# Patient Record
Sex: MALE | Race: WHITE | NOT HISPANIC OR LATINO | Employment: OTHER | URBAN - METROPOLITAN AREA
[De-identification: names, ages, dates, MRNs, and addresses within clinical notes are randomized per-mention and may not be internally consistent; named-entity substitution may affect disease eponyms.]

---

## 2019-08-22 ENCOUNTER — APPOINTMENT (OUTPATIENT)
Dept: GENERAL RADIOLOGY | Facility: CLINIC | Age: 59
End: 2019-08-22
Attending: EMERGENCY MEDICINE
Payer: COMMERCIAL

## 2019-08-22 ENCOUNTER — HOSPITAL ENCOUNTER (EMERGENCY)
Facility: CLINIC | Age: 59
Discharge: ANOTHER HOSPITAL | End: 2019-08-23
Attending: EMERGENCY MEDICINE | Admitting: EMERGENCY MEDICINE
Payer: COMMERCIAL

## 2019-08-22 ENCOUNTER — APPOINTMENT (OUTPATIENT)
Dept: CT IMAGING | Facility: CLINIC | Age: 59
End: 2019-08-22
Attending: EMERGENCY MEDICINE
Payer: COMMERCIAL

## 2019-08-22 DIAGNOSIS — R06.09 DYSPNEA ON EXERTION: ICD-10-CM

## 2019-08-22 DIAGNOSIS — R93.89 ABNORMAL CT OF THE CHEST: ICD-10-CM

## 2019-08-22 LAB
ALBUMIN SERPL-MCNC: 3.1 G/DL (ref 3.4–5)
ALP SERPL-CCNC: 105 U/L (ref 40–150)
ALT SERPL W P-5'-P-CCNC: 18 U/L (ref 0–70)
ANION GAP SERPL CALCULATED.3IONS-SCNC: 7 MMOL/L (ref 3–14)
AST SERPL W P-5'-P-CCNC: 12 U/L (ref 0–45)
BASOPHILS # BLD AUTO: 0.1 10E9/L (ref 0–0.2)
BASOPHILS NFR BLD AUTO: 1 %
BILIRUB SERPL-MCNC: 0.6 MG/DL (ref 0.2–1.3)
BUN SERPL-MCNC: 22 MG/DL (ref 7–30)
CALCIUM SERPL-MCNC: 9.2 MG/DL (ref 8.5–10.1)
CHLORIDE SERPL-SCNC: 106 MMOL/L (ref 94–109)
CO2 SERPL-SCNC: 27 MMOL/L (ref 20–32)
CREAT SERPL-MCNC: 1.45 MG/DL (ref 0.66–1.25)
D DIMER PPP FEU-MCNC: 1.7 UG/ML FEU (ref 0–0.5)
DIFFERENTIAL METHOD BLD: NORMAL
EOSINOPHIL # BLD AUTO: 0.2 10E9/L (ref 0–0.7)
EOSINOPHIL NFR BLD AUTO: 2.5 %
ERYTHROCYTE [DISTWIDTH] IN BLOOD BY AUTOMATED COUNT: 13.1 % (ref 10–15)
GFR SERPL CREATININE-BSD FRML MDRD: 53 ML/MIN/{1.73_M2}
GLUCOSE SERPL-MCNC: 213 MG/DL (ref 70–99)
HCT VFR BLD AUTO: 44.2 % (ref 40–53)
HGB BLD-MCNC: 14.4 G/DL (ref 13.3–17.7)
IMM GRANULOCYTES # BLD: 0 10E9/L (ref 0–0.4)
IMM GRANULOCYTES NFR BLD: 0.3 %
LYMPHOCYTES # BLD AUTO: 2.3 10E9/L (ref 0.8–5.3)
LYMPHOCYTES NFR BLD AUTO: 26.4 %
MCH RBC QN AUTO: 28.1 PG (ref 26.5–33)
MCHC RBC AUTO-ENTMCNC: 32.6 G/DL (ref 31.5–36.5)
MCV RBC AUTO: 86 FL (ref 78–100)
MONOCYTES # BLD AUTO: 0.6 10E9/L (ref 0–1.3)
MONOCYTES NFR BLD AUTO: 6.5 %
NEUTROPHILS # BLD AUTO: 5.5 10E9/L (ref 1.6–8.3)
NEUTROPHILS NFR BLD AUTO: 63.3 %
NRBC # BLD AUTO: 0 10*3/UL
NRBC BLD AUTO-RTO: 0 /100
NT-PROBNP SERPL-MCNC: 608 PG/ML (ref 0–900)
PLATELET # BLD AUTO: 288 10E9/L (ref 150–450)
POTASSIUM SERPL-SCNC: 3.9 MMOL/L (ref 3.4–5.3)
PROT SERPL-MCNC: 7 G/DL (ref 6.8–8.8)
RBC # BLD AUTO: 5.13 10E12/L (ref 4.4–5.9)
SODIUM SERPL-SCNC: 140 MMOL/L (ref 133–144)
TROPONIN I SERPL-MCNC: <0.015 UG/L (ref 0–0.04)
WBC # BLD AUTO: 8.7 10E9/L (ref 4–11)

## 2019-08-22 PROCEDURE — 25000128 H RX IP 250 OP 636: Performed by: EMERGENCY MEDICINE

## 2019-08-22 PROCEDURE — 83880 ASSAY OF NATRIURETIC PEPTIDE: CPT | Performed by: EMERGENCY MEDICINE

## 2019-08-22 PROCEDURE — 96360 HYDRATION IV INFUSION INIT: CPT | Performed by: EMERGENCY MEDICINE

## 2019-08-22 PROCEDURE — 71046 X-RAY EXAM CHEST 2 VIEWS: CPT

## 2019-08-22 PROCEDURE — 85379 FIBRIN DEGRADATION QUANT: CPT | Performed by: EMERGENCY MEDICINE

## 2019-08-22 PROCEDURE — 96361 HYDRATE IV INFUSION ADD-ON: CPT | Performed by: EMERGENCY MEDICINE

## 2019-08-22 PROCEDURE — 85025 COMPLETE CBC W/AUTO DIFF WBC: CPT | Performed by: EMERGENCY MEDICINE

## 2019-08-22 PROCEDURE — 93010 ELECTROCARDIOGRAM REPORT: CPT | Mod: Z6 | Performed by: EMERGENCY MEDICINE

## 2019-08-22 PROCEDURE — 99285 EMERGENCY DEPT VISIT HI MDM: CPT | Mod: 25 | Performed by: EMERGENCY MEDICINE

## 2019-08-22 PROCEDURE — 84484 ASSAY OF TROPONIN QUANT: CPT | Performed by: EMERGENCY MEDICINE

## 2019-08-22 PROCEDURE — 93005 ELECTROCARDIOGRAM TRACING: CPT | Performed by: EMERGENCY MEDICINE

## 2019-08-22 PROCEDURE — 71275 CT ANGIOGRAPHY CHEST: CPT

## 2019-08-22 PROCEDURE — 25000125 ZZHC RX 250: Performed by: EMERGENCY MEDICINE

## 2019-08-22 PROCEDURE — 80053 COMPREHEN METABOLIC PANEL: CPT | Performed by: EMERGENCY MEDICINE

## 2019-08-22 RX ORDER — IOPAMIDOL 755 MG/ML
100 INJECTION, SOLUTION INTRAVASCULAR ONCE
Status: COMPLETED | OUTPATIENT
Start: 2019-08-22 | End: 2019-08-22

## 2019-08-22 RX ADMIN — SODIUM CHLORIDE 74 ML: 9 INJECTION, SOLUTION INTRAVENOUS at 20:30

## 2019-08-22 RX ADMIN — IOPAMIDOL 100 ML: 755 INJECTION, SOLUTION INTRAVENOUS at 20:29

## 2019-08-22 RX ADMIN — SODIUM CHLORIDE 500 ML: 9 INJECTION, SOLUTION INTRAVENOUS at 23:28

## 2019-08-22 ASSESSMENT — ENCOUNTER SYMPTOMS
EYES NEGATIVE: 1
CARDIOVASCULAR NEGATIVE: 1
GASTROINTESTINAL NEGATIVE: 1
MUSCULOSKELETAL NEGATIVE: 1
CONSTITUTIONAL NEGATIVE: 1
SHORTNESS OF BREATH: 1
ENDOCRINE NEGATIVE: 1
HEMATOLOGIC/LYMPHATIC NEGATIVE: 1
PSYCHIATRIC NEGATIVE: 1
ALLERGIC/IMMUNOLOGIC NEGATIVE: 1

## 2019-08-22 NOTE — ED NOTES
Shortness of breath and dizziness for past 2 weeks.  Patient stated that on   Saturday he had 2 syncopal episodes and was nauseous afterwards.  Patient denies hitting head.  Patient stated that he had these symptoms last winter but then they went away.  Patient said that he was seen in urgent care last weekend for same symptoms.

## 2019-08-22 NOTE — ED PROVIDER NOTES
"  History     Chief Complaint   Patient presents with     Shortness of Breath     episodes of sob with syncope. last one saturday     HPI  Jeremiah Isaac is a 59 year old male with complaints of shortness of breath for the past two weeks. He reports first sign of symptom last winter with worsening symptoms in the last two weeks. Patient notes strenuous activity including going up a hill or stairs causes him to have shortness of breath and dizziness. He also notes \"breathing really hard\" and sweating a lot after activity. He reports losing consciousness a couple of times. His last episode of LOC was five days ago.  His roommate witnessed an incident.  He denies shortness of breath while lying down. 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 Patient states he has narcolepsy and is prescribed adderall. No medication allergies.  Patient recalls father with past heart problems such as CABG before the age of 50. His clinic provider is at [facility]. Prior stress echocardiogram at [facility] in [place]. V\"alve regurgitation was found with no blockages\" by report. He denies smoking in the past.     Social History: The patient lives in [place], [place]. He presents by private car with friend, Sarah.    Allergies:  No Known Allergies    Problem List:    There are no active problems to display for this patient.       Past Medical History:    No past medical history on file.    Past Surgical History:    No past surgical history on file.    Family History:    No family history on file.    Marital Status:    Social History     Tobacco Use     Smoking status: Not on file   Substance Use Topics     Alcohol use: Not on file     Drug use: Not on file        Medications:      No current outpatient medications on file.      Review of Systems   Constitutional: Negative.    HENT: Negative.    Eyes: Negative.    Respiratory: Positive for shortness of breath (with activity over the last 2 week, improved with " rest).    Cardiovascular: Negative.    Gastrointestinal: Negative.    Endocrine: Negative.    Genitourinary: Negative.    Musculoskeletal: Negative.    Skin: Negative.    Allergic/Immunologic: Negative.    Neurological: Positive for syncope.   Hematological: Negative.    Psychiatric/Behavioral: Negative.        Physical Exam   BP: (!) 170/101  Pulse: 78  Heart Rate: 73  Temp: 97.4  F (36.3  C)  Resp: 18  Weight: 145.2 kg (320 lb)  SpO2: 97 %      Physical Exam   Constitutional: He is oriented to person, place, and time. He appears well-developed and well-nourished.  Non-toxic appearance. He does not appear ill. No distress. He is not intubated.   HENT:   Head: Normocephalic and atraumatic.   Eyes: Pupils are equal, round, and reactive to light. EOM are normal.   Neck: Normal range of motion. Neck supple. No hepatojugular reflux and no JVD present. No tracheal deviation present. No thyromegaly present.   Cardiovascular: Normal rate and normal heart sounds.  No extrasystoles are present. Exam reveals no gallop, no friction rub and no decreased pulses.   No murmur heard.  Pulmonary/Chest: Effort normal. No accessory muscle usage or stridor. No apnea, no tachypnea and no bradypnea. He is not intubated. No respiratory distress.   Abdominal: Soft. He exhibits no distension, no ascites and no mass. There is no splenomegaly or hepatomegaly. There is no tenderness. There is no rebound and no guarding.   Genitourinary: Rectal exam shows guaiac negative stool.   Musculoskeletal:        Right lower leg: Normal.        Left lower leg: Normal.   Lymphadenopathy:     He has no cervical adenopathy.   Neurological: He is alert and oriented to person, place, and time. He is not disoriented. No cranial nerve deficit.   Skin: Capillary refill takes less than 2 seconds. No ecchymosis and no rash noted. He is not diaphoretic. No cyanosis or erythema. No pallor. Nails show no clubbing.   Psychiatric: He has a normal mood and affect. His  behavior is normal. His mood appears not anxious. He is not agitated.       ED Course        Procedures               EKG Interpretation:      Interpreted by Tay Kline MD  Time reviewed:17:50  Symptoms at time of EKG: Shortness of breath   Rhythm: normal sinus   Rate: Normal  Axis: Left Axis Deviation  Ectopy: none  Conduction: normal  ST Segments/ T Waves: Non-specific ST-T wave changes  Q Waves: nonspecific  Comparison to prior: No old EKG available    Clinical Impression: Normal sinus rhythm, left anterior fascicular block.          Critical Care time:  none               ED medications: none       ED labs and imaging:  Results for orders placed or performed during the hospital encounter of 08/22/19   Chest XR,  PA & LAT    Narrative    CHEST TWO VIEWS  8/22/2019 6:59 PM     HISTORY: Dyspnea on exertion x 2 weeks with fainting spells.     COMPARISON: None.      Impression    IMPRESSION: Normal.    KIAH EDWARDS MD   CT Chest Pulmonary Embolism w Contrast    Addendum: 8/22/2019    Enlarged pulmonary trunk measuring up to 3.7 cm which can be seen in  pulmonary hypertension.    CHIDI KUNZ MD      Narrative    CT CHEST PULMONARY EMBOLISM WITH CONTRAST  8/22/2019 8:42 PM    HISTORY:  Dyspnea on exertion, syncope, elevated D-dimer. Evaluate for  pulmonary embolism versus other acute cardiopulmonary process.    TECHNIQUE: Scans obtained from the apices through the diaphragm with  IV contrast. 100 mL Isovue 370 IV injected. Radiation dose for this  scan was reduced using automated exposure control, adjustment of the  mA and/or kV according to patient size, or iterative reconstruction  technique.    COMPARISON: None available    FINDINGS:  Vascular: No acute thoracic aortic abnormality. No evidence for  pulmonary embolism. Pulmonary trunk is enlarged measuring 3.7 cm in  diameter.    Lungs and pleura: No pleural effusion or pneumothorax is seen. Mild  dependent subsegmental atelectasis seen in the  bilateral lungs. A 1.1  cm subpleural nodular opacity is noted in the superior segment of the  right lower lobe (series 5, image 47).    Lymph nodes: No enlarged lymph nodes. Subcentimeter mediastinal lymph  nodes are noted measuring up to 7 mm in the right paratracheal space.    Additional findings: 5 mm hypoattenuating nodule is noted in the right  lobe of the thyroid gland. The heart is enlarged. No pericardial  effusion is seen. Multivessel coronary artery calcifications are  present. Scattered atrophy of the pancreas is partially visualized. A  3.4 x 3.0 cm peripherally calcified fat containing lesion is noted in  the right chest wall (series 4, image 38). No suspicious osseous  lesions are seen. Multilevel degenerative changes are present in the  spine.      Impression    IMPRESSION:   1.  No pulmonary embolism seen.  2.  Cardiomegaly with multivessel coronary artery calcifications.  3.  3.4 cm peripherally calcified fat containing lesion in the right  chest wall which may represent fat necrosis.  4.  1.1 cm irregularly-shaped subpleural nodular opacity in the right  lower lobe which may represent focal inflammation or infection.  Short-term interval follow-up with chest CT in 3 months is  recommended.     Recommendations for an incidental lung nodule > 8mm:    Low risk patients: Consider follow-up CT in 3 months, PET/CT, and/or  tissue sampling.    High risk patients: Same as for low risk patients.    *Low Risk: Minimal or absent history of smoking or other known risk  factors.  *Nonsolid (ground-glass) or partly solid nodules may require longer  follow-up to exclude indolent adenocarcinoma.  *Recommendations based on Guidelines for the Management of Incidental  Pulmonary Nodules Detected at CT: From the Fleischner Society 2017,  Radiology 2017.    CHIDI KUNZ MD   CBC with platelets, differential   Result Value Ref Range    WBC 8.7 4.0 - 11.0 10e9/L    RBC Count 5.13 4.4 - 5.9 10e12/L    Hemoglobin 14.4  13.3 - 17.7 g/dL    Hematocrit 44.2 40.0 - 53.0 %    MCV 86 78 - 100 fl    MCH 28.1 26.5 - 33.0 pg    MCHC 32.6 31.5 - 36.5 g/dL    RDW 13.1 10.0 - 15.0 %    Platelet Count 288 150 - 450 10e9/L    Diff Method Automated Method     % Neutrophils 63.3 %    % Lymphocytes 26.4 %    % Monocytes 6.5 %    % Eosinophils 2.5 %    % Basophils 1.0 %    % Immature Granulocytes 0.3 %    Nucleated RBCs 0 0 /100    Absolute Neutrophil 5.5 1.6 - 8.3 10e9/L    Absolute Lymphocytes 2.3 0.8 - 5.3 10e9/L    Absolute Monocytes 0.6 0.0 - 1.3 10e9/L    Absolute Eosinophils 0.2 0.0 - 0.7 10e9/L    Absolute Basophils 0.1 0.0 - 0.2 10e9/L    Abs Immature Granulocytes 0.0 0 - 0.4 10e9/L    Absolute Nucleated RBC 0.0    Comprehensive metabolic panel   Result Value Ref Range    Sodium 140 133 - 144 mmol/L    Potassium 3.9 3.4 - 5.3 mmol/L    Chloride 106 94 - 109 mmol/L    Carbon Dioxide 27 20 - 32 mmol/L    Anion Gap 7 3 - 14 mmol/L    Glucose 213 (H) 70 - 99 mg/dL    Urea Nitrogen 22 7 - 30 mg/dL    Creatinine 1.45 (H) 0.66 - 1.25 mg/dL    GFR Estimate 53 (L) >60 mL/min/[1.73_m2]    GFR Estimate If Black 61 >60 mL/min/[1.73_m2]    Calcium 9.2 8.5 - 10.1 mg/dL    Bilirubin Total 0.6 0.2 - 1.3 mg/dL    Albumin 3.1 (L) 3.4 - 5.0 g/dL    Protein Total 7.0 6.8 - 8.8 g/dL    Alkaline Phosphatase 105 40 - 150 U/L    ALT 18 0 - 70 U/L    AST 12 0 - 45 U/L   NT pro BNP   Result Value Ref Range    N-Terminal Pro BNP Inpatient 608 0 - 900 pg/mL   D dimer quantitative   Result Value Ref Range    D Dimer 1.7 (H) 0.0 - 0.50 ug/ml FEU   Troponin I   Result Value Ref Range    Troponin I ES <0.015 0.000 - 0.045 ug/L         ED Vitals:  Vitals:    08/22/19 2145 08/22/19 2200 08/22/19 2215 08/22/19 2325   BP: (!) 154/81 (!) 174/96 (!) 170/91 (!) 152/91   BP Location:    Left arm   Pulse: 73 75 74 74   Resp:  13 24 18   Temp:       TempSrc:       SpO2: 94% 94% 93% 97%   Weight:         Prior or recent diagnostic imaging and work-up:  Records obtained from review  b Care Everywhere  Stress echocardiogram on 2/27/19  Interpretation Summary    * STRESS ECHO.    * Stress Echo is non-diagnostic secondary to suboptimal maximum predicted  heart rate. The stress test was terminated due hypertensive response at peak  stress where the BP at peak stress was 240/100 mmHg..    * Stress EKG is negative for ischemic changes.    * No chest pain noted.    * 5 METS and  79 % max predicted heart rate (MPHR) achieved.    * Elevated baseline BP with hypertensive response to peak stress.    * Severely reduced aerobic capacity.    * Target heart rate not achieved.    * RESTING ECHO.    * The left ventricular systolic function is normal, estimated LVEF 65-70%.        Assessments & Plan (with Medical Decision Making)   Clinical impression: 59-year-old male who presented from home by private car with his friend- Sarah-for evaluation for 2-week history of progressive dyspnea on exertion and fainting spells that have been witnessed.  Symptoms suspicious for an anginal equivalent, need to exclude acute coronary syndrome.    Last episode of fainting 5 days prior on August 17, 2019.  Patient reports with minimal activity  increasing shortness of breath that improves with rest.  No personal history of coronary disease but had a stress echocardiogram in February 2019 which was nondiagnostic and suboptimal due to severely decreased aerobic capacity- see report.  No fever or chills,  history of narcolepsy, family history of severe coronary disease with father having a heart attack at age 48.,  He currently lives in [facility] with a roommate.  Patient is followed by the [facility] in [place], [place].  Patient reports he is scheduled to see cardiology in [place], [place] for follow-up on symptoms but appointment is not for another 3 weeks. No leg swelling no history of DVT or PE.No report chest pain with activity but does report significant fatigue with minimal exertion including  "climbing up steps.  Currently on Adderall, amlodipine, aspirin, atorvastatin, clonidine, Jardiance, glipizide Humalog, lisinopril, metformin, metoprolol.  On my exam- large BMI patient- (320lb), in no respiratory distress with protuberant abdomen.  murmur on exam but no crackles, no wheezing or rhonchi.  94% on room air blood pressure was 149/80.In sinus rhythm on telemetry.    ED course and Plan:   EKG on arrival in the department shows poor R wave progression, left axis deviation, sinus rhythm, T wave inversion with depression in lead III, no old EKG for comparison.  I expressed concern about his symptoms and possible causes for exertional dyspnea with syncope include valvulopathy such as aortic stenosis, symptoms could be due to stable angina with report of improvement with symptoms with rest and dyspnea can be an anginal equivalent although he does not report palpitations or chest pain.  Patient is associated comorbidities blood work was obtained.    Work-up in the department today revealed a normal BNP.  Elevated d-dimer 1.7 age-adjusted, glucose of 213 creatinine 1.45, and a hemogram that was normal.  With elevated d-dimer, dyspnea on exertion and syncope, normal chest x-ray  CT chest PE protocol was obtained based on patient's large BMI to exclude a PE.  CT PE protocol was negative for PE however cardiomegaly was noted and multivessel coronary artery calcifications.  Additional findings  noted by the interpreting radiologist-see detailed report above.    Patient requested transfer to [facility] with  prior diagnostic evaluation and \"my records are there\".  There was significant delay in transfer as patient initially wanted to be transferred to [facility]- no beds, we ultimately settled on transferring to [facility] after making attempts to transfer him to his desired hospital in the [facility].    I spoke with Dr. Robles at 12.28am- admitting hospitalist at [facility] who agreed to assume care upon transfer after reviewing rationale for transfer, clinical history, presentation, ED course and work-up.      Disclaimer: This note consists of symbols derived from keyboarding, dictation and/or voice recognition software. As a result, there may be errors in the script that have gone undetected. Please consider this when interpreting information found in this chart.  I have reviewed the nursing notes.    I have reviewed the findings, diagnosis, plan and need for follow up with the patient.       New Prescriptions    No medications on file       Final diagnoses:   Dyspnea on exertion   Abnormal CT of the chest - Coronary calcifications, cardiomegaly     This document serves as a record of the services and decisions personally performed and made by Tay Kline. The HPI was created on his behalf by Miguelito Kearney, a trained medical scribe. The creation of this document is based on the provider's statements to the medical scribe.  Miguelito Kearney 6:37 PM August 22, 2019    Provider:  The information in this document created by the medical scribe for me, accurately reflects the services I personally performed and the decisions made by me. I have reviewed and approved this document for accuracy prior to leaving the patient care area.  Tay Kline 6:37 PM August 22, 2019 8/22/2019   [facility] EMERGENCY DEPARTMENT     Tay Kline MD  08/23/19 0050

## 2019-08-23 VITALS
HEART RATE: 74 BPM | SYSTOLIC BLOOD PRESSURE: 152 MMHG | TEMPERATURE: 97.4 F | WEIGHT: 315 LBS | DIASTOLIC BLOOD PRESSURE: 91 MMHG | RESPIRATION RATE: 17 BRPM | OXYGEN SATURATION: 98 %

## 2019-08-23 ASSESSMENT — MIFFLIN-ST. JEOR: SCORE: 2289.08

## 2019-08-23 NOTE — ED PROVIDER NOTES
Patient signed out.  59-year-old male presenting via private vehicle awaiting transfer to outside facility.  Plan was for transfer, with ambulance form which had been arranged.  Ultimately, I was notified and became involved in the patient's care and patient refused EMS transfer.  I discussed risks including and up to death, with worsening pain, shortness of breath.  Patient is aware, and refuses EMS transfer.  I discussed with patient that he should go directly to [facility], do not stop to eat, and do not eat or drink anything in case procedure is performed in the morning.  Patient voiced his understanding.     Ministerio Clifton MD  08/23/19 0147

## 2019-08-25 ASSESSMENT — MIFFLIN-ST. JEOR: SCORE: 2255.51

## 2019-08-26 ENCOUNTER — SURGERY - HEALTHEAST (OUTPATIENT)
Dept: CARDIOLOGY | Facility: CLINIC | Age: 59
End: 2019-08-26

## 2019-08-29 ENCOUNTER — AMBULATORY - HEALTHEAST (OUTPATIENT)
Dept: CARDIAC REHAB | Facility: CLINIC | Age: 59
End: 2019-08-29

## 2019-09-30 ENCOUNTER — AMBULATORY - HEALTHEAST (OUTPATIENT)
Dept: CARDIOLOGY | Facility: CLINIC | Age: 59
End: 2019-09-30

## 2019-10-01 ENCOUNTER — COMMUNICATION - HEALTHEAST (OUTPATIENT)
Dept: CARDIOLOGY | Facility: CLINIC | Age: 59
End: 2019-10-01

## 2019-10-01 DIAGNOSIS — Z95.5 STATUS POST INSERTION OF DRUG ELUTING CORONARY ARTERY STENT: ICD-10-CM

## 2019-10-01 DIAGNOSIS — I25.119 CORONARY ARTERY DISEASE INVOLVING NATIVE CORONARY ARTERY OF NATIVE HEART WITH ANGINA PECTORIS (H): ICD-10-CM

## 2019-10-07 ENCOUNTER — OFFICE VISIT - HEALTHEAST (OUTPATIENT)
Dept: CARDIOLOGY | Facility: CLINIC | Age: 59
End: 2019-10-07

## 2019-10-07 ENCOUNTER — SURGERY - HEALTHEAST (OUTPATIENT)
Dept: CARDIOLOGY | Facility: CLINIC | Age: 59
End: 2019-10-07

## 2019-10-07 ENCOUNTER — AMBULATORY - HEALTHEAST (OUTPATIENT)
Dept: CARDIOLOGY | Facility: CLINIC | Age: 59
End: 2019-10-07

## 2019-10-07 DIAGNOSIS — I25.119 CORONARY ARTERY DISEASE INVOLVING NATIVE CORONARY ARTERY OF NATIVE HEART WITH ANGINA PECTORIS (H): ICD-10-CM

## 2019-10-07 ASSESSMENT — MIFFLIN-ST. JEOR: SCORE: 2294.98

## 2019-10-10 ENCOUNTER — SURGERY - HEALTHEAST (OUTPATIENT)
Dept: CARDIOLOGY | Facility: CLINIC | Age: 59
End: 2019-10-10

## 2019-10-10 ASSESSMENT — MIFFLIN-ST. JEOR: SCORE: 2303.59

## 2019-10-11 ASSESSMENT — MIFFLIN-ST. JEOR: SCORE: 2267.82

## 2019-10-21 ENCOUNTER — TRANSFERRED RECORDS (OUTPATIENT)
Dept: HEALTH INFORMATION MANAGEMENT | Facility: CLINIC | Age: 59
End: 2019-10-21

## 2019-10-30 ENCOUNTER — TRANSFERRED RECORDS (OUTPATIENT)
Dept: HEALTH INFORMATION MANAGEMENT | Facility: CLINIC | Age: 59
End: 2019-10-30

## 2019-11-05 ENCOUNTER — TRANSFERRED RECORDS (OUTPATIENT)
Dept: HEALTH INFORMATION MANAGEMENT | Facility: CLINIC | Age: 59
End: 2019-11-05

## 2019-11-07 ENCOUNTER — HEALTH MAINTENANCE LETTER (OUTPATIENT)
Age: 59
End: 2019-11-07

## 2019-11-08 ENCOUNTER — AMBULATORY - HEALTHEAST (OUTPATIENT)
Dept: CARDIOLOGY | Facility: CLINIC | Age: 59
End: 2019-11-08

## 2019-11-26 ENCOUNTER — OFFICE VISIT - HEALTHEAST (OUTPATIENT)
Dept: CARDIOLOGY | Facility: CLINIC | Age: 59
End: 2019-11-26

## 2019-11-26 DIAGNOSIS — R06.09 DYSPNEA ON EXERTION: ICD-10-CM

## 2019-11-26 DIAGNOSIS — I27.20 PULMONARY HYPERTENSION (H): ICD-10-CM

## 2019-11-26 LAB
ANION GAP SERPL CALCULATED.3IONS-SCNC: 14 MMOL/L (ref 5–18)
BNP SERPL-MCNC: 433 PG/ML (ref 0–49)
BUN SERPL-MCNC: 48 MG/DL (ref 8–22)
CALCIUM SERPL-MCNC: 9.7 MG/DL (ref 8.5–10.5)
CHLORIDE BLD-SCNC: 102 MMOL/L (ref 98–107)
CO2 SERPL-SCNC: 24 MMOL/L (ref 22–31)
CREAT SERPL-MCNC: 2.49 MG/DL (ref 0.7–1.3)
GFR SERPL CREATININE-BSD FRML MDRD: 27 ML/MIN/1.73M2
GLUCOSE BLD-MCNC: 275 MG/DL (ref 70–125)
POTASSIUM BLD-SCNC: 4.8 MMOL/L (ref 3.5–5)
SODIUM SERPL-SCNC: 140 MMOL/L (ref 136–145)

## 2019-11-26 ASSESSMENT — MIFFLIN-ST. JEOR: SCORE: 2254.15

## 2019-11-27 ENCOUNTER — AMBULATORY - HEALTHEAST (OUTPATIENT)
Dept: CARDIOLOGY | Facility: CLINIC | Age: 59
End: 2019-11-27

## 2019-11-27 DIAGNOSIS — I25.119 CORONARY ARTERY DISEASE INVOLVING NATIVE CORONARY ARTERY OF NATIVE HEART WITH ANGINA PECTORIS (H): ICD-10-CM

## 2019-12-04 ENCOUNTER — HOSPITAL ENCOUNTER (OUTPATIENT)
Dept: NUCLEAR MEDICINE | Facility: CLINIC | Age: 59
Discharge: HOME | End: 2019-12-04
Attending: INTERNAL MEDICINE

## 2019-12-04 ENCOUNTER — HOSPITAL ENCOUNTER (OUTPATIENT)
Dept: RADIOLOGY | Facility: CLINIC | Age: 59
Discharge: HOME | End: 2019-12-04
Attending: INTERNAL MEDICINE

## 2019-12-04 DIAGNOSIS — I27.20 PULMONARY HYPERTENSION (H): ICD-10-CM

## 2019-12-04 DIAGNOSIS — R06.09 DYSPNEA ON EXERTION: ICD-10-CM

## 2019-12-04 DIAGNOSIS — R06.09 OTHER FORMS OF DYSPNEA: ICD-10-CM

## 2019-12-13 ENCOUNTER — OFFICE VISIT - HEALTHEAST (OUTPATIENT)
Dept: CARDIOLOGY | Facility: CLINIC | Age: 59
End: 2019-12-13

## 2019-12-13 DIAGNOSIS — E78.2 MIXED HYPERLIPIDEMIA: ICD-10-CM

## 2019-12-13 DIAGNOSIS — I27.20 PULMONARY HYPERTENSION (H): ICD-10-CM

## 2019-12-13 LAB
ALBUMIN SERPL-MCNC: 3.5 G/DL (ref 3.5–5)
ALP SERPL-CCNC: 106 U/L (ref 45–120)
ALT SERPL W P-5'-P-CCNC: 16 U/L (ref 0–45)
AST SERPL W P-5'-P-CCNC: 14 U/L (ref 0–40)
BILIRUB DIRECT SERPL-MCNC: 0.2 MG/DL
BILIRUB SERPL-MCNC: 0.6 MG/DL (ref 0–1)
PROT SERPL-MCNC: 6.8 G/DL (ref 6–8)
TSH SERPL DL<=0.005 MIU/L-ACNC: 3.23 UIU/ML (ref 0.3–5)

## 2019-12-13 ASSESSMENT — MIFFLIN-ST. JEOR: SCORE: 2272.3

## 2019-12-16 LAB — ANA SER QL: 0.4 U

## 2019-12-18 ENCOUNTER — HOSPITAL ENCOUNTER (OUTPATIENT)
Dept: RESPIRATORY THERAPY | Facility: HOSPITAL | Age: 59
Discharge: HOME | End: 2019-12-18
Attending: INTERNAL MEDICINE

## 2019-12-18 DIAGNOSIS — I27.20 PULMONARY HYPERTENSION (H): ICD-10-CM

## 2019-12-18 LAB — HGB BLD-MCNC: 14.4 G/DL (ref 14–18)

## 2019-12-30 ENCOUNTER — COMMUNICATION - HEALTHEAST (OUTPATIENT)
Dept: CARDIOLOGY | Facility: CLINIC | Age: 59
End: 2019-12-30

## 2019-12-30 DIAGNOSIS — I27.20 PULMONARY HYPERTENSION (H): ICD-10-CM

## 2020-01-02 ENCOUNTER — SURGERY - HEALTHEAST (OUTPATIENT)
Dept: CARDIOLOGY | Facility: CLINIC | Age: 60
End: 2020-01-02

## 2020-01-02 ENCOUNTER — COMMUNICATION - HEALTHEAST (OUTPATIENT)
Dept: CARDIOLOGY | Facility: CLINIC | Age: 60
End: 2020-01-02

## 2020-01-08 ENCOUNTER — SURGERY - HEALTHEAST (OUTPATIENT)
Dept: CARDIOLOGY | Facility: CLINIC | Age: 60
End: 2020-01-08

## 2020-01-08 ASSESSMENT — MIFFLIN-ST. JEOR: SCORE: 2276.83

## 2020-01-09 ENCOUNTER — TELEPHONE (OUTPATIENT)
Dept: CARDIOLOGY | Facility: CLINIC | Age: 60
End: 2020-01-09

## 2020-01-09 NOTE — TELEPHONE ENCOUNTER
Contacted pt to schedule appointment. Pt was okay with going to Dr. Riojas's [facility]. Provided the pt the address, date and time of the appointment, which he confirmed worked for him.     Pt asked why he needs to see another provider to go over his condition. Stated to pt that the pt needs to be assessed to determine what his plan of care is. Pt stated that, per the discharging physician, he should be able to go to the hospital. Stated to pt that the discharge plan was never discussed with the office, but would follow-up on the discharge plan. Pt stated that he was groggy at the time and could not remember what exactly happened and requested his sister, Oliva, be contacted.    Pt verbally consented to communication, regarding the pt's health, with his sister Oliva. Pt provided Oliva's phone number ([phone]).     Stated to pt contact will be made with pt's sister. Pt verbalized understanding and had no further questions.    Sharron Katz  Clinic   Pulmonary Hypertension  [facility]  (P) [phone]

## 2020-01-13 NOTE — PROGRESS NOTES
Santiago Riojas M.D.  Cardiovascular Medicine    I personally saw and examined this patient, discussed care with housestaff and other consultants, reviewed current laboratories and imaging studies, and conveyed impression and diagnostic/therapeutic plan to patient.    EL Earl (Sep. 16, 2019September 16, 2019 - Present)  [phone] (Work)  [phone] (Fax)  [address] [place] [address]  Physician Assistant    Shan Malhotra MD    [address] [facility]    [address]    [place], [place] [id number] [phone] (Fax)       Humberto Muse MD (Nephrology)  [phone] (Work)  [phone] (Fax)   Nephrology      Estela Landa MD    [address] [place] [address]    [phone] (Fax)\    [facility]    [address]    [place] [id number] [phone]    Felix Coleman MD    [address] [place], [place] [id number] [phone] (Fax)    Sarath Jimenez (Jonel), MD    [address] [facility]    [address]    [place], [place] [id number] [phone] (Fax)     Jessica Phelps M.D.  [facility]     Problem List  1. Pulmonary hypertension  2. Elevated body mass index  3. Abnormal ECG: bradycardia, incomplete RBBB, RVH  4. Diabetes  5. Neuropathy  6. Hypertension  7. Negative serologic screen collagen vascular disease  8. CKD with proteinuria Estimated GFR 30-50  9. Elevated kappa light chains, low albumin, no evidence of monoclonal spike  10. Narcolepsy  11.Sleep disordered breath   Central sleep apnea   BIP with ASV currently  12. Elevated coronary calcium score with negative stress test   History of LAD stent  13, Questionable history of narcolepsy ? Central sleep apnea  14. History of methamphetamine usage  15. Exertional syncope    Plan:  1. Admit to telemetry  2. CT chest and abdomen: ?PVOD cirrhosis, ascitea  Non contrast  3,. Neurology consult for narcolepsy  4.  "Endocrine/diabetes consult to straighten out medications: Jardiance would be ideal in this situation, however cost is issue and is taking glypizide and insulin  5. Repeat right and left heart catherization as previously recorded PCP approximately 50 with LVEDP 12.  Please obtain multiple wedge positions to look for PVOD.  Also inhaled nitric oxide  6. Hematology consult: kappa chains in urine  7. Arterial blood gases on room air  8. Call the sleep people and see if someone could see him in-patient.  His narcolepsy may be central sleep apnea, genetic or acquired.  9. If possible place respitrace on patient for 24 hours.    Dear Rodolfo:    We appreciate the opportunity to see your patients.  I saw Mr. Isaac, a 59 year old male with very high pulmonary artery pressures but normal LVEDP, normal collagen vascular serologies, normal thyroid functions, a question of kappa light chain elevation without a monoclonal spike, poorly controlled diabetes, multiple negative imaging stress tests without evidence of previous infarction or inducible ischemia, negative VQ lung scan.  His PCP was quite high with a normal LVED which would raise the issue of veno-occlusive disease as your pointed out.  He is a very complex case with weight, central sleep apnea, narcolepsy, light chains in urine,  He has exertional syncope which is trroubling and for which I suggested immediate admission to which he agreed.  He has a history of self-treatment of his \"narcolepsy\" with methamphetamine (as recently as two weeks ago).               Family History: hypertension, ASHD, Diabetes    Smoke:none    ETOH: none    Occupational: disabled secondary to narcolepsy    Social: single  .    Objective  Constitutional: alert, oriented, normal gait and station, normal mentation.  Oral: moist mucous membranes  Lymph: without pathologic adenopathy  Chest: clear to ausculation and percussion with bilateral basilar rales  Cor: No evidence of left or right " ventricular activity.  Rhythm is regular.  S1 normal, S2 split physiologically. Murmurs are not present  Abdomen: without tenderness, rebound, guarding, masses, ascites  Extremities Bilateral lymphedema, right carpal tunnel scar  Neuro: no focal defects, normal mentation  Skin: without open lesions  Psych: oriented, verbal, mental status in tact      Meds  Current Outpatient Medications   Medication     amphetamine-dextroamphetamine (ADDERALL) 20 MG tablet     aspirin 81 MG EC tablet     atorvastatin (LIPITOR) 40 MG tablet     cloNIDine (CATAPRES) 0.3 MG tablet     clopidogrel (PLAVIX) 75 MG tablet     furosemide (LASIX) 40 MG tablet     glipiZIDE (GLUCOTROL XL) 10 MG 24 hr tablet     insulin lispro (HUMALOG VIAL) 100 UNIT/ML vial     lisinopril (PRINIVIL/ZESTRIL) 40 MG tablet     metFORMIN (GLUCOPHAGE-XR) 750 MG 24 hr tablet     metoprolol succinate ER (TOPROL-XL) 100 MG 24 hr tablet     No current facility-administered medications for this visit.        Labs (pending and don't need repeat)          Imaging     EXAM: NM LUNG VQ SCAN  LOCATION: [facility]  DATE/TIME: 12/4/2019 12:29 PM    INDICATION: Pulmonary hypertension. Shortness of breath. Dyspnea on exertion.  COMPARISON: Chest radiograph from today, 12/04/2019.  TECHNIQUE: 46.7 technetium-99m DTPA, aerosol inhalation for ventilation scan. 8.2 mCi technetium-99m MAA IV for perfusion scan.    FINDINGS: Matched ventilation-perfusion defect in the lingula without radiographic correlate. Normal homogeneous radionuclide activity elsewhere throughout both lungs on both ventilation and perfusion scans. No mismatched segmental perfusion defect.   Findings result in low probability of pulmonary embolism    Coronary angiogram with stenting of LAD    Coronary angiogram:  Left Main   There is mild diffuse disease throughout the vessel.   Left Anterior Descending   Prox LAD lesion is 75% stenosed.   Prox LAD to Mid LAD lesion is 75% stenosed. Pressure wire/FFR was  performed on the lesion. pre diagnositic: 0.7. No medication given   Mid LAD to Dist LAD lesion is 50% stenosed.   Left Circumflex   There is mild diffuse disease throughout the vessel.   Ost Cx to Prox Cx lesion is 40% stenosed.   Right Coronary Artery   There is mild diffuse disease throughout the vessel.   Dist RCA lesion is 40% stenosed.   Right Posterior Descending Artery   Ost RPDA lesion is 35% stenosed.   Intervention     Prox LAD lesion   PCI   The guidewire crossed lesion. The pre-interventional distal flow is normal (MAYO 3). Pre-stent angioplasty was performed. A drug eluting stent was successfully placed. The strut is apposed. Post-stent angioplasty was performed. The post-interventional distal flow is normal (MAYO 3). No complications occurred at this lesion. Pressure wire/FFR was performed during intervention on the lesion:.   There is a 0% residual stenosis post intervention.   Prox LAD to Mid LAD lesion   PCI   The guidewire crossed lesion. The pre-interventional distal flow is normal (MAYO 3). Pre-stent angioplasty was performed. A drug eluting stent was successfully placed. The strut is apposed. Post-stent angioplasty was performed. The post-interventional distal flow is normal (MAYO 3). The intervention was successful. No complications occurred at this lesion. Pressure wire/FFR was performed during intervention on the lesion:. pre intervention: 0.7. post intervention: 0.89.   Supplies used: CATH EMERGE NC MR US 2.50MM X 12MM; [employer] 2.51N55YG [id number]; [employer] 3.99I54DW [id number]   There is a 0% residual stenosis post intervention        Echocardiogram:     Left ventricle ejection fraction is increased. The calculated left ventricular ejection fraction is 76%. Mild left ventricular outflow tract gradient.    Normal left ventricular size.    Normal right ventricular size and systolic function.    Abnormal left ventricular diastolic function. E/e' >  15, suggesting elevated LV filling pressures.    No hemodynamically significant valvular heart abnormalities      Nuclear Stress Test  Summary    1. Myocardial perfusion imaging is normal without evidence of ischemia or  infarct.    2. Post stress left ventricular ejection fraction is normal, 71 %.    3. Pharmacologic nuclear stress test is negative for ischemia.    4. Stress EKG is negative for ischemia.    5. No chest pain noted.    6. Normal blood pressure response to stress.      Patient Info  Name:     NADIA MCELROY  Age:     59 years  :     1960  Gender:     Male  MRN:     [id number]  Accession #:     [id number]  Ht:     72 in  Wt:     340 lb  BMI:     46.11  HR:     81 bpm  BP:     154  /     92 mmHg      Catherization        /42 (67).    RA mean 18.    PCW 62/66 (53).    /4, 12.    /58 (73).    /11, 26.    60 yo male with pulmonary HTN here for evaluation and possible vasodilator   study    RHC via right IJ  RA mean 18mmHg  /11  /42 mean 67  PCWP mean 53    LVEDP 12mmHg    Sat   SVC 57  IVC 59  PA 61  Ao 96    CO oswald 3.96 and thermodilution 3.94    With ongoing bradycardia, pulmonary artery pressure greater than systemic   pressure consistently, concern for cardiovascular complications of   intravenous adenosine.      Imp  1. Severe pulmonary HTN with normal LVEDP - discussed with referring   cardiologist and will hold on iv adenosine with follow up regarding   discussion of medication for therapy.

## 2020-01-14 ENCOUNTER — OFFICE VISIT (OUTPATIENT)
Dept: CARDIOLOGY | Facility: CLINIC | Age: 60
End: 2020-01-14
Payer: COMMERCIAL

## 2020-01-14 ENCOUNTER — HOSPITAL ENCOUNTER (OUTPATIENT)
Dept: LAB | Facility: CLINIC | Age: 60
Discharge: HOME | End: 2020-01-14
Attending: INTERNAL MEDICINE | Admitting: INTERNAL MEDICINE
Payer: COMMERCIAL

## 2020-01-14 VITALS
HEART RATE: 71 BPM | OXYGEN SATURATION: 98 % | DIASTOLIC BLOOD PRESSURE: 81 MMHG | WEIGHT: 315 LBS | SYSTOLIC BLOOD PRESSURE: 157 MMHG

## 2020-01-14 DIAGNOSIS — N18.1 CKD (CHRONIC KIDNEY DISEASE) STAGE 1, GFR 90 ML/MIN OR GREATER: ICD-10-CM

## 2020-01-14 DIAGNOSIS — I27.20 PULMONARY HYPERTENSION (H): ICD-10-CM

## 2020-01-14 DIAGNOSIS — I25.10 CORONARY ARTERIOSCLEROSIS: ICD-10-CM

## 2020-01-14 DIAGNOSIS — R79.9 ABNORMAL FINDING OF BLOOD CHEMISTRY, UNSPECIFIED: ICD-10-CM

## 2020-01-14 DIAGNOSIS — R06.09 DYSPNEA ON EXERTION: Primary | ICD-10-CM

## 2020-01-14 DIAGNOSIS — R06.09 DYSPNEA ON EXERTION: ICD-10-CM

## 2020-01-14 LAB
ALBUMIN SERPL-MCNC: 3.4 G/DL (ref 3.4–5)
ALP SERPL-CCNC: 99 U/L (ref 40–150)
ALT SERPL W P-5'-P-CCNC: 25 U/L (ref 0–70)
ANION GAP SERPL CALCULATED.3IONS-SCNC: 4 MMOL/L (ref 3–14)
AST SERPL W P-5'-P-CCNC: 15 U/L (ref 0–45)
BILIRUB SERPL-MCNC: 0.6 MG/DL (ref 0.2–1.3)
BUN SERPL-MCNC: 52 MG/DL (ref 7–30)
CALCIUM SERPL-MCNC: 9.6 MG/DL (ref 8.5–10.1)
CHLORIDE SERPL-SCNC: 109 MMOL/L (ref 94–109)
CO2 SERPL-SCNC: 28 MMOL/L (ref 20–32)
CREAT SERPL-MCNC: 1.91 MG/DL (ref 0.66–1.25)
ERYTHROCYTE [DISTWIDTH] IN BLOOD BY AUTOMATED COUNT: 13 % (ref 10–15)
GFR SERPL CREATININE-BSD FRML MDRD: 38 ML/MIN/{1.73_M2}
GLUCOSE SERPL-MCNC: 223 MG/DL (ref 70–99)
HCT VFR BLD AUTO: 39.4 % (ref 40–53)
HGB BLD-MCNC: 13.6 G/DL (ref 13.3–17.7)
IRON SATN MFR SERPL: 20 % (ref 15–46)
IRON SERPL-MCNC: 66 UG/DL (ref 35–180)
MCH RBC QN AUTO: 29.4 PG (ref 26.5–33)
MCHC RBC AUTO-ENTMCNC: 34.5 G/DL (ref 31.5–36.5)
MCV RBC AUTO: 85 FL (ref 78–100)
NT-PROBNP SERPL-MCNC: 1385 PG/ML (ref 0–125)
PLATELET # BLD AUTO: 271 10E9/L (ref 150–450)
POTASSIUM SERPL-SCNC: 4.2 MMOL/L (ref 3.4–5.3)
PROT SERPL-MCNC: 7 G/DL (ref 6.8–8.8)
RBC # BLD AUTO: 4.62 10E12/L (ref 4.4–5.9)
SODIUM SERPL-SCNC: 141 MMOL/L (ref 133–144)
T4 FREE SERPL-MCNC: 0.9 NG/DL (ref 0.76–1.46)
TIBC SERPL-MCNC: 328 UG/DL (ref 240–430)
TSH SERPL DL<=0.005 MIU/L-ACNC: 4.08 MU/L (ref 0.4–4)
WBC # BLD AUTO: 10.1 10E9/L (ref 4–11)

## 2020-01-14 PROCEDURE — 99215 OFFICE O/P EST HI 40 MIN: CPT | Performed by: INTERNAL MEDICINE

## 2020-01-14 PROCEDURE — 83540 ASSAY OF IRON: CPT | Performed by: INTERNAL MEDICINE

## 2020-01-14 PROCEDURE — 84439 ASSAY OF FREE THYROXINE: CPT | Performed by: INTERNAL MEDICINE

## 2020-01-14 PROCEDURE — 00000402 ZZHCL STATISTIC TOTAL PROTEIN: Performed by: INTERNAL MEDICINE

## 2020-01-14 PROCEDURE — 84165 PROTEIN E-PHORESIS SERUM: CPT | Performed by: INTERNAL MEDICINE

## 2020-01-14 PROCEDURE — 83550 IRON BINDING TEST: CPT | Performed by: INTERNAL MEDICINE

## 2020-01-14 PROCEDURE — 84156 ASSAY OF PROTEIN URINE: CPT | Performed by: INTERNAL MEDICINE

## 2020-01-14 PROCEDURE — 85027 COMPLETE CBC AUTOMATED: CPT | Performed by: INTERNAL MEDICINE

## 2020-01-14 PROCEDURE — 86335 IMMUNFIX E-PHORSIS/URINE/CSF: CPT | Performed by: INTERNAL MEDICINE

## 2020-01-14 PROCEDURE — 84443 ASSAY THYROID STIM HORMONE: CPT | Performed by: INTERNAL MEDICINE

## 2020-01-14 PROCEDURE — 83880 ASSAY OF NATRIURETIC PEPTIDE: CPT | Performed by: INTERNAL MEDICINE

## 2020-01-14 PROCEDURE — 83883 ASSAY NEPHELOMETRY NOT SPEC: CPT | Performed by: INTERNAL MEDICINE

## 2020-01-14 PROCEDURE — 36415 COLL VENOUS BLD VENIPUNCTURE: CPT | Performed by: INTERNAL MEDICINE

## 2020-01-14 PROCEDURE — 80053 COMPREHEN METABOLIC PANEL: CPT | Performed by: INTERNAL MEDICINE

## 2020-01-14 RX ORDER — METOPROLOL SUCCINATE 100 MG/1
100 TABLET, EXTENDED RELEASE ORAL EVERY MORNING
COMMUNITY
Start: 2019-02-15 | End: 2021-03-25

## 2020-01-14 RX ORDER — LISINOPRIL 40 MG/1
40 TABLET ORAL EVERY MORNING
Status: ON HOLD | COMMUNITY
Start: 2019-02-15 | End: 2020-08-21

## 2020-01-14 RX ORDER — SODIUM CHLORIDE 9 MG/ML
1000 INJECTION, SOLUTION INTRAVENOUS CONTINUOUS
Status: CANCELLED | OUTPATIENT
Start: 2020-01-14

## 2020-01-14 RX ORDER — CLOPIDOGREL BISULFATE 75 MG/1
75 TABLET ORAL EVERY MORNING
Status: ON HOLD | COMMUNITY
Start: 2019-10-01 | End: 2020-08-21

## 2020-01-14 RX ORDER — FUROSEMIDE 40 MG
40 TABLET ORAL DAILY
COMMUNITY
Start: 2019-10-12 | End: 2020-07-09 | Stop reason: ALTCHOICE

## 2020-01-14 RX ORDER — METFORMIN HYDROCHLORIDE 750 MG/1
2250 TABLET, EXTENDED RELEASE ORAL
Status: ON HOLD | COMMUNITY
End: 2020-08-21

## 2020-01-14 RX ORDER — CLONIDINE HYDROCHLORIDE 0.3 MG/1
0.3 TABLET ORAL 2 TIMES DAILY
Status: ON HOLD | COMMUNITY
Start: 2019-07-24 | End: 2021-01-05

## 2020-01-14 RX ORDER — GLIPIZIDE 10 MG/1
10 TABLET, FILM COATED, EXTENDED RELEASE ORAL EVERY MORNING
Status: ON HOLD | COMMUNITY
Start: 2019-02-15 | End: 2020-08-21

## 2020-01-14 RX ORDER — ASPIRIN 81 MG/1
81 TABLET ORAL DAILY
COMMUNITY
Start: 2010-09-07 | End: 2020-06-25

## 2020-01-14 RX ORDER — POTASSIUM CHLORIDE 1500 MG/1
20 TABLET, EXTENDED RELEASE ORAL
Status: CANCELLED | OUTPATIENT
Start: 2020-01-14

## 2020-01-14 RX ORDER — ATORVASTATIN CALCIUM 40 MG/1
40 TABLET, FILM COATED ORAL EVERY MORNING
COMMUNITY
Start: 2019-07-24

## 2020-01-14 RX ORDER — LIDOCAINE 40 MG/G
CREAM TOPICAL
Status: CANCELLED | OUTPATIENT
Start: 2020-01-14

## 2020-01-14 RX ORDER — DEXTROAMPHETAMINE SACCHARATE, AMPHETAMINE ASPARTATE, DEXTROAMPHETAMINE SULFATE AND AMPHETAMINE SULFATE 5; 5; 5; 5 MG/1; MG/1; MG/1; MG/1
20-40 TABLET ORAL 3 TIMES DAILY
Status: ON HOLD | COMMUNITY
End: 2020-01-19

## 2020-01-14 SDOH — HEALTH STABILITY: MENTAL HEALTH: HOW OFTEN DO YOU HAVE A DRINK CONTAINING ALCOHOL?: MONTHLY OR LESS

## 2020-01-14 NOTE — LETTER
1/14/2020    [facility]  [address] [address]    RE: Jeremiah Grossnt       Dear Colleague,    I had the pleasure of seeing Jeremiah Isaac in the [facility].    Santiago Riojas M.D.  Cardiovascular Medicine    I personally saw and examined this patient, discussed care with housestaff and other consultants, reviewed current laboratories and imaging studies, and conveyed impression and diagnostic/therapeutic plan to patient.    EL Earl (Sep. 16, 2019September 16, 2019 - Present)  [phone] (Work)  [phone] (Fax)  [address] [place] [address]  Physician Assistant    Shan Malhotra MD    [address]    [place] [address]    [phone] (Fax)       Humberto Muse MD (Nephrology)  [phone] (Work)  [phone] (Fax)   Nephrology      Estela Landa MD    [address] [place] [address]    [phone] (Fax)\    [facility]    [address]    [place] [address] [phone]    Felix Coleman MD    [address] [place] [address]    [phone] (Fax)    Sarath Jimenez (Jonel), MD    [address]    [address]    [place] [address]    [phone] (Fax)     Jessica Phelps M.D.  [facility]     Problem List  1. Pulmonary hypertension  2. Elevated body mass index  3. Abnormal ECG: bradycardia, incomplete RBBB, RVH  4. Diabetes  5. Neuropathy  6. Hypertension  7. Negative serologic screen collagen vascular disease  8. CKD with proteinuria Estimated GFR 30-50  9. Elevated kappa light chains, low albumin, no evidence of monoclonal spike  10. Narcolepsy  11.Sleep disordered breath   Central sleep apnea   BIP with ASV currently  12. Elevated coronary calcium score with negative stress test   History of LAD stent  13, Questionable history of narcolepsy ? Central sleep  "apnea  14. History of methamphetamine usage  15. Exertional syncope    Plan:  1. Admit to telemetry  2. CT chest and abdomen: ?PVOD cirrhosis, ascitea  Non contrast  3,. Neurology consult for narcolepsy  4. Endocrine/diabetes consult to straighten out medications: Jardiance would be ideal in this situation, however cost is issue and is taking glypizide and insulin  5. Repeat right and left heart catherization as previously recorded PCP approximately 50 with LVEDP 12.  Please obtain multiple wedge positions to look for PVOD.  Also inhaled nitric oxide  6. Hematology consult: kappa chains in urine  7. Arterial blood gases on room air  8. Call the sleep people and see if someone could see him in-patient.  His narcolepsy may be central sleep apnea, genetic or acquired.  9. If possible place respitrace on patient for 24 hours.    Dear Rodolfo:    We appreciate the opportunity to see your patients.  I saw Mr. Isaac, a 59 year old male with very high pulmonary artery pressures but normal LVEDP, normal collagen vascular serologies, normal thyroid functions, a question of kappa light chain elevation without a monoclonal spike, poorly controlled diabetes, multiple negative imaging stress tests without evidence of previous infarction or inducible ischemia, negative VQ lung scan.  His PCP was quite high with a normal LVED which would raise the issue of veno-occlusive disease as your pointed out.  He is a very complex case with weight, central sleep apnea, narcolepsy, light chains in urine,  He has exertional syncope which is trroubling and for which I suggested immediate admission to which he agreed.  He has a history of self-treatment of his \"narcolepsy\" with methamphetamine (as recently as two weeks ago).               Family History: hypertension, ASHD, Diabetes    Smoke:none    ETOH: none    Occupational: disabled secondary to narcolepsy    Social: single  .    Objective  Constitutional: alert, oriented, normal gait and " station, normal mentation.  Oral: moist mucous membranes  Lymph: without pathologic adenopathy  Chest: clear to ausculation and percussion with bilateral basilar rales  Cor: No evidence of left or right ventricular activity.  Rhythm is regular.  S1 normal, S2 split physiologically. Murmurs are not present  Abdomen: without tenderness, rebound, guarding, masses, ascites  Extremities Bilateral lymphedema, right carpal tunnel scar  Neuro: no focal defects, normal mentation  Skin: without open lesions  Psych: oriented, verbal, mental status in tact      Meds  Current Outpatient Medications   Medication     amphetamine-dextroamphetamine (ADDERALL) 20 MG tablet     aspirin 81 MG EC tablet     atorvastatin (LIPITOR) 40 MG tablet     cloNIDine (CATAPRES) 0.3 MG tablet     clopidogrel (PLAVIX) 75 MG tablet     furosemide (LASIX) 40 MG tablet     glipiZIDE (GLUCOTROL XL) 10 MG 24 hr tablet     insulin lispro (HUMALOG VIAL) 100 UNIT/ML vial     lisinopril (PRINIVIL/ZESTRIL) 40 MG tablet     metFORMIN (GLUCOPHAGE-XR) 750 MG 24 hr tablet     metoprolol succinate ER (TOPROL-XL) 100 MG 24 hr tablet     No current facility-administered medications for this visit.        Labs (pending and don't need repeat)          Imaging     EXAM: NM LUNG VQ SCAN  LOCATION: [facility]  DATE/TIME: 12/4/2019 12:29 PM    INDICATION: Pulmonary hypertension. Shortness of breath. Dyspnea on exertion.  COMPARISON: Chest radiograph from today, 12/04/2019.  TECHNIQUE: 46.7 technetium-99m DTPA, aerosol inhalation for ventilation scan. 8.2 mCi technetium-99m MAA IV for perfusion scan.    FINDINGS: Matched ventilation-perfusion defect in the lingula without radiographic correlate. Normal homogeneous radionuclide activity elsewhere throughout both lungs on both ventilation and perfusion scans. No mismatched segmental perfusion defect.   Findings result in low probability of pulmonary embolism    Coronary angiogram with stenting of LAD    Coronary  angiogram:  Left Main   There is mild diffuse disease throughout the vessel.   Left Anterior Descending   Prox LAD lesion is 75% stenosed.   Prox LAD to Mid LAD lesion is 75% stenosed. Pressure wire/FFR was performed on the lesion. pre diagnositic: 0.7. No medication given   Mid LAD to Dist LAD lesion is 50% stenosed.   Left Circumflex   There is mild diffuse disease throughout the vessel.   Ost Cx to Prox Cx lesion is 40% stenosed.   Right Coronary Artery   There is mild diffuse disease throughout the vessel.   Dist RCA lesion is 40% stenosed.   Right Posterior Descending Artery   Ost RPDA lesion is 35% stenosed.   Intervention     Prox LAD lesion   PCI   The guidewire crossed lesion. The pre-interventional distal flow is normal (MAYO 3). Pre-stent angioplasty was performed. A drug eluting stent was successfully placed. The strut is apposed. Post-stent angioplasty was performed. The post-interventional distal flow is normal (MAYO 3). No complications occurred at this lesion. Pressure wire/FFR was performed during intervention on the lesion:.   There is a 0% residual stenosis post intervention.   Prox LAD to Mid LAD lesion   PCI   The guidewire crossed lesion. The pre-interventional distal flow is normal (MAYO 3). Pre-stent angioplasty was performed. A drug eluting stent was successfully placed. The strut is apposed. Post-stent angioplasty was performed. The post-interventional distal flow is normal (MAYO 3). The intervention was successful. No complications occurred at this lesion. Pressure wire/FFR was performed during intervention on the lesion:. pre intervention: 0.7. post intervention: 0.89.   Supplies used: CATH EMERGE NC MR US 2.50MM X 12MM; [employer] 2.21R75XR [id number]; [employer] ELUT[employer] 3.06X24NT [id number]   There is a 0% residual stenosis post intervention        Echocardiogram:     Left ventricle ejection fraction is increased. The calculated left ventricular ejection  fraction is 76%. Mild left ventricular outflow tract gradient.    Normal left ventricular size.    Normal right ventricular size and systolic function.    Abnormal left ventricular diastolic function. E/e' > 15, suggesting elevated LV filling pressures.    No hemodynamically significant valvular heart abnormalities      Nuclear Stress Test  Summary    1. Myocardial perfusion imaging is normal without evidence of ischemia or  infarct.    2. Post stress left ventricular ejection fraction is normal, 71 %.    3. Pharmacologic nuclear stress test is negative for ischemia.    4. Stress EKG is negative for ischemia.    5. No chest pain noted.    6. Normal blood pressure response to stress.      Patient Info  Name:     NADIA MCELROY  Age:     59 years  :     1960  Gender:     Male  MRN:     [id number]  Accession #:     [id number]  Ht:     72 in  Wt:     340 lb  BMI:     46.11  HR:     81 bpm  BP:     154  /     92 mmHg      Catherization        /42 (67).    RA mean 18.    PCW 62/66 (53).    /4, 12.    /58 (73).    /11, 26.    58 yo male with pulmonary HTN here for evaluation and possible vasodilator   study    RHC via right IJ  RA mean 18mmHg  /11  /42 mean 67  PCWP mean 53    LVEDP 12mmHg    Sat   SVC 57  IVC 59  PA 61  Ao 96    CO oswald 3.96 and thermodilution 3.94    With ongoing bradycardia, pulmonary artery pressure greater than systemic   pressure consistently, concern for cardiovascular complications of   intravenous adenosine.      Imp  1. Severe pulmonary HTN with normal LVEDP - discussed with referring   cardiologist and will hold on iv adenosine with follow up regarding   discussion of medication for therapy.              Thank you for allowing me to participate in the care of your patient.    Sincerely,     Santiago Riojas MD     [facility]

## 2020-01-14 NOTE — NURSING NOTE
Procedures and/or Testing: Patient given instructions regarding  Chest CT, Cardiac MRI with Flow. Discussed purpose, preparation, procedure and when to expect results reported back to the patient. Patient demonstrated understanding of this information and agreed to call with further questions or concerns.  Right Heart Catheterization: Patient was instructed regarding right heart catheterization, including discussion of the procedure, preparation, intra-procedural steps, and recovery at home. Patient demonstrated understanding of this information and agreed to call with further questions or concerns.  Med Reconcile: Reviewed and verified all current medications with the patient. The updated medication list was printed and given to the patient.  Diet: Patient instructed regarding a heart healthy diet, including discussion of reduced fat and sodium intake. Patient demonstrated understanding of this information and agreed to call with further questions or concerns.  Return Appointment: Patient given instructions regarding scheduling next clinic visit. Patient demonstrated understanding of this information and agreed to call with further questions or concerns.  Patient stated he understood all health information given and agreed to call with further questions or concerns.     Medication Changes:  No medication changes at this time. Please continue current medication regiment.    Patient Instructions:  1. Continue staying active and eat a heart healthy diet.    2. Please keep current list of medications with you at all times.    3. Remember to weigh yourself daily after voiding and before you consume any food or beverages and log the numbers.  If you have gained 2 pounds overnight or 5 pounds in a week contact us immediately for medication adjustments or further instructions.    4. **Please call us immediately if you have any syncope (fainting or passing out), chest pain, edema (swelling or weight gain), or decline in  your functional status (general decline in how you are feeling).    Check-In  Time Check-In Location Estimated Length Procedure   Name         20 minutes CT Scan**   Procedure Preparations & Instructions     This is a non-invasive procedure and does NOT require any preparation         Check-In  Time Check-In Location Estimated Length Procedure   Name        [facility]  waiting room 60 minutes Cardiac MRI**   Procedure Preparations & Instructions   This is a non-invasive procedure that DOES require preparation:    - DO NOT use alcohol, tobacco or food/beverages containing caffeine for 12 hours prior to your procedure (ie. Coffee, tea, soda, chocolate, de-caffeinated beverages, certain medications including Excedrin, Anacin, NoDoz)     Check-In  Time Check-In Location Estimated Length Procedure   Name        [facility]  waiting room 60-90 minutes Right Heart Catheterization with Vasodilator Study and simultaneous measuring of PCW and LVEDP**     Procedure Preparations & Instructions     This is an invasive procedure that DOES require preparation:    - Nothing to eat for 6 hours   - You may have clear liquids up until the time of your procedure  - A ride should be arranged for you in the instance you are unable to drive home, however you should be able to function as you normally would after the procedure     *For Patients with Diabetes: ? DO NOT take any oral diabetic medication, short-acting diabetes medications/insulin, humalog or regular insulin the morning of your test  ? Take   dose of long-acting insulin (Lantus, Levemir) the day of your test  ? Remember to  bring your glucometer and insulin with you to take after your test if needed     Follow up Appointment Information:  Labs today: Iron Studies, TSH, N-Terminal Pro N-Terminal Pro BNP, serum free light chains, serum free light chains  Follow up after testing is completed with Dr. Riojas    We would like you to be admitted under the cardiovascular service by the  Pulmonary Hypertension Clinic at the [facility], for Pulmonary Hypertension.  We would like to admit you on January 16, 2020 at  1:00 PM.   PLEASE CALL [phone] before you leave and make sure your bed will be ready.  IF it is not please call Me (Senait Berg RN) and I will call bed placement.    The Hospitals address is:   [address] [place] [address]    Please check in to the admissions window in the main lobby of the hospital entrance to the [facility].  The admissions counselor will have your information and should inform you what floor/unit you will be admitted to and instruct you how to proceed.      Please plan to be admitted for approximately 4-5 days under in-patient care.  You are welcome to bring personal items from home, however please do not bring items of value.      Your clinic follow up will be scheduled upon pre-discharge planning for approximately 1 week after you leave the hospital; you should be informed of this appointment date and time prior to leaving the [facility].  In the event you are not given a date and time for clinic follow up, please contact me at [phone] upon discharging from the [facility].      **I spoke with the pt and we discussed the potential dangers on not coming in to the hospital today.  We asked that he does not exert himself has he is having exertional syncope.  Senait Berg RN on 1/14/2020 at 11:03 AM

## 2020-01-14 NOTE — PATIENT INSTRUCTIONS
Medication Changes:  No medication changes at this time. Please continue current medication regiment.    Patient Instructions:  1. Continue staying active and eat a heart healthy diet.    2. Please keep current list of medications with you at all times.    3. Remember to weigh yourself daily after voiding and before you consume any food or beverages and log the numbers.  If you have gained 2 pounds overnight or 5 pounds in a week contact us immediately for medication adjustments or further instructions.    4. **Please call us immediately if you have any syncope (fainting or passing out), chest pain, edema (swelling or weight gain), or decline in your functional status (general decline in how you are feeling).    Check-In  Time Check-In Location Estimated Length Procedure   Name         20 minutes CT Scan**   Procedure Preparations & Instructions     This is a non-invasive procedure and does NOT require any preparation         Check-In  Time Check-In Location Estimated Length Procedure   Name        [facility]  waiting room 60 minutes Cardiac MRI**   Procedure Preparations & Instructions   This is a non-invasive procedure that DOES require preparation:    - DO NOT use alcohol, tobacco or food/beverages containing caffeine for 12 hours prior to your procedure (ie. Coffee, tea, soda, chocolate, de-caffeinated beverages, certain medications including Excedrin, Anacin, NoDoz)     Check-In  Time Check-In Location Estimated Length Procedure   Name        [facility]  waiting room 60-90 minutes Right Heart Catheterization with Vasodilator Study and simultaneous measuring of PCW and LVEDP**     Procedure Preparations & Instructions     This is an invasive procedure that DOES require preparation:    - Nothing to eat for 6 hours   - You may have clear liquids up until the time of your procedure  - A ride should be arranged for you in the instance you are unable to drive home, however you should be able to function as you normally would  after the procedure     *For Patients with Diabetes: ? DO NOT take any oral diabetic medication, short-acting diabetes medications/insulin, humalog or regular insulin the morning of your test  ? Take   dose of long-acting insulin (Lantus, Levemir) the day of your test  ? Remember to  bring your glucometer and insulin with you to take after your test if needed     Follow up Appointment Information:  Labs today: Iron Studies, TSH, N-Terminal Pro N-Terminal Pro BNP, serum free light chains, serum free light chains  Follow up after testing is completed with Dr. Riojas    We would like you to be admitted under the cardiovascular service by the Pulmonary Hypertension Clinic at the [facility], for Pulmonary Hypertension.  We would like to admit you on January 16, 2020 at  1:00 PM.   PLEASE CALL [phone] before you leave and make sure your bed will be ready.  IF it is not please call Me (Senait Berg RN) and I will call bed placement.    The Hospitals address is:   [address] [place] [address]    Please check in to the admissions window in the main lobby of the hospital entrance to the [facility].  The admissions counselor will have your information and should inform you what floor/unit you will be admitted to and instruct you how to proceed.      Please plan to be admitted for approximately 4-5 days under in-patient care.  You are welcome to bring personal items from home, however please do not bring items of value.      Your clinic follow up will be scheduled upon pre-discharge planning for approximately 1 week after you leave the hospital; you should be informed of this appointment date and time prior to leaving the [facility].  In the event you are not given a date and time for clinic follow up, please contact me at [phone] upon discharging from the [facility].         For scheduling at [facility] please  call [phone]  For scheduling at the [place] please call [phone]  We are located on the second floor [address] at the [facility].  Our address is     [address],   [address]  [place]  [address]    Thank you for allowing us to be a part of your care here at the [facility]    If you have questions or concerns please contact us at:    Senait Berg, RN, BSN      Nurse Coordinator       Pulmonary Hypertension     [facility]   (Phone)[phone]  (Fax)[phone]    ** Please note that you will NOT receive a reminder call regarding your scheduled testing, reminder calls are for provider appointments only.  If you are scheduled for testing within the [facility] system you may receive a call regarding pre-registration for billing purposes only.**     Remember to weigh yourself daily after voiding and before you consume any food or beverages and log the numbers.  If you have gained/lost 2 pounds overnight or 5 pounds in a week contact us immediately for medication adjustments or further instructions.    Support Group:  Pulmonary Hypertension Association  Https://www.phassociation.org/  **Look at the Events Tab** They even have Support Groups that you can call into    [facility] PH Support Group  Second Saturday of the Month from 1-3 PM   [facility] [address]  Leader: Bonnie Yee and Afshan Pena  Phone: [phone] or [phone]  Email: [email or web address]

## 2020-01-16 ENCOUNTER — HOSPITAL ENCOUNTER (INPATIENT)
Facility: CLINIC | Age: 60
LOS: 3 days | Discharge: HOME | DRG: 287 | End: 2020-01-19
Attending: INTERNAL MEDICINE | Admitting: INTERNAL MEDICINE
Payer: COMMERCIAL

## 2020-01-16 ENCOUNTER — APPOINTMENT (OUTPATIENT)
Dept: CT IMAGING | Facility: CLINIC | Age: 60
DRG: 287 | End: 2020-01-16
Attending: STUDENT IN AN ORGANIZED HEALTH CARE EDUCATION/TRAINING PROGRAM
Payer: COMMERCIAL

## 2020-01-16 DIAGNOSIS — I50.813 ACUTE ON CHRONIC RIGHT-SIDED HEART FAILURE (H): Primary | ICD-10-CM

## 2020-01-16 DIAGNOSIS — I27.20 PULMONARY HYPERTENSION (H): ICD-10-CM

## 2020-01-16 PROBLEM — I50.9 HEART FAILURE (H): Status: ACTIVE | Noted: 2020-01-16

## 2020-01-16 LAB
ALBUMIN SERPL ELPH-MCNC: 3.7 G/DL (ref 3.7–5.1)
ALBUMIN UR QL: DETECTED
ALPHA1 GLOB 24H UR QL ELPH: DETECTED
ALPHA1 GLOB SERPL ELPH-MCNC: 0.3 G/DL (ref 0.2–0.4)
ALPHA2 GLOB SERPL ELPH-MCNC: 0.8 G/DL (ref 0.5–0.9)
ALPHA2 GLOB UR QL ELPH: DETECTED
ANION GAP SERPL CALCULATED.3IONS-SCNC: 6 MMOL/L (ref 3–14)
APTT PPP: 29 SEC (ref 22–37)
B-GLOBULIN SERPL ELPH-MCNC: 0.9 G/DL (ref 0.6–1)
B-GLOBULIN UR QL ELPH: DETECTED
BUN SERPL-MCNC: 56 MG/DL (ref 7–30)
CALCIUM SERPL-MCNC: 9.1 MG/DL (ref 8.5–10.1)
CHLORIDE SERPL-SCNC: 105 MMOL/L (ref 94–109)
CHOLEST SERPL-MCNC: 145 MG/DL
CO2 SERPL-SCNC: 28 MMOL/L (ref 20–32)
COLLECT DURATION TIME SPEC: NORMAL H
CREAT SERPL-MCNC: 2.07 MG/DL (ref 0.66–1.25)
ERYTHROCYTE [DISTWIDTH] IN BLOOD BY AUTOMATED COUNT: 12.9 % (ref 10–15)
GAMMA GLOB SERPL ELPH-MCNC: 0.7 G/DL (ref 0.7–1.6)
GAMMA GLOB UR QL ELPH: DETECTED
GFR SERPL CREATININE-BSD FRML MDRD: 34 ML/MIN/{1.73_M2}
GLUCOSE BLDC GLUCOMTR-MCNC: 269 MG/DL (ref 70–99)
GLUCOSE BLDC GLUCOMTR-MCNC: 291 MG/DL (ref 70–99)
GLUCOSE SERPL-MCNC: 307 MG/DL (ref 70–99)
HBA1C MFR BLD: 10.9 % (ref 0–5.6)
HCT VFR BLD AUTO: 38.5 % (ref 40–53)
HDLC SERPL-MCNC: 45 MG/DL
HGB BLD-MCNC: 12.9 G/DL (ref 13.3–17.7)
INR PPP: 0.97 (ref 0.86–1.14)
INTERPRETATION UR IFE-IMP: NORMAL
KAPPA LC FREE 24H UR-MRATE: NORMAL MG/D
KAPPA LC FREE UR-MCNC: 2.08 MG/DL (ref 0.14–2.42)
KAPPA LC FREE/LAMBDA FREE UR: 4.16 RATIO (ref 2.04–10.37)
LAMBDA LC FREE 24H UR-MRATE: NORMAL MG/D
LAMBDA LC FREE UR-MCNC: 0.5 MG/DL (ref 0.02–0.67)
LDLC SERPL CALC-MCNC: 53 MG/DL
M PROTEIN SERPL ELPH-MCNC: 0 G/DL
MCH RBC QN AUTO: 28.7 PG (ref 26.5–33)
MCHC RBC AUTO-ENTMCNC: 33.5 G/DL (ref 31.5–36.5)
MCV RBC AUTO: 86 FL (ref 78–100)
NONHDLC SERPL-MCNC: 100 MG/DL
PLATELET # BLD AUTO: 238 10E9/L (ref 150–450)
POTASSIUM SERPL-SCNC: 4.3 MMOL/L (ref 3.4–5.3)
PROT 24H UR-MRATE: NORMAL MG/D (ref 10–140)
PROT PATTERN SERPL ELPH-IMP: NORMAL
RBC # BLD AUTO: 4.49 10E12/L (ref 4.4–5.9)
SODIUM SERPL-SCNC: 138 MMOL/L (ref 133–144)
SPECIMEN VOL ?TM UR: 70 ML
TRIGL SERPL-MCNC: 234 MG/DL
WBC # BLD AUTO: 6.7 10E9/L (ref 4–11)

## 2020-01-16 PROCEDURE — 00000146 ZZHCL STATISTIC GLUCOSE BY METER IP

## 2020-01-16 PROCEDURE — 25000132 ZZH RX MED GY IP 250 OP 250 PS 637: Performed by: STUDENT IN AN ORGANIZED HEALTH CARE EDUCATION/TRAINING PROGRAM

## 2020-01-16 PROCEDURE — 80061 LIPID PANEL: CPT | Performed by: STUDENT IN AN ORGANIZED HEALTH CARE EDUCATION/TRAINING PROGRAM

## 2020-01-16 PROCEDURE — 85027 COMPLETE CBC AUTOMATED: CPT | Performed by: STUDENT IN AN ORGANIZED HEALTH CARE EDUCATION/TRAINING PROGRAM

## 2020-01-16 PROCEDURE — 85610 PROTHROMBIN TIME: CPT | Performed by: STUDENT IN AN ORGANIZED HEALTH CARE EDUCATION/TRAINING PROGRAM

## 2020-01-16 PROCEDURE — 25000131 ZZH RX MED GY IP 250 OP 636 PS 637: Performed by: STUDENT IN AN ORGANIZED HEALTH CARE EDUCATION/TRAINING PROGRAM

## 2020-01-16 PROCEDURE — 36415 COLL VENOUS BLD VENIPUNCTURE: CPT | Performed by: STUDENT IN AN ORGANIZED HEALTH CARE EDUCATION/TRAINING PROGRAM

## 2020-01-16 PROCEDURE — 80048 BASIC METABOLIC PNL TOTAL CA: CPT | Performed by: STUDENT IN AN ORGANIZED HEALTH CARE EDUCATION/TRAINING PROGRAM

## 2020-01-16 PROCEDURE — 40000141 ZZH STATISTIC PERIPHERAL IV START W/O US GUIDANCE

## 2020-01-16 PROCEDURE — 99223 1ST HOSP IP/OBS HIGH 75: CPT | Mod: GC | Performed by: INTERNAL MEDICINE

## 2020-01-16 PROCEDURE — 25000128 H RX IP 250 OP 636: Performed by: STUDENT IN AN ORGANIZED HEALTH CARE EDUCATION/TRAINING PROGRAM

## 2020-01-16 PROCEDURE — 83036 HEMOGLOBIN GLYCOSYLATED A1C: CPT | Performed by: STUDENT IN AN ORGANIZED HEALTH CARE EDUCATION/TRAINING PROGRAM

## 2020-01-16 PROCEDURE — 85730 THROMBOPLASTIN TIME PARTIAL: CPT | Performed by: STUDENT IN AN ORGANIZED HEALTH CARE EDUCATION/TRAINING PROGRAM

## 2020-01-16 PROCEDURE — 21400000 ZZH R&B CCU UMMC

## 2020-01-16 PROCEDURE — 74176 CT ABD & PELVIS W/O CONTRAST: CPT

## 2020-01-16 RX ORDER — ACETAMINOPHEN 325 MG/1
650 TABLET ORAL EVERY 4 HOURS PRN
Status: DISCONTINUED | OUTPATIENT
Start: 2020-01-16 | End: 2020-01-19 | Stop reason: HOSPADM

## 2020-01-16 RX ORDER — HEPARIN SODIUM 5000 [USP'U]/.5ML
5000 INJECTION, SOLUTION INTRAVENOUS; SUBCUTANEOUS EVERY 12 HOURS
Status: DISCONTINUED | OUTPATIENT
Start: 2020-01-16 | End: 2020-01-19 | Stop reason: HOSPADM

## 2020-01-16 RX ORDER — FUROSEMIDE 40 MG
40 TABLET ORAL DAILY
Status: DISCONTINUED | OUTPATIENT
Start: 2020-01-17 | End: 2020-01-19 | Stop reason: HOSPADM

## 2020-01-16 RX ORDER — ACETAMINOPHEN 650 MG/1
650 SUPPOSITORY RECTAL EVERY 4 HOURS PRN
Status: DISCONTINUED | OUTPATIENT
Start: 2020-01-16 | End: 2020-01-19 | Stop reason: HOSPADM

## 2020-01-16 RX ORDER — METOPROLOL SUCCINATE 100 MG/1
100 TABLET, EXTENDED RELEASE ORAL DAILY
Status: DISCONTINUED | OUTPATIENT
Start: 2020-01-17 | End: 2020-01-19 | Stop reason: HOSPADM

## 2020-01-16 RX ORDER — FUROSEMIDE 10 MG/ML
40 INJECTION INTRAMUSCULAR; INTRAVENOUS ONCE
Status: DISCONTINUED | OUTPATIENT
Start: 2020-01-16 | End: 2020-01-16

## 2020-01-16 RX ORDER — LISINOPRIL 20 MG/1
40 TABLET ORAL DAILY
Status: DISCONTINUED | OUTPATIENT
Start: 2020-01-16 | End: 2020-01-16

## 2020-01-16 RX ORDER — NICOTINE POLACRILEX 4 MG
15-30 LOZENGE BUCCAL
Status: DISCONTINUED | OUTPATIENT
Start: 2020-01-16 | End: 2020-01-19 | Stop reason: HOSPADM

## 2020-01-16 RX ORDER — LIDOCAINE 40 MG/G
CREAM TOPICAL
Status: DISCONTINUED | OUTPATIENT
Start: 2020-01-16 | End: 2020-01-19 | Stop reason: HOSPADM

## 2020-01-16 RX ORDER — CLONIDINE HYDROCHLORIDE 0.1 MG/1
0.3 TABLET ORAL 2 TIMES DAILY
Status: DISCONTINUED | OUTPATIENT
Start: 2020-01-16 | End: 2020-01-19 | Stop reason: HOSPADM

## 2020-01-16 RX ORDER — CLOPIDOGREL BISULFATE 75 MG/1
75 TABLET ORAL DAILY
Status: DISCONTINUED | OUTPATIENT
Start: 2020-01-17 | End: 2020-01-19 | Stop reason: HOSPADM

## 2020-01-16 RX ORDER — ASPIRIN 81 MG/1
81 TABLET ORAL DAILY
Status: DISCONTINUED | OUTPATIENT
Start: 2020-01-17 | End: 2020-01-19 | Stop reason: HOSPADM

## 2020-01-16 RX ORDER — ATORVASTATIN CALCIUM 40 MG/1
40 TABLET, FILM COATED ORAL DAILY
Status: DISCONTINUED | OUTPATIENT
Start: 2020-01-17 | End: 2020-01-19 | Stop reason: HOSPADM

## 2020-01-16 RX ORDER — FUROSEMIDE 40 MG
40 TABLET ORAL DAILY
Status: DISCONTINUED | OUTPATIENT
Start: 2020-01-16 | End: 2020-01-16

## 2020-01-16 RX ORDER — DEXTROSE MONOHYDRATE 25 G/50ML
25-50 INJECTION, SOLUTION INTRAVENOUS
Status: DISCONTINUED | OUTPATIENT
Start: 2020-01-16 | End: 2020-01-19 | Stop reason: HOSPADM

## 2020-01-16 RX ORDER — DEXTROAMPHETAMINE SACCHARATE, AMPHETAMINE ASPARTATE, DEXTROAMPHETAMINE SULFATE AND AMPHETAMINE SULFATE 2.5; 2.5; 2.5; 2.5 MG/1; MG/1; MG/1; MG/1
20-40 TABLET ORAL 3 TIMES DAILY
Status: DISCONTINUED | OUTPATIENT
Start: 2020-01-16 | End: 2020-01-16

## 2020-01-16 RX ADMIN — CLONIDINE HYDROCHLORIDE 0.3 MG: 0.1 TABLET ORAL at 20:31

## 2020-01-16 RX ADMIN — HEPARIN SODIUM 5000 UNITS: 5000 INJECTION, SOLUTION INTRAVENOUS; SUBCUTANEOUS at 18:47

## 2020-01-16 RX ADMIN — INSULIN GLARGINE 14 UNITS: 100 INJECTION, SOLUTION SUBCUTANEOUS at 22:21

## 2020-01-16 RX ADMIN — INSULIN ASPART 6 UNITS: 100 INJECTION, SOLUTION INTRAVENOUS; SUBCUTANEOUS at 18:47

## 2020-01-16 ASSESSMENT — ACTIVITIES OF DAILY LIVING (ADL)
DRESS: 0-->INDEPENDENT
WHICH_OF_THE_ABOVE_FUNCTIONAL_RISKS_HAD_A_RECENT_ONSET_OR_CHANGE?: FALL HISTORY
FALL_HISTORY_WITHIN_LAST_SIX_MONTHS: YES
RETIRED_EATING: 0-->INDEPENDENT
ADLS_ACUITY_SCORE: 13
BATHING: 0-->INDEPENDENT
ADLS_ACUITY_SCORE: 13
TOILETING: 0-->INDEPENDENT
RETIRED_COMMUNICATION: 0-->UNDERSTANDS/COMMUNICATES WITHOUT DIFFICULTY
COGNITION: 0 - NO COGNITION ISSUES REPORTED
NUMBER_OF_TIMES_PATIENT_HAS_FALLEN_WITHIN_LAST_SIX_MONTHS: 2
TRANSFERRING: 0-->INDEPENDENT
AMBULATION: 1-->ASSISTIVE EQUIPMENT
SWALLOWING: 0-->SWALLOWS FOODS/LIQUIDS WITHOUT DIFFICULTY

## 2020-01-16 ASSESSMENT — MIFFLIN-ST. JEOR: SCORE: 2286.83

## 2020-01-16 NOTE — H&P
Cardiology    History and Physical         Date of Admission:  1/16/2020  Date of Service (when I saw the patient): 01/16/20    Assessment & Plan     Jeremiah Isaac is a 59 year old male who presents with with a PMHx of pulmonary HTN, obesity, T2DM c/b diabetes, HTN, CKD, nacolepsy, exertional syncope, possible MGUS who presents today for scheduled admission for exertional syncope.    #Exertional Syncope  #Concern for PVOD  #Pulmonary HTN(WHO group 3 vs possible group 1 PVOD)  History of exertional syncope. Underwent LHC on 10/2019 that revealed 95% stenosis of prox LAD that was FFR positive necessitating ARINA to prox-LAD. Symptoms persisted despite ARINA. RHC revealed severe post capillary pulmonary HTN with a normal LVEDP lowering clinical suspicion for class 2 pulmonary HTN. V/Q scan revealed no CTEPH. Patient has history of sleep apnea, however class 3 unlikely to be only  of pulmonary HTN. At bedside, patient continues to endorse SAINI and syncope. To work up PVOD, will perform LHC/RHC w/nitrix oxide study. Although it is difficult to determine patient's volume, creatinine is higher than baseline, increasing concern for cardiorenal syndrome  - Volume: IV lasix 40mg  - RHC/LHC tomorrow  - ACEI: Will hold due to increasing Creatinine  - Will CT chest/abdomen/pelvis w/o contrast  - BB: Metop succinate 100mg qday       #Narcolepsy  Patient with history of narcolepsy. Could be 2/2 to GIRISH. Currently perscribed amphetamine by sleep medicine. In the setting of significant PHTN, will hold amphetamine presently  - Hold amphetamine   - Will consult Neurology    CAD s/p ARINA to prox LAD  ARINA to prox LAD on 8/2019  - ASA 81mg  - Plavix 75mg qday    GIRISH  Was recently transition to BIPAP last month. There could be a component of CSA  - BIPAP: 26/7  - Will consult sleep medicine to discuss if there is a CSA component to symptoms    #T2DM:  - sliding scale insulin  - Lantus 14 units this evening  - Can consider Endocrinology  consult      Staffed with Dr. Allison Yuan MD  Cardiology Fellow  PGY4    History of Present Illness   Jeremiah Isaac is a 59 year old male who presents with with a PMHx of pulmonary HTN, obesity, T2DM c/b diabetes, HTN, CKD, nacolepsy, exertional syncope, possible MGUS who presents today for scheduled admission for exertional syncope.    Mr. Isaac endorsed SAINI that started about one year ago. States that the episodes occurred when moving his lawn or performing outdoor chores. He then began having episodes of exertional syncope starting in the summer of 2019. He presented to [facility] on 8/2019 for exertional syncope. He underwent a LHC that revealed 75% stenosis of the prox LAD that was FFR  positive then underwent stenting. His breathing improved for a short period of time, however, he then developed exertional syncope one month after his stent. It was thought that his GIRISH was worsening, he followed up with sleep medicine and was transition from CPAP to BIPAP. He then underwent a repeat LHC with a concurrent RHC. LHC showed a patent ARINA to the prox LAD and RHC revealed an RAP: 27/25/23, RV: 124/15(28)  PA: 114/42(65), PCW: 42/79/49 LVEDP:12. Due to concern for severe pulmonary HTN, patient was then referred to Dr. Malhotra. Dr. Malhotra felt that it was unlikely that GIRISH would cause such severe pulmonary HTN, he obtained the cardiac catherization data and noted that the patient's LVEDP was normal increasing of pulmonary veno occlusive disease. Patient was then referred to Dr. Riojas on 1/14/2020 who was similarly concerned for pulmonary venoocclusive disease. It was then decided that Mr. Isaac would be admitted to the heart failure service for a repeat right and left heart cath with nitrix oxide study.,    On admission, patient denied chest pain, SOB, lightheadedness, dizziness, nausea, vomitting, diarrhea. He did endorse the exertional syncope.       Past Medical History    I have reviewed this  patient's medical history and updated it with pertinent information if needed.   No past medical history on file.    Past Surgical History   I have reviewed this patient's surgical history and updated it with pertinent information if needed.  No past surgical history on file.    Prior to Admission Medications   Prior to Admission Medications   Prescriptions Last Dose Informant Patient Reported? Taking?   amphetamine-dextroamphetamine (ADDERALL) 20 MG tablet   Yes No   Sig: Take 20-40 mg by mouth 3 times daily   aspirin 81 MG EC tablet   Yes No   Sig: Take 81 mg by mouth daily   atorvastatin (LIPITOR) 40 MG tablet   Yes No   Sig: Take 40 mg by mouth daily   cloNIDine (CATAPRES) 0.3 MG tablet   Yes No   Sig: Take 0.3 mg by mouth 2 times daily   clopidogrel (PLAVIX) 75 MG tablet   Yes No   Si mg daily   furosemide (LASIX) 40 MG tablet   Yes No   Sig: Take 40 mg by mouth daily   glipiZIDE (GLUCOTROL XL) 10 MG 24 hr tablet   Yes No   Sig: Take 10 mg by mouth daily   insulin lispro (HUMALOG VIAL) 100 UNIT/ML vial   Yes No   Sig: Inject 100 Units Subcutaneous daily   lisinopril (PRINIVIL/ZESTRIL) 40 MG tablet   Yes No   Sig: Take 40 mg by mouth daily   metFORMIN (GLUCOPHAGE-XR) 750 MG 24 hr tablet   Yes No   Sig: Take 2,250 mg by mouth daily (with dinner)   metoprolol succinate ER (TOPROL-XL) 100 MG 24 hr tablet   Yes No   Sig: Take 100 mg by mouth daily      Facility-Administered Medications: None     Allergies   No Known Allergies    Social History   I have reviewed this patient's social history and updated it with pertinent information if needed. Jeremiah Grossnt  reports that he has never smoked. He has never used smokeless tobacco. He reports current alcohol use.    Family History   I have reviewed this patient's family history and updated it with pertinent information if needed.   No family history on file.    Review of Systems   The 10 point Review of Systems is negative other than noted in the HPI or here.  SAINI    Physical Exam   Temp: 97.4  F (36.3  C) Temp src: Oral BP: (!) 155/109(RN is aware of BP)   Heart Rate: 64 Resp: 20 SpO2: 97 % O2 Device: None (Room air)    Vital Signs with Ranges  Temp:  [97.4  F (36.3  C)] 97.4  F (36.3  C)  Heart Rate:  [64] 64  Resp:  [20] 20  BP: (155)/(109) 155/109  SpO2:  [97 %] 97 %  322 lbs 0 oz    GEN:  Alert, oriented x 3, appears comfortable, NAD.  HEENT:  Normocephalic/atraumatic, no scleral icterus, no nasal discharge, mouth moist.  CV:  Regular rate and rhythm, no murmur or JVD.  S1 + S2 noted, no S3 or S4.  LUNGS:  Clear to auscultation bilaterally   ABD:  Active bowel sounds, soft, non-tender/non-distended.  No rebound/guarding/rigidity.  EXT:  No edema or cyanosis.  Hands/feet warm to touch with good signs of peripheral perfusion.   SKIN:  Dry to touch, no exanthems noted in the visualized areas.  NEURO:  No new focal deficits appreciated.    Data   Data reviewed today:  I personally reviewed the EKG tracing showing NSR.  Recent Labs   Lab 01/16/20  1501 01/14/20  1127   WBC 6.7 10.1   HGB 12.9* 13.6   MCV 86 85    271   NA  --  141   POTASSIUM  --  4.2   CHLORIDE  --  109   CO2  --  28   BUN  --  52*   CR  --  1.91*   ANIONGAP  --  4   ANNITA  --  9.6   GLC  --  223*   ALBUMIN  --  3.4   PROTTOTAL  --  7.0   BILITOTAL  --  0.6   ALKPHOS  --  99   ALT  --  25   AST  --  15       No results found for this or any previous visit (from the past 24 hour(s)).       Data:     Result Date: 12/4/2019  EXAM: NM LUNG VQ SCAN LOCATION: [facility] DATE/TIME: 12/4/2019 12:29 PM INDICATION: Pulmonary hypertension. Shortness of breath. Dyspnea on exertion. COMPARISON: Chest radiograph from today, 12/04/2019. TECHNIQUE: 46.7 technetium-99m DTPA, aerosol inhalation for ventilation scan. 8.2 mCi technetium-99m MAA IV for perfusion scan. FINDINGS: Matched ventilation-perfusion defect in the lingula without radiographic correlate. Normal homogeneous radionuclide activity elsewhere  throughout both lungs on both ventilation and perfusion scans. No mismatched segmental perfusion defect. Findings result in low probability of pulmonary embolism.      RH 10/2019     RAP: 27/25/23, RV: 124/15(28)  PA: 114/42(65), PCW: 42/79/49 LVEDP:12.    [facility] 10/2019  Narrative Coronary anatomy unchanged from recent study.  LAD remains widely patent.  Mild diffuse coronary atherosclerosis.

## 2020-01-17 ENCOUNTER — APPOINTMENT (OUTPATIENT)
Dept: MRI IMAGING | Facility: CLINIC | Age: 60
DRG: 287 | End: 2020-01-17
Attending: STUDENT IN AN ORGANIZED HEALTH CARE EDUCATION/TRAINING PROGRAM
Payer: COMMERCIAL

## 2020-01-17 LAB
ANION GAP SERPL CALCULATED.3IONS-SCNC: 2 MMOL/L (ref 3–14)
BUN SERPL-MCNC: 57 MG/DL (ref 7–30)
CALCIUM SERPL-MCNC: 9.2 MG/DL (ref 8.5–10.1)
CHLORIDE SERPL-SCNC: 107 MMOL/L (ref 94–109)
CO2 SERPL-SCNC: 30 MMOL/L (ref 20–32)
CREAT SERPL-MCNC: 1.87 MG/DL (ref 0.66–1.25)
ERYTHROCYTE [DISTWIDTH] IN BLOOD BY AUTOMATED COUNT: 13.1 % (ref 10–15)
GFR SERPL CREATININE-BSD FRML MDRD: 39 ML/MIN/{1.73_M2}
GLUCOSE BLDC GLUCOMTR-MCNC: 190 MG/DL (ref 70–99)
GLUCOSE BLDC GLUCOMTR-MCNC: 199 MG/DL (ref 70–99)
GLUCOSE BLDC GLUCOMTR-MCNC: 202 MG/DL (ref 70–99)
GLUCOSE BLDC GLUCOMTR-MCNC: 203 MG/DL (ref 70–99)
GLUCOSE BLDC GLUCOMTR-MCNC: 219 MG/DL (ref 70–99)
GLUCOSE SERPL-MCNC: 213 MG/DL (ref 70–99)
HCT VFR BLD AUTO: 40.8 % (ref 40–53)
HGB BLD-MCNC: 13.1 G/DL (ref 13.3–17.7)
INR PPP: 1.05 (ref 0.86–1.14)
INTERPRETATION ECG - MUSE: NORMAL
MCH RBC QN AUTO: 28.6 PG (ref 26.5–33)
MCHC RBC AUTO-ENTMCNC: 32.1 G/DL (ref 31.5–36.5)
MCV RBC AUTO: 89 FL (ref 78–100)
PLATELET # BLD AUTO: 209 10E9/L (ref 150–450)
POTASSIUM SERPL-SCNC: 4.2 MMOL/L (ref 3.4–5.3)
POTASSIUM SERPL-SCNC: 5.7 MMOL/L (ref 3.4–5.3)
RBC # BLD AUTO: 4.58 10E12/L (ref 4.4–5.9)
SODIUM SERPL-SCNC: 139 MMOL/L (ref 133–144)
WBC # BLD AUTO: 8.2 10E9/L (ref 4–11)

## 2020-01-17 PROCEDURE — 25000128 H RX IP 250 OP 636: Performed by: INTERNAL MEDICINE

## 2020-01-17 PROCEDURE — C1894 INTRO/SHEATH, NON-LASER: HCPCS | Performed by: INTERNAL MEDICINE

## 2020-01-17 PROCEDURE — 80048 BASIC METABOLIC PNL TOTAL CA: CPT | Performed by: STUDENT IN AN ORGANIZED HEALTH CARE EDUCATION/TRAINING PROGRAM

## 2020-01-17 PROCEDURE — 27210794 ZZH OR GENERAL SUPPLY STERILE: Performed by: INTERNAL MEDICINE

## 2020-01-17 PROCEDURE — 00000146 ZZHCL STATISTIC GLUCOSE BY METER IP

## 2020-01-17 PROCEDURE — 84132 ASSAY OF SERUM POTASSIUM: CPT | Performed by: STUDENT IN AN ORGANIZED HEALTH CARE EDUCATION/TRAINING PROGRAM

## 2020-01-17 PROCEDURE — 36415 COLL VENOUS BLD VENIPUNCTURE: CPT | Performed by: STUDENT IN AN ORGANIZED HEALTH CARE EDUCATION/TRAINING PROGRAM

## 2020-01-17 PROCEDURE — 99233 SBSQ HOSP IP/OBS HIGH 50: CPT | Mod: 25 | Performed by: INTERNAL MEDICINE

## 2020-01-17 PROCEDURE — 40000065 ZZH STATISTIC EKG NON-CHARGEABLE

## 2020-01-17 PROCEDURE — 25000132 ZZH RX MED GY IP 250 OP 250 PS 637: Performed by: STUDENT IN AN ORGANIZED HEALTH CARE EDUCATION/TRAINING PROGRAM

## 2020-01-17 PROCEDURE — 85027 COMPLETE CBC AUTOMATED: CPT | Performed by: STUDENT IN AN ORGANIZED HEALTH CARE EDUCATION/TRAINING PROGRAM

## 2020-01-17 PROCEDURE — 25500064 ZZH RX 255 OP 636: Performed by: INTERNAL MEDICINE

## 2020-01-17 PROCEDURE — 93010 ELECTROCARDIOGRAM REPORT: CPT | Performed by: INTERNAL MEDICINE

## 2020-01-17 PROCEDURE — 75565 CARD MRI VELOC FLOW MAPPING: CPT | Mod: 26 | Performed by: INTERNAL MEDICINE

## 2020-01-17 PROCEDURE — 75565 CARD MRI VELOC FLOW MAPPING: CPT

## 2020-01-17 PROCEDURE — 25000128 H RX IP 250 OP 636: Performed by: STUDENT IN AN ORGANIZED HEALTH CARE EDUCATION/TRAINING PROGRAM

## 2020-01-17 PROCEDURE — 4A023N8 MEASUREMENT OF CARDIAC SAMPLING AND PRESSURE, BILATERAL, PERCUTANEOUS APPROACH: ICD-10-PCS | Performed by: INTERNAL MEDICINE

## 2020-01-17 PROCEDURE — A9585 GADOBUTROL INJECTION: HCPCS | Performed by: INTERNAL MEDICINE

## 2020-01-17 PROCEDURE — 25000125 ZZHC RX 250: Performed by: STUDENT IN AN ORGANIZED HEALTH CARE EDUCATION/TRAINING PROGRAM

## 2020-01-17 PROCEDURE — 21400000 ZZH R&B CCU UMMC

## 2020-01-17 PROCEDURE — 93453 R&L HRT CATH W/VENTRICLGRPHY: CPT | Mod: 26 | Performed by: INTERNAL MEDICINE

## 2020-01-17 PROCEDURE — 93463 DRUG ADMIN & HEMODYNMIC MEAS: CPT | Performed by: INTERNAL MEDICINE

## 2020-01-17 PROCEDURE — 25000125 ZZHC RX 250: Performed by: INTERNAL MEDICINE

## 2020-01-17 PROCEDURE — 27210762 ZZH DEVICE SUTURELESS SECUREMENT EA CR2: Performed by: INTERNAL MEDICINE

## 2020-01-17 PROCEDURE — 75561 CARDIAC MRI FOR MORPH W/DYE: CPT | Mod: 26 | Performed by: INTERNAL MEDICINE

## 2020-01-17 PROCEDURE — 93453 R&L HRT CATH W/VENTRICLGRPHY: CPT | Performed by: INTERNAL MEDICINE

## 2020-01-17 PROCEDURE — 85610 PROTHROMBIN TIME: CPT | Performed by: STUDENT IN AN ORGANIZED HEALTH CARE EDUCATION/TRAINING PROGRAM

## 2020-01-17 RX ORDER — LIDOCAINE 40 MG/G
CREAM TOPICAL
Status: DISCONTINUED | OUTPATIENT
Start: 2020-01-17 | End: 2020-01-17 | Stop reason: HOSPADM

## 2020-01-17 RX ORDER — NIFEDIPINE 30 MG/1
30 TABLET, EXTENDED RELEASE ORAL DAILY
Status: DISCONTINUED | OUTPATIENT
Start: 2020-01-17 | End: 2020-01-19 | Stop reason: HOSPADM

## 2020-01-17 RX ORDER — HEPARIN SODIUM 1000 [USP'U]/ML
INJECTION, SOLUTION INTRAVENOUS; SUBCUTANEOUS
Status: DISCONTINUED | OUTPATIENT
Start: 2020-01-17 | End: 2020-01-17 | Stop reason: HOSPADM

## 2020-01-17 RX ORDER — GADOBUTROL 604.72 MG/ML
7.5 INJECTION INTRAVENOUS ONCE
Status: COMPLETED | OUTPATIENT
Start: 2020-01-17 | End: 2020-01-17

## 2020-01-17 RX ORDER — SODIUM CHLORIDE 9 MG/ML
INJECTION, SOLUTION INTRAVENOUS CONTINUOUS
Status: DISCONTINUED | OUTPATIENT
Start: 2020-01-17 | End: 2020-01-17 | Stop reason: HOSPADM

## 2020-01-17 RX ORDER — VERAPAMIL HYDROCHLORIDE 2.5 MG/ML
INJECTION, SOLUTION INTRAVENOUS
Status: DISCONTINUED | OUTPATIENT
Start: 2020-01-17 | End: 2020-01-17 | Stop reason: HOSPADM

## 2020-01-17 RX ORDER — NITROGLYCERIN 5 MG/ML
VIAL (ML) INTRAVENOUS
Status: DISCONTINUED | OUTPATIENT
Start: 2020-01-17 | End: 2020-01-17 | Stop reason: HOSPADM

## 2020-01-17 RX ORDER — POTASSIUM CHLORIDE 750 MG/1
20 TABLET, EXTENDED RELEASE ORAL
Status: DISCONTINUED | OUTPATIENT
Start: 2020-01-17 | End: 2020-01-17 | Stop reason: HOSPADM

## 2020-01-17 RX ORDER — POTASSIUM CHLORIDE 750 MG/1
40 TABLET, EXTENDED RELEASE ORAL
Status: DISCONTINUED | OUTPATIENT
Start: 2020-01-17 | End: 2020-01-17 | Stop reason: HOSPADM

## 2020-01-17 RX ADMIN — INSULIN ASPART 3 UNITS: 100 INJECTION, SOLUTION INTRAVENOUS; SUBCUTANEOUS at 09:00

## 2020-01-17 RX ADMIN — GADOBUTROL 7.5 ML: 604.72 INJECTION INTRAVENOUS at 11:46

## 2020-01-17 RX ADMIN — CLOPIDOGREL BISULFATE 75 MG: 75 TABLET, FILM COATED ORAL at 08:57

## 2020-01-17 RX ADMIN — ASPIRIN 325 MG: 325 TABLET, DELAYED RELEASE ORAL at 09:12

## 2020-01-17 RX ADMIN — NIFEDIPINE 30 MG: 30 TABLET, FILM COATED, EXTENDED RELEASE ORAL at 17:19

## 2020-01-17 RX ADMIN — CLONIDINE HYDROCHLORIDE 0.3 MG: 0.1 TABLET ORAL at 19:10

## 2020-01-17 RX ADMIN — INSULIN ASPART 3 UNITS: 100 INJECTION, SOLUTION INTRAVENOUS; SUBCUTANEOUS at 13:11

## 2020-01-17 RX ADMIN — FUROSEMIDE 40 MG: 40 TABLET ORAL at 08:57

## 2020-01-17 RX ADMIN — INSULIN ASPART 3 UNITS: 100 INJECTION, SOLUTION INTRAVENOUS; SUBCUTANEOUS at 17:19

## 2020-01-17 RX ADMIN — HEPARIN SODIUM 5000 UNITS: 5000 INJECTION, SOLUTION INTRAVENOUS; SUBCUTANEOUS at 09:12

## 2020-01-17 RX ADMIN — HEPARIN SODIUM 5000 UNITS: 5000 INJECTION, SOLUTION INTRAVENOUS; SUBCUTANEOUS at 19:10

## 2020-01-17 RX ADMIN — INSULIN GLARGINE 14 UNITS: 100 INJECTION, SOLUTION SUBCUTANEOUS at 21:47

## 2020-01-17 RX ADMIN — CLONIDINE HYDROCHLORIDE 0.3 MG: 0.1 TABLET ORAL at 08:57

## 2020-01-17 RX ADMIN — ATORVASTATIN CALCIUM 40 MG: 40 TABLET, FILM COATED ORAL at 08:57

## 2020-01-17 ASSESSMENT — ACTIVITIES OF DAILY LIVING (ADL)
ADLS_ACUITY_SCORE: 13

## 2020-01-17 ASSESSMENT — MIFFLIN-ST. JEOR: SCORE: 2289.1

## 2020-01-17 NOTE — PLAN OF CARE
D: Acute on chronic right-sided heart failure (H)  (primary encounter diagnosis)    I: Monitored vitals and assessed pt status.     A: A0x4. VSS, on room air. Up with SBA Sinus valentin/Sinus rhythm 45-60 bpm. No dizziness this shift.  2 RN skin assessment done with Neelima WU. No open sores or pressure wounds. Dried healing cuts on right arm and left 2nd toe. Dry skin BLE. Francois legs. Took off lymphodema wraps for the night. Please have day team place lymphodema consult.    Temp:  [97  F (36.1  C)-98  F (36.7  C)] 97  F (36.1  C)  Heart Rate:  [47-64] 47  Resp:  [20] 20  BP: (111-155)/() 130/80  SpO2:  [96 %-100 %] 100 %      P: Continue to monitor Pt status and report changes to treatment team. NPO RHC/LHC

## 2020-01-17 NOTE — PROGRESS NOTES
[facility] Cardiology Progress Note           Assessment and Plan:     Jeremiah Isaac is a 59 year old male who presents with with a PMHx of pulmonary HTN, obesity, T2DM c/b diabetes, HTN, CKD, nacolepsy, exertional syncope, possible MGUS who presents today for scheduled admission for exertional syncope.     #Exertional Syncope  #Concern for PVOD  #Pulmonary HTN(WHO group 3 vs possible group 1 PVOD)  History of exertional syncope. Underwent LHC on 10/2019 that revealed 95% stenosis of prox LAD that was FFR positive necessitating ARINA to prox-LAD. Symptoms persisted despite ARINA. RHC revealed severe post capillary pulmonary HTN with a normal LVEDP lowering clinical suspicion for class 2 pulmonary HTN. V/Q scan revealed no CTEPH. Patient has history of sleep apnea, however class 3 unlikely to be only  of pulmonary HTN. At bedside, patient continues to endorse SAINI and syncope.  RHC today did show elevated precapillar PH with moderately reversible NO study. Will begin po Nifedipine 30mg qday.  Findings on RHC today NOT consistent with PVOD.   CRMI also revealed LGE pattern that is typical of pulmonary HTN.   - Volume: PO Lasix 40mg  - Will consider HRCT  - ACEI: Will hold due to increasing Creatinine  - BB: Metop succinate 100mg qday         #Narcolepsy  Patient with history of narcolepsy. Could be 2/2 to GIRISH. Currently perscribed amphetamine by sleep medicine. In the setting of significant PHTN, will hold amphetamine presently  - Hold amphetamine   - Will consult Neurology     CAD s/p ARINA to prox LAD  ARINA to prox LAD on 8/2019  - ASA 81mg  - Plavix 75mg qday     GIRISH  Was recently transition to BIPAP last month. There could be a component of CSA  - BIPAP: 26/7  - Will consult sleep medicine to discuss if there is a CSA component to symptoms     #T2DM:  - sliding scale insulin  - Lantus 14 units this evening  - consulted Endocrinology         Staffed with Dr. Mateo Yuan MD  Cardiology  Fellow  PGY4      Late entry - pt seen and examined on 1/17/2020  I have reviewed today's vital signs, notes, medications, labs and imaging. I have also seen and examined the patient and agree with the findings and plan as outlined above.    Keesha Galindo MD  Section Head - Advanced Heart Failure, Transplantation and Mechanical Circulatory Support  Director - Adult [facility]ovascular [facility]  Associate Professor of Medicine, [facility]           Subjective:     Jeremiah Isaac is a 59 year old male who presents with with a PMHx of pulmonary HTN, obesity, T2DM c/b diabetes, HTN, CKD, nacolepsy, exertional syncope, possible MGUS who presents today for scheduled admission for exertional syncope.       S/IE:  - Underwent LHC and RHC today  - Underwent cMRI  - Denies worsening SOB, lightheadedness and dizziness.                 Review of Systems:   A comprehensive review of systems was performed and found to be negative except as described in this note          Medications:     Current Facility-Administered Medications   Medication     acetaminophen (TYLENOL) Suppository 650 mg     acetaminophen (TYLENOL) tablet 650 mg     aspirin EC tablet 81 mg     atorvastatin (LIPITOR) tablet 40 mg     cloNIDine (CATAPRES) tablet 0.3 mg     clopidogrel (PLAVIX) tablet 75 mg     glucose gel 15-30 g    Or     dextrose 50 % injection 25-50 mL    Or     glucagon injection 1 mg     furosemide (LASIX) tablet 40 mg     heparin ANTICOAGULANT injection 5,000 Units     insulin aspart (NovoLOG) inj (RAPID ACTING)     insulin aspart (NovoLOG) inj (RAPID ACTING)     insulin glargine (LANTUS PEN) injection 14 Units     lidocaine (LMX4) cream     lidocaine 1 % 0.1-1 mL     medication instruction     metoprolol succinate ER (TOPROL-XL) 24 hr tablet 100 mg     NIFEdipine ER OSMOTIC (PROCARDIA XL) 24 hr tablet 30 mg     sodium chloride (PF) 0.9% PF flush 3 mL     sodium chloride (PF) 0.9% PF flush 3 mL                Objective:   Temp: 97.7  F (36.5  C) Temp src: Oral BP: 121/80 Pulse: 52 Heart Rate: 51 Resp: 18 SpO2: 96 % O2 Device: None (Room air)      GEN:  Alert, oriented x 3, appears comfortable, NAD.  HEENT:  Normocephalic/atraumatic, no scleral icterus, no nasal discharge, mouth moist.  CV:  Regular rate and rhythm, no murmur or JVD.  S1 + S2 noted, no S3 or S4.  LUNGS:  Clear to auscultation bilaterally   ABD:  Active bowel sounds, soft, non-tender/non-distended.  No rebound/guarding/rigidity.  EXT: 2+ pitting edema   SKIN:  Dry to touch, no exanthems noted in the visualized areas.  NEURO:  No new focal deficits appreciated.          Data:     Lab Results   Component Value Date    WBC 8.2 01/17/2020    HGB 13.1 (L) 01/17/2020    HCT 40.8 01/17/2020     01/17/2020     01/17/2020    POTASSIUM 4.2 01/17/2020    CHLORIDE 107 01/17/2020    CO2 30 01/17/2020    BUN 57 (H) 01/17/2020    CR 1.87 (H) 01/17/2020     (H) 01/17/2020    DD 1.7 (H) 08/22/2019    NTBNPI 608 08/22/2019    NTBNP 1,385 (H) 01/14/2020    TROPI <0.015 08/22/2019    AST 15 01/14/2020    ALT 25 01/14/2020    ALKPHOS 99 01/14/2020    BILITOTAL 0.6 01/14/2020    INR 1.05 01/17/2020     Lab Results   Component Value Date    WBC 8.2 01/17/2020    HGB 13.1 (L) 01/17/2020    HCT 40.8 01/17/2020     01/17/2020     01/17/2020    POTASSIUM 4.2 01/17/2020    CHLORIDE 107 01/17/2020    CO2 30 01/17/2020    BUN 57 (H) 01/17/2020    CR 1.87 (H) 01/17/2020     (H) 01/17/2020    DD 1.7 (H) 08/22/2019    NTBNPI 608 08/22/2019    NTBNP 1,385 (H) 01/14/2020    TROPI <0.015 08/22/2019    AST 15 01/14/2020    ALT 25 01/14/2020    ALKPHOS 99 01/14/2020    BILITOTAL 0.6 01/14/2020    INR 1.05 01/17/2020        EKG results:  EKG: None today     ECHO:     CXR:     RHC: 1/17/2020    HR 60  /74/106  O2 Sat 100%    RA 10/8/5  /10  Right /28/51  Right PCWP mean 13  LVEDP ranged 10-15 mmHg  PA Sat 60.2%  Brittanie CO/CI 4.8/1.9  TD  CO/CI 5/2  SVR 1616  PVR 7.6    200mcg Nitroglycerine and 5mg verapamil given for radial cocktail medications    /64/85    Right PA 60/24/34  Right PCWP 10/8/7    Left PA 79/25/42  Left PCWP 8/7/4    No aortic valve gradient on pullback    Normal right sided filling pressures  Severely elevated precapillary pulmonary artery pressures with modest response to vasodilators  Normal to mildly elevated PCWP and LVEDP  Hemodynamic data has been modified in Epic per physician review

## 2020-01-17 NOTE — PLAN OF CARE
D: Patient admitted 1/16/19 following syncopal episode at home and pulmonary HTN management.    I/A: Monitored vitals and assessed patient status. A0x4. VSS. Rhythm sinus bradycardia as low as mid 40's while sleeping and up to mid 60's. Asymptomatic. Cards 2 aware. Afebrile. Urinating adequately. Blood sugars in the low 200's, on sliding scale insulin. Transferred to cardiac cath lab around 1400 for right and left heart cath.    Completed: Cardiac MRI    P: Continue to monitor patient status and report changes to treatment team.

## 2020-01-17 NOTE — PLAN OF CARE
Admission          1/16/2020  2:02 PM  -----------------------------------------------------------  Diagnosis: Exertional Dyspnea, Pulmonary HTN    Admitted from: home  Report given from: N/A  Via: ambulatory  Accompanied by: friend  Family Aware of Admission: Yes  Belongings: Placed in closet; valuables and medication, including insulin pump, sent home with pt's friend.  Admission Profile: complete, skin assessment passed on to oncoming RN  Teaching: orientation to unit, call don't fall, use of console, meal times, visiting hours, when to call for the RN (angina/sob/dizzyness, etc.), and enforced importance of safety   Access: PIV  Telemetry:Placed on pt  Ht./Wt.: complete    Temp:  [97.4  F (36.3  C)-98  F (36.7  C)] 98  F (36.7  C)  Heart Rate:  [61-64] 61  Resp:  [20] 20  BP: (127-155)/() 128/75  SpO2:  [96 %-97 %] 96 %    Plan: NPO at midnight for right and left heart cath tomorrow. CT of abdomen, chest, and pelvis done this evening. Oncoming RN to perform 2 RN skin assessment.     Jayde Fox, RN  Cardiology

## 2020-01-18 LAB
ALBUMIN SERPL-MCNC: 3 G/DL (ref 3.4–5)
ALP SERPL-CCNC: 82 U/L (ref 40–150)
ALT SERPL W P-5'-P-CCNC: 22 U/L (ref 0–70)
ANION GAP SERPL CALCULATED.3IONS-SCNC: 7 MMOL/L (ref 3–14)
AST SERPL W P-5'-P-CCNC: 8 U/L (ref 0–45)
BASOPHILS # BLD AUTO: 0 10E9/L (ref 0–0.2)
BASOPHILS NFR BLD AUTO: 0.6 %
BILIRUB SERPL-MCNC: 0.7 MG/DL (ref 0.2–1.3)
BUN SERPL-MCNC: 58 MG/DL (ref 7–30)
CALCIUM SERPL-MCNC: 9.4 MG/DL (ref 8.5–10.1)
CHLORIDE SERPL-SCNC: 105 MMOL/L (ref 94–109)
CO2 SERPL-SCNC: 28 MMOL/L (ref 20–32)
CREAT SERPL-MCNC: 1.98 MG/DL (ref 0.66–1.25)
DIFFERENTIAL METHOD BLD: ABNORMAL
EOSINOPHIL # BLD AUTO: 0.1 10E9/L (ref 0–0.7)
EOSINOPHIL NFR BLD AUTO: 1.9 %
ERYTHROCYTE [DISTWIDTH] IN BLOOD BY AUTOMATED COUNT: 12.8 % (ref 10–15)
GFR SERPL CREATININE-BSD FRML MDRD: 36 ML/MIN/{1.73_M2}
GLUCOSE BLDC GLUCOMTR-MCNC: 174 MG/DL (ref 70–99)
GLUCOSE BLDC GLUCOMTR-MCNC: 180 MG/DL (ref 70–99)
GLUCOSE BLDC GLUCOMTR-MCNC: 190 MG/DL (ref 70–99)
GLUCOSE BLDC GLUCOMTR-MCNC: 210 MG/DL (ref 70–99)
GLUCOSE BLDC GLUCOMTR-MCNC: 258 MG/DL (ref 70–99)
GLUCOSE SERPL-MCNC: 193 MG/DL (ref 70–99)
HCT VFR BLD AUTO: 39.2 % (ref 40–53)
HGB BLD-MCNC: 13.2 G/DL (ref 13.3–17.7)
IMM GRANULOCYTES # BLD: 0 10E9/L (ref 0–0.4)
IMM GRANULOCYTES NFR BLD: 0.4 %
INR PPP: 1.02 (ref 0.86–1.14)
LYMPHOCYTES # BLD AUTO: 1.9 10E9/L (ref 0.8–5.3)
LYMPHOCYTES NFR BLD AUTO: 26.7 %
MCH RBC QN AUTO: 28.8 PG (ref 26.5–33)
MCHC RBC AUTO-ENTMCNC: 33.7 G/DL (ref 31.5–36.5)
MCV RBC AUTO: 86 FL (ref 78–100)
MONOCYTES # BLD AUTO: 0.5 10E9/L (ref 0–1.3)
MONOCYTES NFR BLD AUTO: 6.8 %
NEUTROPHILS # BLD AUTO: 4.6 10E9/L (ref 1.6–8.3)
NEUTROPHILS NFR BLD AUTO: 63.6 %
NRBC # BLD AUTO: 0 10*3/UL
NRBC BLD AUTO-RTO: 0 /100
PLATELET # BLD AUTO: 221 10E9/L (ref 150–450)
POTASSIUM SERPL-SCNC: 4 MMOL/L (ref 3.4–5.3)
PROT SERPL-MCNC: 6.2 G/DL (ref 6.8–8.8)
RBC # BLD AUTO: 4.58 10E12/L (ref 4.4–5.9)
SODIUM SERPL-SCNC: 140 MMOL/L (ref 133–144)
WBC # BLD AUTO: 7.2 10E9/L (ref 4–11)

## 2020-01-18 PROCEDURE — 85610 PROTHROMBIN TIME: CPT | Performed by: STUDENT IN AN ORGANIZED HEALTH CARE EDUCATION/TRAINING PROGRAM

## 2020-01-18 PROCEDURE — 25000128 H RX IP 250 OP 636: Performed by: STUDENT IN AN ORGANIZED HEALTH CARE EDUCATION/TRAINING PROGRAM

## 2020-01-18 PROCEDURE — 36415 COLL VENOUS BLD VENIPUNCTURE: CPT | Performed by: STUDENT IN AN ORGANIZED HEALTH CARE EDUCATION/TRAINING PROGRAM

## 2020-01-18 PROCEDURE — 25000132 ZZH RX MED GY IP 250 OP 250 PS 637: Performed by: STUDENT IN AN ORGANIZED HEALTH CARE EDUCATION/TRAINING PROGRAM

## 2020-01-18 PROCEDURE — 80053 COMPREHEN METABOLIC PANEL: CPT | Performed by: STUDENT IN AN ORGANIZED HEALTH CARE EDUCATION/TRAINING PROGRAM

## 2020-01-18 PROCEDURE — 00000146 ZZHCL STATISTIC GLUCOSE BY METER IP

## 2020-01-18 PROCEDURE — 99232 SBSQ HOSP IP/OBS MODERATE 35: CPT | Mod: GC | Performed by: INTERNAL MEDICINE

## 2020-01-18 PROCEDURE — 25000131 ZZH RX MED GY IP 250 OP 636 PS 637: Performed by: STUDENT IN AN ORGANIZED HEALTH CARE EDUCATION/TRAINING PROGRAM

## 2020-01-18 PROCEDURE — 85025 COMPLETE CBC W/AUTO DIFF WBC: CPT | Performed by: STUDENT IN AN ORGANIZED HEALTH CARE EDUCATION/TRAINING PROGRAM

## 2020-01-18 PROCEDURE — 21400000 ZZH R&B CCU UMMC

## 2020-01-18 RX ADMIN — INSULIN ASPART 9 UNITS: 100 INJECTION, SOLUTION INTRAVENOUS; SUBCUTANEOUS at 13:23

## 2020-01-18 RX ADMIN — HEPARIN SODIUM 5000 UNITS: 5000 INJECTION, SOLUTION INTRAVENOUS; SUBCUTANEOUS at 08:00

## 2020-01-18 RX ADMIN — INSULIN ASPART 3 UNITS: 100 INJECTION, SOLUTION INTRAVENOUS; SUBCUTANEOUS at 08:10

## 2020-01-18 RX ADMIN — HEPARIN SODIUM 5000 UNITS: 5000 INJECTION, SOLUTION INTRAVENOUS; SUBCUTANEOUS at 20:19

## 2020-01-18 RX ADMIN — NIFEDIPINE 30 MG: 30 TABLET, FILM COATED, EXTENDED RELEASE ORAL at 08:01

## 2020-01-18 RX ADMIN — CLONIDINE HYDROCHLORIDE 0.3 MG: 0.1 TABLET ORAL at 20:19

## 2020-01-18 RX ADMIN — CLOPIDOGREL BISULFATE 75 MG: 75 TABLET, FILM COATED ORAL at 08:01

## 2020-01-18 RX ADMIN — INSULIN ASPART 5 UNITS: 100 INJECTION, SOLUTION INTRAVENOUS; SUBCUTANEOUS at 12:04

## 2020-01-18 RX ADMIN — INSULIN ASPART 3 UNITS: 100 INJECTION, SOLUTION INTRAVENOUS; SUBCUTANEOUS at 17:54

## 2020-01-18 RX ADMIN — ATORVASTATIN CALCIUM 40 MG: 40 TABLET, FILM COATED ORAL at 08:01

## 2020-01-18 RX ADMIN — METOPROLOL SUCCINATE 100 MG: 100 TABLET, EXTENDED RELEASE ORAL at 08:01

## 2020-01-18 RX ADMIN — FUROSEMIDE 40 MG: 40 TABLET ORAL at 08:01

## 2020-01-18 RX ADMIN — ASPIRIN 81 MG: 81 TABLET, COATED ORAL at 08:13

## 2020-01-18 RX ADMIN — INSULIN ASPART 9 UNITS: 100 INJECTION, SOLUTION INTRAVENOUS; SUBCUTANEOUS at 17:54

## 2020-01-18 RX ADMIN — CLONIDINE HYDROCHLORIDE 0.3 MG: 0.1 TABLET ORAL at 08:00

## 2020-01-18 ASSESSMENT — ACTIVITIES OF DAILY LIVING (ADL)
ADLS_ACUITY_SCORE: 13
ADLS_ACUITY_SCORE: 14
ADLS_ACUITY_SCORE: 13
ADLS_ACUITY_SCORE: 14

## 2020-01-18 ASSESSMENT — MIFFLIN-ST. JEOR: SCORE: 2262.79

## 2020-01-18 NOTE — CONSULTS
Diabetes/Hyperglycemia Inpatient Consult    Reason for consultation:   Consulting physician: Donte Cooper MD      Assessment/Plan:  Jeremiah Isaac is a 59 year old male with PMHx of pulmonary HTN, obesity, T2DM, HTN, CKD IIIb, narcolepsy admitted 1/16/19 for work up of  exertional syncope. He longstanding DM2 on Vgo 40, metformin and glipizide. He follows with his PCP at [facility], last A1C 10.4 % 1/2020    # longstanding DM2 with nephropathy, CKD3b    Patient was switched to lantus 14 units daily and high sliding scale insulin on admission. He was NPO yesterday and his BG has been in good range. His current regimen is was less than his PTA requirements maybe due to NPO status yesterday. Would expect his requirements to go high now that he is eating.    - increase Lantus to 20 units at bedtime, may need higher dose   - aspart carb coverage 1:10 TID qac and with snacks  - continue current high aspart correction 1:25  - agreed with holding metformin and glipizide  - patient would like to go back on V-go on discharge, he will bring it from home    We will continue to follow     Emmanuel Roman MD  Endocrinology Fellow.    Plan discussed with Dr. Mendoza    =====================================================================      History of Present Illness  Jeremiah Isaac is a 59 year old male with PMHx of with PMHx of pulmonary HTN, obesity, T2DM, HTN, CKD IIIb, narcolepsy admitted 1/16/19 for work up of  exertional syncope.     He has exertional dyspnea of still unclear etiology. He had an extensive cardiac work up and will be here until early next week to continue the work up.    He had longstanding DM2 complicated by CKD. He follows with his PCP at [facility], last A1C 10.4 % 1/2020. Based on the last follow up note 3/2019 he is currently on Vgo 40, jardiance 25mg daily, glipizide XL 20 mg once daily, metformin at our 750 mg three tablets once daily. Patient has a new med list and reported he is on  Glipizide 10 mg daily and metformin 750 mg xr 2 tabs daily. He has been on the V-go for the last year, he was on MDI prior to V-go. He likes and would like to stay on it. He is on Vgo 40 and uses clicks when BG is elevated > 300. He was not able to tell me how many clicks a day. He has a personal jayde for the past year; does not have supply with him now.   .       Recent Labs   Lab 01/18/20  1200 01/18/20  0732 01/18/20  0643 01/18/20  0359 01/17/20  2122 01/17/20  1652 01/17/20  1306 01/17/20  0901  01/16/20  1501 01/14/20  1127   GLC  --   --  193*  --   --   --   --  213*  --  307* 223*   * 210*  --  180* 190* 199* 203*  --    < >  --   --     < > = values in this interval not displayed.         Diabetes Type:  Type 2 Diabetes  Diabetes Duration: >10 years  Usual Diabetes Regimen:   BG monitoring frequency: has a personal jayde  Diet: regular  Medications:   V go 40  Glipizide 10 mg daily and metformin 750 mg xr 2 tabs daily    Ability to [place] Prescribed Regimen: yes  Diabetes Control:   Lab Results   Component Value Date    A1C 10.9 01/16/2020     Diabetes Complications: yes nephropathy, denied neuropathy or retinopathy  History of DKA: no  Able to Detect Hypoglycemia: yes  Usual Diabetes Care Provider: elliott      Current Inpatient DM regimen:  lantus 14 units  High sliding scale insulin     Review of Systems  11 point ROS completed with pertinent positives and negatives noted in the HPI    Past medical, family and social histories are reviewed and updated.    Past Medical History  CAD, PMHx of pulmonary HTN, obesity, T2DM, HTN, CKD IIIb, narcolepsy    Family History  CAD    Social History  Social History     Socioeconomic History     Marital status: Single     Spouse name: Not on file     Number of children: Not on file     Years of education: Not on file     Highest education level: Not on file   Occupational History     Not on file   Social Needs     Financial resource strain: Not on  "file     Food insecurity:     Worry: Not on file     Inability: Not on file     Transportation needs:     Medical: Not on file     Non-medical: Not on file   Tobacco Use     Smoking status: Never Smoker     Smokeless tobacco: Never Used   Substance and Sexual Activity     Alcohol use: Yes     Frequency: Monthly or less     Comment: once or twice a year      Drug use: Not on file     Sexual activity: Not on file   Lifestyle     Physical activity:     Days per week: Not on file     Minutes per session: Not on file     Stress: Not on file   Relationships     Social connections:     Talks on phone: Not on file     Gets together: Not on file     Attends [employer] service: Not on file     Active member of club or organization: Not on file     Attends meetings of clubs or organizations: Not on file     Relationship status: Not on file     Intimate partner violence:     Fear of current or ex partner: Not on file     Emotionally abused: Not on file     Physically abused: Not on file     Forced sexual activity: Not on file   Other Topics Concern     Not on file   Social History Narrative     Not on file         Physical Exam  /57 (BP Location: Left arm)   Pulse 59   Temp 98  F (36.7  C) (Oral)   Resp 16   Ht 1.778 m (5' 10\")   Wt 143.7 kg (316 lb 11.2 oz)   SpO2 98%   BMI 45.44 kg/m      General:  Pleasant, resting in bed, in no distress.   HEENT: NAD, MMM, anicteric slcera  Lungs: breathing comfortably  Cardiac: regular rate and rythm  ABD: soft, non-tender/non-distended  Skin: WWP, no obvious lesions  MSK:  fluid movement of all extremities  Mental status: alert, oriented x3, communicating clearly  Psych: calm, even mood    Laboratory  Recent Labs   Lab Test 01/18/20  0643 01/17/20  1047 01/17/20  0901     --  139   POTASSIUM 4.0 4.2 5.7*   CHLORIDE 105  --  107   CO2 28  --  30   ANIONGAP 7  --  2*   *  --  213*   BUN 58*  --  57*   CR 1.98*  --  1.87*   ANNITA 9.4  --  9.2     CBC RESULTS:   Recent " Labs   Lab Test 01/18/20  0643   WBC 7.2   RBC 4.58   HGB 13.2*   HCT 39.2*   MCV 86   MCH 28.8   MCHC 33.7   RDW 12.8          Liver Function Studies -   Recent Labs   Lab Test 01/18/20  0643   PROTTOTAL 6.2*   ALBUMIN 3.0*   BILITOTAL 0.7   ALKPHOS 82   AST 8   ALT 22       Active Medications  Current Facility-Administered Medications   Medication     acetaminophen (TYLENOL) Suppository 650 mg     acetaminophen (TYLENOL) tablet 650 mg     aspirin EC tablet 81 mg     atorvastatin (LIPITOR) tablet 40 mg     cloNIDine (CATAPRES) tablet 0.3 mg     clopidogrel (PLAVIX) tablet 75 mg     glucose gel 15-30 g    Or     dextrose 50 % injection 25-50 mL    Or     glucagon injection 1 mg     furosemide (LASIX) tablet 40 mg     heparin ANTICOAGULANT injection 5,000 Units     insulin aspart (NovoLOG) inj (RAPID ACTING)     insulin aspart (NovoLOG) inj (RAPID ACTING)     insulin aspart (NovoLOG) inj (RAPID ACTING)     insulin aspart (NovoLOG) inj (RAPID ACTING)     insulin glargine (LANTUS PEN) injection 20 Units     lidocaine (LMX4) cream     lidocaine 1 % 0.1-1 mL     medication instruction     metoprolol succinate ER (TOPROL-XL) 24 hr tablet 100 mg     NIFEdipine ER OSMOTIC (PROCARDIA XL) 24 hr tablet 30 mg     sodium chloride (PF) 0.9% PF flush 3 mL     sodium chloride (PF) 0.9% PF flush 3 mL     No current outpatient medications on file.       Current Diet  Orders Placed This Encounter      Low Saturated Fat Na <2400 mg        ATTENDING NOTE    I have seen and examined the patient, reviewed and edited the fellow's note, and agree with the plan of care.    Rosalie Mendoza MD PhD    Division of Endocrinology and Diabetes

## 2020-01-18 NOTE — CONSULTS
[facility]  Neurology Consultation    Patient Name:  Jeremiah Isaac  MRN:  [id number]    :  1960  Date of Service:  2020  Primary care provider:  [facility]      Neurology consultation service was asked to see Jeremiah Isaac by Dr. Flor Yuan to evaluate for Narcolepsy.    History of Present Illness:   59 year old male h/o CAD S/P ARINA in 2019, hypertension, obesity, diabetes, CKD, obstructive sleep apnea and narcolepsy who presents with exertional syncope.  Neurology has been consulted for narcolepsy. The patient is on Adderall that was hold in the setting of pulmonary hypertension.  patient has narcolepsy without cataplexy and sleep apnea diagnosed in , he is on Adderall 20 to 40 mg 3 times daily for the Narcolepsy.  He has excessive daytime sleepiness. The patient states the Adderall is not really doing much to treat. The excessive day time sleepiness.  He had about 6 sleep studies so far.  We do not have the reports in our system.  The patient showed me there report of the last sleep study that he got in 2019.  This study showed no apneas but he has hypopneas with AHI of 12.  About 30% of the hypopneas with a central.  He was on a CPAP for many years and after the last study he is using BiPAP. The obstructive and central hypopneas are succefully treated with the BiPAP, per the sleep study done on 2019     Patient denies headache, weakness, sensory changes, balance issues or any focal neurological deficit.    He is using walker for the dyspnea and dizziness with exertion    ROS  A 10-point ROS was performed as per HPI.     [facility]  Narcolepsy 2013   GIRISH (obstructive sleep apnea)       Medications   Medications Prior to Admission   Medication Sig Dispense Refill Last Dose     amphetamine-dextroamphetamine (ADDERALL) 20 MG tablet Take 20-40 mg by mouth 3 times daily   1/15/2020 at Unknown time     aspirin 81 MG EC tablet  "Take 81 mg by mouth daily   1/16/2020 at 0800     atorvastatin (LIPITOR) 40 MG tablet Take 40 mg by mouth daily   1/16/2020 at 0800     cloNIDine (CATAPRES) 0.3 MG tablet Take 0.3 mg by mouth 2 times daily   1/16/2020 at 080     clopidogrel (PLAVIX) 75 MG tablet 75 mg daily   1/16/2020 at 0800     furosemide (LASIX) 40 MG tablet Take 40 mg by mouth daily   1/16/2020 at 08811     glipiZIDE (GLUCOTROL XL) 10 MG 24 hr tablet Take 10 mg by mouth daily   1/16/2020 at 0800     insulin lispro (HUMALOG VIAL) 100 UNIT/ML vial Inject 100 Units Subcutaneous daily   1/16/2020     lisinopril (PRINIVIL/ZESTRIL) 40 MG tablet Take 40 mg by mouth daily   1/16/2020 at 0800     metFORMIN (GLUCOPHAGE-XR) 750 MG 24 hr tablet Take 2,250 mg by mouth daily (with dinner)   1/16/2020 at 0800     metoprolol succinate ER (TOPROL-XL) 100 MG 24 hr tablet Take 100 mg by mouth daily   1/16/2020 at 0800       Allergies  No Known Allergies    Social History  Social History     Tobacco Use     Smoking status: Never Smoker     Smokeless tobacco: Never Used   Substance Use Topics     Alcohol use: Yes     Frequency: Monthly or less     Comment: once or twice a year        Family History    Family History   Problem Relation Age of Onset     Coronary Artery Disease Birth Father   multiple heart attacks     Diabetes, Type II Birth Father     Physical Examination   Vitals: /57 (BP Location: Left arm)   Pulse 59   Temp 98  F (36.7  C) (Oral)   Resp 16   Ht 1.778 m (5' 10\")   Wt 143.7 kg (316 lb 11.2 oz)   SpO2 98%   BMI 45.44 kg/m    General: Adult male patient, lying in bed, NAD  HEENT: Normocephalic  Cardiac: RRR  Chest: No respiratory distress  Psych: Mood pleasant, affect congruent  Neuro:  Mental status: Awake, alert, attentive, oriented to self, time, place, and circumstance. Language is fluent and coherent with intact comprehension of complex commands, naming and repetition.  Cranial nerves: VFF, PERRL, conjugate gaze, EOMI, facial " sensation intact, face symmetric, shoulder shrug strong, tongue/uvula midline, no dysarthria.   Motor: Normal bulk and tone. No abnormal movements. 5/5 strength in 4/4 extremities.   Reflexes: 2+ reflexes symmetric biceps, brachioradialis, triceps, patellae, and achilles. Negative Calhoun, no clonus, toes down-going.  Sensory: Intact to light touch, pin, vibration, and proprioception  Coordination: FNF without ataxia or dysmetria.   Gait: Normal width, and stride length  Investigations   Sleep study 11/2019 : AHI 12, 30% of the hypopneas are central    Impression  59 year old male h/o CAD S/P ARINA in 2019, hypertension, obesity, diabetes, CKD, obstructive sleep apnea and narcolepsy who presents with exertional syncope.  Neurology has been consulted for narcolepsy.  He has narcolepsy without cataplexy for years and on Adderall for this.  He does not think the Adderall has great effect on the excessive daytime sleepiness.  He also has obstructive sleep apnea and some central hypopneas for which he is using BiPAP machine during sleep.  Narcolepsy can be contributing to his excessive daytime sleepiness.      - If the Adderall is not helping much.  I agree on holding it if it has adverse effect on the Pulmonary hypertension.   - The central and obstructive hypopneas are successfully treated with the BiPAP, no further intervention for the GIRISH and the central hypopneas at this point  - Follow up with the Out patient sleep medicine doctor to discuss alternative treatment for the excessive daytime sleepiness    Neurology will sign off    Thank you for involving Neurology in the care of Jeremiah Isaac.  Please do not hesitate to call with questions/concerns (consult pager [phone]).      Patient was seen and discussed with Dr. Keating.    Alba Barone MD  Neurology PGY-2  [phone]

## 2020-01-18 NOTE — PLAN OF CARE
D/I/A: Pt here w/ exertional syncope. Had RHC/LHC done. TR band deflated, site intact. SB, RA/bipap, VSS.  P: Continue to monitor. Neuro consult for narcolepsy, sleep medicine consult for sleep apnea.

## 2020-01-18 NOTE — PLAN OF CARE
"Patient is a 59 year old male admitted on 1/16 as a scheduled admit to receive treatment for exertional syncope and pulmonary hypertension.     PMHx relevant for CAD s/p ARINA to LAD (2019), pHTN, obesity, T2DM, HTN, CKD, GIRISH, narcolepsy, exertional syndrome, and possible MGUD.    /58 (BP Location: Left arm)   Pulse 52   Temp 97.9  F (36.6  C) (Axillary)   Resp 18   Ht 1.778 m (5' 10\")   Wt 146.3 kg (322 lb 8 oz)   SpO2 99%   BMI 46.27 kg/m      RHC and angiogram completed yesterday with TR band R wrist removed at 1930.    Neuro: A&O x4, calls appropriately.  Cardiac/Tele: VSS. Sinus bradycardia on telemetry with rates in the 50s. No chest pain, lightheaded/dizziness, or palpitations.  Respiratory: Sats maintained on RA, BiPAP worn at night. Denies SOB.  GI/: LBM 1/16, voids without complications. No n/v/d.  Musculoskeletal: Generalized weakness  Diet/Appetite: <2400 mg Na, low saturated fat diet. Carb counting.  Skin: Francois coloration to bilateral LE with +2 dependent edema. No new deficits noted.  Endocrine: BG ACHS, sliding scale insulin and lantus at HS.   LDAs: L PIV saline locked.  Activity: Up as tolerated.  Pain: Denies  Plan: Neuro consulting for narcolepsy, sleep med consulting for GIRISH. Continue to monitor and report changes to Cards 2.    Cece Lozano RN  Cardiology  7:00 AM    "

## 2020-01-18 NOTE — PROGRESS NOTES
[facility] Cardiology Progress Note           Assessment and Plan:     Jeremiah Isaac is a 59 year old male who presents with with a PMHx of pulmonary HTN, obesity, T2DM c/b diabetes, HTN, CKD, narcolepsy, exertional syncope, possible MGUS who presented 1/16 for scheduled admission for exertional syncope.     Changes today:  - Tolerated nifedipine without side effects  - Neuro consult, recommend outpatient sleep clinic follow up  - Likely discharge tomorrow    # Exertional Syncope  #  PVOD excluded by RHC 1/17  # Pulmonary HTN(WHO group 3 vs possible group 1)  History of exertional syncope. Underwent LHC on 10/2019 that revealed 95% stenosis of prox LAD that was FFR positive necessitating ARINA to prox-LAD. Symptoms persisted despite ARINA.     RHC revealed severe post capillary pulmonary HTN with a normal LVEDP lowering clinical suspicion for class 2 pulmonary HTN. V/Q scan revealed no CTEPH. Patient has history of sleep apnea, however class 3 unlikely to be only  of pulmonary HTN due to higher PA pressures than would be expected.     At bedside, patient continues to endorse SAINI and syncope. RHC 1/17 did show elevated precapillar PH with moderately reversible CCB study. CRMI also revealed LGE pattern that is typical of pulmonary HTN. Because of CCB responsiveness during RHC, Mr Isaac was started on nifedipine on 1/17. He tolerated this medication without side effects.  - Volume: PO Lasix 40mg  - ACEI: Will hold due to increasing Creatinine  - BB: Metop succinate 100mg qday   - Nifedipine 30 mg qday    # Narcolepsy  Patient with history of narcolepsy. Could be 2/2 to GIRISH. Currently prescribed amphetamine by sleep medicine. In the setting of significant PHTN, amphetamine held. Per neurology consult 1/18, this may be discontinued as benefit is outweighed by cost of exacerbating PH.  - Discontinue amphetamine   - Neurology consulted, appreciate recs   - Recommend follow up with outpatient sleep medicine     #  CAD s/p ARINA to prox LAD  ARINA to prox LAD on 8/2019.  - ASA 81mg  - Plavix 75mg qday     # GIRISH  Was recently transitioned to BIPAP last month. There could be a component of CSA.  - BIPAP: 26/7     # T2DM:  Controlled on metformin, glipizide and V-go (wearable insulin pump).  - Consulted Endocrinology   - Lantus 20 unit(s) at bedtime   - Aspart coverage 1:10 TID qac and with snacks   - Continue high sliding scale insulin   - Restart V-go on discharge     Staffed with Dr. Galindo.    Mickey Allison MD  PGY1  [phone]      I have reviewed today's vital signs, notes, medications, labs and imaging. I have also seen and examined the patient and agree with the findings and plan as outlined above.    Keesha Galindo MD  Section Head - Advanced Heart Failure, Transplantation and Mechanical Circulatory Support  Director - Adult Congenital and Cardiovascular Genetics Center  Associate Professor of Medicine, [facility]       Subjective:     Jeremiah Isaac is a 59 year old male who presents with with a PMHx of pulmonary HTN, obesity, T2DM c/b diabetes, HTN, CKD, narcolepsy, exertional syncope, possible MGUS who presents today for scheduled admission for exertional syncope.    S/IE:  No acute events overnight.  This morning, Mr. Isaac reports no acute concerns. He reports no complications from RHC and LHC from yesterday.            Review of Systems:   A comprehensive review of systems was performed and found to be negative except as described in this note          Medications:     Current Facility-Administered Medications   Medication     acetaminophen (TYLENOL) Suppository 650 mg     acetaminophen (TYLENOL) tablet 650 mg     aspirin EC tablet 81 mg     atorvastatin (LIPITOR) tablet 40 mg     cloNIDine (CATAPRES) tablet 0.3 mg     clopidogrel (PLAVIX) tablet 75 mg     glucose gel 15-30 g    Or     dextrose 50 % injection 25-50 mL    Or     glucagon injection 1 mg     furosemide (LASIX) tablet 40 mg      heparin ANTICOAGULANT injection 5,000 Units     insulin aspart (NovoLOG) inj (RAPID ACTING)     insulin aspart (NovoLOG) inj (RAPID ACTING)     insulin glargine (LANTUS PEN) injection 14 Units     lidocaine (LMX4) cream     lidocaine 1 % 0.1-1 mL     medication instruction     metoprolol succinate ER (TOPROL-XL) 24 hr tablet 100 mg     NIFEdipine ER OSMOTIC (PROCARDIA XL) 24 hr tablet 30 mg     sodium chloride (PF) 0.9% PF flush 3 mL     sodium chloride (PF) 0.9% PF flush 3 mL               Objective:   Temp: 96.4  F (35.8  C) Temp src: Axillary BP: 129/71 Pulse: 51 Heart Rate: 54 Resp: 18 SpO2: 99 % O2 Device: BiPAP/CPAP      GEN:  Alert, oriented x 3, appears comfortable, NAD.  HEENT:  Normocephalic/atraumatic, no scleral icterus, no nasal discharge, mouth moist.  CV:  Regular rate and rhythm, no murmur or JVD.  S1 + S2 noted, no S3 or S4.  LUNGS:  Clear to auscultation bilaterally   ABD:  Active bowel sounds, soft, non-tender/non-distended.  No rebound/guarding/rigidity.  EXT: 2+ pitting edema   SKIN:  Dry to touch, no exanthems noted in the visualized areas.  NEURO:  No new focal deficits appreciated.          Data:     Lab Results   Component Value Date    WBC 8.2 01/17/2020    HGB 13.1 (L) 01/17/2020    HCT 40.8 01/17/2020     01/17/2020     01/17/2020    POTASSIUM 4.2 01/17/2020    CHLORIDE 107 01/17/2020    CO2 30 01/17/2020    BUN 57 (H) 01/17/2020    CR 1.87 (H) 01/17/2020     (H) 01/17/2020    DD 1.7 (H) 08/22/2019    NTBNPI 608 08/22/2019    NTBNP 1,385 (H) 01/14/2020    TROPI <0.015 08/22/2019    AST 15 01/14/2020    ALT 25 01/14/2020    ALKPHOS 99 01/14/2020    BILITOTAL 0.6 01/14/2020    INR 1.05 01/17/2020     Lab Results   Component Value Date    WBC 8.2 01/17/2020    HGB 13.1 (L) 01/17/2020    HCT 40.8 01/17/2020     01/17/2020     01/17/2020    POTASSIUM 4.2 01/17/2020    CHLORIDE 107 01/17/2020    CO2 30 01/17/2020    BUN 57 (H) 01/17/2020    CR 1.87 (H)  01/17/2020     (H) 01/17/2020    DD 1.7 (H) 08/22/2019    NTBNPI 608 08/22/2019    NTBNP 1,385 (H) 01/14/2020    TROPI <0.015 08/22/2019    AST 15 01/14/2020    ALT 25 01/14/2020    ALKPHOS 99 01/14/2020    BILITOTAL 0.6 01/14/2020    INR 1.05 01/17/2020        EKG results:  EKG: None today     ECHO:     CXR:     RHC: 1/17/2020    HR 60  /74/106  O2 Sat 100%    RA 10/8/5  /10  Right /28/51  Right PCWP mean 13  LVEDP ranged 10-15 mmHg  PA Sat 60.2%  Brittanie CO/CI 4.8/1.9  TD CO/CI 5/2  SVR 1616  PVR 7.6    200mcg Nitroglycerine and 5mg verapamil given for radial cocktail medications    /64/85    Right PA 60/24/34  Right PCWP 10/8/7    Left PA 79/25/42  Left PCWP 8/7/4    No aortic valve gradient on pullback    Normal right sided filling pressures  Severely elevated precapillary pulmonary artery pressures with modest response to vasodilators  Normal to mildly elevated PCWP and LVEDP  Hemodynamic data has been modified in Epic per physician review

## 2020-01-19 ENCOUNTER — PATIENT OUTREACH (OUTPATIENT)
Dept: CARE COORDINATION | Facility: CLINIC | Age: 60
End: 2020-01-19

## 2020-01-19 VITALS
HEIGHT: 70 IN | DIASTOLIC BLOOD PRESSURE: 63 MMHG | TEMPERATURE: 97.5 F | SYSTOLIC BLOOD PRESSURE: 98 MMHG | BODY MASS INDEX: 45.1 KG/M2 | HEART RATE: 51 BPM | RESPIRATION RATE: 16 BRPM | OXYGEN SATURATION: 94 % | WEIGHT: 315 LBS

## 2020-01-19 LAB
ALBUMIN SERPL-MCNC: 2.9 G/DL (ref 3.4–5)
ALP SERPL-CCNC: 82 U/L (ref 40–150)
ALT SERPL W P-5'-P-CCNC: 19 U/L (ref 0–70)
ANION GAP SERPL CALCULATED.3IONS-SCNC: 4 MMOL/L (ref 3–14)
ANION GAP SERPL CALCULATED.3IONS-SCNC: 7 MMOL/L (ref 3–14)
AST SERPL W P-5'-P-CCNC: 10 U/L (ref 0–45)
BASOPHILS # BLD AUTO: 0.1 10E9/L (ref 0–0.2)
BASOPHILS NFR BLD AUTO: 0.8 %
BILIRUB SERPL-MCNC: 0.8 MG/DL (ref 0.2–1.3)
BUN SERPL-MCNC: 64 MG/DL (ref 7–30)
BUN SERPL-MCNC: 65 MG/DL (ref 7–30)
CALCIUM SERPL-MCNC: 9 MG/DL (ref 8.5–10.1)
CALCIUM SERPL-MCNC: 9 MG/DL (ref 8.5–10.1)
CHLORIDE SERPL-SCNC: 104 MMOL/L (ref 94–109)
CHLORIDE SERPL-SCNC: 104 MMOL/L (ref 94–109)
CO2 SERPL-SCNC: 27 MMOL/L (ref 20–32)
CO2 SERPL-SCNC: 29 MMOL/L (ref 20–32)
CREAT SERPL-MCNC: 2.09 MG/DL (ref 0.66–1.25)
CREAT SERPL-MCNC: 2.37 MG/DL (ref 0.66–1.25)
DIFFERENTIAL METHOD BLD: ABNORMAL
EOSINOPHIL # BLD AUTO: 0.2 10E9/L (ref 0–0.7)
EOSINOPHIL NFR BLD AUTO: 2.4 %
ERYTHROCYTE [DISTWIDTH] IN BLOOD BY AUTOMATED COUNT: 12.9 % (ref 10–15)
GFR SERPL CREATININE-BSD FRML MDRD: 29 ML/MIN/{1.73_M2}
GFR SERPL CREATININE-BSD FRML MDRD: 34 ML/MIN/{1.73_M2}
GLUCOSE BLDC GLUCOMTR-MCNC: 146 MG/DL (ref 70–99)
GLUCOSE BLDC GLUCOMTR-MCNC: 160 MG/DL (ref 70–99)
GLUCOSE BLDC GLUCOMTR-MCNC: 195 MG/DL (ref 70–99)
GLUCOSE SERPL-MCNC: 159 MG/DL (ref 70–99)
GLUCOSE SERPL-MCNC: 216 MG/DL (ref 70–99)
HCT VFR BLD AUTO: 36.7 % (ref 40–53)
HGB BLD-MCNC: 12.3 G/DL (ref 13.3–17.7)
IMM GRANULOCYTES # BLD: 0.1 10E9/L (ref 0–0.4)
IMM GRANULOCYTES NFR BLD: 0.7 %
INR PPP: 0.99 (ref 0.86–1.14)
LYMPHOCYTES # BLD AUTO: 2.1 10E9/L (ref 0.8–5.3)
LYMPHOCYTES NFR BLD AUTO: 29.5 %
MCH RBC QN AUTO: 29.4 PG (ref 26.5–33)
MCHC RBC AUTO-ENTMCNC: 33.5 G/DL (ref 31.5–36.5)
MCV RBC AUTO: 88 FL (ref 78–100)
MONOCYTES # BLD AUTO: 0.7 10E9/L (ref 0–1.3)
MONOCYTES NFR BLD AUTO: 9.8 %
NEUTROPHILS # BLD AUTO: 4.1 10E9/L (ref 1.6–8.3)
NEUTROPHILS NFR BLD AUTO: 56.8 %
NRBC # BLD AUTO: 0 10*3/UL
NRBC BLD AUTO-RTO: 0 /100
PLATELET # BLD AUTO: 217 10E9/L (ref 150–450)
POTASSIUM SERPL-SCNC: 4.2 MMOL/L (ref 3.4–5.3)
POTASSIUM SERPL-SCNC: 4.3 MMOL/L (ref 3.4–5.3)
PROT SERPL-MCNC: 6.3 G/DL (ref 6.8–8.8)
RBC # BLD AUTO: 4.18 10E12/L (ref 4.4–5.9)
SODIUM SERPL-SCNC: 137 MMOL/L (ref 133–144)
SODIUM SERPL-SCNC: 139 MMOL/L (ref 133–144)
WBC # BLD AUTO: 7.2 10E9/L (ref 4–11)

## 2020-01-19 PROCEDURE — 25000128 H RX IP 250 OP 636: Performed by: STUDENT IN AN ORGANIZED HEALTH CARE EDUCATION/TRAINING PROGRAM

## 2020-01-19 PROCEDURE — 00000146 ZZHCL STATISTIC GLUCOSE BY METER IP

## 2020-01-19 PROCEDURE — 80048 BASIC METABOLIC PNL TOTAL CA: CPT | Performed by: STUDENT IN AN ORGANIZED HEALTH CARE EDUCATION/TRAINING PROGRAM

## 2020-01-19 PROCEDURE — 99239 HOSP IP/OBS DSCHRG MGMT >30: CPT | Mod: GC | Performed by: INTERNAL MEDICINE

## 2020-01-19 PROCEDURE — 36415 COLL VENOUS BLD VENIPUNCTURE: CPT | Performed by: STUDENT IN AN ORGANIZED HEALTH CARE EDUCATION/TRAINING PROGRAM

## 2020-01-19 PROCEDURE — 85610 PROTHROMBIN TIME: CPT | Performed by: STUDENT IN AN ORGANIZED HEALTH CARE EDUCATION/TRAINING PROGRAM

## 2020-01-19 PROCEDURE — 80053 COMPREHEN METABOLIC PANEL: CPT | Performed by: STUDENT IN AN ORGANIZED HEALTH CARE EDUCATION/TRAINING PROGRAM

## 2020-01-19 PROCEDURE — 25000132 ZZH RX MED GY IP 250 OP 250 PS 637: Performed by: STUDENT IN AN ORGANIZED HEALTH CARE EDUCATION/TRAINING PROGRAM

## 2020-01-19 PROCEDURE — 85025 COMPLETE CBC W/AUTO DIFF WBC: CPT | Performed by: STUDENT IN AN ORGANIZED HEALTH CARE EDUCATION/TRAINING PROGRAM

## 2020-01-19 RX ORDER — NIFEDIPINE 30 MG
30 TABLET, EXTENDED RELEASE ORAL DAILY
Qty: 90 TABLET | Refills: 0 | Status: SHIPPED | OUTPATIENT
Start: 2020-01-19 | End: 2020-04-17

## 2020-01-19 RX ADMIN — ATORVASTATIN CALCIUM 40 MG: 40 TABLET, FILM COATED ORAL at 08:19

## 2020-01-19 RX ADMIN — METOPROLOL SUCCINATE 100 MG: 100 TABLET, EXTENDED RELEASE ORAL at 08:19

## 2020-01-19 RX ADMIN — INSULIN ASPART 6 UNITS: 100 INJECTION, SOLUTION INTRAVENOUS; SUBCUTANEOUS at 08:25

## 2020-01-19 RX ADMIN — CLONIDINE HYDROCHLORIDE 0.3 MG: 0.1 TABLET ORAL at 08:19

## 2020-01-19 RX ADMIN — ASPIRIN 81 MG: 81 TABLET, COATED ORAL at 08:19

## 2020-01-19 RX ADMIN — NIFEDIPINE 30 MG: 30 TABLET, FILM COATED, EXTENDED RELEASE ORAL at 08:19

## 2020-01-19 RX ADMIN — INSULIN ASPART 1 UNITS: 100 INJECTION, SOLUTION INTRAVENOUS; SUBCUTANEOUS at 08:24

## 2020-01-19 RX ADMIN — HEPARIN SODIUM 5000 UNITS: 5000 INJECTION, SOLUTION INTRAVENOUS; SUBCUTANEOUS at 08:20

## 2020-01-19 RX ADMIN — FUROSEMIDE 40 MG: 40 TABLET ORAL at 08:19

## 2020-01-19 RX ADMIN — CLOPIDOGREL BISULFATE 75 MG: 75 TABLET, FILM COATED ORAL at 08:24

## 2020-01-19 RX ADMIN — INSULIN ASPART 3 UNITS: 100 INJECTION, SOLUTION INTRAVENOUS; SUBCUTANEOUS at 12:48

## 2020-01-19 RX ADMIN — INSULIN ASPART 5 UNITS: 100 INJECTION, SOLUTION INTRAVENOUS; SUBCUTANEOUS at 13:08

## 2020-01-19 ASSESSMENT — ACTIVITIES OF DAILY LIVING (ADL)
ADLS_ACUITY_SCORE: 12
ADLS_ACUITY_SCORE: 11

## 2020-01-19 ASSESSMENT — MIFFLIN-ST. JEOR: SCORE: 2278.67

## 2020-01-19 NOTE — PLAN OF CARE
D: Pt with h/y of syncopal episode , DM 22, obesity,HTN, CAD,GIRISH,  Narcolepsy, PHT managment .      I: Monitored vitals and assessed pt status.     PRN:    A: A0x4. VSS, on BIPAP overnight. SB/SR ,asymptomatic. Afebrile. Denies any pain,no nausea,reported.  at bedtime.       Temp:  [96.4  F (35.8  C)-98.1  F (36.7  C)] 98  F (36.7  C)  Pulse:  [51-59] 51  Heart Rate:  [51-64] 51  Resp:  [16-18] 16  BP: ()/(54-71) 90/54  SpO2:  [97 %-100 %] 97 %      P: Continue to monitor Pt status and report changes to treatment team. Likely will be discharge today.

## 2020-01-19 NOTE — DISCHARGE SUMMARY
Cardiology Discharge Summary  Jeremiah Isaac MRN: [id number]  1960  Primary care provider: [facility]  ___________________________________          Date of Admission:  1/16/2020  Date of Discharge:  1/19/2020  Admitting Physician:  Donte Cooper MD  Discharge Physician:  Keesha Galindo MD  Discharging Service:  Cardiology 2     Primary Provider: [facility]         Reason for Admission:   Planned admission for pulmonary hypertension diagnostic evaluation          Discharge Diagnosis:   Calcium channel blocker sensitive pulmonary hypertension         Procedures & Significant Findings:     RHC & LHC, 1/17/20:  Normal right sided filling pressures  Severely elevated precapillary pulmonary artery pressures with modest response to vasodilators  Normal to mildly elevated PCWP and LVEDP  Low borderline cardiac output         Consultations:     Neurology  Endocrine - diabetes         Relevant Results:     RHC as above    Cr, 1/19/20 @ 1150: 2.09    MR Cardiac with contrast, 1/17/20:  CONCLUSIONS: Pulmonary hypertension with LVH and RVH. Normal RV size with RVEF of 55%          Hospital Course by Problem:      Jeremiah Isaac is a 59 year old male who presents with with a PMHx of pulmonary HTN, obesity, T2DM c/b diabetes, HTN, CKD, narcolepsy, exertional syncope, possible MGUS who presented 1/16 for scheduled admission for exertional syncope.    # Exertional Syncope  # Pulmonary HTN  History of exertional syncope. Underwent LHC on 10/2019 that revealed 95% stenosis of prox LAD that was FFR positive necessitating ARINA to prox-LAD. Symptoms persisted despite ARINA.      RHC on 1/8/20 revealed severe post capillary pulmonary HTN with a normal LVEDP lowering clinical suspicion for class 2 pulmonary HTN. V/Q scan revealed no CTEPH. Patient has history of sleep apnea, however class 3 unlikely to be only  of pulmonary HTN due to higher  PA pressures than would be expected. Reversibility study was not performed at this time.     Planned admission to [facility] on 1/16 for further evaluation of PH. RHC 1/17 showed elevated precapillar PH with moderately reversible CCB study. CRMI also revealed LGE pattern that is typical of pulmonary HTN. Because of CCB responsiveness during RHC, Mr Isaac was started on nifedipine on 1/17. He tolerated this medication without side effects. Discharged on nifedipine 1/19.  - Volume: PO Lasix 40mg  - ACEI: Held due to increasing Creatinine inpatient, restarted on discharge  - BB: Metop succinate 100mg qday   - Nifedipine 30 mg qday    # Narcolepsy  Patient with history of narcolepsy. Could be 2/2 to GIRISH. Currently prescribed amphetamine by sleep medicine. In the setting of significant PHTN, amphetamine held. Per neurology consult 1/18, this may be discontinued as benefit is outweighed by cost of exacerbating PH.  - Discontinue amphetamine  - Follow-up outpatient with sleep medicine - Mr. Isaac is already seen by sleep specialist and does not want further referral at this time, PCP may re-address PRN    # CAD s/p ARINA to prox LAD  ARINA to prox LAD on 8/2019.  - ASA 81mg  - Plavix 75mg qday     # GIRISH  Was recently transitioned to BIPAP last month.  - BIPAP: 26/7     # T2DM:  Controlled on metformin, glipizide and V-go (wearable insulin pump).  - Consulted Endocrinology regarding inpatient insulin regimen              - Lantus 20 unit(s) at bedtime              - Aspart coverage 1:10 TID qac and with snacks              - High sliding scale insulin  - Restart outpatient anti-hyperglycemic regimen including V-go on discharge    Follow up plan summary  1. Follow-up with PCP at [facility] in 7-14 days, BMP ordered for 1/23 - can readdress sleep medicine referral PRN  2. Follow-up with Dr. Riojas in PH clinic within 1-2 weeks, inbox message sent to clinic coordinator for assistance scheduling  3. Follow-up with  "[facility] cardiologist Dr. Shan Malhotra 2/13/2020, appointment already scheduled    Physical Exam on day of Discharge:  Blood pressure 98/63, pulse 51, temperature 97.5  F (36.4  C), temperature source Axillary, resp. rate 16, height 1.778 m (5' 10\"), weight 145.2 kg (320 lb 3.2 oz), SpO2 94 %.  General: AAOx3, no clubbing/cyanosis  HEENT:  Supple, JVD not appreciated due to neck girth   Cardiac: RRR. No m/r/g. Normal S1, S2.  Pulm: CTAB, no wheezes, no crackles.  Abd: +BS, soft, non distended, non tender  Skin: No rash/petechiae  MSK: No deformities, no joint swelling, 1+ edema lower extremities  Neuro:  No new focal deficits         Discharge Medications:     Discharge Medication List as of 1/19/2020  1:13 PM      START taking these medications    Details   NIFEdipine ER OSMOTIC (ADALAT CC) 30 MG 24 hr tablet Take 1 tablet (30 mg) by mouth daily, Disp-90 tablet, R-0, Local Print         CONTINUE these medications which have NOT CHANGED    Details   aspirin 81 MG EC tablet Take 81 mg by mouth daily, Historical      atorvastatin (LIPITOR) 40 MG tablet Take 40 mg by mouth daily, Historical      cloNIDine (CATAPRES) 0.3 MG tablet Take 0.3 mg by mouth 2 times daily, Historical      clopidogrel (PLAVIX) 75 MG tablet 75 mg daily, Historical      furosemide (LASIX) 40 MG tablet Take 40 mg by mouth daily, Historical      glipiZIDE (GLUCOTROL XL) 10 MG 24 hr tablet Take 10 mg by mouth daily, Historical      insulin lispro (HUMALOG VIAL) 100 UNIT/ML vial Inject 100 Units Subcutaneous daily, Historical      lisinopril (PRINIVIL/ZESTRIL) 40 MG tablet Take 40 mg by mouth daily, Historical      metFORMIN (GLUCOPHAGE-XR) 750 MG 24 hr tablet Take 2,250 mg by mouth daily (with dinner), Historical      metoprolol succinate ER (TOPROL-XL) 100 MG 24 hr tablet Take 100 mg by mouth daily, Historical         STOP taking these medications       amphetamine-dextroamphetamine (ADDERALL) 20 MG tablet Comments:   Reason for Stopping:      "               Discharge Instructions and Follow-Up:     Discharge Procedure Orders   Basic metabolic panel   Standing Status: Future Standing Exp. Date: 03/19/20     Reason for your hospital stay   Order Comments: You were admitted for evaluation of your pulmonary hypertension. You underwent a procedure called right heart catheterization which measured the pressure in multiple areas of your heart.     During this procedure, it was observed that your pulmonary hypertension responded to a medication called a calcium channel blocker. Therefore after your procedure you were started on a calcium channel blocker called nifedipine.     Adult [facility] Follow-up and recommended labs and tests   Order Comments: Follow up with primary care provider, [facility], within 7 days for hospital follow- up.  No follow up labs or test are needed.    You also have a follow-up appointment scheduled with Dr. Shan Malhotra on 2/13/2020.    Appointments on [place] and/or [facility] (with [facility] or [facility] provider or service). Call [phone] if you haven't heard regarding these appointments within 7 days of discharge.     Activity   Order Comments: Your activity upon discharge: activity as tolerated     Order Specific Question Answer Comments   Is discharge order? Yes      Full Code     Order Specific Question Answer Comments   Code status determined by: Discussion with patient/legal decision maker      Diet   Order Comments: Follow this diet upon discharge: Orders Placed This Encounter      Low Saturated Fat Na <2400 mg     Order Specific Question Answer Comments   Is discharge order? Yes      Case Request Cath Lab: Heart Cath Right Heart Cath w/ nitric oxide   Standing Status: Future Standing Exp. Date: 01/16/21     Order Specific Question Answer Comments   Patient Class: Outpatient [102]    Scheduling Questions for Order Above RHC only    Reason for exam: PVOD              Discharge Disposition:   Home          Condition on Discharge:     Discharge condition: Stable   Code status on discharge: Full Code        Date of service: 1/19/2020    Patient discussed with staff attending, Dr. Galindo and the note reflects our joint plan.     Mickey Allison MD  PGY-1, Internal Medicine  Cardiology Service  Pager: [phone]      I,Keesha Galindo MD, have seen and examined this patient. I have discussed with the team and agree with the findings and discharge plan. I have reviewed the hospital course with the patient and have spent 35 minutes in the process of discharge, including discharge planning with patient education, medication teaching, and the coordination of care to outpatient providers.  It has been a pleasure to participate in the care of your patient - please do not hesitate to contact me with any questions.    Keesha Galindo MD  Section Head - Advanced Heart Failure, Transplantation and Mechanical Circulatory Support  Director - Adult Congenital and Cardiovascular Genetics Center  Associate Professor of Medicine, [facility]

## 2020-01-19 NOTE — PROGRESS NOTES
DISCHARGE:  Discharged to: Home  Via: Automobile  Accompanied by: Family  Discharge Instructions: diet, activity, medications, follow up appointments, when to call the MD, and what to watchout for (i.e. s/s of infection, increasing SOB, palpitations, chest pain, etc.)  Prescriptions: To be filled by [facility] per patient's request; medication list reviewed & sent with patient  Follow Up Appointments: arranged; information given  Belongings: All sent with patient  IV: Removed  Telemetry: off    Patient exhibits understanding of above discharge instructions; all questions answered.  Discharge Paperwork: faxed

## 2020-01-19 NOTE — PROGRESS NOTES
Diabetes Consult Daily Progress Note          Assessment/Plan:   Assessment/Plan:  Jeremiah Isaac is a 59 year old male with PMHx of pulmonary HTN, obesity, T2DM, HTN, CKD IIIb, narcolepsy admitted 1/16/19 for work up of  exertional syncope. He longstanding DM2 on Vgo 40, metformin and glipizide. He follows with his PCP at [facility], last A1C 10.4 % 1/2020     # longstanding DM2 with nephropathy, CKD3b     Current inpatient regimen:  - Lantus to 20 units at bedtime  - aspart carb coverage 1:10 TID qac and with snacks  - high aspart correction 1:25  - agreed with holding metformin and glipizide  - patient would like to go back on V-go on discharge, he will bring it from home     Recommendations on discharge:  Patient will be discharged home today    - no need to give lantus prior to discharge if goes home this afternoon  - he was instructed to place his V-go back at home not before 8 pm ( around supper time)  - hold metformin on discharge given GFR is < 30 and discuss with his providers ( PCP and nephrology) as outpatient if/when to resume depending on his kidney function  - ok to resume Glipizide on discharge, we discussed risk of hypoglycemia with sulfonylurea and CKD. He needs also to address it again with his providers as outpatient.     Emmanuel Roman MD  Endocrinology Fellow.     Plan discussed with Dr. Mendoza           Interval History:   BG within good range so far. He eating well but not like home.  BG in good range    Current Diabetes Regimen:  Lantus to 20 units at bedtime, may need higher dose   aspart carb coverage 1:10 TID qac and with snacks  high aspart correction 1:25    Previous day insulin needs:  1/18: 29 aspart, 20 lantus: total 49 units    Other Pertinent Medications/Considerations:   Odd metformin and glipizide      Orders Placed This Encounter      Low Saturated Fat Na <2400 mg              Exam:    Blood pressure 122/72, pulse 51, temperature 98.1  F (36.7  " C), temperature source Oral, resp. rate 16, height 1.778 m (5' 10\"), weight 145.2 kg (320 lb 3.2 oz), SpO2 94 %.    General: Alert, resting in bed, NAD.   HEENT: NC/AT, mucous membranes are moist.  Resp: Unlabored breathing.   Neuro: Alert and oriented, communicating clearly.          Data:     Lab Results   Component Value Date    A1C 10.9 01/16/2020       Recent Labs   Lab 01/19/20  0815 01/19/20  0457 01/19/20  0415 01/18/20  2228 01/18/20  1749 01/18/20  1200 01/18/20  0732 01/18/20  0643  01/17/20  0901  01/16/20  1501 01/14/20  1127   GLC  --  159*  --   --   --   --   --  193*  --  213*  --  307* 223*   *  --  146* 174* 190* 258* 210*  --    < >  --    < >  --   --     < > = values in this interval not displayed.     ATTENDING NOTE  I have seen and examined the patient, reviewed and edited the fellow's note, and agree with the plan of care.    Rosalie Mendoza MD PhD    Division of Endocrinology and Diabetes      "

## 2020-01-19 NOTE — PLAN OF CARE
D: Patient admitted 1/16/19 following syncopal episode at home and pulmonary HTN management.     I/A: Monitored vitals and assessed patient status. A0x4. VSS. Rhythm sinus bradycardia as low as mid 40's-50's while sleeping and up to mid 60's when awake. Asymptomatic. Cards 2 aware. Afebrile. Urinating adequately. Blood sugars in the upper 190's-low 200's, on sliding scale insulin. Carb coverage added to regimen. Up with SB assist.     P: Continue to monitor patient status and report changes to treatment team. Discharge to home Sunday.

## 2020-01-20 ENCOUNTER — PATIENT OUTREACH (OUTPATIENT)
Dept: CARDIOLOGY | Facility: CLINIC | Age: 60
End: 2020-01-20

## 2020-01-20 DIAGNOSIS — I27.20 PULMONARY HYPERTENSION (H): ICD-10-CM

## 2020-01-20 DIAGNOSIS — R06.09 DYSPNEA ON EXERTION: ICD-10-CM

## 2020-01-20 NOTE — TELEPHONE ENCOUNTER
Reason for Admission  Exertional Syncope     How are you feeling since you got home?  Pt noticed that he is not having exertional shortness of breath and is feeling pretty good.    And questions about medication? (I.E. Taking, received, new medication)  YES - Medications looks different from the Pharmacy,  Confirmed with the pt that it is the same medication that he received from the hospital.    Do you have any questions about the discharge instructions?  NO    Do you have any other questions?  NO    Has a follow up appointment been made?   YES - Santiago Riojas MD - 1/22/20, Labs at 2:30 PM, Appointment with him at 3:00 PM

## 2020-01-22 ENCOUNTER — OFFICE VISIT (OUTPATIENT)
Dept: CARDIOLOGY | Facility: CLINIC | Age: 60
End: 2020-01-22
Attending: INTERNAL MEDICINE
Payer: COMMERCIAL

## 2020-01-22 VITALS
HEIGHT: 70 IN | SYSTOLIC BLOOD PRESSURE: 127 MMHG | DIASTOLIC BLOOD PRESSURE: 70 MMHG | OXYGEN SATURATION: 97 % | HEART RATE: 70 BPM | BODY MASS INDEX: 45.1 KG/M2 | WEIGHT: 315 LBS

## 2020-01-22 DIAGNOSIS — I27.20 PULMONARY HYPERTENSION (H): ICD-10-CM

## 2020-01-22 DIAGNOSIS — R06.09 DYSPNEA ON EXERTION: Primary | ICD-10-CM

## 2020-01-22 DIAGNOSIS — I50.813 ACUTE ON CHRONIC RIGHT-SIDED HEART FAILURE (H): ICD-10-CM

## 2020-01-22 DIAGNOSIS — R06.09 DYSPNEA ON EXERTION: ICD-10-CM

## 2020-01-22 LAB
ALBUMIN SERPL-MCNC: 3.4 G/DL (ref 3.4–5)
ALP SERPL-CCNC: 107 U/L (ref 40–150)
ALT SERPL W P-5'-P-CCNC: 29 U/L (ref 0–70)
ANION GAP SERPL CALCULATED.3IONS-SCNC: 7 MMOL/L (ref 3–14)
AST SERPL W P-5'-P-CCNC: 18 U/L (ref 0–45)
BILIRUB SERPL-MCNC: 0.6 MG/DL (ref 0.2–1.3)
BUN SERPL-MCNC: 60 MG/DL (ref 7–30)
CALCIUM SERPL-MCNC: 9.3 MG/DL (ref 8.5–10.1)
CHLORIDE SERPL-SCNC: 107 MMOL/L (ref 94–109)
CO2 SERPL-SCNC: 26 MMOL/L (ref 20–32)
CREAT SERPL-MCNC: 2.4 MG/DL (ref 0.66–1.25)
ERYTHROCYTE [DISTWIDTH] IN BLOOD BY AUTOMATED COUNT: 13 % (ref 10–15)
GFR SERPL CREATININE-BSD FRML MDRD: 28 ML/MIN/{1.73_M2}
GLUCOSE SERPL-MCNC: 150 MG/DL (ref 70–99)
HCT VFR BLD AUTO: 41.5 % (ref 40–53)
HGB BLD-MCNC: 13.9 G/DL (ref 13.3–17.7)
MCH RBC QN AUTO: 29 PG (ref 26.5–33)
MCHC RBC AUTO-ENTMCNC: 33.5 G/DL (ref 31.5–36.5)
MCV RBC AUTO: 87 FL (ref 78–100)
NT-PROBNP SERPL-MCNC: 648 PG/ML (ref 0–125)
PLATELET # BLD AUTO: 265 10E9/L (ref 150–450)
POTASSIUM SERPL-SCNC: 4.2 MMOL/L (ref 3.4–5.3)
PROT SERPL-MCNC: 7.2 G/DL (ref 6.8–8.8)
RBC # BLD AUTO: 4.8 10E12/L (ref 4.4–5.9)
SODIUM SERPL-SCNC: 140 MMOL/L (ref 133–144)
WBC # BLD AUTO: 8.5 10E9/L (ref 4–11)

## 2020-01-22 PROCEDURE — 83880 ASSAY OF NATRIURETIC PEPTIDE: CPT | Performed by: INTERNAL MEDICINE

## 2020-01-22 PROCEDURE — 80053 COMPREHEN METABOLIC PANEL: CPT | Performed by: INTERNAL MEDICINE

## 2020-01-22 PROCEDURE — 85027 COMPLETE CBC AUTOMATED: CPT | Performed by: INTERNAL MEDICINE

## 2020-01-22 PROCEDURE — 36415 COLL VENOUS BLD VENIPUNCTURE: CPT | Performed by: INTERNAL MEDICINE

## 2020-01-22 PROCEDURE — 99213 OFFICE O/P EST LOW 20 MIN: CPT | Mod: ZP | Performed by: INTERNAL MEDICINE

## 2020-01-22 PROCEDURE — G0463 HOSPITAL OUTPT CLINIC VISIT: HCPCS | Mod: ZF

## 2020-01-22 ASSESSMENT — PAIN SCALES - GENERAL: PAINLEVEL: NO PAIN (0)

## 2020-01-22 ASSESSMENT — MIFFLIN-ST. JEOR: SCORE: 2290.46

## 2020-01-22 NOTE — PROGRESS NOTES
1. Pulmonary hypertension  2. Elevated body mass index  3. Abnormal ECG: bradycardia, incomplete RBBB, RVH  4. Diabetes  5. Neuropathy  6. Hypertension  7. Negative serologic screen collagen vascular disease  8. CKD with proteinuria Estimated GFR 30-50  9. Elevated kappa light chains, low albumin, no evidence of monoclonal spike  10. Narcolepsy  11.Sleep disordered breath              Central sleep apnea              BIP with ASV currently  12. Elevated coronary calcium score with negative stress test              History of LAD stent  13, Questionable history of narcolepsy ? Central sleep apnea  14. History of methamphetamine usage  15. Exertional syncope        EL Earl (Sep. 16, 2018September 16, 2019 - Present)  [phone] (Work)  [phone] (Fax)  [address] [place] [address]  Physician Assistant     Shan Malhotra MD    [address] [facility] [address]    [place] [address]    [phone] (Fax)         Humberto Muse MD (Nephrology)  [phone] (Work)  [phone] (Fax)   Nephrology        Estela Landa MD    [address] [place] [address]    [phone] (Fax)\     [facility] [place]    [address] [facility] [address]    [place] [id number] [phone]     Felix Coleman MD    [address] [place], [place] [id number] [phone] (Fax)     Sarath Jimenez (Jonel), MD    [address] [facility]    [address]    [place], [place] [id number] [phone] (Fax)      Jessica Phelps M.D.  [facility]    The patient returns for follow-up of heart failure,highly responsive pulmonary hypertension.  There is no interim history of chest pain, tightness, paroxysmal nocturnal dyspnea, orthopnea, peripheral edema, palpitation, pre-syncope, syncope, device discharge.  Exercise tolerance is stable.  The patient is attempting to exercise regularly and following a sodium restricted, calorically  appropriate diet.  Medications are reviewed and the patient is taking medications as prescribed.  The patient is generally sleeping well. He has more energy and less somnolence.    Constitutional: alert, oriented, normal gait and station, normal mentation.  Oral: moist mucous membranes  Lymph: without pathologic adenopathy  Chest: clear to ausculation and percussion save basilar rales  Cor: No evidence of left or right ventricular activity.  Rhythm is regular.  S1 normal, S2 split physiologically. Murmurs are not present  Abdomen: without tenderness, rebound, guarding, masses, ascites  Extremities: Edema mildly  present  Neuro: no focal defects, normal mentation  Skin: without open lesions  Psych: oriented, verbal, mental status in tact        Results for TERRI MCELROY (MRN [id number]) as of 1/22/2020 15:26   Ref. Range 1/14/2020 11:27 1/14/2020 11:30 1/16/2020 15:01 1/16/2020 17:36 1/16/2020 17:53 1/16/2020 18:20 1/16/2020 21:32 1/17/2020 02:14 1/17/2020 07:31 1/17/2020 09:00 1/17/2020 09:01 1/17/2020 10:05 1/17/2020 10:47 1/17/2020 13:03 1/17/2020 13:06 1/17/2020 15:53 1/17/2020 16:52 1/17/2020 21:22 1/18/2020 03:59 1/18/2020 06:43 1/18/2020 07:32 1/18/2020 12:00 1/18/2020 17:49 1/18/2020 22:28 1/19/2020 04:15 1/19/2020 04:57 1/19/2020 08:15 1/19/2020 11:50 1/19/2020 12:15 1/22/2020 14:36   Sodium Latest Ref Range: 133 - 144 mmol/L 141  138        139         140      139  137  140   Potassium Latest Ref Range: 3.4 - 5.3 mmol/L 4.2  4.3        5.7 (H)  4.2       4.0      4.3  4.2  4.2   Chloride Latest Ref Range: 94 - 109 mmol/L 109  105        107         105      104  104  107   Carbon Dioxide Latest Ref Range: 20 - 32 mmol/L 28  28        30         28      27  29  26   Urea Nitrogen Latest Ref Range: 7 - 30 mg/dL 52 (H)  56 (H)        57 (H)         58 (H)      65 (H)  64 (H)  60 (H)   Creatinine Latest Ref Range: 0.66 - 1.25 mg/dL 1.91 (H)  2.07 (H)        1.87 (H)         1.98 (H)      2.37 (H)  2.09  (H)  2.40 (H)   GFR Estimate Latest Ref Range: >60 mL/min/1.73_m2 38 (L)  34 (L)        39 (L)         36 (L)      29 (L)  34 (L)  28 (L)   GFR Estimate If Black Latest Ref Range: >60 mL/min/1.73_m2 44 (L)  39 (L)        45 (L)         42 (L)      34 (L)  39 (L)  33 (L)   Calcium Latest Ref Range: 8.5 - 10.1 mg/dL 9.6  9.1        9.2         9.4      9.0  9.0  9.3   Anion Gap Latest Ref Range: 3 - 14 mmol/L 4  6        2 (L)         7      7  4  7   Albumin Latest Ref Range: 3.4 - 5.0 g/dL 3.4                   3.0 (L)      2.9 (L)    3.4   Protein Total Latest Ref Range: 6.8 - 8.8 g/dL 7.0                   6.2 (L)      6.3 (L)    7.2   Bilirubin Total Latest Ref Range: 0.2 - 1.3 mg/dL 0.6                   0.7      0.8    0.6   Alkaline Phosphatase Latest Ref Range: 40 - 150 U/L 99                   82      82    107   ALT Latest Ref Range: 0 - 70 U/L 25                   22      19    29   AST Latest Ref Range: 0 - 45 U/L 15                   8      10    18   Hemoglobin A1C Latest Ref Range: 0 - 5.6 %     10.9 (H)                            Cholesterol Latest Ref Range: <200 mg/dL   145                              HDL Cholesterol Latest Ref Range: >39 mg/dL   45                              Hours Collected Latest Units:     RANDOM URINE                               Iron Latest Ref Range: 35 - 180 ug/dL 66                                Iron Binding Cap Latest Ref Range: 240 - 430 ug/dL 328                                Iron Saturation Index Latest Ref Range: 15 - 46 % 20                                LDL Cholesterol Calculated Latest Ref Range: <100 mg/dL   53                              Non HDL Cholesterol Latest Ref Range: <130 mg/dL   100                              N-Terminal Pro Bnp Latest Ref Range: 0 - 125 pg/mL 1,385 (H)                             648 (H)   T4 Free Latest Ref Range: 0.76 - 1.46 ng/dL 0.90                                Triglycerides Latest Ref Range: <150 mg/dL   234 (H)                               TSH Latest Ref Range: 0.40 - 4.00 mU/L 4.08 (H)                                Glucose Latest Ref Range: 70 - 99 mg/dL 223 (H)  307 (H) 269 (H)   291 (H) 219 (H)  202 (H) 213 (H)    203 (H)  199 (H) 190 (H) 180 (H) 193 (H) 210 (H) 258 (H) 190 (H) 174 (H) 146 (H) 159 (H) 160 (H) 216 (H) 195 (H) 150 (H)   WBC Latest Ref Range: 4.0 - 11.0 10e9/L 10.1  6.7        8.2         7.2      7.2    8.5   Hemoglobin Latest Ref Range: 13.3 - 17.7 g/dL 13.6  12.9 (L)        13.1 (L)         13.2 (L)      12.3 (L)    13.9   Hematocrit Latest Ref Range: 40.0 - 53.0 % 39.4 (L)  38.5 (L)        40.8         39.2 (L)      36.7 (L)    41.5   Platelet Count Latest Ref Range: 150 - 450 10e9/L 271  238        209         221      217    265   RBC Count Latest Ref Range: 4.4 - 5.9 10e12/L 4.62  4.49        4.58         4.58      4.18 (L)    4.80   MCV Latest Ref Range: 78 - 100 fl 85  86        89         86      88    87   MCH Latest Ref Range: 26.5 - 33.0 pg 29.4  28.7        28.6         28.8      29.4    29.0   MCHC Latest Ref Range: 31.5 - 36.5 g/dL 34.5  33.5        32.1         33.7      33.5    33.5   RDW Latest Ref Range: 10.0 - 15.0 % 13.0  12.9        13.1         12.8      12.9    13.0   Diff Method Unknown                    Automated Method      Automated Method       % Neutrophils Latest Units: %                    63.6      56.8       % Lymphocytes Latest Units: %                    26.7      29.5       % Monocytes Latest Units: %                    6.8      9.8       % Eosinophils Latest Units: %                    1.9      2.4       % Basophils Latest Units: %                    0.6      0.8       % Immature Granulocytes Latest Units: %                    0.4      0.7       Nucleated RBCs Latest Ref Range: 0 /100                    0      0       Absolute Neutrophil Latest Ref Range: 1.6 - 8.3 10e9/L                    4.6      4.1       Absolute Lymphocytes Latest Ref Range: 0.8 - 5.3 10e9/L                     1.9      2.1       Absolute Monocytes Latest Ref Range: 0.0 - 1.3 10e9/L                    0.5      0.7       Absolute Eosinophils Latest Ref Range: 0.0 - 0.7 10e9/L                    0.1      0.2       Absolute Basophils Latest Ref Range: 0.0 - 0.2 10e9/L                    0.0      0.1       Abs Immature Granulocytes Latest Ref Range: 0 - 0.4 10e9/L                    0.0      0.1       Absolute Nucleated RBC Unknown                    0.0      0.0       INR Latest Ref Range: 0.86 - 1.14    0.97         1.05        1.02      0.99       PTT Latest Ref Range: 22 - 37 sec   29                              Albumin Fraction Latest Ref Range: 3.7 - 5.1 g/dL 3.7                                Alpha 1 Fraction Latest Ref Range: 0.2 - 0.4 g/dL 0.3                                Alpha 2 Fraction Latest Ref Range: 0.5 - 0.9 g/dL 0.8                                Beta Fraction Latest Ref Range: 0.6 - 1.0 g/dL 0.9                                ELP Interpretation: Unknown Essentially santiago...                                FREE KAPPA AND LAMBDA LIGHT CHAINS URINE Unknown  Rpt                               Gamma Fraction Latest Ref Range: 0.7 - 1.6 g/dL 0.7                                Monoclonal Peak Latest Ref Range: 0.0 g/dL 0.0                                CT CHEST ABDOMEN PELVIS W/O CONTRAST Unknown      Rpt                           MR CARDIAC W CONTRAST W FLOW QUANT Unknown              Rpt                   EKG 12-LEAD, TRACING ONLY Unknown         Rpt                        CV CARDIAC CATHERIZATION Unknown                Attch                     Current Outpatient Medications   Medication     aspirin 81 MG EC tablet     atorvastatin (LIPITOR) 40 MG tablet     cloNIDine (CATAPRES) 0.3 MG tablet     clopidogrel (PLAVIX) 75 MG tablet     furosemide (LASIX) 40 MG tablet     glipiZIDE (GLUCOTROL XL) 10 MG 24 hr tablet     insulin lispro (HUMALOG VIAL) 100 UNIT/ML vial     lisinopril (PRINIVIL/ZESTRIL) 40 MG  tablet     metFORMIN (GLUCOPHAGE-XR) 750 MG 24 hr tablet     metoprolol succinate ER (TOPROL-XL) 100 MG 24 hr tablet     NIFEdipine ER OSMOTIC (ADALAT CC) 30 MG 24 hr tablet     No current facility-administered medications for this visit.        Wt Readings from Last 24 Encounters:   01/22/20 146.4 kg (322 lb 12.8 oz)   01/19/20 145.2 kg (320 lb 3.2 oz)   01/14/20 149.1 kg (328 lb 11.2 oz)   08/22/19 145.2 kg (320 lb)                   1. See Dr. Ruiz for sleep medicine  2. See [facility] next week and in three weeks for weight in, renal function testing, blood pressure assessment  3. See me in 406 weeks    CC: all above

## 2020-01-22 NOTE — LETTER
1/22/2020      RE: Jeremiah Isaac  [address]  [place] [address]       Dear Colleague,    Thank you for the opportunity to participate in the care of your patient, Jeremiah Isaac, at the [facility]. Please see a copy of my visit note below.    1. Pulmonary hypertension  2. Elevated body mass index  3. Abnormal ECG: bradycardia, incomplete RBBB, RVH  4. Diabetes  5. Neuropathy  6. Hypertension  7. Negative serologic screen collagen vascular disease  8. CKD with proteinuria Estimated GFR 30-50  9. Elevated kappa light chains, low albumin, no evidence of monoclonal spike  10. Narcolepsy  11.Sleep disordered breath              Central sleep apnea              BIP with ASV currently  12. Elevated coronary calcium score with negative stress test              History of LAD stent  13, Questionable history of narcolepsy ? Central sleep apnea  14. History of methamphetamine usage  15. Exertional syncope        EL Earl (Sep. 16, 2019September 16, 2019 - Present)  [phone] (Work)  [phone] (Fax)  [address] [place] [address]  Physician Assistant     Shan Malhotra MD    [address] [facility]    [address]    [place] [address]    [phone] (Fax)         Humberto Muse MD (Nephrology)  [phone] (Work)  [phone] (Fax)   Nephrology        Estela Landa MD    [address] [place] [address]    [phone] (Fax)\     [facility] [place]    [address]    [place] [address] [phone]     Felix Coleman MD    [address] [place], [place] [id number] [phone] (Fax)     Sarath Jimenez (Jonel), MD    [address] [facility]    [address]    [place], [place] [id number] [phone] (Fax)      Jessica Phelps M.D.  [facility]    The patient returns for follow-up of heart failure,highly responsive  pulmonary hypertension.  There is no interim history of chest pain, tightness, paroxysmal nocturnal dyspnea, orthopnea, peripheral edema, palpitation, pre-syncope, syncope, device discharge.  Exercise tolerance is stable.  The patient is attempting to exercise regularly and following a sodium restricted, calorically appropriate diet.  Medications are reviewed and the patient is taking medications as prescribed.  The patient is generally sleeping well. He has more energy and less somnolence.    Constitutional: alert, oriented, normal gait and station, normal mentation.  Oral: moist mucous membranes  Lymph: without pathologic adenopathy  Chest: clear to ausculation and percussion save basilar rales  Cor: No evidence of left or right ventricular activity.  Rhythm is regular.  S1 normal, S2 split physiologically. Murmurs are not present  Abdomen: without tenderness, rebound, guarding, masses, ascites  Extremities: Edema mildly  present  Neuro: no focal defects, normal mentation  Skin: without open lesions  Psych: oriented, verbal, mental status in tact        Results for TERRI MCELROY (MRN [id number]) as of 1/22/2020 15:26   Ref. Range 1/14/2020 11:27 1/14/2020 11:30 1/16/2020 15:01 1/16/2020 17:36 1/16/2020 17:53 1/16/2020 18:20 1/16/2020 21:32 1/17/2020 02:14 1/17/2020 07:31 1/17/2020 09:00 1/17/2020 09:01 1/17/2020 10:05 1/17/2020 10:47 1/17/2020 13:03 1/17/2020 13:06 1/17/2020 15:53 1/17/2020 16:52 1/17/2020 21:22 1/18/2020 03:59 1/18/2020 06:43 1/18/2020 07:32 1/18/2020 12:00 1/18/2020 17:49 1/18/2020 22:28 1/19/2020 04:15 1/19/2020 04:57 1/19/2020 08:15 1/19/2020 11:50 1/19/2020 12:15 1/22/2020 14:36   Sodium Latest Ref Range: 133 - 144 mmol/L 141  138        139         140      139  137  140   Potassium Latest Ref Range: 3.4 - 5.3 mmol/L 4.2  4.3        5.7 (H)  4.2       4.0      4.3  4.2  4.2   Chloride Latest Ref Range: 94 - 109 mmol/L 109  105        107         105      104  104  107   Carbon Dioxide  Latest Ref Range: 20 - 32 mmol/L 28  28        30         28      27  29  26   Urea Nitrogen Latest Ref Range: 7 - 30 mg/dL 52 (H)  56 (H)        57 (H)         58 (H)      65 (H)  64 (H)  60 (H)   Creatinine Latest Ref Range: 0.66 - 1.25 mg/dL 1.91 (H)  2.07 (H)        1.87 (H)         1.98 (H)      2.37 (H)  2.09 (H)  2.40 (H)   GFR Estimate Latest Ref Range: >60 mL/min/1.73_m2 38 (L)  34 (L)        39 (L)         36 (L)      29 (L)  34 (L)  28 (L)   GFR Estimate If Black Latest Ref Range: >60 mL/min/1.73_m2 44 (L)  39 (L)        45 (L)         42 (L)      34 (L)  39 (L)  33 (L)   Calcium Latest Ref Range: 8.5 - 10.1 mg/dL 9.6  9.1        9.2         9.4      9.0  9.0  9.3   Anion Gap Latest Ref Range: 3 - 14 mmol/L 4  6        2 (L)         7      7  4  7   Albumin Latest Ref Range: 3.4 - 5.0 g/dL 3.4                   3.0 (L)      2.9 (L)    3.4   Protein Total Latest Ref Range: 6.8 - 8.8 g/dL 7.0                   6.2 (L)      6.3 (L)    7.2   Bilirubin Total Latest Ref Range: 0.2 - 1.3 mg/dL 0.6                   0.7      0.8    0.6   Alkaline Phosphatase Latest Ref Range: 40 - 150 U/L 99                   82      82    107   ALT Latest Ref Range: 0 - 70 U/L 25                   22      19    29   AST Latest Ref Range: 0 - 45 U/L 15                   8      10    18   Hemoglobin A1C Latest Ref Range: 0 - 5.6 %     10.9 (H)                            Cholesterol Latest Ref Range: <200 mg/dL   145                              HDL Cholesterol Latest Ref Range: >39 mg/dL   45                              Hours Collected Latest Units:     RANDOM URINE                               Iron Latest Ref Range: 35 - 180 ug/dL 66                                Iron Binding Cap Latest Ref Range: 240 - 430 ug/dL 328                                Iron Saturation Index Latest Ref Range: 15 - 46 % 20                                LDL Cholesterol Calculated Latest Ref Range: <100 mg/dL   53                              Non HDL  Cholesterol Latest Ref Range: <130 mg/dL   100                              N-Terminal Pro Bnp Latest Ref Range: 0 - 125 pg/mL 1,385 (H)                             648 (H)   T4 Free Latest Ref Range: 0.76 - 1.46 ng/dL 0.90                                Triglycerides Latest Ref Range: <150 mg/dL   234 (H)                              TSH Latest Ref Range: 0.40 - 4.00 mU/L 4.08 (H)                                Glucose Latest Ref Range: 70 - 99 mg/dL 223 (H)  307 (H) 269 (H)   291 (H) 219 (H)  202 (H) 213 (H)    203 (H)  199 (H) 190 (H) 180 (H) 193 (H) 210 (H) 258 (H) 190 (H) 174 (H) 146 (H) 159 (H) 160 (H) 216 (H) 195 (H) 150 (H)   WBC Latest Ref Range: 4.0 - 11.0 10e9/L 10.1  6.7        8.2         7.2      7.2    8.5   Hemoglobin Latest Ref Range: 13.3 - 17.7 g/dL 13.6  12.9 (L)        13.1 (L)         13.2 (L)      12.3 (L)    13.9   Hematocrit Latest Ref Range: 40.0 - 53.0 % 39.4 (L)  38.5 (L)        40.8         39.2 (L)      36.7 (L)    41.5   Platelet Count Latest Ref Range: 150 - 450 10e9/L 271  238        209         221      217    265   RBC Count Latest Ref Range: 4.4 - 5.9 10e12/L 4.62  4.49        4.58         4.58      4.18 (L)    4.80   MCV Latest Ref Range: 78 - 100 fl 85  86        89         86      88    87   MCH Latest Ref Range: 26.5 - 33.0 pg 29.4  28.7        28.6         28.8      29.4    29.0   MCHC Latest Ref Range: 31.5 - 36.5 g/dL 34.5  33.5        32.1         33.7      33.5    33.5   RDW Latest Ref Range: 10.0 - 15.0 % 13.0  12.9        13.1         12.8      12.9    13.0   Diff Method Unknown                    Automated Method      Automated Method       % Neutrophils Latest Units: %                    63.6      56.8       % Lymphocytes Latest Units: %                    26.7      29.5       % Monocytes Latest Units: %                    6.8      9.8       % Eosinophils Latest Units: %                    1.9      2.4       % Basophils Latest Units: %                    0.6      0.8        % Immature Granulocytes Latest Units: %                    0.4      0.7       Nucleated RBCs Latest Ref Range: 0 /100                    0      0       Absolute Neutrophil Latest Ref Range: 1.6 - 8.3 10e9/L                    4.6      4.1       Absolute Lymphocytes Latest Ref Range: 0.8 - 5.3 10e9/L                    1.9      2.1       Absolute Monocytes Latest Ref Range: 0.0 - 1.3 10e9/L                    0.5      0.7       Absolute Eosinophils Latest Ref Range: 0.0 - 0.7 10e9/L                    0.1      0.2       Absolute Basophils Latest Ref Range: 0.0 - 0.2 10e9/L                    0.0      0.1       Abs Immature Granulocytes Latest Ref Range: 0 - 0.4 10e9/L                    0.0      0.1       Absolute Nucleated RBC Unknown                    0.0      0.0       INR Latest Ref Range: 0.86 - 1.14    0.97         1.05        1.02      0.99       PTT Latest Ref Range: 22 - 37 sec   29                              Albumin Fraction Latest Ref Range: 3.7 - 5.1 g/dL 3.7                                Alpha 1 Fraction Latest Ref Range: 0.2 - 0.4 g/dL 0.3                                Alpha 2 Fraction Latest Ref Range: 0.5 - 0.9 g/dL 0.8                                Beta Fraction Latest Ref Range: 0.6 - 1.0 g/dL 0.9                                ELP Interpretation: Unknown Essentially santiago...                                FREE KAPPA AND LAMBDA LIGHT CHAINS URINE Unknown  Rpt                               Gamma Fraction Latest Ref Range: 0.7 - 1.6 g/dL 0.7                                Monoclonal Peak Latest Ref Range: 0.0 g/dL 0.0                                CT CHEST ABDOMEN PELVIS W/O CONTRAST Unknown      Rpt                           MR CARDIAC W CONTRAST W FLOW QUANT Unknown              Rpt                   EKG 12-LEAD, TRACING ONLY Unknown         Rpt                        CV CARDIAC CATHERIZATION Unknown                Attch                     Current Outpatient Medications   Medication      aspirin 81 MG EC tablet     atorvastatin (LIPITOR) 40 MG tablet     cloNIDine (CATAPRES) 0.3 MG tablet     clopidogrel (PLAVIX) 75 MG tablet     furosemide (LASIX) 40 MG tablet     glipiZIDE (GLUCOTROL XL) 10 MG 24 hr tablet     insulin lispro (HUMALOG VIAL) 100 UNIT/ML vial     lisinopril (PRINIVIL/ZESTRIL) 40 MG tablet     metFORMIN (GLUCOPHAGE-XR) 750 MG 24 hr tablet     metoprolol succinate ER (TOPROL-XL) 100 MG 24 hr tablet     NIFEdipine ER OSMOTIC (ADALAT CC) 30 MG 24 hr tablet     No current facility-administered medications for this visit.        Wt Readings from Last 24 Encounters:   01/22/20 146.4 kg (322 lb 12.8 oz)   01/19/20 145.2 kg (320 lb 3.2 oz)   01/14/20 149.1 kg (328 lb 11.2 oz)   08/22/19 145.2 kg (320 lb)                   1. See Dr. Ruiz for sleep medicine  2. See [facility] next week and in three weeks for weight in, renal function testing, blood pressure assessment  3. See me in 406 weeks    CC: all above    Please do not hesitate to contact me if you have any questions/concerns.     Sincerely,     Santiago Riojas MD

## 2020-01-22 NOTE — PATIENT INSTRUCTIONS
Medication Changes:       Patient Instructions:  1. Continue staying active and eat a heart healthy diet.    2. Please keep current list of medications with you at all times.    3. Remember to weigh yourself daily after voiding and before you consume any food or beverages and log the numbers.  If you have gained 2 pounds overnight or 5 pounds in a week contact us immediately for medication adjustments or further instructions.    4. **Please call us immediately if you have any syncope (fainting or passing out), chest pain, edema (swelling or weight gain), or decline in your functional status (general decline in how you are feeling).    Follow up Appointment Information:  - Referral sent in to see Dr. Ruiz for a sleep study  - See [facility] next week and in three weeks for a weigh in, renal function testing, and to assess your blood pressure  - Follow up with Dr. Riojas in 4-6 weeks with labs prior    Results:  Component      Latest Ref Rng & Units 1/22/2020   Sodium      133 - 144 mmol/L 140   Potassium      3.4 - 5.3 mmol/L 4.2   Chloride      94 - 109 mmol/L 107   Carbon Dioxide      20 - 32 mmol/L 26   Anion Gap      3 - 14 mmol/L 7   Glucose      70 - 99 mg/dL 150 (H)   Urea Nitrogen      7 - 30 mg/dL 60 (H)   Creatinine      0.66 - 1.25 mg/dL 2.40 (H)   GFR Estimate      >60 mL/min/1.73:m2 28 (L)   GFR Estimate If Black      >60 mL/min/1.73:m2 33 (L)   Calcium      8.5 - 10.1 mg/dL 9.3   Bilirubin Total      0.2 - 1.3 mg/dL 0.6   Albumin      3.4 - 5.0 g/dL 3.4   Protein Total      6.8 - 8.8 g/dL 7.2   Alkaline Phosphatase      40 - 150 U/L 107   ALT      0 - 70 U/L 29   AST      0 - 45 U/L 18   WBC      4.0 - 11.0 10e9/L 8.5   RBC Count      4.4 - 5.9 10e12/L 4.80   Hemoglobin      13.3 - 17.7 g/dL 13.9   Hematocrit      40.0 - 53.0 % 41.5   MCV      78 - 100 fl 87   MCH      26.5 - 33.0 pg 29.0   MCHC      31.5 - 36.5 g/dL 33.5   RDW      10.0 - 15.0 % 13.0   Platelet Count      150 - 450  10e9/L 265   N-Terminal Pro Bnp      0 - 125 pg/mL 648 (H)     We are located on the third floor of [facility] the [facility].  Our address is     [address] [place] [address] [place] [address] [place] [address]      Thank you for allowing us to be a part of your care here at the [facility]    If you have questions or concerns please contact us at:    Senait Berg, RN, BSN    Sharron Katz (Schedule,Prior Auth)  Nurse Coordinator     Clinic   Pulmonary Hypertension   Pulmonary Hypertension  [facility]  (Phone)[phone]   (Phone) [phone]        (Fax)[phone]                ** Please note that you will NOT receive a reminder call regarding your scheduled testing, reminder calls are for provider appointments only.  If you are scheduled for testing within the [facility] system you may receive a call regarding pre-registration for billing purposes only.**     Support Group:  Pulmonary Hypertension Association  Https://www.phassociation.org/  **Look at the Events Tab** They even have Support Groups that you can call into    [facility] PH Support Group  Second Saturday of the Month from 1-3 PM   Location: [address] [place] [address]  Leader: Bonnie Yee and Afshan Pena  Phone: [phone] or [phone]  Email: [email or web address]

## 2020-01-22 NOTE — NURSING NOTE
Chief Complaint   Patient presents with     Follow Up      Return for PH F/U with labs prior (recent discharge from hospital and new med start)     Vitals were taken and medications reconciled.     Lamont Vale CMA  2:48 PM

## 2020-02-11 ENCOUNTER — AMBULATORY - HEALTHEAST (OUTPATIENT)
Dept: CARDIOLOGY | Facility: CLINIC | Age: 60
End: 2020-02-11

## 2020-02-17 ENCOUNTER — HEALTH MAINTENANCE LETTER (OUTPATIENT)
Age: 60
End: 2020-02-17

## 2020-02-19 ENCOUNTER — PATIENT OUTREACH (OUTPATIENT)
Dept: CARDIOLOGY | Facility: CLINIC | Age: 60
End: 2020-02-19

## 2020-02-19 DIAGNOSIS — R89.9 ABNORMAL LABORATORY TEST RESULT: Primary | ICD-10-CM

## 2020-02-19 NOTE — TELEPHONE ENCOUNTER
Date: 2/19/2020    Time of Call: 2:35 PM     Diagnosis:  Abnormal Labs     [ TORB ] Ordering provider: Santiago Riojas MD  Order: Pt needs to be referred to Hematology for abnormal labs (Free [place] and Lambda Light Chains Urine)     Order received by: Senait Berg RN     Follow-up/additional notes: I called pt and left message requesting a call back regarding his labs and referral placed for hematology. Senait Berg RN on 2/19/2020 at 2:36 PM

## 2020-02-24 NOTE — PROGRESS NOTES
Called to follow up with patient regarding his Hematology referral. Patient stated he had not received a call to schedule an appointment, provided the patient with the scheduling number (P) [phone]. Patient verbalized he will call today. Melody Staley RN on 2/24/2020 at 10:42 AM

## 2020-02-24 NOTE — TELEPHONE ENCOUNTER
ONCOLOGY INTAKE: Records Information      APPT INFORMATION: 3/12/20 - [place]  Referring provider:  [facility]  Referring provider s clinic:  [facility]  Reason for visit/diagnosis:  Abnormal Lab - Free Kappa and Lambda Light Chains  Has patient been notified of appointment date and time?: Yes    RECORDS INFORMATION:  Were the records received with the referral (via [employer])? Internal referral    Has patient been seen for any external appt for this diagnosis? No    If yes, where? NA    Has patient had any imaging or procedures outside of [facility] for this condition? No      If Yes, where? NA    ADDITIONAL INFORMATION:  Scheduled via call from patient

## 2020-02-25 ENCOUNTER — TELEPHONE (OUTPATIENT)
Dept: CARDIOLOGY | Facility: CLINIC | Age: 60
End: 2020-02-25

## 2020-02-25 NOTE — TELEPHONE ENCOUNTER
I spoke with Dr. Riojas regarding the patient and he stated that the pt will not need a referral to Hematology.  My Chart Message sent to the patient.  Senait Berg RN on 2/25/2020 at 9:06 AM    ----- Message from Chip Deshpande MD sent at 2/24/2020 10:16 AM CST -----  Regarding: ? need for referral to Hematology  I was asked to determine whether this patient should be seen by our benign or malignant hematology group.  However, after reviewing his chart, I don't see any clear reason for referral to Hematology.    He had a minimally abnormal kappa / lambda light chain ratio in the urine on labs done elsewhere in July 2019.  This is almost certainly due to his chronic renal insufficiency.  Repeat labs done here in Jan 2020 show no evidence of monoclonal light chains in the urine, normal kappa and lambda light chain levels in the urine, and no evidence of monoclonal protein in the serum.      Based on these recent labs, there doesn't appear to be any need for further evaluation or follow up from a hematology perspective.      Jane       S Plasty Text: Given the location and shape of the defect, and the orientation of relaxed skin tension lines, an S-plasty was deemed most appropriate for repair.  Using a sterile surgical marker, the appropriate outline of the S-plasty was drawn, incorporating the defect and placing the expected incisions within the relaxed skin tension lines where possible.  The area thus outlined was incised deep to adipose tissue with a #15 scalpel blade.  The skin margins were undermined to an appropriate distance in all directions utilizing iris scissors. The skin flaps were advanced over the defect.  The opposing margins were then approximated with interrupted buried subcutaneous sutures.

## 2020-02-25 NOTE — TELEPHONE ENCOUNTER
RECORDS STATUS - ALL OTHER DIAGNOSIS      RECORDS RECEIVED FROM: [facility]   DATE RECEIVED: 3/12/2020    NOTES STATUS DETAILS   OFFICE NOTE from referring provider Complete   [facility]   OFFICE NOTE from medical oncologist N/A    DISCHARGE SUMMARY from hospital N/A    DISCHARGE REPORT from the ER     OPERATIVE REPORT N/A    MEDICATION LIST Complete [facility]   CLINICAL TRIAL TREATMENTS TO DATE N/A    LABS     PATHOLOGY REPORTS     ANYTHING RELATED TO DIAGNOSIS Complete Labs last updated on 1/22/2020 in [facility]     Labs are also up to date in [facility]    GENONOMIC TESTING     TYPE:     IMAGING (NEED IMAGES & REPORT)     CT SCANS     MRI     MAMMO     ULTRASOUND     PET       Action    Action Taken 2/25/2020 3:54pm     I called pt Hasmukh- per pt no outside records.

## 2020-02-26 ENCOUNTER — PRE VISIT (OUTPATIENT)
Dept: SLEEP MEDICINE | Facility: CLINIC | Age: 60
End: 2020-02-26

## 2020-02-26 NOTE — TELEPHONE ENCOUNTER
"  1.  Reason for the visit:  Consult establish care for Bipap  2.  Referring provider and clinic name:  Dr. Riojas [facility]  3.  Previous Sleep Doctor or Pulmonlogist (clinic name)?  Previous studies done at [place] [address]  4.  Records, Procedures, Imaging, and Labs (see below)  STEFANIE needed        All NOTES from previous office visits that pertain to why they are being seen in the Sleep Center    Previous Sleep Studies, Chest CT, Echos and reports that pertain to why they are seeing Sleep Center    All Sleep records that have been done in the last 2 years that pertain to why they are seeing Sleep Center            Are they being seen for continuation of care for Cpap/Bipap/Avap/Trilogy/Dental Device? Bipap    If yes to above Who and Where was Device issued/currently getting supplies from?     Are you currently on \"Supplemental Oxygen\" during the day or night?                                                                                                                                                         Please remind pt to bring Cpap machine and ask to arrive 15 minutes early to appointment due traffic and congestion                                                 5. Pt Sleep Center Packet received Message left asking pt to arrive 30 minutes early to appointment if no packet received.        Yes: \"please make sure that you bring this to your appointment completed, either the doctor will not see you until this completed or you may be asked to reschedule your appointment.\"     No: mail or email to the pt and explain, \"please make sure that you bring this to your appointment completed, either the doctor will not see you until this completed or you may be asked to reschedule your appointment.\"     ~If pt coming early to fill packet out, ask that they come 30 minutes prior to their appointment~     6. Has the pt's medication list been updated and preferred pharmacy added?     7. Has the allergy list " "been reviewed?    \"Thank you for choosing [facility] and we look forward to seeing you at your upcoming appointment\"     "

## 2020-03-03 ENCOUNTER — OFFICE VISIT (OUTPATIENT)
Dept: SLEEP MEDICINE | Facility: CLINIC | Age: 60
End: 2020-03-03
Attending: INTERNAL MEDICINE
Payer: COMMERCIAL

## 2020-03-03 VITALS
DIASTOLIC BLOOD PRESSURE: 72 MMHG | HEART RATE: 68 BPM | BODY MASS INDEX: 44.67 KG/M2 | HEIGHT: 70 IN | OXYGEN SATURATION: 95 % | RESPIRATION RATE: 16 BRPM | SYSTOLIC BLOOD PRESSURE: 123 MMHG | WEIGHT: 312 LBS

## 2020-03-03 DIAGNOSIS — I27.20 PULMONARY HYPERTENSION (H): ICD-10-CM

## 2020-03-03 DIAGNOSIS — R06.09 DYSPNEA ON EXERTION: ICD-10-CM

## 2020-03-03 DIAGNOSIS — G47.33 OSA TREATED WITH BIPAP: Primary | ICD-10-CM

## 2020-03-03 PROCEDURE — 99205 OFFICE O/P NEW HI 60 MIN: CPT | Performed by: INTERNAL MEDICINE

## 2020-03-03 ASSESSMENT — MIFFLIN-ST. JEOR: SCORE: 2236.47

## 2020-03-03 NOTE — PROGRESS NOTES
"Sleep Consultation Note:    Date on this visit: 3/3/2020    Jeremiah Isaac is sent by Santiago Riojas for a sleep consultation regarding evaluation of sleep disordered breathing and excessive daytime sleepiness.    Primary Physician: Clinic, [facility]     Chief complaint: \"To address my sleepiness during the day\"    Jeremiah Isaac 60 year old with PMH of congestive heart failure, pulmonary hypertension, abnormal ECG(bradycardia, incomplete RBBB, RVH), Diabetes, Neuropathy, Hypertension, questionable  Narcolepsy, Sleep disordered breathing(GIRISH,CSA),  History of methamphetamine usage.    He was diagnosed with obstructive sleep apnea several years ago and has had several polysomnography evaluations.    PSG report (October 21, 2019):  The most recent PSG evaluations have been obtained through [facility][1] on October 21, 2019. It was an all-night CPAP titration study.  During the  study CPAP was titrated up to 20 cm water. Optimum treatment was not achieved during the study due to persistent obstructive respiratory events despite higher CPAP settings.      PSG report (Nov,5, 2019): titration was obtained using Bilevel PAP and it was noted that with at  Bilevel PAP at pressure settings 25/17 cmH2O the disordered breathing events  were well controlled, using a chinstrap and [place] and [place]kel  large Simplus full facemask. There were no significant central apneas at the final pressure settings with residual AHI 0.5/h.    PSG from February 2, 2003: Patient was noted to have severe obstructive sleep apnea with a respiratory disturbance index of 124 that resolved with CPAP at pressure setting of 18 cmH2O. patient was started on treatment with CPAP at 15 cmH2O.    PSG obtained on March 19, 2003: CPAP titration study since that pressure setting 15 cmH2O there were still respiratory events with index of 40 events per hour CPAP was titrated to a final pressure of 18 cmH2O with resolution of obstructive " events.  The following morning multiple sleep latency test was obtained that showed evidence of mean sleep latency of 4.1 minutes without sleep onset REM periods and a diagnosis of possible idiopathic hypersomnolence was made.    He was previously treated with CPAP, however following the PSG from November 5, 2019, he was switched to treatment using Bi-level PAP device .  He obtained his device through [facility].  He was planning on following up with his previous sleep provider after obtaining the Bilevel PAP device.    Sleep Disordered Breathing  He reports using the Bilevel PAP device regularly during sleep. He uses a full facemask.  He does not like his current mask.  He liked his previous full facemask that he was using with CPAP.     He sleeps alone and hence there is not anyone to report snoring or apneas during sleep while he is using the device.  He denies awakenings due to gasping for air with the device use. He reports that the pressure feels higher with the BiPAP compared to his previous device- CPAP,  but he does report good tolerance to the pressure settings and denies any symptoms suggestive of aerophagia.     Compliance download for the past 1 month was reviewed he has used the device for 29 out of 30 days with an average use of 5.18 hours with a residual AHI of 7.6/h.  Significant air leak was noted.  Pressure setting on the Bilevel PAP device:IPAP equal to 25, EPAP equal to 17 cm water.    Sleep Schedule/Sleep Complaints/Daytime functioning  He is a night person. Jeremiah goes to bed between 10 PM to 1 AM or later and wakes up at 7 -9 AM.   Thrice a week he  wakes up with an alarm at 8 AM, to go  for volunteering and has to be on the road by 9 AM.  He spends most of his time in his bedroom but not on his bed.  He watches television, listens to the news and plays card games at night. Once he goes to bed, he falls asleep in 5 minutes. He wakes up 0-1 times throughout the night, to use the bathroom.  After awakening, he denies difficulty resuming sleep.  He reports feeling comparatively little more refreshed since he has been using the BiPAP device.  He also reports that he has not been sleeping as much since he started the treatment with the Bilevel PAP.  After a few hours of awakening he again feels tired and sleepy.  He endorses an [place] sleepiness score  of  15 /24.  He denies drowsiness while driving.   He naps 6 out of 7 days for variable duration of time sometimes up to 2 to 3 hours and he does use his BiPAP during the naps as well.  He has always been sleepy for several years.  He was being treated with Adderall which was discontinued in January 2020 in the setting of significant PHTN.  He reports Adderall did not do a whole lot because he considered himself as an awake zombie, even when he was using it.    Restless legs symptoms  He denies symptoms suggestive of restless legs syndrome.    Sleep Behaviors  He denies nightmares, sleepwalking or sleep eating, dream enactment behavior, cataplexy, sleep paralysis or hallucinations.  He initially answered yes to the question that was listed in the sleep history questionnaire for cataplexy.  He reported that when he is upset with the dog, he feels wiped out and drained.      Social History  Jeremiah lives with his son.  He does volunteering 3 times a week for couple hours. He drinks a soda pop very rarely and does not drink caffeine within 6 hours before bed. He  drinks alcohol 1-2 times/year. Patient is a never smoker. Patient does  not use drugs.    Allergies:    No Known Allergies    Medications:    Current Outpatient Medications   Medication Sig Dispense Refill     aspirin 81 MG EC tablet Take 81 mg by mouth daily       atorvastatin (LIPITOR) 40 MG tablet Take 40 mg by mouth daily       cloNIDine (CATAPRES) 0.3 MG tablet Take 0.3 mg by mouth 2 times daily       clopidogrel (PLAVIX) 75 MG tablet 75 mg daily       furosemide (LASIX) 40 MG tablet Take 40 mg  by mouth daily       glipiZIDE (GLUCOTROL XL) 10 MG 24 hr tablet Take 10 mg by mouth daily       insulin lispro (HUMALOG VIAL) 100 UNIT/ML vial Inject 100 Units Subcutaneous daily       lisinopril (PRINIVIL/ZESTRIL) 40 MG tablet Take 40 mg by mouth daily       metFORMIN (GLUCOPHAGE-XR) 750 MG 24 hr tablet Take 2,250 mg by mouth daily (with dinner)       metoprolol succinate ER (TOPROL-XL) 100 MG 24 hr tablet Take 100 mg by mouth daily       NIFEdipine ER OSMOTIC (ADALAT CC) 30 MG 24 hr tablet Take 1 tablet (30 mg) by mouth daily 90 tablet 0       Problem List:  Patient Active Problem List    Diagnosis Date Noted     Heart failure (H) 01/16/2020     Priority: Medium     Acute on chronic right-sided heart failure (H) 01/16/2020     Priority: Medium     Added automatically from request for surgery [id number]       Dyspnea on exertion 01/14/2020     Priority: Medium     Added automatically from request for surgery [id number]       Pulmonary hypertension (H) 01/14/2020     Priority: Medium     Added automatically from request for surgery [id number]          Past Medical/Surgical History:  No past medical history on file.  Past Surgical History:   Procedure Laterality Date     CV LEFT HEART CATH N/A 1/17/2020    Procedure: Left Heart Cath;  Surgeon: Elia Salas MD;  Location:  HEART CARDIAC CATH LAB     CV RIGHT HEART CATH N/A 1/17/2020    Procedure: Heart Cath Right Heart Cath w/ nitric oxide reversal study;  Surgeon: Elia Salas MD;  [facility]       Social History:  Social History     Socioeconomic History     Marital status: Single     Spouse name: Not on file     Number of children: Not on file     Years of education: Not on file     Highest education level: Not on file   Occupational History     Not on file   Social Needs     Financial resource strain: Not on file     Food insecurity:     Worry: Not on file     Inability: Not on file     Transportation needs:     Medical: Not on  file     Non-medical: Not on file   Tobacco Use     Smoking status: Never Smoker     Smokeless tobacco: Never Used   Substance and Sexual Activity     Alcohol use: Yes     Frequency: Monthly or less     Comment: once or twice a year      Drug use: Not on file     Sexual activity: Not on file   Lifestyle     Physical activity:     Days per week: Not on file     Minutes per session: Not on file     Stress: Not on file   Relationships     Social connections:     Talks on phone: Not on file     Gets together: Not on file     Attends Worship service: Not on file     Active member of club or organization: Not on file     Attends meetings of clubs or organizations: Not on file     Relationship status: Not on file     Intimate partner violence:     Fear of current or ex partner: Not on file     Emotionally abused: Not on file     Physically abused: Not on file     Forced sexual activity: Not on file   Other Topics Concern     Not on file   Social History Narrative     Not on file       Family History:  No family history on file.    Review of Systems:  A complete review of systems reviewed by me is negative with the exeption of what has been mentioned in the history of present illness and symptoms listed below, highlighted in red:  CONSTITUTIONAL: NEGATIVE for weight gain/loss, fever, chills, sweats or night sweats, drug allergies.  EYES: changes in vision, NEGATIVE for blind spots, double vision.  ENT: NEGATIVE for ear pain, sore throat, sinus pain, post-nasal drip, runny nose, bloody nose  CARDIAC:swollen legs or swollen feet;  high blood pressure NEGATIVE for fast heartbeats or fluttering in chest, chest pain or pressure, breathlessness when lying flat,   NEUROLOGIC: NEGATIVE headaches, weakness or numbness in the arms or legs.  DERMATOLOGIC: NEGATIVE for rashes, new moles or change in mole(s)  PULMONARY: SOB with activity, NEGATIVE SOB at rest,dry cough, productive cough, coughing up blood, wheezing or whistling when  "breathing.    GASTROINTESTINAL: NEGATIVE for nausea or vomitting, loose or watery stools, fat or grease in stools, constipation, abdominal pain, bowel movements black in color or blood noted.  GENITOURINARY: NEGATIVE for pain during urination, blood in urine, urinating more frequently than usual  MUSCULOSKELETAL: NEGATIVE for muscle pain, bone or joint pain, swollen joints.  ENDOCRINE: diabetes NEGATIVE for increased thirst or urination  LYMPHATIC: NEGATIVE for swollen lymph nodes, lumps or bumps in the breasts or nipple discharge.  PSYCHE: NEGATIVE for depression, anxiety    Physical Examination:  Vitals: /72   Pulse 68   Resp 16   Ht 1.778 m (5' 10\")   Wt 141.5 kg (312 lb)   SpO2 95%   BMI 44.77 kg/m    BMI= Body mass index is 44.77 kg/m .    Neck Cir (cm): 51 cm    [place] Total Score 3/3/2020   Total score - [place] 15       General: No apparent distress, appropriately groomed  Head: Normocephalic, atraumatic  Eyes: no icterus, PERRL  Nose: Nares patent. No exudate/erythema.  Mouth: op pink and moist, teeth: poor dentition  Orophraynx: Opening is narrowed, uvula: Thick   Mallampati Class:  IV    No tonsillar hypertrophy   Neck: Supple, Circumference: 20 inches  Cardiac: RegularS1, S2; no gallops/murmurs  Chest: Symmetric air movement, lungs clear to auscultation bilaterally, no rales/rhonchi/wheezing  Musculoskeletal: [place] LE edema noted  Skin: Warm, dry, intact  Psych: Mood pleasant, affect congruent  Neuro:  Mental status: Awake, alert, attentive, oriented.  No focal neurological deficit    MRI CARDIAC(1/17/2020)  SUMMARY   ==========================================================================================================  Clinical history: 59-year-old male with CAD (prior LAD stent), MGUS, and PH. CMR to evaluate RV function in  the setting of PH.   Comparison CMR: None.     1. The LV exhibits a small cavity size. There is mild concentric hypertrophy. The global systolic function  is normal. " The LVEF is 72%. There is systolic flattening of the interventricular septum consistent with RV  pressure overload.     2. The RV is normal in cavity size. There is mild hypertrophy. The global systolic function is normal. The  RVEF is 55%.      3. Both atria are mildly enlarged.     4. There is no significant valvular disease.      5. Late gadolinium enhancement imaging shows midmyocardial enhancement in the anterior and inferior right  ventricular insertions in the basal anteroseptal, basal anterior, and basal inferoseptal segments that  transitions to involve only the inferior right ventricular insertion points in the mid inferoseptal  segment. This is a non-specific pattern that can be seen in pulmonary hypertension.     6. There is no pericardial effusion or thickening.     7. There is no intracardiac thrombus.     8. There is no evidence of intracardiac shunting. Qp/Qs 1.0.     9. The aortic root and ascending aorta are normal in size without an aneurysm or dissection.     10. The aortic arch is left sided. There is normal branching of the arch vessels. There is no coarctation.     11. The main and proximal branch pulmonary arteries are all mildly enlarged.      12. The systemic venous connections are normal.      13. There are four pulmonary veins and they all drain into the left atrium.     CONCLUSIONS: Pulmonary hypertension with LVH and RVH. Normal RV size with RVEF of 55%.     HbA1C(1/31/2020):10.5  TSH, Reflex  0.30 - 4.50 uIU/mL  3.45     Basic Metabolic panel 1/31/2020:  Ref Range & Units  Value  Resulting Agency    Sodium  136 - 145 mmol/L  141    Potassium  3.5 - 5.1 mmol/L  4.6    Chloride  98 - 109 mmol/L  108    CO2  20 - 29 mmol/L  23    Anion Gap  7 - 16 mmol/L  10    Calcium  8.4 - 10.4 mg/dL  10.1    BUN  7 - 26 mg/dL  41 High      Creatinine  0.73 - 1.18 mg/dL  1.73 High      GFR, Estimated  >60 mL/min/1.73m2  43 Low      GFR, Est If African American  >60 mL/min/1.73m2  49 Low      Glucose   70 - 100 mg/dL  119 High           Impression/Report/Plan:  Patient is a 60 year old male with PMH of congestive heart failure, pulmonary hypertension, abnormal ECG(bradycardia, incomplete RBBB, RVH), Diabetes, Neuropathy, Hypertension, questionable idiopathic hypersomnia, obstructive sleep apnea and history of methamphetamine usage, with reports of excessive daytime sleepiness.  The accuracy of previous diagnosis of idiopathic hypersomnia (pathological hypersomnolence) is questionable. Neither sleep logs or actigraphy were obtained preceding the polysomnography in March 2003.  The PSG that preceded the MSLT was a titration study.  The most likely cause of the excessive daytime sleepiness is insufficient sleep/inadequate use of BiPAP.    Based on the downloadable compliance measures in the past month, there were several nights with the device usage of less than 4 hours.  The sleep schedule and BiPAP use that he reports did not quite correlate with the data from compliance download.    GIRISH: Recommended continuing the treatment with the BiPAP  device at the current pressure settings and to follow-up with the DME provider to obtain mask fit optimization to minimize the air leaks. Recommended using the BiPAP device for the entire sleep duration including during naps to derive maximum benefit. Consequences of inadequately/untreated sleep disordered breathing were discussed.     Obtaining actigraphy with concomitant sleep logs, for at least 2 weeks followed by PSG with the BiPAP  at the current pressure setting followed by MSLT to evaluate for pathological hypersomnolence would be ideal.    We discussed regularizing sleep schedule, increasing sleep duration aiming at obtaining at least 7 to 8 hours of sleep time.   If he is unable to regularize sleep schedule following the   recommendations he was instructed to let me know so that I can provide him referral to our sleep psychologist, Dr. Nolan Dumont.  Encouraged to  "follow good sleep hygiene/behavioral techniques.    Congestive heart failure, pulmonary hypertension:We also discussed the association of sleep disordered breathing and cardiovascular disease. He  will continue follow-up with Cardiology.    Obesity: We discussed weight management with healthy diet, and exercise.    Patient was strongly advised to avoid driving, operating any heavy machinery or other hazardous situations while drowsy or sleepy.  Patient was counseled on the importance of driving while alert, to pull over if drowsy, or nap before getting into the vehicle if sleepy.      Follow up in 6 weeks at sleep clinic (he was instructed to bring his  BiPAP machine and sleep logs).  Patient is not sure whether he wants to continue follow-up at our sleep clinic or return back to his previous sleep provider.    CC: Santiago Riojas      The above note was dictated using voice recognition software. Although reviewed after completion, some word and grammatical error may remain . Please contact the author for any clarifications.    \"I spent a total of 60 minutes face to face with Jeremiah Isaac during today's office visit. Over 50% of this time was spent counseling the patient and  coordinating care regarding sleep-related breathing disorder, BiPAP treatment, mask fit optimization, regularizing sleep schedule , increasing sleep duration, SRBD and cardiovascular disease, reviewing previous sleep study reports and chart review.\"     Alfred Parks MD  [facility]   [address], [address]   [address] [place] [address]   Appointments: [phone]                      "

## 2020-03-03 NOTE — PATIENT INSTRUCTIONS
Your BMI is Body mass index is 44.77 kg/m .  Weight management is a personal decision.  If you are interested in exploring weight loss strategies, the following discussion covers the approaches that may be successful. Body mass index (BMI) is one way to tell whether you are at a healthy weight, overweight, or obese. It measures your weight in relation to your height.  A BMI of 18.5 to 24.9 is in the healthy range. A person with a BMI of 25 to 29.9 is considered overweight, and someone with a BMI of 30 or greater is considered obese. More than two-thirds of American adults are considered overweight or obese.  Being overweight or obese increases the risk for further weight gain. Excess weight may lead to heart disease and diabetes.  Creating and following plans for healthy eating and physical activity may help you improve your health.  Weight control is part of healthy lifestyle and includes exercise, emotional health, and healthy eating habits. Careful eating habits lifelong are the mainstay of weight control. Though there are significant health benefits from weight loss, long-term weight loss with diet alone may be very difficult to achieve- studies show long-term success with dietary management in less than 10% of people. Attaining a healthy weight may be especially difficult to achieve in those with severe obesity. In some cases, medications, devices and surgical management might be considered.  What can you do?  If you are overweight or obese and are interested in methods for weight loss, you should discuss this with your provider.     Consider reducing daily calorie intake by 500 calories.     Keep a food journal.     Avoiding skipping meals, consider cutting portions instead.    Diet combined with exercise helps maintain muscle while optimizing fat loss. Strength training is particularly important for building and maintaining muscle mass. Exercise helps reduce stress, increase energy, and improves fitness.  "Increasing exercise without diet control, however, may not burn enough calories to loose weight.       Start walking three days a week 10-20 minutes at a time    Work towards walking thirty minutes five days a week     Eventually, increase the speed of your walking for 1-2 minutes at time    In addition, we recommend that you review healthy lifestyles and methods for weight loss available through the National Institutes of Health patient information sites:  http://win.niddk.nih.gov/publications/index.htm    And look into health and wellness programs that may be available through your health insurance provider, employer, local community center, or [employer] club.    Weight management plan: Patient was referred to their PCP to discuss a diet and exercise plan.    Please do everything they can to develop and maintain good sleep habits and a steady sleep schedule. Habits should include going to bed and waking up at the same times; avoiding caffeinated products (eg, coffees, teas, ray, some non-cola pops, energy drinks, chocolates) avoiding other stimulants and products that can disrupt sleep (eg, alcohol, sleeping pills, nicotine); maintaining a cool, quiet and comfortable bedroom; and avoiding activities before bedtime that are stimulating (eg, computer games and television).   GOAL  BED TIME:12 MN  GOAL WAKE TIME: 8 AM  Staying motivated to stick with the schedule. It is especially important not to lose sight of the goals during holidays and weekends. Adhering to strict bed and wake times keeps the body s clock under control but does not \"cure\" the tendency for delayed sleep phase. Once the desired bedtime is reached, you must stay motivated and stick with going to bed at the desired bedtime on a nightly basis in order to reset the internal clock. Only after several months of sticking to the schedule can there be some flexibility allowed on special occasions.   Use special bright light box of 71573 lux intensity(can be " purchased through amazon.com), exposing  to bright light for approximately half an hour to one hour  in the morning soon afetr awakening  Take  1/2 to 1 tab of melatonin 1mg strength by mouth about 2 hours before goal bedtime(this means taking melatonin at 10 PM since the goal bed time is 12 MN)  Move to bed when sleepy, after 15 of wakefulness move to chair to read or listen to audio    Copy and paste into web browser address window        https://www.[employer].com/watch?v=Quqb9koVoUb  If you are unable to follow regular sleep schedule following the aforementioned recommendations, please let me know so that I can provide you with referral to our sleep psychologist.  PLEASE avoid naps during day. If nap is inevitable please use an alarm to wake yourself up in  30 minutes and use the BiPAP machine even during nap.  PLEASE follow up with [facility] to get mask fit optimized to minimize the air leaks.  Follow up in 6 weeks at sleep clinic (bring with you your BiPAP machine and sleep logs).  Please do not drive/operte heavy machinery,  if drowsy or sleepy;  pull over if drowsy.

## 2020-03-05 NOTE — NURSING NOTE
Orders sent to [facility]    [facility] ~[facility]

## 2020-03-06 ENCOUNTER — TELEPHONE (OUTPATIENT)
Dept: SLEEP MEDICINE | Facility: CLINIC | Age: 60
End: 2020-03-06

## 2020-03-12 ENCOUNTER — PRE VISIT (OUTPATIENT)
Dept: ONCOLOGY | Facility: CLINIC | Age: 60
End: 2020-03-12

## 2020-03-12 ENCOUNTER — HOSPITAL ENCOUNTER (OUTPATIENT)
Dept: LAB | Facility: CLINIC | Age: 60
Discharge: HOME | End: 2020-03-12
Attending: INTERNAL MEDICINE | Admitting: INTERNAL MEDICINE
Payer: COMMERCIAL

## 2020-03-12 ENCOUNTER — ONCOLOGY VISIT (OUTPATIENT)
Dept: ONCOLOGY | Facility: CLINIC | Age: 60
End: 2020-03-12
Attending: INTERNAL MEDICINE
Payer: COMMERCIAL

## 2020-03-12 VITALS
DIASTOLIC BLOOD PRESSURE: 51 MMHG | BODY MASS INDEX: 45.07 KG/M2 | WEIGHT: 314.8 LBS | HEART RATE: 63 BPM | TEMPERATURE: 97.3 F | RESPIRATION RATE: 20 BRPM | SYSTOLIC BLOOD PRESSURE: 101 MMHG | HEIGHT: 70 IN | OXYGEN SATURATION: 95 %

## 2020-03-12 DIAGNOSIS — D47.2 MONOCLONAL (M) PROTEIN DISEASE, MULTIPLE 'M' PROTEIN: ICD-10-CM

## 2020-03-12 DIAGNOSIS — D47.2 MONOCLONAL (M) PROTEIN DISEASE, MULTIPLE 'M' PROTEIN: Primary | ICD-10-CM

## 2020-03-12 PROCEDURE — 82784 ASSAY IGA/IGD/IGG/IGM EACH: CPT | Performed by: INTERNAL MEDICINE

## 2020-03-12 PROCEDURE — 86334 IMMUNOFIX E-PHORESIS SERUM: CPT | Performed by: INTERNAL MEDICINE

## 2020-03-12 PROCEDURE — 36415 COLL VENOUS BLD VENIPUNCTURE: CPT | Performed by: INTERNAL MEDICINE

## 2020-03-12 PROCEDURE — 99204 OFFICE O/P NEW MOD 45 MIN: CPT | Performed by: INTERNAL MEDICINE

## 2020-03-12 PROCEDURE — G0463 HOSPITAL OUTPT CLINIC VISIT: HCPCS

## 2020-03-12 ASSESSMENT — MIFFLIN-ST. JEOR: SCORE: 2249.17

## 2020-03-12 ASSESSMENT — PAIN SCALES - GENERAL: PAINLEVEL: NO PAIN (0)

## 2020-03-12 NOTE — PROGRESS NOTES
"NEW PATIENT HEMATOLOGY CONSULT    REQUESTING PROVIDER/SERVICE: cardiology Santiago Corey    PATIENT NAME: Jeremiah Isaac   MRN# [id number]     Date of Visit: Mar 12, 2020     YOB: 1960       REASON FOR CONSULTATION/CC:          HISTORY OF ONCOLOGY/HEMATOLLOGY ILLNESS:    He has been doctoring extensively with cardiology service for his severe pulmonary hypertension.  He has chronic progressively dyspnea on exertion over years, even with minimal activity symptoms.  He denies any palpitation, he does have chronic fluid retention in his legs.  He also has diabetic nephropathy, neuropathy.  Chronic renal insufficiency creatinine around 2.    1/2020, SPEP 0 M protein, Urine LCA negative, no cytopenia on CBC.    He is seen hematology service for \"monoclonal protein disease \"first time today.      INTERVAL HISTORY  n/a      PAST MEDICAL HISTORY  Abnormal ECG: bradycardia, incomplete RBBB, RVH, CAD stents.  Hypertension.   Pulmonary hypertension. Central sleep apnea on Bipap  Elevated body mass index. Diabetes  Neuropathy  CKD with proteinuria Estimated GFR 30-50  Exertional syncope    MEDICATIONS/ALLERY:  Reviewed in Epic system.        SOCIAL HISTORY:    Jacquelin smoking or ETOH abuse    FAMILY HISTORY:    denies      REVIEW OF SYSTEMS:   A 10-point review of systems was performed. Pertinent findings are noted in the HPI.    GENERAL: pt is in usual state of health.  No B symptoms  NEURO:   No headache, double vision, or focal weakness.  No neuropathy. Hx of Exertional syncope  SKIN:  No chronic skin rash or skin infection.   CARDIOVASCULAR: Abnormal ECG: bradycardia, incomplete RBBB, RVH, CAD stents.  Hypertension.   PULMONARY:  Pulmonary hypertension. Central sleep apnea on Bipap  GI:  No abdominal pain, nausea, vomiting, constipation.  No diarrhea.  No bright red blood per rectum.   :   Chronic renal insufficiency diabetic nephropathy.  RHEUMATOLOGY/MUSCULOSKELETAL SYSTEM: Deferred diabetic " "neuropathy  ENDOCRINE: Diabetes mellitus.   HEMATOLOGY:  No history of bleeding or thrombosis episode.   Oncology: no hx of cancers  IMMUNOLOGY:  No recurrent fever or chills episode.  No recurrent infectious episode.   PSYCHIATRY:  No anxiety or depression.          PHYSICAL EXAMINATION:   VITAL SIGNS: Blood pressure 101/51, pulse 63, temperature 97.3  F (36.3  C), temperature source Tympanic, resp. rate 20, height 1.778 m (5' 10\"), weight 142.8 kg (314 lb 12.8 oz), SpO2 95 %.      GENERAL APPEARANCE:  looks like stated age, very pleasant, not in acute distress.     ECOG 0-1    ENT, MOUTH: Pupils are equally reactive to light.  Sclerae are anicteric.  Moist oral mucosa without lesion or ulcer.   Negative pharynx.  No oral thrush.   NECK:  Supple.  No jugular venous distention.  Thyroid is not palpable.   LYMPH NODES:  Superficial lymphadenopathy is not appreciable in the bilateral cervical, supraclavicular, axillary or inguinal areas.   CARDIOVASCULAR:  S1, S2 regular with no murmurs or gallops.  No carotid or abdominal bruits.   PULMONARY:  Lungs are clear to auscultation and percussion bilaterally.  There is no wheezing or rhonchi.   GASTROINTESTINAL:  Abdomen is soft, nontender.  No hepatosplenomegaly.  No signs of ascites.  No mass appreciable.   MUSCULOSKELETAL/EXTREMITIES: Bilateral legs has Ace wrapping. No cyanotic changes.  No signs of joint deformity.  No lymphedema.   NEUROLOGIC:  Cranial nerves II-XII are grossly intact.  Sensation intact.  Muscle strength and muscle tone symmetrical, 5/5 throughout.   BACK  No spinal or paraspinal tenderness.  No CVA tenderness.   SKIN:  No petechiae.  No rash.  No signs of cellulitis.   PSYCHIATRIC: Oriented to time, person, and places. Normal mood and affect. Good memory. Proper insight and judgement.       CURRENT LAB DATA REVIEWED TODAY  1/2020, SPEP 0 M protein, Urine LCA negative, no cytopenia on CT CBC.        CURRENT IMAGING REVIEWED TODAY  1/2020 CT body  1. " "Dilation of the main pulmonary artery, suggestive of pulmonary arterial hypertension.  2. Stable 11 x 7 mm nodular opacity in the right lower lobe. Recommend follow-up CT in 18 months for further evaluation.  3. Layering hyperdense material in the gallbladder, biliary sludge  versus vicarious excretion of contrast.  4. Diverticulosis without CT evidence of acute diverticulitis.       OLD DATA REVIEWED TODAY WITH SUMMARY  Chronic renal insufficiency gradually worsening from 1.5 to around 2 over years  No cytopenia since August 2019      ASSESSMENT AND PLAN:    60 years old gentleman has progressive pulmonary hypertension, progressive dyspnea on exertion, diabetes diabetic nephropathy, sent from cardiology service for hematology consult for \"monoclonal protein.\"    He has chronic renal insufficiency creatinine around 2 right now, no cytopenia, SPEP light chain assay were normal in January 2020.    Recommend hematology work-up to check for M protein with blood urine and bone survey.     Called him on negative work up results with blood tests and bone survey.       "

## 2020-03-12 NOTE — LETTER
"    3/12/2020         RE: Jeremiah Isaac  [address]  [facility] [address]        Dear Colleague,    Thank you for referring your patient, Jeremiah Isaac, to the [facility]. Please see a copy of my visit note below.    Oncology Rooming Note    March 12, 2020 1:01 PM   Jeremiah Isaac is a 60 year old male who presents for:    Chief Complaint   Patient presents with     Oncology Clinic Visit     New Patient - Abnormal laboratory test result      Initial Vitals: /51 (BP Location: Right arm, Patient Position: Sitting, Cuff Size: Adult Large)   Pulse 63   Temp 97.3  F (36.3  C) (Tympanic)   Resp 20   Ht 1.778 m (5' 10\")   Wt 142.8 kg (314 lb 12.8 oz)   SpO2 95%   BMI 45.17 kg/m   Estimated body mass index is 45.17 kg/m  as calculated from the following:    Height as of this encounter: 1.778 m (5' 10\").    Weight as of this encounter: 142.8 kg (314 lb 12.8 oz). Body surface area is 2.66 meters squared.  No Pain (0) Comment: Data Unavailable   No LMP for male patient.  Allergies reviewed: Yes  Medications reviewed: Yes    Medications: Medication refills not needed today.  Pharmacy name entered into [employer]: [id number] - [place], [place] - [address] [address]    Clinical concerns: New Patient - Abnormal laboratory test result.       Negrita Perez CMA                NEW PATIENT HEMATOLOGY CONSULT    REQUESTING PROVIDER/SERVICE: cardiology Santiago Corey    PATIENT NAME: Jeremiah Isaac   MRN# [id number]     Date of Visit: Mar 12, 2020     YOB: 1960       REASON FOR CONSULTATION/CC:          HISTORY OF ONCOLOGY/HEMATOLLOGY ILLNESS:    He has been doctoring extensively with cardiology service for his severe pulmonary hypertension.  He has chronic progressively dyspnea on exertion over years, even with minimal activity symptoms.  He denies any palpitation, he does have chronic fluid retention in his legs.  He also has diabetic nephropathy, neuropathy.  Chronic renal " "insufficiency creatinine around 2.    1/2020, SPEP 0 M protein, Urine LCA negative, no cytopenia on CT CBC.    He is seen hematology service for \"monoclonal protein disease \"first time today.      INTERVAL HISTORY  n/a      PAST MEDICAL HISTORY  Abnormal ECG: bradycardia, incomplete RBBB, RVH, CAD stents.  Hypertension.   Pulmonary hypertension. Central sleep apnea on Bipap  Elevated body mass index. Diabetes  Neuropathy  CKD with proteinuria Estimated GFR 30-50  Exertional syncope    MEDICATIONS/ALLERY:  Reviewed in Epic system.        SOCIAL HISTORY:    Jacquelin smoking or ETOH abuse    FAMILY HISTORY:    denies      REVIEW OF SYSTEMS:   A 10-point review of systems was performed. Pertinent findings are noted in the HPI.    GENERAL: pt is in usual state of health.  No B symptoms  NEURO:   No headache, double vision, or focal weakness.  No neuropathy. Hx of Exertional syncope  SKIN:  No chronic skin rash or skin infection.   CARDIOVASCULAR: Abnormal ECG: bradycardia, incomplete RBBB, RVH, CAD stents.  Hypertension.   PULMONARY:  Pulmonary hypertension. Central sleep apnea on Bipap  GI:  No abdominal pain, nausea, vomiting, constipation.  No diarrhea.  No bright red blood per rectum.   :   Chronic renal insufficiency diabetic nephropathy.  RHEUMATOLOGY/MUSCULOSKELETAL SYSTEM: Deferred diabetic neuropathy  ENDOCRINE: Diabetes mellitus.   HEMATOLOGY:  No history of bleeding or thrombosis episode.   Oncology: no hx of cancers  IMMUNOLOGY:  No recurrent fever or chills episode.  No recurrent infectious episode.   PSYCHIATRY:  No anxiety or depression.          PHYSICAL EXAMINATION:   VITAL SIGNS: Blood pressure 101/51, pulse 63, temperature 97.3  F (36.3  C), temperature source Tympanic, resp. rate 20, height 1.778 m (5' 10\"), weight 142.8 kg (314 lb 12.8 oz), SpO2 95 %.      GENERAL APPEARANCE:  looks like stated age, very pleasant, not in acute distress.     ECOG 0-1    ENT, MOUTH: Pupils are equally reactive to " light.  Sclerae are anicteric.  Moist oral mucosa without lesion or ulcer.   Negative pharynx.  No oral thrush.   NECK:  Supple.  No jugular venous distention.  Thyroid is not palpable.   LYMPH NODES:  Superficial lymphadenopathy is not appreciable in the bilateral cervical, supraclavicular, axillary or inguinal areas.   CARDIOVASCULAR:  S1, S2 regular with no murmurs or gallops.  No carotid or abdominal bruits.   PULMONARY:  Lungs are clear to auscultation and percussion bilaterally.  There is no wheezing or rhonchi.   GASTROINTESTINAL:  Abdomen is soft, nontender.  No hepatosplenomegaly.  No signs of ascites.  No mass appreciable.   MUSCULOSKELETAL/EXTREMITIES: Bilateral legs has Ace wrapping. No cyanotic changes.  No signs of joint deformity.  No lymphedema.   NEUROLOGIC:  Cranial nerves II-XII are grossly intact.  Sensation intact.  Muscle strength and muscle tone symmetrical, 5/5 throughout.   BACK  No spinal or paraspinal tenderness.  No CVA tenderness.   SKIN:  No petechiae.  No rash.  No signs of cellulitis.   PSYCHIATRIC: Oriented to time, person, and places. Normal mood and affect. Good memory. Proper insight and judgement.       CURRENT LAB DATA REVIEWED TODAY  1/2020, SPEP 0 M protein, Urine LCA negative, no cytopenia on CT CBC.        CURRENT IMAGING REVIEWED TODAY  1/2020 CT body  1. Dilation of the main pulmonary artery, suggestive of pulmonary arterial hypertension.  2. Stable 11 x 7 mm nodular opacity in the right lower lobe. Recommend follow-up CT in 18 months for further evaluation.  3. Layering hyperdense material in the gallbladder, biliary sludge  versus vicarious excretion of contrast.  4. Diverticulosis without CT evidence of acute diverticulitis.       OLD DATA REVIEWED TODAY WITH SUMMARY  Chronic renal insufficiency gradually worsening from 1.5 to around 2 over years  No cytopenia since August 2019      ASSESSMENT AND PLAN:    60 years old gentleman has progressive pulmonary hypertension,  "progressive dyspnea on exertion, diabetes diabetic nephropathy, sent from cardiology service for hematology consult for \"monoclonal protein.\"    He has chronic renal insufficiency creatinine around 2 right now, no cytopenia, SPEP light chain assay were normal in January 2020.    Recommend hematology work-up to check for M protein with blood urine and bone survey.     We will call him on the results.        Again, thank you for allowing me to participate in the care of your patient.        Sincerely,        Naz Corrales MD, MD    "

## 2020-03-12 NOTE — PATIENT INSTRUCTIONS
Dr. Corrales would like you to complete a work up with labs, urine and an Xray. We will call you with results.         When you are in need of a refill, please call your pharmacy and they will send us a request.      Copy of appointments, and after visit summary (AVS) given to patient.      If you have any questions please call Domi Bautista RN, BSN Oncology Hematology  [facility] [phone]. For questions after business hours, or on holidays/weekends, please call our after hours Nurse Triage line [phone]. Thank you.       Work up with lab, urine, x ray. Will call pt on result

## 2020-03-12 NOTE — PROGRESS NOTES
"Oncology Rooming Note    March 12, 2020 1:01 PM   Jeremiah Isaac is a 60 year old male who presents for:    Chief Complaint   Patient presents with     Oncology Clinic Visit     New Patient - Abnormal laboratory test result      Initial Vitals: /51 (BP Location: Right arm, Patient Position: Sitting, Cuff Size: Adult Large)   Pulse 63   Temp 97.3  F (36.3  C) (Tympanic)   Resp 20   Ht 1.778 m (5' 10\")   Wt 142.8 kg (314 lb 12.8 oz)   SpO2 95%   BMI 45.17 kg/m   Estimated body mass index is 45.17 kg/m  as calculated from the following:    Height as of this encounter: 1.778 m (5' 10\").    Weight as of this encounter: 142.8 kg (314 lb 12.8 oz). Body surface area is 2.66 meters squared.  No Pain (0) Comment: Data Unavailable   No LMP for male patient.  Allergies reviewed: Yes  Medications reviewed: Yes    Medications: Medication refills not needed today.  Pharmacy name entered into [employer]: [id number] - [place], [place] - [address] [address]    Clinical concerns: New Patient - Abnormal laboratory test result.       Negrita Perez CMA              "

## 2020-03-13 LAB
IGA SERPL-MCNC: 340 MG/DL (ref 84–499)
IGG SERPL-MCNC: 782 MG/DL (ref 610–1616)
IGM SERPL-MCNC: 54 MG/DL (ref 35–242)
PROT PATTERN SERPL IFE-IMP: NORMAL

## 2020-03-16 PROCEDURE — 84166 PROTEIN E-PHORESIS/URINE/CSF: CPT | Performed by: INTERNAL MEDICINE

## 2020-03-17 ENCOUNTER — HOSPITAL ENCOUNTER (OUTPATIENT)
Dept: GENERAL RADIOLOGY | Facility: CLINIC | Age: 60
Discharge: HOME | End: 2020-03-17
Attending: INTERNAL MEDICINE | Admitting: INTERNAL MEDICINE
Payer: COMMERCIAL

## 2020-03-17 DIAGNOSIS — D47.2 MONOCLONAL (M) PROTEIN DISEASE, MULTIPLE 'M' PROTEIN: ICD-10-CM

## 2020-03-17 PROCEDURE — 77075 RADEX OSSEOUS SURVEY COMPL: CPT

## 2020-03-18 LAB
ALBUMIN MFR UR ELPH: 81.3 %
ALPHA1 GLOB MFR UR ELPH: 1.2 %
ALPHA2 GLOB MFR UR ELPH: 1.7 %
B-GLOBULIN MFR UR ELPH: 5.1 %
GAMMA GLOB MFR UR ELPH: 10.7 %
M PROTEIN MFR UR ELPH: 0 %
PROT PATTERN UR ELPH-IMP: ABNORMAL

## 2020-03-19 ENCOUNTER — TELEPHONE (OUTPATIENT)
Dept: ONCOLOGY | Facility: CLINIC | Age: 60
End: 2020-03-19

## 2020-03-19 NOTE — TELEPHONE ENCOUNTER
----- Message from Naz Corrales MD sent at 3/19/2020  3:40 PM CDT -----  Regarding: RE: Results  Labs and x ray are all negative. Follow-up with PMD, and his other providers.   ----- Message -----  From: Domi Bautista RN  Sent: 3/19/2020   3:17 PM CDT  To: Naz Corrales MD  Subject: Results                                          Dr. Corrales    Have you had time to review pt results?    Thank you  Domi

## 2020-04-01 ENCOUNTER — VIRTUAL VISIT (OUTPATIENT)
Dept: CARDIOLOGY | Facility: CLINIC | Age: 60
End: 2020-04-01
Payer: COMMERCIAL

## 2020-04-01 DIAGNOSIS — I27.20 PULMONARY HYPERTENSION (H): Primary | ICD-10-CM

## 2020-04-01 PROCEDURE — 99207 ZZC NO CHARGE LOS: CPT | Mod: ZP | Performed by: INTERNAL MEDICINE

## 2020-04-01 NOTE — PROGRESS NOTES
1. Pulmonary hypertension  2. Elevated body mass index  3. Abnormal ECG: bradycardia, incomplete RBBB, RVH  4. Diabetes  5. Neuropathy  6. Hypertension  7. Negative serologic screen collagen vascular disease  8. CKD with proteinuria Estimated GFR 30-50  9. Elevated kappa light chains, low albumin, no evidence of monoclonal spike  10. Narcolepsy  11.Sleep disordered breath              Central sleep apnea              BIP with ASV currently  12. Elevated coronary calcium score with negative stress test              History of LAD stent  13, Questionable history of narcolepsy ? Central sleep apnea  14. History of methamphetamine usage  15. Exertional syncope    I have called Mr Isaac on several occasions over the last week and left word on an identified phone number, asking him to call me.  So far no word back.          EL Earl (Sep. 16, 2019September 16, 2019 - Present)  [phone] (Work)  [phone] (Fax)  [address] [place] [address]  Physician Assistant     Shan Malhotra MD    [address]    [place] [address]    [phone] (Fax)         Humberto Muse MD (Nephrology)  [phone] (Work)  [phone] (Fax)   Nephrology        Estela Landa MD    [address] [place] [address]    [phone] (Fax)\     [facility] [place]    [address]    [place] [address] [phone]     Felix Coleman MD    [address] [place], [place] [id number] [phone] (Fax)     Sarath Jimenez (Jonel), MD    [address]    [place], [place] [id number] [phone] (Fax)      Jessica Phelps M.D.  [facility]    Plan:  1. Labs this week  2. Follow-up RHC and echocardiogram in June  3. Weight loss continuation  4. Regular f/u with sleep medicine    Patient is a 60 year old male with PMH of congestive heart failure, pulmonary  hypertension, abnormal ECG(bradycardia, incomplete RBBB, RVH), Diabetes, Neuropathy, Hypertension, questionable idiopathic hypersomnia, obstructive sleep apnea and history of methamphetamine usage, with reports of excessive daytime sleepiness.  The accuracy of previous diagnosis of idiopathic hypersomnia (pathological hypersomnolence    .T        Current Outpatient Medications   Medication     aspirin 81 MG EC tablet     atorvastatin (LIPITOR) 40 MG tablet     cloNIDine (CATAPRES) 0.3 MG tablet     clopidogrel (PLAVIX) 75 MG tablet     furosemide (LASIX) 40 MG tablet     glipiZIDE (GLUCOTROL XL) 10 MG 24 hr tablet     insulin lispro (HUMALOG VIAL) 100 UNIT/ML vial     lisinopril (PRINIVIL/ZESTRIL) 40 MG tablet     metFORMIN (GLUCOPHAGE-XR) 750 MG 24 hr tablet     metoprolol succinate ER (TOPROL-XL) 100 MG 24 hr tablet     NIFEdipine ER OSMOTIC (ADALAT CC) 30 MG 24 hr tablet     No current facility-administered medications for this visit.        Wt Readings from Last 24 Encounters:   03/12/20 142.8 kg (314 lb 12.8 oz)   03/03/20 141.5 kg (312 lb)   01/22/20 146.4 kg (322 lb 12.8 oz)   01/19/20 145.2 kg (320 lb 3.2 oz)   01/14/20 149.1 kg (328 lb 11.2 oz)   08/22/19 145.2 kg (320 lb)                     CC: all above

## 2020-04-04 NOTE — PROGRESS NOTES
Incomplete                  I conducted a virtual visit with this patient. We discussed the patient's current condition, reviewed interim history since last visit, current functional status, diet, and possible cardiac symptoms including: chest pain, tightness, heaviness, pressure, PND, orthopnea, ankle edema, increasing abdominal girth, weight gain, palpitation, pre-syncope or syncope. This visit was carried out by phone with his permission.  We reviewed medications and allergies.      We also discussed the difficulty we are having in getting a hold of him when we initiate call.  He will check his phone.    We discussed the nature of PAH as reflected in his catherization demonstrating normal RA and PCP but PA pressure of 100.      We discussed the nature of PAH as well as its two drug therapy.        I spent 30 minutes reviewing chart, discussing the patient's condition and preparing report    We reviewed and suggested:  1. Viral epidemic safety suggestions  2. Continued adherence to medical regimen, diet, and exercise program as previously described  3. Current scheduled visit deferred in light of current situation  Call immediately should your health status change, and we will make arrangements to see you immediately    We suggested:  1. Two drug therapy with tadalafil and Opsumit.  Nurses will call on Monday to obtain verbal permission.  2. Patient to have perfusion lung scan to help exclude CTEPH  3. Continue weight loss/ has lost 15 pounds!  4. Start medications sequentially with Tadalafil and then Opsumit  5. See in clinic 6 weeks post starting medications                              1. Pulmonary hypertension  2. Elevated body mass index  3. Abnormal ECG: bradycardia, incomplete RBBB, RVH  4. Diabetes  5. Neuropathy  6. Hypertension  7. Negative serologic screen collagen vascular disease  8. CKD with proteinuria Estimated GFR 30-50  9. Elevated kappa light chains, low albumin, no evidence of monoclonal  spike  10. Narcolepsy  11.Sleep disordered breath              Central sleep apnea              BIP with ASV currently  12. Elevated coronary calcium score with negative stress test              History of LAD stent  13, Questionable history of narcolepsy ? Central sleep apnea  14. History of methamphetamine usage  15. Exertional syncope     I have called Mr Isaac on several occasions over the last week and left word on an identified phone number, asking him to call me.  So far no word back.             EL Earl (Sep. 16, 2019September 16, 2019 - Present)  [phone] (Work)  [phone] (Fax)  [address] [place] [address]  Physician Assistant     Shan Malhotra MD    [address]    [place], [place] [id number] [phone] (Fax)         Humberto Muse MD (Nephrology)  [phone] (Work)  [phone] (Fax)   Nephrology        Estela Landa MD    [address] [place] [address]    [phone] (Fax)\     [facility] [place]    [address]    [place] [address] [phone]     Felix Coleman MD    [address] [place], [place] [id number] [phone] (Fax)     Sarath Jimenez (Jonel), MD    [address]    [place], [place] [id number] [phone] (Fax)      Jessica Phelps M.D.  [facility]     Plan:  1. Labs this week  2. Follow-up RHC and echocardiogram in June  3. Weight loss continuation  4. Regular f/u with sleep medicine     Patient is a 60 year old male with PMH of congestive heart failure, pulmonary hypertension, abnormal ECG(bradycardia, incomplete RBBB, RVH), Diabetes, Neuropathy, Hypertension, questionable idiopathic hypersomnia, obstructive sleep apnea and history of methamphetamine usage, with reports of excessive daytime sleepiness.  The accuracy of previous diagnosis of idiopathic hypersomnia (pathological  hypersomnolence     .T                   Wt Readings from Last 24 Encounters:   20 142.8 kg (314 lb 12.8 oz)   20 141.5 kg (312 lb)   20 146.4 kg (322 lb 12.8 oz)   20 145.2 kg (320 lb 3.2 oz)   20 149.1 kg (328 lb 11.2 oz)   19 145.2 kg (320 lb)                             CC: all above              MRI     MRN:                  [id number]                                  Name:            NADIA MCELROY                                   :                  1960                                  Scan Date:   2020 11:18:50                                   Electronically signed by Rajinder Alexandre  15:15:47     SUMMARY   ==========================================================================================================     Clinical history: 59-year-old male with CAD (prior LAD stent), MGUS, and PH. CMR to evaluate RV function in  the setting of PH.   Comparison CMR: None.     1. The LV exhibits a small cavity size. There is mild concentric hypertrophy. The global systolic function  is normal. The LVEF is 72%. There is systolic flattening of the interventricular septum consistent with RV  pressure overload.     2. The RV is normal in cavity size. There is mild hypertrophy. The global systolic function is normal. The RVEF is 55%.      3. Both atria are mildly enlarged.     4. There is no significant valvular disease.      5. Late gadolinium enhancement imaging shows midmyocardial enhancement in the anterior and inferior right  ventricular insertions in the basal anteroseptal, basal anterior, and basal inferoseptal segments that  transitions to involve only the inferior right ventricular insertion points in the mid inferoseptal  segment. This is a non-specific pattern that can be seen in pulmonary hypertension.     6. There is no pericardial effusion or thickening.     7. There is no intracardiac thrombus.     8. There is no evidence of intracardiac  shunting. Qp/Qs 1.0.     9. The aortic root and ascending aorta are normal in size without an aneurysm or dissection.     10. The aortic arch is left sided. There is normal branching of the arch vessels. There is no coarctation.     11. The main and proximal branch pulmonary arteries are all mildly enlarged.      12. The systemic venous connections are normal.      13. There are four pulmonary veins and they all drain into the left atrium.     CONCLUSIONS: Pulmonary hypertension with LVH and RVH. Normal RV size with RVEF of 55            Catherization    Right sided filling pressures are normal.    Severely elevated pulmonary artery hypertension.    Left sided filling pressures are normal.    Left ventricular filling pressures are normal .    Normal cardiac output level.    No gradient between PCW and LVEDP to suggest pulmonary veno-occlusive disease.    Higher LF PASP (measured later) than RT PASP may reflect the recovery from IA Verapamil and NTG                    Current Outpatient Medications   Medication     aspirin 81 MG EC tablet     atorvastatin (LIPITOR) 40 MG tablet     cloNIDine (CATAPRES) 0.3 MG tablet     clopidogrel (PLAVIX) 75 MG tablet     furosemide (LASIX) 40 MG tablet     glipiZIDE (GLUCOTROL XL) 10 MG 24 hr tablet     insulin lispro (HUMALOG VIAL) 100 UNIT/ML vial     lisinopril (PRINIVIL/ZESTRIL) 40 MG tablet     metFORMIN (GLUCOPHAGE-XR) 750 MG 24 hr tablet     metoprolol succinate ER (TOPROL-XL) 100 MG 24 hr tablet     NIFEdipine ER OSMOTIC (ADALAT CC) 30 MG 24 hr tablet     No current facility-administered medications for this visit.    EXAMINATION: CT CHEST ABDOMEN PELVIS W/O CONTRAST, 1/16/2020 6:20 PM     TECHNIQUE:  Helical CT images from the thoracic inlet through the  symphysis pubis were obtained without contrast.      COMPARISON: 8/22/2019 CT chest     HISTORY: Suspcion for PVOD, want to rule out causes     FINDINGS:     Chest: No consolidative airspace opacity. No pleural effusion  or  pneumothorax. Dependent atelectasis in the lung bases. The central  tracheobronchial tree is patent. Irregular nodular opacity in the  superior aspect of the right lower lobe measuring 11 x 7 mm, stable  since 8/22/2019. Dependent atelectasis in the lung bases.     The visualized thyroid gland is within normal limits. Heart size is  normal. No pericardial effusion. Dilation of the main pulmonary artery  measuring 4.3 cm. Normal caliber of the aorta. Common origin of the  left common carotid and brachiocephalic arteries. No thoracic  lymphadenopathy. Diffuse esophageal wall thickening, stable from  prior. Moderate three-vessel coronary artery disease.     Abdomen and pelvis: Unremarkable noncontrast appearance of the liver,  spleen, adrenal glands, and kidneys. Left upper quadrant splenules.  Layering hyperdense material in the gallbladder, biliary sludge versus  vicarious excretion of contrast from patient's cardiac catheterization  on 1/8/2020. Fatty atrophy of the pancreas. No obstructing urinary  tract calculi. The bladder is partially distended and unremarkable.  Normal caliber of the small and large bowel. Normal appendix.  Diverticulosis without CT evidence of acute diverticulitis. No  pathologically enlarged abdominal or pelvic lymph nodes.     Bones and soft tissues: Degenerative changes of the spine with  multilevel thoracic wedge deformities. Partially calcified fat density  nodule in the right chest wall measuring 3.7 x 2.8 cm, likely sequelae  of fat necrosis, stable 8/22/2019. Fat-containing umbilical hernia.                                                                      IMPRESSION:   1. Dilation of the main pulmonary artery, suggestive of pulmonary  arterial hypertension.  2. Stable 11 x 7 mm nodular opacity in the right lower lobe. Recommend  follow-up CT in 18 months for further evaluation.  3. Layering hyperdense material in the gallbladder, biliary sludge  versus vicarious excretion of  contrast.  4. Diverticulosis without CT evidence of acute diverticulitis.

## 2020-04-07 ENCOUNTER — VIRTUAL VISIT (OUTPATIENT)
Dept: CARDIOLOGY | Facility: CLINIC | Age: 60
End: 2020-04-07
Attending: INTERNAL MEDICINE
Payer: COMMERCIAL

## 2020-04-07 DIAGNOSIS — I27.20 PULMONARY HYPERTENSION (H): Primary | ICD-10-CM

## 2020-04-07 PROCEDURE — 99212 OFFICE O/P EST SF 10 MIN: CPT | Mod: TEL | Performed by: INTERNAL MEDICINE

## 2020-04-14 ENCOUNTER — TELEPHONE (OUTPATIENT)
Dept: CARDIOLOGY | Facility: CLINIC | Age: 60
End: 2020-04-14

## 2020-04-14 RX ORDER — TADALAFIL 20 MG/1
20 TABLET ORAL DAILY
Qty: 60 TABLET | COMMUNITY
Start: 2020-04-14 | End: 2020-04-17 | Stop reason: ALTCHOICE

## 2020-04-14 NOTE — TELEPHONE ENCOUNTER
PA Initiation    Medication: Tadalafil 20mg  Insurance Company: [employer] - Phone [phone] Fax [phone]  Start Date: 4/14/2020    *Prior authorization form completed and faxed to [facility] for expedited review along with clinic note and right heart catheterization report.

## 2020-04-14 NOTE — TELEPHONE ENCOUNTER
PA Initiation    Medication: Opsumit 10mg  Insurance Company: [email or web address] - Phone [phone] Fax [phone]  Pharmacy Filling the Rx: [facility] [address]  Start Date: 4/14/2020    *Prior authorization form completed and faxed to [facility] for expedited review along with clinic note and right heart catheterization report.

## 2020-04-14 NOTE — NURSING NOTE
Orders have been placed for the Adcirca (tadalafil) and Opsumit (macitentan).  I have also ordered the NM Perfusion scan as well and we will work to get this set up.  Senait Berg RN on 4/14/2020 at 12:32 PM    We suggested:  1. Two drug therapy with tadalafil and Opsumit.  Nurses will call on Monday to obtain verbal permission.  2. Patient to have perfusion lung scan to help exclude CTEPH  3. Continue weight loss/ has lost 15 pounds!  4. Start medications sequentially with Tadalafil and then Opsumit  5. See in clinic 6 weeks post starting medications

## 2020-04-16 ENCOUNTER — TELEPHONE (OUTPATIENT)
Dept: CARDIOLOGY | Facility: CLINIC | Age: 60
End: 2020-04-16

## 2020-04-16 DIAGNOSIS — R06.09 DYSPNEA ON EXERTION: ICD-10-CM

## 2020-04-16 DIAGNOSIS — I27.20 PULMONARY HYPERTENSION (H): ICD-10-CM

## 2020-04-16 DIAGNOSIS — I27.20 PULMONARY HYPERTENSION (H): Primary | ICD-10-CM

## 2020-04-16 NOTE — TELEPHONE ENCOUNTER
PA Initiation    Medication: Sildenafil 20mg  Insurance Company: [employer] - Phone [phone] Fax [phone]  Start Date: 4/16/2020    *Prior authorization form completed and faxed to [facility] for expedited review along with clinic note and right heart catheterization report.

## 2020-04-16 NOTE — TELEPHONE ENCOUNTER
Called and verified which pharmacy patient wanted his refill to go to. Advised that when he starts his sildenafil, to monitor s/sx of low BP, lightheadedness, dizziness, and feelings of passing out. Advised that the patient check his BP daily and to call us immediately if he is experiencing any adverse symptoms. Patient verbalized understanding and did not have any further questions. Melody Staley RN on 4/17/2020 at 9:49 AM  __________________________________________    Contacted pt to review information about pt's new medication start. Informed pt that he will be starting Sildenafil instead of Tadalafil. Discussed Opsumit with the pt also.    During the call, pt stated that he has about 6 days left of Nifedipine. Pt asked if he would be getting another refill while he is waiting for the two other medication. Stated to pt that the message will be routed to RN to follow-up on pt's question.    Sharron Katz  Clinic   Pulmonary Hypertension  [facility]  (P) [phone]

## 2020-04-16 NOTE — TELEPHONE ENCOUNTER
Prior Authorization Approval    Authorization Effective Date: 3/15/2020  Authorization Expiration Date: 4/15/2021  Medication: Opsumit 10mg - Approved  Approved Dose/Quantity: 30 tablets/30 days  Reference #: [id number]   Insurance Company: [employer] - Phone [phone] Fax [phone]    Which Pharmacy is filling the prescription (Not needed for infusion/clinic administered): [address]  Patient Notified:  Yes  ----------------------------  Insurance: [employer]  BIN: [id number]  PCN: [id number]  ID#: [id number]  GRP#: [id number]

## 2020-04-16 NOTE — TELEPHONE ENCOUNTER
PRIOR AUTHORIZATION DENIED    Medication: Tadalafil 20mg - DENIED  Denial Date: 4/15/2020  Denial Rational: *See Below*  Appeal Information: Awaiting MD's direction on whether request should be appealed or if pt should start on formulary alternative, Sildenafil.

## 2020-04-17 ENCOUNTER — MEDICAL CORRESPONDENCE (OUTPATIENT)
Dept: HEALTH INFORMATION MANAGEMENT | Facility: CLINIC | Age: 60
End: 2020-04-17

## 2020-04-17 RX ORDER — SILDENAFIL CITRATE 20 MG/1
20 TABLET ORAL 3 TIMES DAILY
Qty: 270 TABLET | Refills: 3 | Status: ON HOLD | OUTPATIENT
Start: 2020-04-17 | End: 2021-01-05

## 2020-04-17 RX ORDER — NIFEDIPINE 30 MG
30 TABLET, EXTENDED RELEASE ORAL DAILY
Qty: 90 TABLET | Refills: 3 | Status: SHIPPED | OUTPATIENT
Start: 2020-04-17 | End: 2021-05-17

## 2020-04-17 NOTE — TELEPHONE ENCOUNTER
Per MD, not to pursue appeal and complete prior authorization for Sildenafil.    No further follow-up completed and encounter closed.    Sharron Katz  Clinic   Pulmonary Hypertension  [facility]  (P) [phone]

## 2020-04-17 NOTE — TELEPHONE ENCOUNTER
Prior Authorization Approval    Authorization Effective Date: 3/17/2020  Authorization Expiration Date: 4/17/2021  Medication: Sildenafil 20mg - Approved  Approved Dose/Quantity: 90 tablets/30 days  Reference #: [id number]   Insurance Company: [employer] - Phone [phone] Fax [phone]  Which Pharmacy is filling the prescription (Not needed for infusion/clinic administered): [id number] [place], [place] - [address]  ----------------------------  Insurance: [employer]  BIN: [id number]  PCN: [id number]  ID#: [id number]  GRP#: [id number]

## 2020-04-17 NOTE — TELEPHONE ENCOUNTER
*Opsumit enrollment form submitted through [employer] portal for review. Prior authorization sent separately by fax to [employer].    Opsumit Voucher Program was not requested.        Sharron Katz  Clinic   Pulmonary Hypertension  [facility]  (p) [phone]

## 2020-04-17 NOTE — TELEPHONE ENCOUNTER
**Discussed the enrollment form with the pt over the phone yesterday, April 16 @4:53pm. Stated pt should hear from [facility] to discuss shipment of the medication. Stated to pt to ask what the co-pay is when the pt is contacted by the specialty pharmacy. Requested that if co-pay is high, ask the specialty pharmacy for assistance, and if none is available, to contact the office. Pt verbalized understanding of the information provided.    Sharron Katz  Clinic   Pulmonary Hypertension  [facility]  (P) [phone]

## 2020-04-21 ENCOUNTER — PATIENT OUTREACH (OUTPATIENT)
Dept: CARDIOLOGY | Facility: CLINIC | Age: 60
End: 2020-04-21

## 2020-04-21 NOTE — TELEPHONE ENCOUNTER
I called the pt and he stated that he is going to start his Revatio (sildenafil) 20 mg three times a day today.  We will follow up with him in one week to see how he is tolerating this medication.  Senait Berg RN on 4/21/2020 at 1:26 PM

## 2020-04-28 ENCOUNTER — PATIENT OUTREACH (OUTPATIENT)
Dept: CARDIOLOGY | Facility: CLINIC | Age: 60
End: 2020-04-28

## 2020-04-28 NOTE — TELEPHONE ENCOUNTER
I called the pt to follow up and see how he was tolerating the Revatio (sildenafil).  He stated that he feels fine and has not had any side effects.  He also stated that he started the Opsumit (macitentan) 2 days ago.  I have requested that he continue to weight himself daily to assess for fluid retention.  Senait Berg RN on 4/28/2020 at 3:06 PM

## 2020-05-05 ENCOUNTER — PATIENT OUTREACH (OUTPATIENT)
Dept: CARDIOLOGY | Facility: CLINIC | Age: 60
End: 2020-05-05

## 2020-05-05 NOTE — TELEPHONE ENCOUNTER
I called the pt and he stated that yesterday he felt weak and fuzzy yesterday, a bloody nose one day, and shortness of breath one day.  Other than that he still feels fine. He is going to follow up in May 12 at 1:00.  Senait Berg RN on 5/5/2020 at 1:26 PM      I called pt to follow up on how he is tolerating the medication Opsumit (macitentan).  He should have been on this medication for about one week.  I was unable to reach him and left a message requesting a call back.  Senait Berg RN on 5/5/2020 at 10:41 AM

## 2020-05-06 ENCOUNTER — HOSPITAL ENCOUNTER (OUTPATIENT)
Dept: NUCLEAR MEDICINE | Facility: CLINIC | Age: 60
Setting detail: NUCLEAR MEDICINE
Discharge: HOME | End: 2020-05-06
Attending: INTERNAL MEDICINE | Admitting: INTERNAL MEDICINE
Payer: COMMERCIAL

## 2020-05-06 DIAGNOSIS — I27.20 PULMONARY HYPERTENSION (H): ICD-10-CM

## 2020-05-06 PROCEDURE — 78580 LUNG PERFUSION IMAGING: CPT

## 2020-05-06 PROCEDURE — A9540 TC99M MAA: HCPCS | Performed by: INTERNAL MEDICINE

## 2020-05-06 PROCEDURE — 34300033 ZZH RX 343: Performed by: INTERNAL MEDICINE

## 2020-05-06 RX ADMIN — KIT FOR THE PREPARATION OF TECHNETIUM TC 99M ALBUMIN AGGREGATED 6 MCI.: 2.5 INJECTION, POWDER, FOR SOLUTION INTRAVENOUS at 14:12

## 2020-05-11 NOTE — PROGRESS NOTES
I conducted a virtual telephone visit with this patient's permission. The patient did not wish to have video visit, We discussed the patient's current condition, reviewed interim history since last visit, current functional status, diet, and possible cardiac symptoms including: chest pain, tightness, heaviness, pressure, PND, orthopnea, ankle edema, increasing abdominal girth, weight gain, palpitation, pre-syncope or syncope.  We also reviewed the patient's current medications and cross checked for allergies.    Duration 25 minutes      1. Pulmonary hypertension  2. Elevated body mass index  3. Abnormal ECG: bradycardia, incomplete RBBB, RVH  4. Diabetes  5. Neuropathy  6. Hypertension  7. Negative serologic screen collagen vascular disease  8. CKD with proteinuria Estimated GFR 30-50  9. Elevated kappa light chains, low albumin, no evidence of monoclonal spike  10. Narcolepsy  11.Sleep disordered breath              Central sleep apnea              BIP with ASV currently  12. Elevated coronary calcium score with negative stress test              History of LAD stent  13, Questionable history of narcolepsy ? Central sleep apnea  14. History of methamphetamine usage  15. Exertional syncope  16. Epistaxis (ASA/Plavix)          The patient is doing well. His weight is stable.  He has decreased shortness of breath and increased exercise tolerance.  He has no evidence of fluid retention.  He is utilizing BIPAP and has no excessive daytime somnolence.      .There is no interim jhistory of chest pain, tightness, pressure, orthopnea, edema, palpitation pre-syncope or syncope.    He does have spontaneous epistaxis while on A & P post stent 8 months ago.      Assessment:  1. Tolerating PAH medications  2. No evidence right heart failure  3. Stable sleep apnea  4. Increasing exercise tolerance  5. Epistaxis secondary to humidity - ASA to be stopped and Plavix continued  6. Right heart catherization and echocardiogram and  complete laboratories in second or third week of June to assess response to two drug initiation    Current Outpatient Medications   Medication     aspirin 81 MG EC tablet     atorvastatin (LIPITOR) 40 MG tablet     cloNIDine (CATAPRES) 0.3 MG tablet     clopidogrel (PLAVIX) 75 MG tablet     furosemide (LASIX) 40 MG tablet     glipiZIDE (GLUCOTROL XL) 10 MG 24 hr tablet     insulin lispro (HUMALOG VIAL) 100 UNIT/ML vial     lisinopril (PRINIVIL/ZESTRIL) 40 MG tablet     macitentan (OPSUMIT) 10 MG tablet     metFORMIN (GLUCOPHAGE-XR) 750 MG 24 hr tablet     metoprolol succinate ER (TOPROL-XL) 100 MG 24 hr tablet     NIFEdipine ER (ADALAT CC) 30 MG 24 hr tablet     sildenafil (REVATIO) 20 MG tablet     No current facility-administered medications for this visit.        Constitutional: weight loss, fever, chills, night sweats  HEENT: without visual changes, swallow difficulties  Pulmonary: without shortness of breath, cough, wheeze, hemoptysis  Cardiac: without chest pain, SAINI, PND, orthopnea, edema, palpitation, pre-syncope, syncope,  GI: without diarrhea, constipation, jaundice, melena, GERD, hematemesis  : without frequency, urgency, dysuria, hematuria  Skin: rash, bruise, open lesions  Neuro: without TIA, focal neurologic complaints, seizure, trauma  Ortho: without pain, swelling, mobility impairment  Endocrine: diabetes, thyroid, heat/cold intolerance, polyuria, polyphagia, change bowel habits.  Sleep:no GIRISH, periodic breathing, fatigue        We reviewed and suggested:  1. Viral epidemic safety suggestions  2. Continued adherence to medical regimen, diet, and exercise program as previously described  3. Current scheduled visit deferred in light of current situation  4. Call immediately should your health status change, and we will make arrangements to see you immediately    .

## 2020-05-12 ENCOUNTER — VIRTUAL VISIT (OUTPATIENT)
Dept: CARDIOLOGY | Facility: CLINIC | Age: 60
End: 2020-05-12
Payer: COMMERCIAL

## 2020-05-12 DIAGNOSIS — R06.09 DYSPNEA ON EXERTION: ICD-10-CM

## 2020-05-12 DIAGNOSIS — I27.20 PULMONARY HYPERTENSION (H): ICD-10-CM

## 2020-05-12 PROCEDURE — 99214 OFFICE O/P EST MOD 30 MIN: CPT | Mod: TEL | Performed by: INTERNAL MEDICINE

## 2020-05-12 NOTE — LETTER
5/12/2020      RE: Jeremiah Isaac  [address]  [facility] [address]       Dear Colleague,    Thank you for the opportunity to participate in the care of your patient, Jeremiah Isaac, at the [facility] at [facility]. Please see a copy of my visit note below.    I conducted a virtual telephone visit with this patient's permission. The patient did not wish to have video visit, We discussed the patient's current condition, reviewed interim history since last visit, current functional status, diet, and possible cardiac symptoms including: chest pain, tightness, heaviness, pressure, PND, orthopnea, ankle edema, increasing abdominal girth, weight gain, palpitation, pre-syncope or syncope.  We also reviewed the patient's current medications and cross checked for allergies.    Duration 25 minutes      1. Pulmonary hypertension  2. Elevated body mass index  3. Abnormal ECG: bradycardia, incomplete RBBB, RVH  4. Diabetes  5. Neuropathy  6. Hypertension  7. Negative serologic screen collagen vascular disease  8. CKD with proteinuria Estimated GFR 30-50  9. Elevated kappa light chains, low albumin, no evidence of monoclonal spike  10. Narcolepsy  11.Sleep disordered breath              Central sleep apnea              BIP with ASV currently  12. Elevated coronary calcium score with negative stress test              History of LAD stent  13, Questionable history of narcolepsy ? Central sleep apnea  14. History of methamphetamine usage  15. Exertional syncope  16. Epistaxis (ASA/Plavix)    The patient is doing well. His weight is stable.  He has decreased shortness of breath and increased exercise tolerance.  He has no evidence of fluid retention.  He is utilizing BIPAP and has no excessive daytime somnolence.      .There is no interim jhistory of chest pain, tightness, pressure, orthopnea, edema, palpitation pre-syncope or syncope.    He does have  spontaneous epistaxis while on A & P post stent 8 months ago.      Assessment:  1. Tolerating PAH medications  2. No evidence right heart failure  3. Stable sleep apnea  4. Increasing exercise tolerance  5. Epistaxis secondary to humidity - ASA to be stopped and Plavix continued  6. Right heart catherization and echocardiogram and complete laboratories in second or third week of June to assess response to two drug initiation    Current Outpatient Medications   Medication     aspirin 81 MG EC tablet     atorvastatin (LIPITOR) 40 MG tablet     cloNIDine (CATAPRES) 0.3 MG tablet     clopidogrel (PLAVIX) 75 MG tablet     furosemide (LASIX) 40 MG tablet     glipiZIDE (GLUCOTROL XL) 10 MG 24 hr tablet     insulin lispro (HUMALOG VIAL) 100 UNIT/ML vial     lisinopril (PRINIVIL/ZESTRIL) 40 MG tablet     macitentan (OPSUMIT) 10 MG tablet     metFORMIN (GLUCOPHAGE-XR) 750 MG 24 hr tablet     metoprolol succinate ER (TOPROL-XL) 100 MG 24 hr tablet     NIFEdipine ER (ADALAT CC) 30 MG 24 hr tablet     sildenafil (REVATIO) 20 MG tablet     No current facility-administered medications for this visit.        Constitutional: weight loss, fever, chills, night sweats  HEENT: without visual changes, swallow difficulties  Pulmonary: without shortness of breath, cough, wheeze, hemoptysis  Cardiac: without chest pain, SAINI, PND, orthopnea, edema, palpitation, pre-syncope, syncope,  GI: without diarrhea, constipation, jaundice, melena, GERD, hematemesis  : without frequency, urgency, dysuria, hematuria  Skin: rash, bruise, open lesions  Neuro: without TIA, focal neurologic complaints, seizure, trauma  Ortho: without pain, swelling, mobility impairment  Endocrine: diabetes, thyroid, heat/cold intolerance, polyuria, polyphagia, change bowel habits.  Sleep:no GIRISH, periodic breathing, fatigue        We reviewed and suggested:  1. Viral epidemic safety suggestions  2. Continued adherence to medical regimen, diet, and exercise program as  previously described  3. Current scheduled visit deferred in light of current situation  4. Call immediately should your health status change, and we will make arrangements to see you immediately    Please do not hesitate to contact me if you have any questions/concerns.     Sincerely,     Santiago Riojas MD

## 2020-05-13 RX ORDER — LIDOCAINE 40 MG/G
CREAM TOPICAL
Status: CANCELLED | OUTPATIENT
Start: 2020-05-13

## 2020-05-13 RX ORDER — SODIUM CHLORIDE 9 MG/ML
1000 INJECTION, SOLUTION INTRAVENOUS CONTINUOUS
Status: CANCELLED | OUTPATIENT
Start: 2020-05-13

## 2020-05-13 RX ORDER — POTASSIUM CHLORIDE 1500 MG/1
20 TABLET, EXTENDED RELEASE ORAL
Status: CANCELLED | OUTPATIENT
Start: 2020-05-13

## 2020-05-13 NOTE — NURSING NOTE
Pt plan of care, per Santiago Riojas MD     Assessment:  1. Tolerating PAH medications  2. No evidence right heart failure  3. Stable sleep apnea  4. Increasing exercise tolerance  5. Epistaxis secondary to humidity - ASA to be stopped and Plavix continued  6. Right heart catherization and echocardiogram and complete laboratories in second or third week of June to assess response to two drug initiation  Orders placed for testing and message sent to Sharron for scheduling.  Senait Berg RN on 5/13/2020 at 8:24 AM

## 2020-06-19 ENCOUNTER — TELEPHONE (OUTPATIENT)
Dept: CARDIOLOGY | Facility: CLINIC | Age: 60
End: 2020-06-19

## 2020-06-19 NOTE — TELEPHONE ENCOUNTER
----- Message from EL Sidhu sent at 6/19/2020 12:29 PM CDT -----  Regarding: call patient today please?  Hey team:  So today at 12:30 I had this pt scheduled for an H&P for a RHC. When my MA called to room him he decided to reschedule? Not sure why...but his RHC is scheduled for Monday at [facility] so I'm guessing they wouldn't do it.  Will someone call him this afternoon and make sure he knows we will have to cancel/reschedule the RHC then?    Thx  ----------------------------------  LM we were calling because he cancelled his H&P today and they may cancel his RHC on Monday because of it.  I asked him to call and re-schedule his appt.  Left the scheduling line number. Lissette Carias RN on 6/19/2020 at 2:40 PM

## 2020-06-22 DIAGNOSIS — Z11.59 ENCOUNTER FOR SCREENING FOR OTHER VIRAL DISEASES: Primary | ICD-10-CM

## 2020-06-24 NOTE — PROGRESS NOTES
CARDIOLOGY PULMONARY HYPERTENSION CLINIC VIDEO VISIT    Jeremiah Isaac is a 60 year old male who is being evaluated via a billable video visit.      The patient has been notified of following:     This video visit will be conducted via a call between you and your physician/provider. We have found that certain health care needs can be provided without the need for an in-person physical exam.  This service lets us provide the care you need with a video conversation.  If a prescription is necessary we can send it directly to your pharmacy.  If lab work is needed we can place an order for that and you can then stop by our lab to have the test done at a later time.    Virtual visits are billed at different rates depending on your insurance coverage. During this emergency period, for some insurers they may be billed the same as an in-person visit.  Please reach out to your insurance provider with any questions.    If during the course of the call the physician/provider feels a video visit is not appropriate, you will not be charged for this service.      Physician has received verbal consent for a Video Visit from the patient? Yes    Patient would like the video invitation sent by: Text to cell phone: [phone]    Vitals - Patient Reported  Weight (Patient Reported): 144.2 kg (318 lb)      I have reviewed and updated the patient's Past Medical History, Social History, Family History and Medication List.    MEDICATIONS:  Current Outpatient Medications   Medication Sig Dispense Refill     atorvastatin (LIPITOR) 40 MG tablet Take 40 mg by mouth daily       cloNIDine (CATAPRES) 0.3 MG tablet Take 0.3 mg by mouth 2 times daily       clopidogrel (PLAVIX) 75 MG tablet 75 mg daily       furosemide (LASIX) 40 MG tablet Take 40 mg by mouth daily       glipiZIDE (GLUCOTROL XL) 10 MG 24 hr tablet Take 10 mg by mouth daily       insulin lispro (HUMALOG VIAL) 100 UNIT/ML vial Inject 100 Units Subcutaneous daily       lisinopril  (PRINIVIL/ZESTRIL) 40 MG tablet Take 40 mg by mouth daily       macitentan (OPSUMIT) 10 MG tablet Take 1 tablet (10 mg) by mouth daily       metFORMIN (GLUCOPHAGE-XR) 750 MG 24 hr tablet Take 2,250 mg by mouth daily (with dinner)       metoprolol succinate ER (TOPROL-XL) 100 MG 24 hr tablet Take 100 mg by mouth daily       NIFEdipine ER (ADALAT CC) 30 MG 24 hr tablet Take 1 tablet (30 mg) by mouth daily 90 tablet 3     sildenafil (REVATIO) 20 MG tablet Take 1 tablet (20 mg) by mouth 3 times daily 270 tablet 3       ALLERGIES  Patient has no known allergies.    Review Of Systems:   Respiratory: Still has some SAINI, but improved. Has occasional runny nose, he thinks from the medications.  Cardiovascular: Denies chest pain or pressure. No palpitations. No further presyncope/syncope.  Gastrointestinal: No new N/V/D.    Brief physical exam:  General: In no acute distress, upright and calm.  Eyes: No apparent redness or discharge.   Chest: No labored breathing, no cough during exam or audible wheezing.   Neuro: No obvious focal defects or tremors.   Psych: Alert and oriented. Does not appear anxious.     The rest of a comprehensive physical examination is deferred due to public health emergency video visit restrictions.     Most recent labs: None in the last 60 days     Primary PH cardiologist: Dr. Riojas      HPI:  Mr. Isaac is a pleasant 60 ear old male with a PMHx including DM2, hypertension, neuropathy, obesity, CKD, central sleep apnea with possible narcolepsy on Bipap, prior methamphetamine use, and coronary disease s/p LAD stenting. He was initially evaluated by Dr. Riojas in Jan 2020 due to exertional syncope. He underwent a RHC also in January showing normal right and left sided filling pressures, but severe PAH, with normal cardiac output. Notably, at that time he had a significant reduction in PA pressures after receiving NTG and verapamil and was placed on nifedipine.     In April, when he returned, dual  "oral therapy was added with sildenafil and macitentan. Since then, he tells me he has no longer had syncope, but still struggles with shortness of breath at times, especially while trying to carry heavy things. He remains on Bipap and is compliant with nightly use. It is now planned for him to have a repeat RHC to assess his response to his PAH medications.    Today we are doing a virtual visit in efforts to limit possible exposures to COVID-19 which he is agreeable to. We discussed the risks and benefits of RHC including but not limited to: bleeding, vascular complications, infection, arrhythmias, heart attack, and stroke. He wishes to proceed with testing. He tells me that overall he has noted some subjective improvement since starting therapies but is still \"hoping there is more you can do\" as he does still feel that he is limited by his breathing.     Assessment/Plan:    1. Pulmonary arterial hypertension.   --PAH out of proportion to his sleep disordered breathing. He remains compliant with his nightly Bipap as directed.   --He has been on nifedipine for about 6 months, and now sildenafil 20mg TID with Opsumit 10mg daily for a little over 2 months. A repeat RHC is planned next week to reassess his response to therapy and make decisions whether the addition of a third medication is needed.    --He self reports no worsening edema and tells me his weight is stable. Continue Lasix 40mg daily. It has been a few months since labs have been performed, so a BMP may be warranted the morning of his procedure.     2. Coronary artery disease.   --Angiogram at OSH in Aug 2019 showed 75% stenosis of LAD, s/p stenting.   --He is currently on Plavix, but I believe his ASA has been stopped due to severe epistaxis.    --Continue atorvastatin 40mg daily. Last LDL Jan 2020 within goal at 52.       Follow up plan:  Proceed with RHC and echo in the next few weeks, then repeat video visit with myself shortly after to discuss results. "       Video-Visit Details    Type of service:  Video Visit    Video Start Time: 1440  Video End Time: 1451    Originating Location (pt. Location): Home    Distant Location (provider location):  [facility]    Platform used for Video Visit: Jose Angel Peters PA-C  [facility]  Pager [phone]

## 2020-06-25 ENCOUNTER — VIRTUAL VISIT (OUTPATIENT)
Dept: CARDIOLOGY | Facility: CLINIC | Age: 60
End: 2020-06-25
Attending: INTERNAL MEDICINE
Payer: COMMERCIAL

## 2020-06-25 DIAGNOSIS — R06.09 DYSPNEA ON EXERTION: ICD-10-CM

## 2020-06-25 DIAGNOSIS — I27.20 PULMONARY HYPERTENSION (H): Primary | ICD-10-CM

## 2020-06-25 PROCEDURE — 99214 OFFICE O/P EST MOD 30 MIN: CPT | Mod: GT | Performed by: PHYSICIAN ASSISTANT

## 2020-06-25 ASSESSMENT — PAIN SCALES - GENERAL: PAINLEVEL: NO PAIN (0)

## 2020-06-25 NOTE — PATIENT INSTRUCTIONS
Medication Changes:   None today    Patient Instructions:  1. Continue staying active and eat a heart healthy diet.    2. Please keep current list of medications with you at all times.    3. Remember to weigh yourself daily after voiding and before you consume any food or beverages and log the numbers.  If you have gained 2 pounds overnight or 5 pounds in a week contact us immediately for medication adjustments or further instructions.    4. **Please call us immediately if you have any syncope (fainting or passing out), chest pain, edema (swelling or weight gain), or decline in your functional status (general decline in how you are feeling).    Follow up Appointment Information:  You have appointments in place for your right heart catheterization and echocardiogram.  Then return with another virtual visit with Laurie shortly after to discuss results.     We are located on the third floor of the Clinic [facility] ([facility] on the [facility].  Our address is     [address] [place] [address] [place] [address] [place] [address]      Thank you for allowing us to be a part of your care here at the [facility]    If you have questions or concerns please contact us at:    Senait Berg, RN, BSN    Sharron Katz (Schedule,Prior Auth)  Nurse Coordinator     Clinic   Pulmonary Hypertension   Pulmonary Hypertension  [facility]  (Phone)[phone]   (Phone) [phone]        (Fax)[phone]

## 2020-06-25 NOTE — LETTER
6/25/2020    RE: Jeremiah Isaac  [address]  [facility] [address]       Dear Colleague,    Thank you for the opportunity to participate in the care of your patient, Jeremiah Isaac, at the [facility]. Please see a copy of my visit note below.    CARDIOLOGY PULMONARY HYPERTENSION CLINIC VIDEO VISIT    Jeremiah Isaac is a 60 year old male who is being evaluated via a billable video visit.      Vitals - Patient Reported  Weight (Patient Reported): 144.2 kg (318 lb)    I have reviewed and updated the patient's Past Medical History, Social History, Family History and Medication List.    MEDICATIONS:  Current Outpatient Medications   Medication Sig Dispense Refill     atorvastatin (LIPITOR) 40 MG tablet Take 40 mg by mouth daily       cloNIDine (CATAPRES) 0.3 MG tablet Take 0.3 mg by mouth 2 times daily       clopidogrel (PLAVIX) 75 MG tablet 75 mg daily       furosemide (LASIX) 40 MG tablet Take 40 mg by mouth daily       glipiZIDE (GLUCOTROL XL) 10 MG 24 hr tablet Take 10 mg by mouth daily       insulin lispro (HUMALOG VIAL) 100 UNIT/ML vial Inject 100 Units Subcutaneous daily       lisinopril (PRINIVIL/ZESTRIL) 40 MG tablet Take 40 mg by mouth daily       macitentan (OPSUMIT) 10 MG tablet Take 1 tablet (10 mg) by mouth daily       metFORMIN (GLUCOPHAGE-XR) 750 MG 24 hr tablet Take 2,250 mg by mouth daily (with dinner)       metoprolol succinate ER (TOPROL-XL) 100 MG 24 hr tablet Take 100 mg by mouth daily       NIFEdipine ER (ADALAT CC) 30 MG 24 hr tablet Take 1 tablet (30 mg) by mouth daily 90 tablet 3     sildenafil (REVATIO) 20 MG tablet Take 1 tablet (20 mg) by mouth 3 times daily 270 tablet 3       ALLERGIES  Patient has no known allergies.    Review Of Systems:   Respiratory: Still has some SAINI, but improved. Has occasional runny nose, he thinks from the medications.  Cardiovascular: Denies chest pain or pressure. No palpitations. No further  presyncope/syncope.  Gastrointestinal: No new N/V/D.    Brief physical exam:  General: In no acute distress, upright and calm.  Eyes: No apparent redness or discharge.   Chest: No labored breathing, no cough during exam or audible wheezing.   Neuro: No obvious focal defects or tremors.   Psych: Alert and oriented. Does not appear anxious.     The rest of a comprehensive physical examination is deferred due to public health emergency video visit restrictions.     Most recent labs: None in the last 60 days     Primary PH cardiologist: Dr. Riojas      HPI:  Mr. Isaac is a pleasant 60 ear old male with a PMHx including DM2, hypertension, neuropathy, obesity, CKD, central sleep apnea with possible narcolepsy on Bipap, prior methamphetamine use, and coronary disease s/p LAD stenting. He was initially evaluated by Dr. Riojas in Jan 2020 due to exertional syncope. He underwent a RHC also in January showing normal right and left sided filling pressures, but severe PAH, with normal cardiac output. Notably, at that time he had a significant reduction in PA pressures after receiving NTG and verapamil and was placed on nifedipine.     In April, when he returned, dual oral therapy was added with sildenafil and macitentan. Since then, he tells me he has no longer had syncope, but still struggles with shortness of breath at times, especially while trying to carry heavy things. He remains on Bipap and is compliant with nightly use. It is now planned for him to have a repeat RHC to assess his response to his PAH medications.    Today we are doing a virtual visit in efforts to limit possible exposures to COVID-19 which he is agreeable to. We discussed the risks and benefits of RHC including but not limited to: bleeding, vascular complications, infection, arrhythmias, heart attack, and stroke. He wishes to proceed with testing. He tells me that overall he has noted some subjective improvement since starting therapies but is still  "\"hoping there is more you can do\" as he does still feel that he is limited by his breathing.     Assessment/Plan:    1. Pulmonary arterial hypertension.   --PAH out of proportion to his sleep disordered breathing. He remains compliant with his nightly Bipap as directed.   --He has been on nifedipine for about 6 months, and now sildenafil 20mg TID with Opsumit 10mg daily for a little over 2 months. A repeat RHC is planned next week to reassess his response to therapy and make decisions whether the addition of a third medication is needed.    --He self reports no worsening edema and tells me his weight is stable. Continue Lasix 40mg daily. It has been a few months since labs have been performed, so a BMP may be warranted the morning of his procedure.     2. Coronary artery disease.   --Angiogram at OSH in Aug 2019 showed 75% stenosis of LAD, s/p stenting.   --He is currently on Plavix, but I believe his ASA has been stopped due to severe epistaxis.    --Continue atorvastatin 40mg daily. Last LDL Jan 2020 within goal at 52.       Follow up plan:  Proceed with RHC and echo in the next few weeks, then repeat video visit with myself shortly after to discuss results.       Video-Visit Details    Type of service:  Video Visit    Video Start Time: 1440  Video End Time: 1451    Originating Location (pt. Location): Home    Distant Location (provider location):  [facility]    Platform used for Video Visit: Jose Angel Peters PA-C  [facility]  Pager [phone]    "

## 2020-06-27 DIAGNOSIS — Z11.59 ENCOUNTER FOR SCREENING FOR OTHER VIRAL DISEASES: ICD-10-CM

## 2020-06-29 ENCOUNTER — COMMUNICATION - HEALTHEAST (OUTPATIENT)
Dept: SCHEDULING | Facility: CLINIC | Age: 60
End: 2020-06-29

## 2020-06-29 ENCOUNTER — TELEPHONE (OUTPATIENT)
Dept: CARDIOLOGY | Facility: CLINIC | Age: 60
End: 2020-06-29

## 2020-06-29 ENCOUNTER — PATIENT OUTREACH (OUTPATIENT)
Dept: CARE COORDINATION | Facility: CLINIC | Age: 60
End: 2020-06-29

## 2020-06-29 DIAGNOSIS — Z20.822 ENCOUNTER FOR LABORATORY TESTING FOR COVID-19 VIRUS: Primary | ICD-10-CM

## 2020-06-29 PROCEDURE — U0003 INFECTIOUS AGENT DETECTION BY NUCLEIC ACID (DNA OR RNA); SEVERE ACUTE RESPIRATORY SYNDROME CORONAVIRUS 2 (SARS-COV-2) (CORONAVIRUS DISEASE [COVID-19]), AMPLIFIED PROBE TECHNIQUE, MAKING USE OF HIGH THROUGHPUT TECHNOLOGIES AS DESCRIBED BY CMS-2020-01-R: HCPCS | Performed by: FAMILY MEDICINE

## 2020-06-29 PROCEDURE — 99207 ZZC NO CHARGE NURSE ONLY: CPT

## 2020-06-29 NOTE — LETTER
July 1, 2020        Jeremiah Isaac  [address] [address] [address]  [facility] [facility] [address]    This letter provides a written record that you were tested for COVID-19 on 6/29/20.       Your result was negative. This means that we didn t find the virus that causes COVID-19 in your sample. A test may show negative when you do actually have the virus. This can happen when the virus is in the early stages of infection, before you feel illness symptoms.    If you have symptoms   Stay home and away from others (self-isolate) until you meet ALL of the guidelines below:    You ve had no fever--and no medicine that reduces fever--for 3 full days (72 hours). And      Your other symptoms have gotten better. For example, your cough or breathing has improved. And     At least 10 days have passed since your symptoms started.    During this time:    Stay home. Don t go to work, school or anywhere else.     Stay in your own room, including for meals. Use your own bathroom if you can.    Stay away from others in your home. No hugging, kissing or shaking hands. No visitors.    Clean  high touch  surfaces often (doorknobs, counters, handles, etc.). Use a household cleaning spray or wipes. You can find a full list on the EPA website at www.epa.gov/pesticide-registration/list-n-disinfectants-use-against-sars-cov-2.    Cover your mouth and nose with a mask, tissue or washcloth to avoid spreading germs.    Wash your hands and face often with soap and water.    Going back to work  Check with your employer for any guidelines to follow for going back to work.    Employers: This document serves as formal notice that your employee tested negative for COVID-19, as of the testing date shown above.

## 2020-06-29 NOTE — TELEPHONE ENCOUNTER
Wellness Screening Tool    Symptom Screening:  Do you have one of the following NEW symptoms:    Fever (subjective or >100.0)?  No    New cough? No    Shortness of breath? No  - pt does have SOB, but this is not new, why patient is having RHC    Chills? No    New loss of taste or smell? No    Generalized body aches? No    New persistent headache? No    New sore throat? No    Nausea, vomiting or diarrhea? No  Within the past 3 weeks, have you been exposed to someone with a known positive illness below?      COVID - 19 (known or suspected) No    Chicken pox?  No    Measles? No    Pertussis? No    Patient notified of visitor restriction: Yes  Patient informed to wear a mask: Yes   COVID results: PENDING -completed 6/29/20 at 2:15pm    Patient's appointment status: Patient will proceed with RHC as scheduled 6/30/20.    Lynda Louie RN  [facility]

## 2020-06-30 ENCOUNTER — HOSPITAL ENCOUNTER (OUTPATIENT)
Facility: CLINIC | Age: 60
Discharge: HOME | End: 2020-06-30
Admitting: INTERNAL MEDICINE
Payer: COMMERCIAL

## 2020-06-30 ENCOUNTER — TELEPHONE (OUTPATIENT)
Dept: CARDIOLOGY | Facility: CLINIC | Age: 60
End: 2020-06-30

## 2020-06-30 ENCOUNTER — SURGERY (OUTPATIENT)
Age: 60
End: 2020-06-30
Payer: COMMERCIAL

## 2020-06-30 VITALS
BODY MASS INDEX: 45.1 KG/M2 | SYSTOLIC BLOOD PRESSURE: 146 MMHG | HEART RATE: 70 BPM | OXYGEN SATURATION: 94 % | WEIGHT: 315 LBS | TEMPERATURE: 98.4 F | DIASTOLIC BLOOD PRESSURE: 83 MMHG | HEIGHT: 70 IN | RESPIRATION RATE: 16 BRPM

## 2020-06-30 DIAGNOSIS — I27.20 PULMONARY HYPERTENSION (H): Primary | ICD-10-CM

## 2020-06-30 DIAGNOSIS — R06.09 DYSPNEA ON EXERTION: ICD-10-CM

## 2020-06-30 DIAGNOSIS — I27.20 PULMONARY HYPERTENSION (H): ICD-10-CM

## 2020-06-30 PROBLEM — Z98.890 STATUS POST CORONARY ANGIOGRAM: Status: ACTIVE | Noted: 2020-06-30

## 2020-06-30 LAB
ANION GAP SERPL CALCULATED.3IONS-SCNC: 8 MMOL/L (ref 3–14)
APTT PPP: 30 SEC (ref 22–37)
BUN SERPL-MCNC: 41 MG/DL (ref 7–30)
CALCIUM SERPL-MCNC: 9.1 MG/DL (ref 8.5–10.1)
CHLORIDE SERPL-SCNC: 108 MMOL/L (ref 94–109)
CO2 SERPL-SCNC: 23 MMOL/L (ref 20–32)
CREAT SERPL-MCNC: 1.74 MG/DL (ref 0.66–1.25)
ERYTHROCYTE [DISTWIDTH] IN BLOOD BY AUTOMATED COUNT: 13.2 % (ref 10–15)
GFR SERPL CREATININE-BSD FRML MDRD: 42 ML/MIN/{1.73_M2}
GLUCOSE SERPL-MCNC: 223 MG/DL (ref 70–99)
HCT VFR BLD AUTO: 32.8 % (ref 40–53)
HGB BLD-MCNC: 10.9 G/DL (ref 13.3–17.7)
INR PPP: 0.92 (ref 0.86–1.14)
MCH RBC QN AUTO: 29.1 PG (ref 26.5–33)
MCHC RBC AUTO-ENTMCNC: 33.2 G/DL (ref 31.5–36.5)
MCV RBC AUTO: 88 FL (ref 78–100)
PLATELET # BLD AUTO: 249 10E9/L (ref 150–450)
POTASSIUM SERPL-SCNC: 4 MMOL/L (ref 3.4–5.3)
RBC # BLD AUTO: 3.75 10E12/L (ref 4.4–5.9)
SARS-COV-2 RNA SPEC QL NAA+PROBE: NOT DETECTED
SODIUM SERPL-SCNC: 139 MMOL/L (ref 133–144)
SPECIMEN SOURCE: NORMAL
WBC # BLD AUTO: 6.4 10E9/L (ref 4–11)

## 2020-06-30 PROCEDURE — 80048 BASIC METABOLIC PNL TOTAL CA: CPT

## 2020-06-30 PROCEDURE — 40000852 ZZH STATISTIC HEART CATH LAB OR EP LAB

## 2020-06-30 PROCEDURE — 25000128 H RX IP 250 OP 636: Performed by: INTERNAL MEDICINE

## 2020-06-30 PROCEDURE — 93005 ELECTROCARDIOGRAM TRACING: CPT

## 2020-06-30 PROCEDURE — 85027 COMPLETE CBC AUTOMATED: CPT

## 2020-06-30 PROCEDURE — 99153 MOD SED SAME PHYS/QHP EA: CPT | Performed by: INTERNAL MEDICINE

## 2020-06-30 PROCEDURE — 25800030 ZZH RX IP 258 OP 636: Performed by: INTERNAL MEDICINE

## 2020-06-30 PROCEDURE — 40000235 ZZH STATISTIC TELEMETRY

## 2020-06-30 PROCEDURE — 25000125 ZZHC RX 250: Performed by: INTERNAL MEDICINE

## 2020-06-30 PROCEDURE — 93010 ELECTROCARDIOGRAM REPORT: CPT | Performed by: INTERNAL MEDICINE

## 2020-06-30 PROCEDURE — 99152 MOD SED SAME PHYS/QHP 5/>YRS: CPT | Mod: 59 | Performed by: INTERNAL MEDICINE

## 2020-06-30 PROCEDURE — 82803 BLOOD GASES ANY COMBINATION: CPT

## 2020-06-30 PROCEDURE — 85730 THROMBOPLASTIN TIME PARTIAL: CPT | Mod: GZ

## 2020-06-30 PROCEDURE — 27210794 ZZH OR GENERAL SUPPLY STERILE: Performed by: INTERNAL MEDICINE

## 2020-06-30 PROCEDURE — 99152 MOD SED SAME PHYS/QHP 5/>YRS: CPT | Performed by: INTERNAL MEDICINE

## 2020-06-30 PROCEDURE — 93451 RIGHT HEART CATH: CPT | Performed by: INTERNAL MEDICINE

## 2020-06-30 PROCEDURE — C1894 INTRO/SHEATH, NON-LASER: HCPCS | Performed by: INTERNAL MEDICINE

## 2020-06-30 PROCEDURE — 85610 PROTHROMBIN TIME: CPT | Mod: GZ

## 2020-06-30 PROCEDURE — 25000132 ZZH RX MED GY IP 250 OP 250 PS 637: Performed by: INTERNAL MEDICINE

## 2020-06-30 PROCEDURE — 93451 RIGHT HEART CATH: CPT | Mod: 26 | Performed by: INTERNAL MEDICINE

## 2020-06-30 PROCEDURE — 40000065 ZZH STATISTIC EKG NON-CHARGEABLE

## 2020-06-30 RX ORDER — POTASSIUM CHLORIDE 1500 MG/1
20 TABLET, EXTENDED RELEASE ORAL
Status: DISCONTINUED | OUTPATIENT
Start: 2020-06-30 | End: 2020-06-30 | Stop reason: HOSPADM

## 2020-06-30 RX ORDER — LIDOCAINE 40 MG/G
CREAM TOPICAL
Status: DISCONTINUED | OUTPATIENT
Start: 2020-06-30 | End: 2020-06-30 | Stop reason: HOSPADM

## 2020-06-30 RX ORDER — SODIUM CHLORIDE 9 MG/ML
INJECTION, SOLUTION INTRAVENOUS CONTINUOUS
Status: CANCELLED | OUTPATIENT
Start: 2020-06-30

## 2020-06-30 RX ORDER — DOPAMINE HYDROCHLORIDE 160 MG/100ML
2-20 INJECTION, SOLUTION INTRAVENOUS CONTINUOUS PRN
Status: DISCONTINUED | OUTPATIENT
Start: 2020-06-30 | End: 2020-06-30 | Stop reason: HOSPADM

## 2020-06-30 RX ORDER — FENTANYL CITRATE 50 UG/ML
INJECTION, SOLUTION INTRAMUSCULAR; INTRAVENOUS
Status: DISCONTINUED | OUTPATIENT
Start: 2020-06-30 | End: 2020-06-30 | Stop reason: HOSPADM

## 2020-06-30 RX ORDER — EPTIFIBATIDE 2 MG/ML
180 INJECTION, SOLUTION INTRAVENOUS EVERY 10 MIN PRN
Status: DISCONTINUED | OUTPATIENT
Start: 2020-06-30 | End: 2020-06-30 | Stop reason: HOSPADM

## 2020-06-30 RX ORDER — SODIUM CHLORIDE 9 MG/ML
1000 INJECTION, SOLUTION INTRAVENOUS CONTINUOUS
Status: CANCELLED | OUTPATIENT
Start: 2020-06-30

## 2020-06-30 RX ORDER — SODIUM CHLORIDE 9 MG/ML
INJECTION, SOLUTION INTRAVENOUS CONTINUOUS
Status: DISCONTINUED | OUTPATIENT
Start: 2020-06-30 | End: 2020-06-30 | Stop reason: HOSPADM

## 2020-06-30 RX ORDER — FLUMAZENIL 0.1 MG/ML
0.2 INJECTION, SOLUTION INTRAVENOUS
Status: DISCONTINUED | OUTPATIENT
Start: 2020-06-30 | End: 2020-06-30 | Stop reason: HOSPADM

## 2020-06-30 RX ORDER — LIDOCAINE 40 MG/G
CREAM TOPICAL
Status: CANCELLED | OUTPATIENT
Start: 2020-06-30

## 2020-06-30 RX ORDER — POTASSIUM CHLORIDE 1500 MG/1
20 TABLET, EXTENDED RELEASE ORAL
Status: CANCELLED | OUTPATIENT
Start: 2020-06-30

## 2020-06-30 RX ORDER — FENTANYL CITRATE 50 UG/ML
25-50 INJECTION, SOLUTION INTRAMUSCULAR; INTRAVENOUS
Status: DISCONTINUED | OUTPATIENT
Start: 2020-06-30 | End: 2020-06-30 | Stop reason: HOSPADM

## 2020-06-30 RX ORDER — NALOXONE HYDROCHLORIDE 0.4 MG/ML
.2-.4 INJECTION, SOLUTION INTRAMUSCULAR; INTRAVENOUS; SUBCUTANEOUS
Status: DISCONTINUED | OUTPATIENT
Start: 2020-06-30 | End: 2020-06-30 | Stop reason: HOSPADM

## 2020-06-30 RX ORDER — ATROPINE SULFATE 0.1 MG/ML
0.5 INJECTION INTRAVENOUS EVERY 5 MIN PRN
Status: DISCONTINUED | OUTPATIENT
Start: 2020-06-30 | End: 2020-06-30 | Stop reason: HOSPADM

## 2020-06-30 RX ORDER — NALOXONE HYDROCHLORIDE 0.4 MG/ML
.1-.4 INJECTION, SOLUTION INTRAMUSCULAR; INTRAVENOUS; SUBCUTANEOUS
Status: DISCONTINUED | OUTPATIENT
Start: 2020-06-30 | End: 2020-06-30 | Stop reason: HOSPADM

## 2020-06-30 RX ORDER — HEPARIN SODIUM 10000 [USP'U]/100ML
100-1000 INJECTION, SOLUTION INTRAVENOUS CONTINUOUS PRN
Status: DISCONTINUED | OUTPATIENT
Start: 2020-06-30 | End: 2020-06-30 | Stop reason: HOSPADM

## 2020-06-30 RX ORDER — DOBUTAMINE HYDROCHLORIDE 200 MG/100ML
2-20 INJECTION INTRAVENOUS CONTINUOUS PRN
Status: DISCONTINUED | OUTPATIENT
Start: 2020-06-30 | End: 2020-06-30 | Stop reason: HOSPADM

## 2020-06-30 RX ORDER — ACETAMINOPHEN 325 MG/1
650 TABLET ORAL EVERY 4 HOURS PRN
Status: DISCONTINUED | OUTPATIENT
Start: 2020-06-30 | End: 2020-06-30 | Stop reason: HOSPADM

## 2020-06-30 RX ORDER — EPTIFIBATIDE 2 MG/ML
15 INJECTION, SOLUTION INTRAVENOUS CONTINUOUS PRN
Status: DISCONTINUED | OUTPATIENT
Start: 2020-06-30 | End: 2020-06-30 | Stop reason: HOSPADM

## 2020-06-30 RX ADMIN — SODIUM CHLORIDE: 9 INJECTION, SOLUTION INTRAVENOUS at 08:24

## 2020-06-30 RX ADMIN — FENTANYL CITRATE 50 MCG: 50 INJECTION, SOLUTION INTRAMUSCULAR; INTRAVENOUS at 11:01

## 2020-06-30 RX ADMIN — FENTANYL CITRATE 50 MCG: 50 INJECTION, SOLUTION INTRAMUSCULAR; INTRAVENOUS at 10:48

## 2020-06-30 RX ADMIN — ASPIRIN 325 MG: 325 TABLET, COATED ORAL at 08:50

## 2020-06-30 RX ADMIN — LIDOCAINE HYDROCHLORIDE 10 ML: 10 INJECTION, SOLUTION EPIDURAL; INFILTRATION; INTRACAUDAL; PERINEURAL at 10:49

## 2020-06-30 ASSESSMENT — MIFFLIN-ST. JEOR: SCORE: 2296.8

## 2020-06-30 NOTE — PROGRESS NOTES
Care Suites Discharge Nursing Note    Patient Information  Name: Jeremiah Isaac  Age: 60 year old    Discharge Education:  Discharge instructions reviewed: Yes  Additional education/resources provided: N/A  Patient/patient representative verbalizes understanding: Yes  Patient discharging on new medications: No  Medication education completed: N/A    Discharge Plans:   Discharge location: home  Discharge ride contacted: Yes  Approximate discharge time: 12:45 PM    Discharge Criteria:  Discharge criteria met and vital signs stable: Yes    Patient Belongs:  Patient belongings returned to patient: Yes    LEVY Tanner RN

## 2020-06-30 NOTE — Clinical Note
Potential access sites were evaluated for patency using ultrasound.   The internal jugular vein was selected. Access was obtained under with Sonosite guidance using a standard 18 guage needle with direct visualization of needle entry.

## 2020-06-30 NOTE — PROGRESS NOTES
Care Suites Post Procedure Note    Patient Information  Name: Jeremiah Isaac  Age: 60 year old    Post Procedure  Time patient returned to Care Suites: 11:15 AM  Concerns/abnormal assessment: N/A, right juglar site clean, dry, and intact  If abnormal assessment, provider notified: N/A  Plan/Other: review AVS, tolerate PO, void, discharge to home    LEVY Tanner RN

## 2020-06-30 NOTE — DISCHARGE INSTRUCTIONS
Right Heart Cath Discharge Instructions - Neck     After you go home:      Have an adult stay with you until tomorrow.    Drink extra fluids for 2 days.    You may resume your normal diet.    No smoking       For 24 hours - due to the sedation you received:    Relax and take it easy.    Do NOT make any important or legal decisions.    Do NOT drive or operate machines at home or at work.    Do NOT drink alcohol.    Care of Neck Puncture Site:      For the first 24 hrs - check the puncture site every 1-2 hours while awake.    It is normal to have soreness at the puncture site and mild tingling in your hand for up to 3 days.    Remove the bandaid after 24 hours. If there is minor oozing, apply another bandaid and remove it after 12 hours.    You may shower tomorrow. Do NOT take a bath, or use a hot tub or pool for at least 3 days. Do NOT scrub the site. Do not use lotion or powder near the puncture site.           Activity - For 2 days:      Avoid heavy lifting or the overuse of your shoulder.      Bleeding:       If you start bleeding from the site in your neck, sit down and press gently on the site for 10 minutes.     Once bleeding stops, sit still for 2 hours.     Call [facility] as soon as you can    Call 911 right away if you have heavy bleeding or bleeding that does not stop.      Medicines:      If you are taking an antiplatelet medication such as Plavix, Brilinta or Effient, do not stop taking it until you talk to your cardiologist.        If you are on Metformin (Glucophage), do not restart it until you have blood tests (within 2 to 3 days after discharge).  After you have your blood drawn, you may restart the Metformin.     Take your medications, including blood thinners, unless your provider tells you not to.      If you take Coumadin (Warfarin), have your INR checked by your provider in  3-5 days. Call your clinic to schedule this.    If you have stopped any medicines, check with your provider about when to  restart them.    Follow Up Appointments:      Follow up with [facility] Nurse Practitioner at [facility] of patient preference in 7-10 days.    Call the clinic if:      You have increased pain or a large or growing hard lump around the site.    The site is red, swollen, hot or tender.    Blood or fluid is draining from the site.    You have chills or a fever greater than 101 F (38 C).    You have hives, a rash or unusual itching.    Any questions or concerns.      [facility] [place]:    [phone] [facility] (7 days a week)

## 2020-06-30 NOTE — PROGRESS NOTES
Care Suites Discharge Nursing Note    Patient Information  Name: Jeremiah Isaac  Age: 60 year old    Discharge Education:  Discharge instructions reviewed: Yes  Additional education/resources provided: n/a  Patient/patient representative verbalizes understanding: Yes  Patient discharging on new medications: No  Medication education completed: N/A    Discharge Plans:   Discharge location: home  Discharge ride contacted: Yes  Approximate discharge time: 1245    Discharge Criteria:  Discharge criteria met and vital signs stable: Yes    Patient Belongs:  Patient belongings returned to patient: Yes    Rachel Hopkins RN

## 2020-06-30 NOTE — PROGRESS NOTES
Care Suites Admission Nursing Note    Patient Information  Name: Jeremiah Isaac  Age: 60 year old  Reason for admission: right heart cath  Care Suites arrival time: 7:35 AM    Patient Admission/Assessment   Pre-procedure assessment complete: Yes  If abnormal assessment/labs, provider notified: N/A  NPO: Yes  Medications held per instructions/orders: Yes  Consent: obtained  If applicable, pregnancy test status: deferred  Patient oriented to room: Yes  Education/questions answered: Yes  Plan/other: right heart cath scheduled at 10:00 AM    Discharge Planning  Accompanied by: friend - Daniel  Discharge name/phone number: Daniel [phone]  Overnight post sedation caregiver: Yes, son  Discharge location: home    LEVY Tanner RN

## 2020-06-30 NOTE — TELEPHONE ENCOUNTER

## 2020-06-30 NOTE — PROGRESS NOTES
PATIENT/VISITOR WELLNESS SCREENING    Step 1 Patient Screening  6/29 COVID screen negative     1. In the last month, have you been in contact with someone who was confirmed or suspected to have Coronavirus/COVID-19? No    2. Do you have the following symptoms?  Fever/Chills? No   Cough? No   Shortness of breath? No   New loss of taste or smell? No  Sore throat? No  Muscle or body aches? No  Headaches? No  Fatigue? No  Vomiting or diarrhea? No  hes? No  Headaches? No  Fatigue? No    NO SYMPTOM(S)    ACTIONS:  1. Standard rooming process  2. Provider to assess per normal protocol  3. Implement precautions as needed and per guidelines

## 2020-07-01 ENCOUNTER — HOSPITAL ENCOUNTER (OUTPATIENT)
Dept: CARDIOLOGY | Facility: CLINIC | Age: 60
Discharge: HOME | End: 2020-07-01
Attending: INTERNAL MEDICINE | Admitting: INTERNAL MEDICINE
Payer: COMMERCIAL

## 2020-07-01 DIAGNOSIS — R06.09 DYSPNEA ON EXERTION: Primary | ICD-10-CM

## 2020-07-01 DIAGNOSIS — I27.20 PULMONARY HYPERTENSION (H): ICD-10-CM

## 2020-07-01 DIAGNOSIS — R06.09 DYSPNEA ON EXERTION: ICD-10-CM

## 2020-07-01 LAB
CO2 BLD-SCNC: 22 MMOL/L (ref 21–28)
NT-PROBNP SERPL-MCNC: 673 PG/ML (ref 0–125)
PCO2 BLD: 42 MM HG (ref 35–45)
PH BLD: 7.33 PH (ref 7.35–7.45)
PO2 BLD: 36 MM HG (ref 80–105)
SAO2 % BLDA FROM PO2: 65 % (ref 92–100)

## 2020-07-01 PROCEDURE — 83880 ASSAY OF NATRIURETIC PEPTIDE: CPT | Performed by: INTERNAL MEDICINE

## 2020-07-01 PROCEDURE — 40000264 ECHOCARDIOGRAM COMPLETE

## 2020-07-01 PROCEDURE — 93306 TTE W/DOPPLER COMPLETE: CPT | Mod: 26 | Performed by: INTERNAL MEDICINE

## 2020-07-01 PROCEDURE — 36415 COLL VENOUS BLD VENIPUNCTURE: CPT | Performed by: INTERNAL MEDICINE

## 2020-07-01 PROCEDURE — 25500064 ZZH RX 255 OP 636: Performed by: INTERNAL MEDICINE

## 2020-07-01 RX ADMIN — HUMAN ALBUMIN MICROSPHERES AND PERFLUTREN 9 ML: 10; .22 INJECTION, SOLUTION INTRAVENOUS at 10:35

## 2020-07-03 LAB — INTERPRETATION ECG - MUSE: NORMAL

## 2020-07-06 NOTE — PROGRESS NOTES
"CARDIOLOGY PULMONARY HYPERTENSION CLINIC TELEPHONE VISIT    Jeermiah Mcelroy is a 60 year old male who is being evaluated via a billable telephone visit.      I have reviewed and updated the patient's Past Medical History, Social History, Family History and Medication List.      MEDICATIONS:  Current Outpatient Medications   Medication Sig Dispense Refill     atorvastatin (LIPITOR) 40 MG tablet Take 40 mg by mouth daily       cloNIDine (CATAPRES) 0.3 MG tablet Take 0.3 mg by mouth 2 times daily       clopidogrel (PLAVIX) 75 MG tablet 75 mg daily       furosemide (LASIX) 40 MG tablet Take 40 mg by mouth daily       glipiZIDE (GLUCOTROL XL) 10 MG 24 hr tablet Take 10 mg by mouth daily       insulin lispro (HUMALOG VIAL) 100 UNIT/ML vial Inject 100 Units Subcutaneous daily       lisinopril (PRINIVIL/ZESTRIL) 40 MG tablet Take 40 mg by mouth daily       macitentan (OPSUMIT) 10 MG tablet Take 1 tablet (10 mg) by mouth daily       metFORMIN (GLUCOPHAGE-XR) 750 MG 24 hr tablet Take 2,250 mg by mouth daily (with dinner)       metoprolol succinate ER (TOPROL-XL) 100 MG 24 hr tablet Take 100 mg by mouth daily       NIFEdipine ER (ADALAT CC) 30 MG 24 hr tablet Take 1 tablet (30 mg) by mouth daily 90 tablet 3     sildenafil (REVATIO) 20 MG tablet Take 1 tablet (20 mg) by mouth 3 times daily 270 tablet 3       ALLERGIES  Patient has no known allergies.      Self reported vitals:  Vitals - Patient Reported  Systolic (Patient Reported): 117  Diastolic (Patient Reported): 70  Weight (Patient Reported): 145.2 kg (320 lb)  Height (Patient Reported): 177.8 cm (5' 10\")  BMI (Based on Pt Reported Ht/Wt): 45.91      Most recent labs:   Results for TERRI MCELROY (MRN [id number]) as of 7/7/2020 09:06   Ref. Range 6/30/2020 08:00   Sodium Latest Ref Range: 133 - 144 mmol/L 139   Potassium Latest Ref Range: 3.4 - 5.3 mmol/L 4.0   Chloride Latest Ref Range: 94 - 109 mmol/L 108   Carbon Dioxide Latest Ref Range: 20 - 32 mmol/L 23   Urea Nitrogen " Latest Ref Range: 7 - 30 mg/dL 41 (H)   Creatinine Latest Ref Range: 0.66 - 1.25 mg/dL 1.74 (H)   GFR Estimate Latest Ref Range: >60 mL/min/1.73_m2 42 (L)   GFR Estimate If Black Latest Ref Range: >60 mL/min/1.73_m2 49 (L)   Calcium Latest Ref Range: 8.5 - 10.1 mg/dL 9.1   Anion Gap Latest Ref Range: 3 - 14 mmol/L 8   Glucose Latest Ref Range: 70 - 99 mg/dL 223 (H)   WBC Latest Ref Range: 4.0 - 11.0 10e9/L 6.4   Hemoglobin Latest Ref Range: 13.3 - 17.7 g/dL 10.9 (L)   Hematocrit Latest Ref Range: 40.0 - 53.0 % 32.8 (L)   Platelet Count Latest Ref Range: 150 - 450 10e9/L 249   RBC Count Latest Ref Range: 4.4 - 5.9 10e12/L 3.75 (L)   MCV Latest Ref Range: 78 - 100 fl 88   MCH Latest Ref Range: 26.5 - 33.0 pg 29.1   MCHC Latest Ref Range: 31.5 - 36.5 g/dL 33.2   RDW Latest Ref Range: 10.0 - 15.0 % 13.2       Primary PH cardiologist: Dr. Riojas      HPI:  Mr. Isaac is a pleasant 60 year old male with a PMHx including DM2, hypertension, neuropathy, obesity, CKD, central sleep apnea with possible narcolepsy on Bipap, prior methamphetamine use, and coronary disease s/p LAD stenting. He was initially evaluated by Dr. Riojas in Jan 2020 due to exertional syncope. He underwent a RHC also in January showing normal right and left sided filling pressures, but severe PAH, with normal cardiac output. Notably, at that time he had a significant reduction in PA pressures after receiving NTG and verapamil and was placed on nifedipine.      In April, when he returned, dual oral therapy was added with sildenafil and macitentan. Since then, he had no further syncope by continued to struggle with shortness of breath at times, especially while trying to carry heavy things. He has remained compliant with Bipap. A repeat RHC has now been performed on 6/30/2020. This showed continued severe pulmonary hypertension, along with both elevated right and left filling pressures  (RAP 12, PAP 96/35, mean 57, PCWP 22, Brittanie CI 2.59, and PVR of  "5.44 del valle). He also went on to have a repeat echocardiogram shortly after, which showed continued preserved EF 60-65%, and normal RV function with no new valvular abnormalities.     Today, we are having a follow up virtual visit to review results of his RHC. I reviewed the result with him today and he tells me he isn't surprised and his breathing \"still isn't great\" and he notes he has struggled with swelling and fluid retention for awhile now. He thinks he isn't urinating at much with the lasix as he used to. He tells me at one point he was up to 80mg Lasix and it didn't seem to be much difference, and can't recall why it was brought back down. Possibly due to renal issues. He denies any new presyncope/synope, but as mentioned, continues to have SAINI and edema. Home weight today was 320 lbs which he says he has been at for awhile. It is noted that in March his clinic weight was 312 lbs.         Assessment/Plan:     1. Pulmonary arterial hypertension.              --PAH out of proportion to his sleep disordered breathing. He remains compliant with his nightly Bipap as directed.              --He has been on nifedipine for about 6 months, and now sildenafil 20mg TID with Opsumit 10mg daily for about 3 months. Repeat RHC showed continued severe pulmonary hypertension, along with both elevated right and left filling pressures  (RAP 12, PAP 96/35, mean 57, PCWP 22, Brittanie CI 2.59, and PVR of 5.44 del valle).   --Will plan to augment his diuretics first and possible consider an addition of a third PAH agent in the future. As he thinks the Lasix is no longer working well for him, will change him to torsemide 40mg once daily. We will get a weight and symptum update early next week via phone, and plan labs in 1 week as we need to watch his renal function carefully. Historically he has not needed potassium supplementation and tells me he likes to eat a lot of bananas.      2. Coronary artery disease.              --Angiogram at OSH in " Aug 2019 showed 75% stenosis of LAD, s/p stenting.              --He is currently on Plavix, but I believe his ASA has been stopped due to severe epistaxis. We may be able to switch him back to ASA along in the near future as he is approaching 1 year s/p PCI.               --Continue atorvastatin 40mg daily. Last LDL Jan 2020 within goal at 52.        Follow up plan: Repeat video visit with myself in ~2-3 weeks to follow up on diuretic changes.       I have reviewed the note as documented above.  This accurately captures the substance of my conversation with the patient.      Phone call contact time  Call Started at 1510  Call Ended at 1522    Laurie Peters PA-C  [facility]  Pager [phone]

## 2020-07-09 ENCOUNTER — VIRTUAL VISIT (OUTPATIENT)
Dept: CARDIOLOGY | Facility: CLINIC | Age: 60
End: 2020-07-09
Attending: PHYSICIAN ASSISTANT
Payer: COMMERCIAL

## 2020-07-09 DIAGNOSIS — R06.09 DYSPNEA ON EXERTION: Primary | ICD-10-CM

## 2020-07-09 DIAGNOSIS — I27.20 PULMONARY HYPERTENSION (H): ICD-10-CM

## 2020-07-09 PROCEDURE — 99214 OFFICE O/P EST MOD 30 MIN: CPT | Mod: TEL | Performed by: PHYSICIAN ASSISTANT

## 2020-07-09 RX ORDER — TORSEMIDE 20 MG/1
40 TABLET ORAL DAILY
Qty: 180 TABLET | Refills: 3 | Status: SHIPPED | OUTPATIENT
Start: 2020-07-09 | End: 2020-08-27

## 2020-07-09 NOTE — NURSING NOTE
Pts plan of care per EL Sidhu:  I changed his Lasix to torsemide 40mg daily today.   Please call early next week and get an update on his weight, SAINI, and swelling.   Repeat BMP in ~1 week after the change.     Then a 2-3 week follow up video visit with me please.     Orders placed and pt marked ready for check out.  Senait Berg RN on 7/9/2020 at 3:56 PM

## 2020-07-09 NOTE — PROGRESS NOTES
"Jeremiah Isaac is a 60 year old male who is being evaluated via a billable telephone visit.      The patient has been notified of following:     \"This telephone visit will be conducted via a call between you and your physician/provider. We have found that certain health care needs can be provided without the need for a physical exam.  This service lets us provide the care you need with a short phone conversation.  If a prescription is necessary we can send it directly to your pharmacy.  If lab work is needed we can place an order for that and you can then stop by our lab to have the test done at a later time.    Telephone visits are billed at different rates depending on your insurance coverage. During this emergency period, for some insurers they may be billed the same as an in-person visit.  Please reach out to your insurance provider with any questions.    If during the course of the call the physician/provider feels a telephone visit is not appropriate, you will not be charged for this service.\"    Patient has given verbal consent for Telephone visit?  Yes    What phone number would you like to be contacted at? [phone]    How would you like to obtain your AVS? MyChart      "

## 2020-07-09 NOTE — LETTER
"7/9/2020    RE: Jeremiah Isaac  [address]  [place] [address]     Dear Colleague,    Thank you for the opportunity to participate in the care of your patient, Jeremiah Isaac, at the [facility] at [facility]. Please see a copy of my visit note below.    CARDIOLOGY PULMONARY HYPERTENSION CLINIC TELEPHONE VISIT    Jeremiah Isaac is a 60 year old male who is being evaluated via a billable telephone visit.      I have reviewed and updated the patient's Past Medical History, Social History, Family History and Medication List.      MEDICATIONS:  Current Outpatient Medications   Medication Sig Dispense Refill     atorvastatin (LIPITOR) 40 MG tablet Take 40 mg by mouth daily       cloNIDine (CATAPRES) 0.3 MG tablet Take 0.3 mg by mouth 2 times daily       clopidogrel (PLAVIX) 75 MG tablet 75 mg daily       furosemide (LASIX) 40 MG tablet Take 40 mg by mouth daily       glipiZIDE (GLUCOTROL XL) 10 MG 24 hr tablet Take 10 mg by mouth daily       insulin lispro (HUMALOG VIAL) 100 UNIT/ML vial Inject 100 Units Subcutaneous daily       lisinopril (PRINIVIL/ZESTRIL) 40 MG tablet Take 40 mg by mouth daily       macitentan (OPSUMIT) 10 MG tablet Take 1 tablet (10 mg) by mouth daily       metFORMIN (GLUCOPHAGE-XR) 750 MG 24 hr tablet Take 2,250 mg by mouth daily (with dinner)       metoprolol succinate ER (TOPROL-XL) 100 MG 24 hr tablet Take 100 mg by mouth daily       NIFEdipine ER (ADALAT CC) 30 MG 24 hr tablet Take 1 tablet (30 mg) by mouth daily 90 tablet 3     sildenafil (REVATIO) 20 MG tablet Take 1 tablet (20 mg) by mouth 3 times daily 270 tablet 3       ALLERGIES  Patient has no known allergies.      Self reported vitals:  Vitals - Patient Reported  Systolic (Patient Reported): 117  Diastolic (Patient Reported): 70  Weight (Patient Reported): 145.2 kg (320 lb)  Height (Patient Reported): 177.8 cm (5' 10\")  BMI (Based on Pt Reported Ht/Wt): 45.91      Most recent labs: "   Results for TERRI MCELROY (MRN [id number]) as of 7/7/2020 09:06   Ref. Range 6/30/2020 08:00   Sodium Latest Ref Range: 133 - 144 mmol/L 139   Potassium Latest Ref Range: 3.4 - 5.3 mmol/L 4.0   Chloride Latest Ref Range: 94 - 109 mmol/L 108   Carbon Dioxide Latest Ref Range: 20 - 32 mmol/L 23   Urea Nitrogen Latest Ref Range: 7 - 30 mg/dL 41 (H)   Creatinine Latest Ref Range: 0.66 - 1.25 mg/dL 1.74 (H)   GFR Estimate Latest Ref Range: >60 mL/min/1.73_m2 42 (L)   GFR Estimate If Black Latest Ref Range: >60 mL/min/1.73_m2 49 (L)   Calcium Latest Ref Range: 8.5 - 10.1 mg/dL 9.1   Anion Gap Latest Ref Range: 3 - 14 mmol/L 8   Glucose Latest Ref Range: 70 - 99 mg/dL 223 (H)   WBC Latest Ref Range: 4.0 - 11.0 10e9/L 6.4   Hemoglobin Latest Ref Range: 13.3 - 17.7 g/dL 10.9 (L)   Hematocrit Latest Ref Range: 40.0 - 53.0 % 32.8 (L)   Platelet Count Latest Ref Range: 150 - 450 10e9/L 249   RBC Count Latest Ref Range: 4.4 - 5.9 10e12/L 3.75 (L)   MCV Latest Ref Range: 78 - 100 fl 88   MCH Latest Ref Range: 26.5 - 33.0 pg 29.1   MCHC Latest Ref Range: 31.5 - 36.5 g/dL 33.2   RDW Latest Ref Range: 10.0 - 15.0 % 13.2       Primary PH cardiologist: Dr. Riojas      HPI:  Mr. Mcelroy is a pleasant 60 year old male with a PMHx including DM2, hypertension, neuropathy, obesity, CKD, central sleep apnea with possible narcolepsy on Bipap, prior methamphetamine use, and coronary disease s/p LAD stenting. He was initially evaluated by Dr. Riojas in Jan 2020 due to exertional syncope. He underwent a RHC also in January showing normal right and left sided filling pressures, but severe PAH, with normal cardiac output. Notably, at that time he had a significant reduction in PA pressures after receiving NTG and verapamil and was placed on nifedipine.      In April, when he returned, dual oral therapy was added with sildenafil and macitentan. Since then, he had no further syncope by continued to struggle with shortness of breath at times,  "especially while trying to carry heavy things. He has remained compliant with Bipap. A repeat RHC has now been performed on 6/30/2020. This showed continued severe pulmonary hypertension, along with both elevated right and left filling pressures  (RAP 12, PAP 96/35, mean 57, PCWP 22, Brittanie CI 2.59, and PVR of 5.44 del valle). He also went on to have a repeat echocardiogram shortly after, which showed continued preserved EF 60-65%, and normal RV function with no new valvular abnormalities.     Today, we are having a follow up virtual visit to review results of his RHC. I reviewed the result with him today and he tells me he isn't surprised and his breathing \"still isn't great\" and he notes he has struggled with swelling and fluid retention for awhile now. He thinks he isn't urinating at much with the lasix as he used to. He tells me at one point he was up to 80mg Lasix and it didn't seem to be much difference, and can't recall why it was brought back down. Possibly due to renal issues. He denies any new presyncope/synope, but as mentioned, continues to have SAINI and edema. Home weight today was 320 lbs which he says he has been at for awhile. It is noted that in March his clinic weight was 312 lbs.         Assessment/Plan:     1. Pulmonary arterial hypertension.              --PAH out of proportion to his sleep disordered breathing. He remains compliant with his nightly Bipap as directed.              --He has been on nifedipine for about 6 months, and now sildenafil 20mg TID with Opsumit 10mg daily for about 3 months. Repeat RHC showed continued severe pulmonary hypertension, along with both elevated right and left filling pressures  (RAP 12, PAP 96/35, mean 57, PCWP 22, Brittanie CI 2.59, and PVR of 5.44 del valle).   --Will plan to augment his diuretics first and possible consider an addition of a third PAH agent in the future. As he thinks the Lasix is no longer working well for him, will change him to torsemide 40mg once daily. We " will get a weight and symptum update early next week via phone, and plan labs in 1 week as we need to watch his renal function carefully. Historically he has not needed potassium supplementation and tells me he likes to eat a lot of bananas.      2. Coronary artery disease.              --Angiogram at OSH in Aug 2019 showed 75% stenosis of LAD, s/p stenting.              --He is currently on Plavix, but I believe his ASA has been stopped due to severe epistaxis. We may be able to switch him back to ASA along in the near future as he is approaching 1 year s/p PCI.               --Continue atorvastatin 40mg daily. Last LDL Jan 2020 within goal at 52.        Follow up plan: Repeat video visit with myself in ~2-3 weeks to follow up on diuretic changes.       I have reviewed the note as documented above.  This accurately captures the substance of my conversation with the patient.      Phone call contact time  Call Started at 1510  Call Ended at 1522    Laurie Peters PA-C  [facility]  Pager [phone]    Jeremiah Isaac is a 60 year old male who is being evaluated via a billable telephone visit.      Please do not hesitate to contact me if you have any questions/concerns.     Sincerely,     EL Sidhu

## 2020-07-09 NOTE — PATIENT INSTRUCTIONS
Medication Changes:   We will start torsemide 40mg once daily which is a stronger water pill.  When you start this you will STOP the Lasix (furosemide).    Our office will call you early next week for an update on your weight and swelling. We will arrange for labs later next week.    Patient Instructions:  1. Continue staying active and eat a heart healthy diet.    2. Please keep current list of medications with you at all times.    3. Remember to weigh yourself daily after voiding and before you consume any food or beverages and log the numbers.  If you have gained 2 pounds overnight or 5 pounds in a week contact us immediately for medication adjustments or further instructions.    4. **Please call us immediately if you have any syncope (fainting or passing out), chest pain, edema (swelling or weight gain), or decline in your functional status (general decline in how you are feeling).    Follow up Appointment Information:  Return for another video visit with Laurie in 2-3 weeks.       We are located on the third floor of the Clinic and [facility] ([facility] on the [facility].  Our address is     [address] [place] [address] on [address]rd [place] [address]      Thank you for allowing us to be a part of your care here at the [facility]    If you have questions or concerns please contact us at:    Senait Berg RN, BSN    Sharron Katz (Schedule,Prior Auth)  Nurse Coordinator     Clinic   Pulmonary Hypertension   Pulmonary Hypertension  [facility]  (Phone)[phone]   (Phone) [phone]        (Fax)[phone]                ** Please note that you will NOT receive a reminder call regarding your scheduled testing, reminder calls are for provider appointments only.  If you are scheduled for testing within the [facility] system you may receive a call regarding pre-registration for  billing purposes only.**     Support Group:  Pulmonary Hypertension Association  Https://www.phassociation.org/  **Look at the Events Tab** They even have Support Groups that you can call into    [facility] PH Support Group  Second Saturday of the Month from 1-3 PM   Location: [address] [place] [id number]  Leader: Bonnie Pena  Phone: [phone] or [phone]  Email: [email or web address]

## 2020-07-10 NOTE — TELEPHONE ENCOUNTER
Called to clarify that patient was recently switched from Lasix to torsemide 40mg daily; patient should stop taking his Lasix. Per our med rec, the patient is not on chlorthalidone. Melody Staley RN on 7/10/2020 at 3:20 PM  _______________________________________    Pharmacy needs direction clarification  Pharmacy states that patient is on   Furosemide 40mg daily  Chlorthalidone 25mg daily  And now Torsemide    Please call to clarify medication

## 2020-07-15 ENCOUNTER — TELEPHONE (OUTPATIENT)
Dept: CARDIOLOGY | Facility: CLINIC | Age: 60
End: 2020-07-15

## 2020-07-15 NOTE — TELEPHONE ENCOUNTER
I called the pt and he stated that he hasn't really noticed much.  He states that he is still having some shortness of breath that hasn't really changed.  He is going to get them drawn this week.  Senait Berg RN on 7/20/2020 at 11:59 AM    I called the pt to follow up on his recent diuretic change and reminding him to have labs drawn.  I was unable to reach him and left a detailed message.  He also needs to be scheduled for a 2 week follow up.  Senait Berg RN on 7/15/2020 at 9:50 AM

## 2020-07-30 DIAGNOSIS — I27.20 PULMONARY HYPERTENSION (H): ICD-10-CM

## 2020-07-30 DIAGNOSIS — R06.09 DYSPNEA ON EXERTION: ICD-10-CM

## 2020-07-30 LAB
ALBUMIN SERPL-MCNC: 3.7 G/DL (ref 3.4–5)
ALP SERPL-CCNC: 108 U/L (ref 40–150)
ALT SERPL W P-5'-P-CCNC: 18 U/L (ref 0–70)
ANION GAP SERPL CALCULATED.3IONS-SCNC: 8 MMOL/L (ref 3–14)
AST SERPL W P-5'-P-CCNC: 11 U/L (ref 0–45)
BILIRUB SERPL-MCNC: 0.5 MG/DL (ref 0.2–1.3)
BUN SERPL-MCNC: 50 MG/DL (ref 7–30)
CALCIUM SERPL-MCNC: 9.3 MG/DL (ref 8.5–10.1)
CHLORIDE SERPL-SCNC: 107 MMOL/L (ref 94–109)
CO2 SERPL-SCNC: 24 MMOL/L (ref 20–32)
CREAT SERPL-MCNC: 2.19 MG/DL (ref 0.66–1.25)
ERYTHROCYTE [DISTWIDTH] IN BLOOD BY AUTOMATED COUNT: 12.8 % (ref 10–15)
GFR SERPL CREATININE-BSD FRML MDRD: 32 ML/MIN/{1.73_M2}
GLUCOSE SERPL-MCNC: 235 MG/DL (ref 70–99)
HCT VFR BLD AUTO: 34.5 % (ref 40–53)
HGB BLD-MCNC: 11.4 G/DL (ref 13.3–17.7)
MCH RBC QN AUTO: 29 PG (ref 26.5–33)
MCHC RBC AUTO-ENTMCNC: 33 G/DL (ref 31.5–36.5)
MCV RBC AUTO: 88 FL (ref 78–100)
NT-PROBNP SERPL-MCNC: 615 PG/ML (ref 0–125)
PLATELET # BLD AUTO: 281 10E9/L (ref 150–450)
POTASSIUM SERPL-SCNC: 4 MMOL/L (ref 3.4–5.3)
PROT SERPL-MCNC: 7.3 G/DL (ref 6.8–8.8)
RBC # BLD AUTO: 3.93 10E12/L (ref 4.4–5.9)
SODIUM SERPL-SCNC: 139 MMOL/L (ref 133–144)
WBC # BLD AUTO: 7.7 10E9/L (ref 4–11)

## 2020-07-30 PROCEDURE — 83880 ASSAY OF NATRIURETIC PEPTIDE: CPT | Performed by: INTERNAL MEDICINE

## 2020-07-30 PROCEDURE — 36415 COLL VENOUS BLD VENIPUNCTURE: CPT | Performed by: INTERNAL MEDICINE

## 2020-07-30 PROCEDURE — 85027 COMPLETE CBC AUTOMATED: CPT | Performed by: INTERNAL MEDICINE

## 2020-07-30 PROCEDURE — 80053 COMPREHEN METABOLIC PANEL: CPT | Performed by: INTERNAL MEDICINE

## 2020-08-03 NOTE — PROGRESS NOTES
August 4, 2020    1. Pulmonary hypertension  2. Elevated body mass index  3. Abnormal ECG: bradycardia, incomplete RBBB, RVH  4. Diabetes  5. Neuropathy  6. Hypertension  7. Negative serologic screen collagen vascular disease  8. CKD with proteinuria Estimated GFR 30-50  9. Elevated kappa light chains, low albumin, no evidence of monoclonal spike  10. Narcolepsy  11.Sleep disordered breath              Central sleep apnea              BIP with ASV currently  12. Elevated coronary calcium score with negative stress test              History of LAD stent  13, Questionable history of narcolepsy ? Central sleep apnea  14. History of methamphetamine usage  15. Exertional syncope  16. Epistaxis (ASA/Plavix      Video visit with patient's permission for follow-up of pulmonary arterial hypertension (disproportionate) currently treated with sildenafil, weight, fluid, and blood pressure optimization.  He has known CAD with previous stenting of the LAD.  9:30-10:15 Patient at home.    Plan:  1. Monthly BMP to follow CKD with fluid and blood pressure being manipulated  2. With next BMP should also have BNP  3. BNP elevation currently may be related to increase in creatinine.  4. Aggressive control of blood pressure and glucose  5. Repeat right heart catherization with nitroprusside infusion as well as selective coronary angiogram in biplane room to limit contrast exposure.  Patient has history of previous LAD stent at [facility] and symptoms did not include chest pain bur raher marked exercise intolerance, pre-syncope similar to what he is experiencing currently  - may need fluids pre-angio to augment renal function  6. Weight loss  7. Continued use of nocturnal sleep device    The patient has recurrent synptoms of marked exertional shortness of breath, one episode of pre-syncope.  His weight is stable but high, blood pressure 60-70 diastolic and 105-130 systolic.  He has no chest pain, tightness, heaviness nor  pressure.    Invasive procedures recommended asap, however patient believes next Tuesday is best for him.  Discussed exercise restriction, need to present if increased symptoms, recurrent syncope, pre-syncope, or symptoms at rest     Complex decision making, patient counseling  and coordination of care involving multi-system disease including chronic renal failure, ASHD with previous stent and return of symptoms necessitating invasive assessmednt. assessment for classification of current overall status and the specific cardiac and vascular components.  The patient is on multiple, complex medications that require monitoring to achieve maximum therapeutic dosing and minimize off-target symptoms, drug-drug interactions or toxicities.  Serial imaging and hemodynamic studies reviewed and up-dated.        The patient returns for follow-up of pulmonary hypertension.  There is no interim history of chest pain, tightness, paroxysmal nocturnal dyspnea, orthopnea, peripheral edema, palpitation,  But recurrent pre-syncope x 1 last week and newly current marked limitation in exertion via shortness of breath   Exercise tolerance is stable.  The patient is attempting to exercise however now very limitedregularly and following a sodium restricted, calorically appropriate diet.  Medications are reviewed and the patient is taking medications as prescribed.  The patient is generally sleeping well.    Constitutional: weight loss, fever, chills, night sweats  HEENT: without visual changes, swallow difficulties  Pulmonary: with shortness of breath, but  cough, wheeze, hemoptysis  Cardiac: without chest pain but+, SAINI, PND, orthopnea, edema, palpitation, + one episodepre-syncope, syncope,  GI: without diarrhea, constipation, jaundice, melena, GERD, hematemesis  : without frequency, urgency, dysuria, hematuria  Skin: rash, bruise, open lesions  Neuro: without TIA, focal neurologic complaints, seizure, trauma  Ortho: without pain,  swelling, mobility impairment  Endocrine: diabetes, thyroid, heat/cold intolerance, polyuria, polyphagia, change bowel habits.  Sleep:no GIRISH, periodic breathing, fatigue       Current Outpatient Medications   Medication     atorvastatin (LIPITOR) 40 MG tablet     cloNIDine (CATAPRES) 0.3 MG tablet     clopidogrel (PLAVIX) 75 MG tablet     glipiZIDE (GLUCOTROL XL) 10 MG 24 hr tablet     insulin lispro (HUMALOG VIAL) 100 UNIT/ML vial     lisinopril (PRINIVIL/ZESTRIL) 40 MG tablet     macitentan (OPSUMIT) 10 MG tablet     metFORMIN (GLUCOPHAGE-XR) 750 MG 24 hr tablet     metoprolol succinate ER (TOPROL-XL) 100 MG 24 hr tablet     NIFEdipine ER (ADALAT CC) 30 MG 24 hr tablet     sildenafil (REVATIO) 20 MG tablet     torsemide (DEMADEX) 20 MG tablet     No current facility-administered medications for this visit.      Laboratories:  Results for TERRI MCELROY (MRN [id number]) as of 8/3/2020 12:56        Results for TERRI MCELROY (MRN [id number]) as of 8/3/2020 12:56   Ref. Range 1/19/2020 04:57 1/19/2020 11:50 1/22/2020 14:36 6/30/2020 08:00 7/30/2020 13:47   Creatinine Latest Ref Range: 0.66 - 1.25 mg/dL 2.37 (H) 2.09 (H) 2.40 (H) 1.74 (H) 2.19 (H)        Ref. Range 7/30/2020 13:47   Sodium Latest Ref Range: 133 - 144 mmol/L 139   Potassium Latest Ref Range: 3.4 - 5.3 mmol/L 4.0   Chloride Latest Ref Range: 94 - 109 mmol/L 107   Carbon Dioxide Latest Ref Range: 20 - 32 mmol/L 24   Urea Nitrogen Latest Ref Range: 7 - 30 mg/dL 50 (H)   Creatinine Latest Ref Range: 0.66 - 1.25 mg/dL 2.19 (H)   GFR Estimate Latest Ref Range: >60 mL/min/1.73_m2 32 (L)   GFR Estimate If Black Latest Ref Range: >60 mL/min/1.73_m2 37 (L)   Calcium Latest Ref Range: 8.5 - 10.1 mg/dL 9.3   Anion Gap Latest Ref Range: 3 - 14 mmol/L 8   Albumin Latest Ref Range: 3.4 - 5.0 g/dL 3.7   Protein Total Latest Ref Range: 6.8 - 8.8 g/dL 7.3   Bilirubin Total Latest Ref Range: 0.2 - 1.3 mg/dL 0.5   Alkaline Phosphatase Latest Ref Range: 40 - 150 U/L 108   ALT  Latest Ref Range: 0 - 70 U/L 18   AST Latest Ref Range: 0 - 45 U/L 11   N-Terminal Pro Bnp Latest Ref Range: 0 - 125 pg/mL 615 (H)   Glucose Latest Ref Range: 70 - 99 mg/dL 235 (H)   WBC Latest Ref Range: 4.0 - 11.0 10e9/L 7.7   Hemoglobin Latest Ref Range: 13.3 - 17.7 g/dL 11.4 (L)   Hematocrit Latest Ref Range: 40.0 - 53.0 % 34.5 (L)   Platelet Count Latest Ref Range: 150 - 450 10e9/L 281   RBC Count Latest Ref Range: 4.4 - 5.9 10e12/L 3.93 (L)   MCV Latest Ref Range: 78 - 100 fl 88   MCH Latest Ref Range: 26.5 - 33.0 pg 29.0   MCHC Latest Ref Range: 31.5 - 36.5 g/dL 33.0   RDW Latest Ref Range: 10.0 - 15.0 % 12.8       MRN: [id number]  : 1960  Study Date: 2020 10:04 AM  Age: 60 yrs  Gender: Male  Patient Location: [facility]  Reason For Study: Dyspnea on exertion  Ordering Physician: INDIGO TERRY  Referring Physician: INDIGO TERRY  Performed By: Fabian Marino RDCS     BSA: 2.6 m2  Height: 70 in  Weight: 325 lb  HR: 89  BP: 210/105 mmHg  _____________________________________________________________________________  __     Procedure  Complete Echo Adult. Optison (NDC #[id number]) given intravenously.     _____________________________________________________________________________  __        Interpretation Summary     Mild concentric left ventricular hypertrophy.  Left ventricular size, global systolic function, and wall motion are normal,  estimated LVEF 60-65%.  Right ventricular global function is normal.  No significant valvular abnormalities.     There are no prior studies available for comparison.  _____________________________________________________________________________  __        Left Ventricle  The left ventricle is normal in size. There is mild concentric left  ventricular hypertrophy. Left ventricular systolic function is normal. The  visual ejection fraction is estimated at 60-65%. Left ventricular diastolic  function is normal. No regional wall motion abnormalities noted.      Right Ventricle  Borderline right ventricular enlargement. The right ventricular systolic  function is normal.     Atria  Normal left atrial size. Right atrial size is normal.     Mitral Valve  The mitral valve is normal in structure and function.     Tricuspid Valve  The tricuspid valve is not well visualized, but is grossly normal. Right  ventricular systolic pressure could not be approximated due to inadequate  tricuspid regurgitation.        Aortic Valve  The aortic valve is normal in structure and function.     Pulmonic Valve  The pulmonic valve is not well seen, but is grossly normal.     Vessels  The aortic root is normal size. Normal size ascending aorta. The inferior vena  cava was normal in size with preserved respiratory variability.     Pericardium  There is no pericardial effusion.     _____________________________________________________________________________  __  MMode/2D Measurements & Calculations  RVDd: 4.2 cm  IVSd: 1.4 cm  LVIDd: 4.6 cm  LVIDs: 2.2 cm  LVPWd: 1.3 cm  FS: 52.4 %  LV mass(C)d: 239.4 grams  LV mass(C)dI: 93.3 grams/m2  Ao root diam: 3.7 cm  LA dimension: 4.8 cm  asc Aorta Diam: 3.6 cm  LA/Ao: 1.3     LA Volume Index (BP): 28.0 ml/m2  RWT: 0.56        Doppler Measurements & Calculations  MV E max benedict: 100.5 cm/sec  MV A max benedict: 89.1 cm/sec  MV E/A: 1.1  MV dec slope: 500.2 cm/sec2  MV dec time: 0.20 sec  PA acc time: 0.11 sec  E/E': 20.1  Peak E' Benedict: 5.0 cm/sec

## 2020-08-04 ENCOUNTER — VIRTUAL VISIT (OUTPATIENT)
Dept: CARDIOLOGY | Facility: CLINIC | Age: 60
End: 2020-08-04
Attending: INTERNAL MEDICINE
Payer: COMMERCIAL

## 2020-08-04 DIAGNOSIS — R06.09 DYSPNEA ON EXERTION: Primary | ICD-10-CM

## 2020-08-04 DIAGNOSIS — I51.9 HEART DISEASE, ILL-DEFINED: ICD-10-CM

## 2020-08-04 DIAGNOSIS — N18.1 CKD (CHRONIC KIDNEY DISEASE) STAGE 1, GFR 90 ML/MIN OR GREATER: ICD-10-CM

## 2020-08-04 DIAGNOSIS — I27.20 PULMONARY HYPERTENSION (H): ICD-10-CM

## 2020-08-04 DIAGNOSIS — I25.10 CORONARY ARTERIOSCLEROSIS: ICD-10-CM

## 2020-08-04 DIAGNOSIS — I27.20 PULMONARY HYPERTENSION (H): Primary | ICD-10-CM

## 2020-08-04 DIAGNOSIS — R06.09 DYSPNEA ON EXERTION: ICD-10-CM

## 2020-08-04 DIAGNOSIS — Z11.59 ENCOUNTER FOR SCREENING FOR OTHER VIRAL DISEASES: Primary | ICD-10-CM

## 2020-08-04 PROCEDURE — 99215 OFFICE O/P EST HI 40 MIN: CPT | Mod: GT | Performed by: INTERNAL MEDICINE

## 2020-08-04 RX ORDER — POTASSIUM CHLORIDE 1500 MG/1
20 TABLET, EXTENDED RELEASE ORAL
Status: CANCELLED | OUTPATIENT
Start: 2020-08-04

## 2020-08-04 RX ORDER — SODIUM CHLORIDE 9 MG/ML
INJECTION, SOLUTION INTRAVENOUS CONTINUOUS
Status: CANCELLED | OUTPATIENT
Start: 2020-08-04

## 2020-08-04 RX ORDER — POTASSIUM CHLORIDE 1500 MG/1
40 TABLET, EXTENDED RELEASE ORAL
Status: CANCELLED | OUTPATIENT
Start: 2020-08-04

## 2020-08-04 RX ORDER — LIDOCAINE 40 MG/G
CREAM TOPICAL
Status: CANCELLED | OUTPATIENT
Start: 2020-08-04

## 2020-08-04 NOTE — PROGRESS NOTES
I called pt and reviewed all pre-procedure instructions with the patient.  He is going to call the COVID team to set up his testing.  Senait Berg RN on 8/5/2020 at 1:25 PM    I received a call from Dr Riojas stating that he would like the pt scheduled for a Bilateral Heart Cath with Cors next week.  I called Emilie and she is going to scheduled him on 8/11 at 10:30 Check in.  He needs to come in early to supplement his fluids or diuretics.  Per Dr. Riojas he would like them to Obtain lab (BMP, N-Terminal Pro BNP) and call fellow to evaluate the need for additional fluids or diuretics.  Senait Berg RN on 8/4/2020 at 12:56 PM

## 2020-08-04 NOTE — LETTER
8/4/2020      RE: Jeremiah Isaac  [id number]  [place] [address]       Dear Colleague,    Thank you for the opportunity to participate in the care of your patient, Jeremiah Isaac, at the [facility]. Please see a copy of my visit note below.      August 4, 2020    1. Pulmonary hypertension  2. Elevated body mass index  3. Abnormal ECG: bradycardia, incomplete RBBB, RVH  4. Diabetes  5. Neuropathy  6. Hypertension  7. Negative serologic screen collagen vascular disease  8. CKD with proteinuria Estimated GFR 30-50  9. Elevated kappa light chains, low albumin, no evidence of monoclonal spike  10. Narcolepsy  11.Sleep disordered breath              Central sleep apnea              BIP with ASV currently  12. Elevated coronary calcium score with negative stress test              History of LAD stent  13, Questionable history of narcolepsy ? Central sleep apnea  14. History of methamphetamine usage  15. Exertional syncope  16. Epistaxis (ASA/Plavix      Video visit with patient's permission for follow-up of pulmonary arterial hypertension (disproportionate) currently treated with sildenafil, weight, fluid, and blood pressure optimization.  He has known CAD with previous stenting of the LAD.  9:30-10:15 Patient at home.    Plan:  1. Monthly BMP to follow CKD with fluid and blood pressure being manipulated  2. With next BMP should also have BNP  3. BNP elevation currently may be related to increase in creatinine.  4. Aggressive control of blood pressure and glucose  5. Repeat right heart catherization with nitroprusside infusion as well as selective coronary angiogram in biplane room to limit contrast exposure.  Patient has history of previous LAD stent at [facility] and symptoms did not include chest pain bur raher marked exercise intolerance, pre-syncope similar to what he is experiencing currently  - may need fluids pre-angio to augment renal  function  6. Weight loss  7. Continued use of nocturnal sleep device    The patient has recurrent synptoms of marked exertional shortness of breath, one episode of pre-syncope.  His weight is stable but high, blood pressure 60-70 diastolic and 105-130 systolic.  He has no chest pain, tightness, heaviness nor pressure.    Invasive procedures recommended asap, however patient believes next Tuesday is best for him.  Discussed exercise restriction, need to present if increased symptoms, recurrent syncope, pre-syncope, or symptoms at rest     Complex decision making, patient counseling  and coordination of care involving multi-system disease including chronic renal failure, ASHD with previous stent and return of symptoms necessitating invasive assessmednt. assessment for classification of current overall status and the specific cardiac and vascular components.  The patient is on multiple, complex medications that require monitoring to achieve maximum therapeutic dosing and minimize off-target symptoms, drug-drug interactions or toxicities.  Serial imaging and hemodynamic studies reviewed and up-dated.        The patient returns for follow-up of pulmonary hypertension.  There is no interim history of chest pain, tightness, paroxysmal nocturnal dyspnea, orthopnea, peripheral edema, palpitation,  But recurrent pre-syncope x 1 last week and newly current marked limitation in exertion via shortness of breath   Exercise tolerance is stable.  The patient is attempting to exercise however now very limitedregularly and following a sodium restricted, calorically appropriate diet.  Medications are reviewed and the patient is taking medications as prescribed.  The patient is generally sleeping well.    Constitutional: weight loss, fever, chills, night sweats  HEENT: without visual changes, swallow difficulties  Pulmonary: with shortness of breath, but  cough, wheeze, hemoptysis  Cardiac: without chest pain but+, SAINI, PND, orthopnea,  edema, palpitation, + one episodepre-syncope, syncope,  GI: without diarrhea, constipation, jaundice, melena, GERD, hematemesis  : without frequency, urgency, dysuria, hematuria  Skin: rash, bruise, open lesions  Neuro: without TIA, focal neurologic complaints, seizure, trauma  Ortho: without pain, swelling, mobility impairment  Endocrine: diabetes, thyroid, heat/cold intolerance, polyuria, polyphagia, change bowel habits.  Sleep:no GIRISH, periodic breathing, fatigue       Current Outpatient Medications   Medication     atorvastatin (LIPITOR) 40 MG tablet     cloNIDine (CATAPRES) 0.3 MG tablet     clopidogrel (PLAVIX) 75 MG tablet     glipiZIDE (GLUCOTROL XL) 10 MG 24 hr tablet     insulin lispro (HUMALOG VIAL) 100 UNIT/ML vial     lisinopril (PRINIVIL/ZESTRIL) 40 MG tablet     macitentan (OPSUMIT) 10 MG tablet     metFORMIN (GLUCOPHAGE-XR) 750 MG 24 hr tablet     metoprolol succinate ER (TOPROL-XL) 100 MG 24 hr tablet     NIFEdipine ER (ADALAT CC) 30 MG 24 hr tablet     sildenafil (REVATIO) 20 MG tablet     torsemide (DEMADEX) 20 MG tablet     No current facility-administered medications for this visit.      Laboratories:  Results for TERRI MCELROY (MRN [id number]) as of 8/3/2020 12:56        Results for TERRI MCELROY (MRN [id number]) as of 8/3/2020 12:56   Ref. Range 1/19/2020 04:57 1/19/2020 11:50 1/22/2020 14:36 6/30/2020 08:00 7/30/2020 13:47   Creatinine Latest Ref Range: 0.66 - 1.25 mg/dL 2.37 (H) 2.09 (H) 2.40 (H) 1.74 (H) 2.19 (H)        Ref. Range 7/30/2020 13:47   Sodium Latest Ref Range: 133 - 144 mmol/L 139   Potassium Latest Ref Range: 3.4 - 5.3 mmol/L 4.0   Chloride Latest Ref Range: 94 - 109 mmol/L 107   Carbon Dioxide Latest Ref Range: 20 - 32 mmol/L 24   Urea Nitrogen Latest Ref Range: 7 - 30 mg/dL 50 (H)   Creatinine Latest Ref Range: 0.66 - 1.25 mg/dL 2.19 (H)   GFR Estimate Latest Ref Range: >60 mL/min/1.73_m2 32 (L)   GFR Estimate If Black Latest Ref Range: >60 mL/min/1.73_m2 37 (L)   Calcium  Latest Ref Range: 8.5 - 10.1 mg/dL 9.3   Anion Gap Latest Ref Range: 3 - 14 mmol/L 8   Albumin Latest Ref Range: 3.4 - 5.0 g/dL 3.7   Protein Total Latest Ref Range: 6.8 - 8.8 g/dL 7.3   Bilirubin Total Latest Ref Range: 0.2 - 1.3 mg/dL 0.5   Alkaline Phosphatase Latest Ref Range: 40 - 150 U/L 108   ALT Latest Ref Range: 0 - 70 U/L 18   AST Latest Ref Range: 0 - 45 U/L 11   N-Terminal Pro Bnp Latest Ref Range: 0 - 125 pg/mL 615 (H)   Glucose Latest Ref Range: 70 - 99 mg/dL 235 (H)   WBC Latest Ref Range: 4.0 - 11.0 10e9/L 7.7   Hemoglobin Latest Ref Range: 13.3 - 17.7 g/dL 11.4 (L)   Hematocrit Latest Ref Range: 40.0 - 53.0 % 34.5 (L)   Platelet Count Latest Ref Range: 150 - 450 10e9/L 281   RBC Count Latest Ref Range: 4.4 - 5.9 10e12/L 3.93 (L)   MCV Latest Ref Range: 78 - 100 fl 88   MCH Latest Ref Range: 26.5 - 33.0 pg 29.0   MCHC Latest Ref Range: 31.5 - 36.5 g/dL 33.0   RDW Latest Ref Range: 10.0 - 15.0 % 12.8       MRN: [id number]  : 1960  Study Date: 2020 10:04 AM  Age: 60 yrs  Gender: Male  Patient Location: [facility]  Reason For Study: Dyspnea on exertion  Ordering Physician: INDIGO TERRY  Referring Physician: INDIGO TERRY  Performed By: Fabian Marino RDCS     BSA: 2.6 m2  Height: 70 in  Weight: 325 lb  HR: 89  BP: 210/105 mmHg  _____________________________________________________________________________  __     Procedure  Complete Echo Adult. Optison (NDC #[id number]) given intravenously.     _____________________________________________________________________________  __        Interpretation Summary     Mild concentric left ventricular hypertrophy.  Left ventricular size, global systolic function, and wall motion are normal,  estimated LVEF 60-65%.  Right ventricular global function is normal.  No significant valvular abnormalities.     There are no prior studies available for comparison.  _____________________________________________________________________________  __        Left  Ventricle  The left ventricle is normal in size. There is mild concentric left  ventricular hypertrophy. Left ventricular systolic function is normal. The  visual ejection fraction is estimated at 60-65%. Left ventricular diastolic  function is normal. No regional wall motion abnormalities noted.     Right Ventricle  Borderline right ventricular enlargement. The right ventricular systolic  function is normal.     Atria  Normal left atrial size. Right atrial size is normal.     Mitral Valve  The mitral valve is normal in structure and function.     Tricuspid Valve  The tricuspid valve is not well visualized, but is grossly normal. Right  ventricular systolic pressure could not be approximated due to inadequate  tricuspid regurgitation.        Aortic Valve  The aortic valve is normal in structure and function.     Pulmonic Valve  The pulmonic valve is not well seen, but is grossly normal.     Vessels  The aortic root is normal size. Normal size ascending aorta. The inferior vena  cava was normal in size with preserved respiratory variability.     Pericardium  There is no pericardial effusion.     _____________________________________________________________________________  __  MMode/2D Measurements & Calculations  RVDd: 4.2 cm  IVSd: 1.4 cm  LVIDd: 4.6 cm  LVIDs: 2.2 cm  LVPWd: 1.3 cm  FS: 52.4 %  LV mass(C)d: 239.4 grams  LV mass(C)dI: 93.3 grams/m2  Ao root diam: 3.7 cm  LA dimension: 4.8 cm  asc Aorta Diam: 3.6 cm  LA/Ao: 1.3     LA Volume Index (BP): 28.0 ml/m2  RWT: 0.56        Doppler Measurements & Calculations  MV E max benedict: 100.5 cm/sec  MV A max benedict: 89.1 cm/sec  MV E/A: 1.1  MV dec slope: 500.2 cm/sec2  MV dec time: 0.20 sec  PA acc time: 0.11 sec  E/E': 20.1  Peak E' Benedict: 5.0 cm/sec    Please do not hesitate to contact me if you have any questions/concerns.     Sincerely,     Santiago Riojas MD

## 2020-08-07 DIAGNOSIS — Z11.59 ENCOUNTER FOR SCREENING FOR OTHER VIRAL DISEASES: ICD-10-CM

## 2020-08-07 PROCEDURE — U0003 INFECTIOUS AGENT DETECTION BY NUCLEIC ACID (DNA OR RNA); SEVERE ACUTE RESPIRATORY SYNDROME CORONAVIRUS 2 (SARS-COV-2) (CORONAVIRUS DISEASE [COVID-19]), AMPLIFIED PROBE TECHNIQUE, MAKING USE OF HIGH THROUGHPUT TECHNOLOGIES AS DESCRIBED BY CMS-2020-01-R: HCPCS | Performed by: INTERNAL MEDICINE

## 2020-08-09 LAB
SARS-COV-2 RNA SPEC QL NAA+PROBE: NOT DETECTED
SPECIMEN SOURCE: NORMAL

## 2020-08-10 ENCOUNTER — TELEPHONE (OUTPATIENT)
Dept: CARDIOLOGY | Facility: CLINIC | Age: 60
End: 2020-08-10

## 2020-08-11 ENCOUNTER — HOSPITAL ENCOUNTER (OUTPATIENT)
Facility: CLINIC | Age: 60
Discharge: HOME | End: 2020-08-11
Attending: INTERNAL MEDICINE | Admitting: INTERNAL MEDICINE
Payer: COMMERCIAL

## 2020-08-11 ENCOUNTER — APPOINTMENT (OUTPATIENT)
Dept: LAB | Facility: CLINIC | Age: 60
End: 2020-08-11
Attending: INTERNAL MEDICINE
Payer: COMMERCIAL

## 2020-08-11 ENCOUNTER — APPOINTMENT (OUTPATIENT)
Dept: MEDSURG UNIT | Facility: CLINIC | Age: 60
End: 2020-08-11
Attending: INTERNAL MEDICINE
Payer: COMMERCIAL

## 2020-08-11 VITALS
OXYGEN SATURATION: 96 % | SYSTOLIC BLOOD PRESSURE: 149 MMHG | TEMPERATURE: 98.2 F | HEART RATE: 81 BPM | RESPIRATION RATE: 16 BRPM | DIASTOLIC BLOOD PRESSURE: 77 MMHG

## 2020-08-11 DIAGNOSIS — R06.09 DYSPNEA ON EXERTION: ICD-10-CM

## 2020-08-11 DIAGNOSIS — I25.110 CORONARY ARTERY DISEASE INVOLVING NATIVE CORONARY ARTERY OF NATIVE HEART WITH UNSTABLE ANGINA PECTORIS (H): Primary | ICD-10-CM

## 2020-08-11 DIAGNOSIS — I51.9 HEART DISEASE, ILL-DEFINED: ICD-10-CM

## 2020-08-11 DIAGNOSIS — I27.20 PULMONARY HYPERTENSION (H): ICD-10-CM

## 2020-08-11 LAB
ANION GAP SERPL CALCULATED.3IONS-SCNC: 4 MMOL/L (ref 3–14)
BUN SERPL-MCNC: 53 MG/DL (ref 7–30)
CALCIUM SERPL-MCNC: 9 MG/DL (ref 8.5–10.1)
CHLORIDE SERPL-SCNC: 106 MMOL/L (ref 94–109)
CO2 SERPL-SCNC: 27 MMOL/L (ref 20–32)
CREAT SERPL-MCNC: 2.05 MG/DL (ref 0.66–1.25)
ERYTHROCYTE [DISTWIDTH] IN BLOOD BY AUTOMATED COUNT: 13.2 % (ref 10–15)
GFR SERPL CREATININE-BSD FRML MDRD: 34 ML/MIN/{1.73_M2}
GLUCOSE BLDC GLUCOMTR-MCNC: 189 MG/DL (ref 70–99)
GLUCOSE BLDC GLUCOMTR-MCNC: 200 MG/DL (ref 70–99)
GLUCOSE SERPL-MCNC: 305 MG/DL (ref 70–99)
HCT VFR BLD AUTO: 35 % (ref 40–53)
HGB BLD-MCNC: 11.6 G/DL (ref 13.3–17.7)
KCT BLD-ACNC: 150 SEC (ref 75–150)
KCT BLD-ACNC: 361 SEC (ref 75–150)
MCH RBC QN AUTO: 28.9 PG (ref 26.5–33)
MCHC RBC AUTO-ENTMCNC: 33.1 G/DL (ref 31.5–36.5)
MCV RBC AUTO: 87 FL (ref 78–100)
NT-PROBNP SERPL-MCNC: 250 PG/ML (ref 0–900)
PLATELET # BLD AUTO: 240 10E9/L (ref 150–450)
POTASSIUM SERPL-SCNC: 4.3 MMOL/L (ref 3.4–5.3)
RBC # BLD AUTO: 4.01 10E12/L (ref 4.4–5.9)
SODIUM SERPL-SCNC: 137 MMOL/L (ref 133–144)
WBC # BLD AUTO: 7.7 10E9/L (ref 4–11)

## 2020-08-11 PROCEDURE — 25800030 ZZH RX IP 258 OP 636: Performed by: INTERNAL MEDICINE

## 2020-08-11 PROCEDURE — 40000065 ZZH STATISTIC EKG NON-CHARGEABLE

## 2020-08-11 PROCEDURE — 99153 MOD SED SAME PHYS/QHP EA: CPT | Performed by: INTERNAL MEDICINE

## 2020-08-11 PROCEDURE — 25000128 H RX IP 250 OP 636: Performed by: INTERNAL MEDICINE

## 2020-08-11 PROCEDURE — 93005 ELECTROCARDIOGRAM TRACING: CPT

## 2020-08-11 PROCEDURE — C9600 PERC DRUG-EL COR STENT SING: HCPCS | Mod: LD | Performed by: INTERNAL MEDICINE

## 2020-08-11 PROCEDURE — 83880 ASSAY OF NATRIURETIC PEPTIDE: CPT | Performed by: INTERNAL MEDICINE

## 2020-08-11 PROCEDURE — 85347 COAGULATION TIME ACTIVATED: CPT

## 2020-08-11 PROCEDURE — 82962 GLUCOSE BLOOD TEST: CPT

## 2020-08-11 PROCEDURE — 93010 ELECTROCARDIOGRAM REPORT: CPT | Mod: 76 | Performed by: INTERNAL MEDICINE

## 2020-08-11 PROCEDURE — C1769 GUIDE WIRE: HCPCS | Performed by: INTERNAL MEDICINE

## 2020-08-11 PROCEDURE — C1894 INTRO/SHEATH, NON-LASER: HCPCS | Performed by: INTERNAL MEDICINE

## 2020-08-11 PROCEDURE — 25000132 ZZH RX MED GY IP 250 OP 250 PS 637: Performed by: INTERNAL MEDICINE

## 2020-08-11 PROCEDURE — 25000125 ZZHC RX 250: Performed by: INTERNAL MEDICINE

## 2020-08-11 PROCEDURE — 85027 COMPLETE CBC AUTOMATED: CPT | Performed by: INTERNAL MEDICINE

## 2020-08-11 PROCEDURE — 40000172 ZZH STATISTIC PROCEDURE PREP ONLY

## 2020-08-11 PROCEDURE — 27210794 ZZH OR GENERAL SUPPLY STERILE: Performed by: INTERNAL MEDICINE

## 2020-08-11 PROCEDURE — C1874 STENT, COATED/COV W/DEL SYS: HCPCS | Performed by: INTERNAL MEDICINE

## 2020-08-11 PROCEDURE — 92978 ENDOLUMINL IVUS OCT C 1ST: CPT | Mod: LD | Performed by: INTERNAL MEDICINE

## 2020-08-11 PROCEDURE — 80048 BASIC METABOLIC PNL TOTAL CA: CPT | Performed by: INTERNAL MEDICINE

## 2020-08-11 PROCEDURE — 93463 DRUG ADMIN & HEMODYNMIC MEAS: CPT | Performed by: INTERNAL MEDICINE

## 2020-08-11 PROCEDURE — C1725 CATH, TRANSLUMIN NON-LASER: HCPCS | Performed by: INTERNAL MEDICINE

## 2020-08-11 PROCEDURE — 92978 ENDOLUMINL IVUS OCT C 1ST: CPT | Mod: 26 | Performed by: INTERNAL MEDICINE

## 2020-08-11 PROCEDURE — 92928 PRQ TCAT PLMT NTRAC ST 1 LES: CPT | Mod: LD | Performed by: INTERNAL MEDICINE

## 2020-08-11 PROCEDURE — C1753 CATH, INTRAVAS ULTRASOUND: HCPCS | Performed by: INTERNAL MEDICINE

## 2020-08-11 PROCEDURE — 93571 IV DOP VEL&/PRESS C FLO 1ST: CPT | Mod: 52,LD | Performed by: INTERNAL MEDICINE

## 2020-08-11 PROCEDURE — C1887 CATHETER, GUIDING: HCPCS | Performed by: INTERNAL MEDICINE

## 2020-08-11 PROCEDURE — 93456 R HRT CORONARY ARTERY ANGIO: CPT | Mod: 26 | Performed by: INTERNAL MEDICINE

## 2020-08-11 PROCEDURE — 99152 MOD SED SAME PHYS/QHP 5/>YRS: CPT | Performed by: INTERNAL MEDICINE

## 2020-08-11 PROCEDURE — 99152 MOD SED SAME PHYS/QHP 5/>YRS: CPT | Mod: 59 | Performed by: INTERNAL MEDICINE

## 2020-08-11 PROCEDURE — 93456 R HRT CORONARY ARTERY ANGIO: CPT | Mod: XU | Performed by: INTERNAL MEDICINE

## 2020-08-11 PROCEDURE — 36415 COLL VENOUS BLD VENIPUNCTURE: CPT | Performed by: INTERNAL MEDICINE

## 2020-08-11 DEVICE — STENT SYNERGY DRUG ELUTING 3.00X28MM  H7493926028300: Type: IMPLANTABLE DEVICE | Status: FUNCTIONAL

## 2020-08-11 RX ORDER — ASPIRIN 81 MG/1
81 TABLET ORAL DAILY
Status: DISCONTINUED | OUTPATIENT
Start: 2020-08-11 | End: 2020-08-12 | Stop reason: HOSPADM

## 2020-08-11 RX ORDER — ATROPINE SULFATE 0.1 MG/ML
0.5 INJECTION INTRAVENOUS EVERY 5 MIN PRN
Status: CANCELLED | OUTPATIENT
Start: 2020-08-11 | End: 2020-08-12

## 2020-08-11 RX ORDER — SODIUM CHLORIDE 9 MG/ML
INJECTION, SOLUTION INTRAVENOUS CONTINUOUS
Status: DISCONTINUED | OUTPATIENT
Start: 2020-08-11 | End: 2020-08-11

## 2020-08-11 RX ORDER — NITROGLYCERIN 0.4 MG/1
0.4 TABLET SUBLINGUAL EVERY 5 MIN PRN
Status: CANCELLED | OUTPATIENT
Start: 2020-08-11

## 2020-08-11 RX ORDER — LIDOCAINE 40 MG/G
CREAM TOPICAL
Status: CANCELLED | OUTPATIENT
Start: 2020-08-11

## 2020-08-11 RX ORDER — NALOXONE HYDROCHLORIDE 0.4 MG/ML
.2-.4 INJECTION, SOLUTION INTRAMUSCULAR; INTRAVENOUS; SUBCUTANEOUS
Status: CANCELLED | OUTPATIENT
Start: 2020-08-11 | End: 2020-08-12

## 2020-08-11 RX ORDER — FLUMAZENIL 0.1 MG/ML
0.2 INJECTION, SOLUTION INTRAVENOUS
Status: DISCONTINUED | OUTPATIENT
Start: 2020-08-11 | End: 2020-08-12 | Stop reason: HOSPADM

## 2020-08-11 RX ORDER — NITROGLYCERIN 5 MG/ML
VIAL (ML) INTRAVENOUS
Status: DISCONTINUED | OUTPATIENT
Start: 2020-08-11 | End: 2020-08-11

## 2020-08-11 RX ORDER — SODIUM CHLORIDE 9 MG/ML
INJECTION, SOLUTION INTRAVENOUS CONTINUOUS
Status: DISCONTINUED | OUTPATIENT
Start: 2020-08-11 | End: 2020-08-12 | Stop reason: HOSPADM

## 2020-08-11 RX ORDER — POTASSIUM CHLORIDE 750 MG/1
40 TABLET, EXTENDED RELEASE ORAL
Status: DISCONTINUED | OUTPATIENT
Start: 2020-08-11 | End: 2020-08-11

## 2020-08-11 RX ORDER — IOPAMIDOL 755 MG/ML
INJECTION, SOLUTION INTRAVASCULAR
Status: DISCONTINUED | OUTPATIENT
Start: 2020-08-11 | End: 2020-08-11

## 2020-08-11 RX ORDER — HEPARIN SODIUM 1000 [USP'U]/ML
INJECTION, SOLUTION INTRAVENOUS; SUBCUTANEOUS
Status: DISCONTINUED | OUTPATIENT
Start: 2020-08-11 | End: 2020-08-11

## 2020-08-11 RX ORDER — NALOXONE HYDROCHLORIDE 0.4 MG/ML
.1-.4 INJECTION, SOLUTION INTRAMUSCULAR; INTRAVENOUS; SUBCUTANEOUS
Status: CANCELLED | OUTPATIENT
Start: 2020-08-11

## 2020-08-11 RX ORDER — ATROPINE SULFATE 0.1 MG/ML
0.5 INJECTION INTRAVENOUS EVERY 5 MIN PRN
Status: DISCONTINUED | OUTPATIENT
Start: 2020-08-11 | End: 2020-08-12 | Stop reason: HOSPADM

## 2020-08-11 RX ORDER — HYDRALAZINE HYDROCHLORIDE 20 MG/ML
10 INJECTION INTRAMUSCULAR; INTRAVENOUS
Status: DISCONTINUED | OUTPATIENT
Start: 2020-08-11 | End: 2020-08-11

## 2020-08-11 RX ORDER — FENTANYL CITRATE 50 UG/ML
25-50 INJECTION, SOLUTION INTRAMUSCULAR; INTRAVENOUS
Status: CANCELLED | OUTPATIENT
Start: 2020-08-11 | End: 2020-08-12

## 2020-08-11 RX ORDER — NALOXONE HYDROCHLORIDE 0.4 MG/ML
.1-.4 INJECTION, SOLUTION INTRAMUSCULAR; INTRAVENOUS; SUBCUTANEOUS
Status: DISCONTINUED | OUTPATIENT
Start: 2020-08-11 | End: 2020-08-11

## 2020-08-11 RX ORDER — NALOXONE HYDROCHLORIDE 0.4 MG/ML
.2-.4 INJECTION, SOLUTION INTRAMUSCULAR; INTRAVENOUS; SUBCUTANEOUS
Status: DISCONTINUED | OUTPATIENT
Start: 2020-08-11 | End: 2020-08-12 | Stop reason: HOSPADM

## 2020-08-11 RX ORDER — FLUMAZENIL 0.1 MG/ML
0.2 INJECTION, SOLUTION INTRAVENOUS
Status: CANCELLED | OUTPATIENT
Start: 2020-08-11 | End: 2020-08-12

## 2020-08-11 RX ORDER — POTASSIUM CHLORIDE 750 MG/1
20 TABLET, EXTENDED RELEASE ORAL
Status: DISCONTINUED | OUTPATIENT
Start: 2020-08-11 | End: 2020-08-11

## 2020-08-11 RX ORDER — NITROGLYCERIN 0.4 MG/1
0.4 TABLET SUBLINGUAL EVERY 5 MIN PRN
Status: DISCONTINUED | OUTPATIENT
Start: 2020-08-11 | End: 2020-08-12 | Stop reason: HOSPADM

## 2020-08-11 RX ORDER — SODIUM CHLORIDE 9 MG/ML
INJECTION, SOLUTION INTRAVENOUS CONTINUOUS
Status: CANCELLED | OUTPATIENT
Start: 2020-08-11 | End: 2020-08-11

## 2020-08-11 RX ORDER — FENTANYL CITRATE 50 UG/ML
25-50 INJECTION, SOLUTION INTRAMUSCULAR; INTRAVENOUS
Status: DISCONTINUED | OUTPATIENT
Start: 2020-08-11 | End: 2020-08-12 | Stop reason: HOSPADM

## 2020-08-11 RX ORDER — LIDOCAINE 40 MG/G
CREAM TOPICAL
Status: DISCONTINUED | OUTPATIENT
Start: 2020-08-11 | End: 2020-08-11

## 2020-08-11 RX ORDER — ASPIRIN 81 MG/1
81 TABLET ORAL DAILY
Status: CANCELLED | OUTPATIENT
Start: 2020-08-11

## 2020-08-11 RX ORDER — LIDOCAINE 40 MG/G
CREAM TOPICAL
Status: DISCONTINUED | OUTPATIENT
Start: 2020-08-11 | End: 2020-08-12 | Stop reason: HOSPADM

## 2020-08-11 RX ADMIN — ASPIRIN 325 MG: 325 TABLET, COATED ORAL at 13:37

## 2020-08-11 RX ADMIN — TICAGRELOR 90 MG: 90 TABLET ORAL at 22:46

## 2020-08-11 RX ADMIN — SODIUM THIOSULFATE 1 MCG/KG/MIN: 250 INJECTION, SOLUTION INTRAVENOUS at 16:33

## 2020-08-11 RX ADMIN — SODIUM CHLORIDE: 9 INJECTION, SOLUTION INTRAVENOUS at 13:41

## 2020-08-11 NOTE — Clinical Note
The first balloon was inserted into the left anterior descending and proximal left anterior descending.Max pressure = 16 kim. Total duration = 5 seconds.     Max pressure = 16 kim. Total duration = 5 seconds.    Balloon reinflated a second time: Max pressure = 16 kim. Total duration = 5 seconds.  Balloon reinflated a third time: Max pressure = 16 kim. Total duration = 5 seconds.

## 2020-08-11 NOTE — Clinical Note
Stent deployed in the proximal left anterior descending. Max pressure = 12 kim. Total duration = 5 seconds.

## 2020-08-11 NOTE — Clinical Note
The first balloon was inserted into the left anterior descending and proximal left anterior descending.Max pressure = 14 kim. Total duration = 5 seconds.     Max pressure = 14 kim. Total duration = 5 seconds.    Balloon reinflated a second time: Max pressure = 14 kim. Total duration = 5 seconds.

## 2020-08-11 NOTE — Clinical Note
The first balloon was inserted into the left anterior descending and proximal left anterior descending.Max pressure = 14 kim. Total duration = 5 seconds.     Max pressure = 16 kim. Total duration = 5 seconds.    Balloon reinflated a second time: Max pressure = 16 kim. Total duration = 5 seconds.

## 2020-08-11 NOTE — PRE-PROCEDURE
GENERAL PRE-PROCEDURE:   Procedure:  Coronary angiogram, left heart catheterization, and right heart catheterization with nipride study   Date/Time:  8/11/2020 2:57 PM    Written consent obtained?: Yes    Risks and benefits: Risks, benefits and alternatives were discussed    DC Plan: Appropriate discharge home plan in place for patients who are going home after procedure   Consent given by:  Patient  Patient states understanding of procedure being performed: Yes    Patient's understanding of procedure matches consent: Yes    Procedure consent matches procedure scheduled: Yes    Appropriately NPO:  Yes  ASA Class:  Class 2- mild systemic disease, no acute problems, no functional limitations  Mallampati  :  Grade 2- soft palate, base of uvula, tonsillar pillars, and portion of posterior pharyngeal wall visible  Lungs:  Lungs clear with good breath sounds bilaterally  Heart:  Normal heart sounds and rate  History & Physical reviewed:  History and physical reviewed and no updates needed  Statement of review:  I have reviewed the lab findings, diagnostic data, medications, and the plan for sedation    Margarita ENCINAS CNP

## 2020-08-11 NOTE — PROGRESS NOTES
Prep complete for Angiogram.  MD notified. Patients friend Sarah will be transporting him home post procedure.

## 2020-08-11 NOTE — Clinical Note
Ultrasound catheter inserted for high resolution imaging into left anterior descending and proximal left anterior descending.

## 2020-08-11 NOTE — Clinical Note
The first balloon was inserted into the left anterior descending and proximal left anterior descending.Max pressure = 16 kim. Total duration = 5 seconds.     Max pressure = 16 kim. Total duration = 5 seconds.    Balloon reinflated a second time: Max pressure = 16 kim. Total duration = 5 seconds.

## 2020-08-12 LAB
INTERPRETATION ECG - MUSE: NORMAL
INTERPRETATION ECG - MUSE: NORMAL

## 2020-08-12 NOTE — PROGRESS NOTES
TR band complete, site CDI, no bleeding or hematoma. internal jugular site CDI, no bleeding or hematoma. This RN went through all discharge instructions with patient who verbalized understanding. PIV removed. Patient escorted to lobby by staff via wheelchair.

## 2020-08-19 ENCOUNTER — HOSPITAL ENCOUNTER (EMERGENCY)
Facility: CLINIC | Age: 60
Discharge: ANOTHER HOSPITAL | End: 2020-08-19
Attending: EMERGENCY MEDICINE | Admitting: EMERGENCY MEDICINE
Payer: COMMERCIAL

## 2020-08-19 ENCOUNTER — VIRTUAL VISIT (OUTPATIENT)
Dept: CARDIOLOGY | Facility: CLINIC | Age: 60
DRG: 247 | End: 2020-08-19
Attending: INTERNAL MEDICINE
Payer: COMMERCIAL

## 2020-08-19 ENCOUNTER — HOSPITAL ENCOUNTER (INPATIENT)
Facility: CLINIC | Age: 60
LOS: 2 days | Discharge: HOME | DRG: 247 | End: 2020-08-21
Attending: INTERNAL MEDICINE | Admitting: INTERNAL MEDICINE
Payer: COMMERCIAL

## 2020-08-19 ENCOUNTER — APPOINTMENT (OUTPATIENT)
Dept: GENERAL RADIOLOGY | Facility: CLINIC | Age: 60
End: 2020-08-19
Attending: EMERGENCY MEDICINE
Payer: COMMERCIAL

## 2020-08-19 VITALS
TEMPERATURE: 97.6 F | DIASTOLIC BLOOD PRESSURE: 63 MMHG | WEIGHT: 315 LBS | OXYGEN SATURATION: 97 % | SYSTOLIC BLOOD PRESSURE: 125 MMHG | HEART RATE: 67 BPM | BODY MASS INDEX: 45.92 KG/M2 | RESPIRATION RATE: 32 BRPM

## 2020-08-19 DIAGNOSIS — I25.10 CORONARY ARTERY DISEASE INVOLVING NATIVE CORONARY ARTERY OF NATIVE HEART WITHOUT ANGINA PECTORIS: ICD-10-CM

## 2020-08-19 DIAGNOSIS — I15.9 SECONDARY HYPERTENSION: ICD-10-CM

## 2020-08-19 DIAGNOSIS — N18.1 CKD (CHRONIC KIDNEY DISEASE) STAGE 1, GFR 90 ML/MIN OR GREATER: ICD-10-CM

## 2020-08-19 DIAGNOSIS — R06.09 DYSPNEA ON EXERTION: Primary | ICD-10-CM

## 2020-08-19 DIAGNOSIS — R06.09 DYSPNEA ON EXERTION: ICD-10-CM

## 2020-08-19 DIAGNOSIS — E11.9 TYPE 2 DIABETES MELLITUS WITHOUT COMPLICATION, UNSPECIFIED WHETHER LONG TERM INSULIN USE (H): ICD-10-CM

## 2020-08-19 DIAGNOSIS — N17.9 ACUTE RENAL FAILURE SUPERIMPOSED ON CHRONIC KIDNEY DISEASE, UNSPECIFIED CKD STAGE, UNSPECIFIED ACUTE RENAL FAILURE TYPE: ICD-10-CM

## 2020-08-19 DIAGNOSIS — I27.20 PULMONARY HYPERTENSION (H): ICD-10-CM

## 2020-08-19 DIAGNOSIS — I50.813 ACUTE ON CHRONIC RIGHT-SIDED HEART FAILURE (H): Primary | ICD-10-CM

## 2020-08-19 DIAGNOSIS — N18.9 ACUTE RENAL FAILURE SUPERIMPOSED ON CHRONIC KIDNEY DISEASE, UNSPECIFIED CKD STAGE, UNSPECIFIED ACUTE RENAL FAILURE TYPE: ICD-10-CM

## 2020-08-19 DIAGNOSIS — Z98.890 S/P CARDIAC CATH: ICD-10-CM

## 2020-08-19 PROBLEM — R06.00 DYSPNEA: Status: ACTIVE | Noted: 2020-08-19

## 2020-08-19 LAB
ALBUMIN UR-MCNC: NEGATIVE MG/DL
ANION GAP SERPL CALCULATED.3IONS-SCNC: 8 MMOL/L (ref 3–14)
APPEARANCE UR: CLEAR
BASOPHILS # BLD AUTO: 0 10E9/L (ref 0–0.2)
BASOPHILS NFR BLD AUTO: 0.5 %
BILIRUB UR QL STRIP: NEGATIVE
BUN SERPL-MCNC: 62 MG/DL (ref 7–30)
CALCIUM SERPL-MCNC: 8.8 MG/DL (ref 8.5–10.1)
CHLORIDE SERPL-SCNC: 104 MMOL/L (ref 94–109)
CO2 SERPL-SCNC: 25 MMOL/L (ref 20–32)
COLOR UR AUTO: NORMAL
CREAT SERPL-MCNC: 3.04 MG/DL (ref 0.66–1.25)
DIFFERENTIAL METHOD BLD: ABNORMAL
EOSINOPHIL # BLD AUTO: 0.1 10E9/L (ref 0–0.7)
EOSINOPHIL NFR BLD AUTO: 2.1 %
ERYTHROCYTE [DISTWIDTH] IN BLOOD BY AUTOMATED COUNT: 13 % (ref 10–15)
ERYTHROCYTE [DISTWIDTH] IN BLOOD BY AUTOMATED COUNT: 13.1 % (ref 10–15)
GFR SERPL CREATININE-BSD FRML MDRD: 21 ML/MIN/{1.73_M2}
GLUCOSE BLDC GLUCOMTR-MCNC: 136 MG/DL (ref 70–99)
GLUCOSE SERPL-MCNC: 285 MG/DL (ref 70–99)
GLUCOSE UR STRIP-MCNC: NEGATIVE MG/DL
HCT VFR BLD AUTO: 32 % (ref 40–53)
HCT VFR BLD AUTO: 34.7 % (ref 40–53)
HGB BLD-MCNC: 10.9 G/DL (ref 13.3–17.7)
HGB BLD-MCNC: 11.6 G/DL (ref 13.3–17.7)
HGB UR QL STRIP: NEGATIVE
IMM GRANULOCYTES # BLD: 0 10E9/L (ref 0–0.4)
IMM GRANULOCYTES NFR BLD: 0.3 %
KETONES UR STRIP-MCNC: NEGATIVE MG/DL
LABORATORY COMMENT REPORT: NORMAL
LEUKOCYTE ESTERASE UR QL STRIP: NEGATIVE
LYMPHOCYTES # BLD AUTO: 1.3 10E9/L (ref 0.8–5.3)
LYMPHOCYTES NFR BLD AUTO: 22.3 %
MCH RBC QN AUTO: 28.8 PG (ref 26.5–33)
MCH RBC QN AUTO: 29.4 PG (ref 26.5–33)
MCHC RBC AUTO-ENTMCNC: 33.4 G/DL (ref 31.5–36.5)
MCHC RBC AUTO-ENTMCNC: 34.1 G/DL (ref 31.5–36.5)
MCV RBC AUTO: 86 FL (ref 78–100)
MCV RBC AUTO: 86 FL (ref 78–100)
MONOCYTES # BLD AUTO: 0.6 10E9/L (ref 0–1.3)
MONOCYTES NFR BLD AUTO: 10.7 %
NEUTROPHILS # BLD AUTO: 3.7 10E9/L (ref 1.6–8.3)
NEUTROPHILS NFR BLD AUTO: 64.1 %
NITRATE UR QL: NEGATIVE
NRBC # BLD AUTO: 0 10*3/UL
NRBC BLD AUTO-RTO: 0 /100
PH UR STRIP: 5 PH (ref 5–7)
PLATELET # BLD AUTO: 216 10E9/L (ref 150–450)
PLATELET # BLD AUTO: 255 10E9/L (ref 150–450)
POTASSIUM SERPL-SCNC: 3.7 MMOL/L (ref 3.4–5.3)
RBC # BLD AUTO: 3.71 10E12/L (ref 4.4–5.9)
RBC # BLD AUTO: 4.03 10E12/L (ref 4.4–5.9)
SARS-COV-2 RNA SPEC QL NAA+PROBE: NEGATIVE
SARS-COV-2 RNA SPEC QL NAA+PROBE: NORMAL
SODIUM SERPL-SCNC: 137 MMOL/L (ref 133–144)
SOURCE: NORMAL
SP GR UR STRIP: 1.01 (ref 1–1.03)
SPECIMEN SOURCE: NORMAL
SPECIMEN SOURCE: NORMAL
TROPONIN I SERPL-MCNC: <0.015 UG/L (ref 0–0.04)
TROPONIN I SERPL-MCNC: <0.015 UG/L (ref 0–0.04)
UROBILINOGEN UR STRIP-MCNC: 0 MG/DL (ref 0–2)
WBC # BLD AUTO: 5.8 10E9/L (ref 4–11)
WBC # BLD AUTO: 7.3 10E9/L (ref 4–11)

## 2020-08-19 PROCEDURE — 99214 OFFICE O/P EST MOD 30 MIN: CPT | Mod: GT | Performed by: INTERNAL MEDICINE

## 2020-08-19 PROCEDURE — 84484 ASSAY OF TROPONIN QUANT: CPT | Performed by: EMERGENCY MEDICINE

## 2020-08-19 PROCEDURE — U0003 INFECTIOUS AGENT DETECTION BY NUCLEIC ACID (DNA OR RNA); SEVERE ACUTE RESPIRATORY SYNDROME CORONAVIRUS 2 (SARS-COV-2) (CORONAVIRUS DISEASE [COVID-19]), AMPLIFIED PROBE TECHNIQUE, MAKING USE OF HIGH THROUGHPUT TECHNOLOGIES AS DESCRIBED BY CMS-2020-01-R: HCPCS | Performed by: EMERGENCY MEDICINE

## 2020-08-19 PROCEDURE — 99285 EMERGENCY DEPT VISIT HI MDM: CPT | Mod: 25 | Performed by: EMERGENCY MEDICINE

## 2020-08-19 PROCEDURE — 99223 1ST HOSP IP/OBS HIGH 75: CPT | Mod: AI | Performed by: INTERNAL MEDICINE

## 2020-08-19 PROCEDURE — 71046 X-RAY EXAM CHEST 2 VIEWS: CPT

## 2020-08-19 PROCEDURE — 83880 ASSAY OF NATRIURETIC PEPTIDE: CPT | Performed by: INTERNAL MEDICINE

## 2020-08-19 PROCEDURE — 96360 HYDRATION IV INFUSION INIT: CPT | Performed by: EMERGENCY MEDICINE

## 2020-08-19 PROCEDURE — 93005 ELECTROCARDIOGRAM TRACING: CPT | Performed by: EMERGENCY MEDICINE

## 2020-08-19 PROCEDURE — 36415 COLL VENOUS BLD VENIPUNCTURE: CPT | Performed by: INTERNAL MEDICINE

## 2020-08-19 PROCEDURE — 81003 URINALYSIS AUTO W/O SCOPE: CPT | Performed by: EMERGENCY MEDICINE

## 2020-08-19 PROCEDURE — 00000146 ZZHCL STATISTIC GLUCOSE BY METER IP

## 2020-08-19 PROCEDURE — C9803 HOPD COVID-19 SPEC COLLECT: HCPCS | Performed by: EMERGENCY MEDICINE

## 2020-08-19 PROCEDURE — 80048 BASIC METABOLIC PNL TOTAL CA: CPT | Performed by: EMERGENCY MEDICINE

## 2020-08-19 PROCEDURE — 85027 COMPLETE CBC AUTOMATED: CPT | Performed by: INTERNAL MEDICINE

## 2020-08-19 PROCEDURE — 96361 HYDRATE IV INFUSION ADD-ON: CPT | Performed by: EMERGENCY MEDICINE

## 2020-08-19 PROCEDURE — 80048 BASIC METABOLIC PNL TOTAL CA: CPT | Performed by: INTERNAL MEDICINE

## 2020-08-19 PROCEDURE — 21400000 ZZH R&B CCU UMMC

## 2020-08-19 PROCEDURE — 85025 COMPLETE CBC W/AUTO DIFF WBC: CPT | Performed by: EMERGENCY MEDICINE

## 2020-08-19 PROCEDURE — 25800030 ZZH RX IP 258 OP 636: Performed by: EMERGENCY MEDICINE

## 2020-08-19 PROCEDURE — 93010 ELECTROCARDIOGRAM REPORT: CPT | Mod: Z6 | Performed by: EMERGENCY MEDICINE

## 2020-08-19 RX ORDER — CLONIDINE HYDROCHLORIDE 0.1 MG/1
0.3 TABLET ORAL 2 TIMES DAILY
Status: DISCONTINUED | OUTPATIENT
Start: 2020-08-20 | End: 2020-08-21 | Stop reason: HOSPADM

## 2020-08-19 RX ORDER — ONDANSETRON 2 MG/ML
4 INJECTION INTRAMUSCULAR; INTRAVENOUS EVERY 6 HOURS PRN
Status: DISCONTINUED | OUTPATIENT
Start: 2020-08-19 | End: 2020-08-21 | Stop reason: HOSPADM

## 2020-08-19 RX ORDER — ONDANSETRON 4 MG/1
4 TABLET, ORALLY DISINTEGRATING ORAL EVERY 6 HOURS PRN
Status: DISCONTINUED | OUTPATIENT
Start: 2020-08-19 | End: 2020-08-21 | Stop reason: HOSPADM

## 2020-08-19 RX ORDER — TORSEMIDE 20 MG/1
40 TABLET ORAL DAILY
Status: DISCONTINUED | OUTPATIENT
Start: 2020-08-20 | End: 2020-08-21 | Stop reason: HOSPADM

## 2020-08-19 RX ORDER — ACETAMINOPHEN 325 MG/1
650 TABLET ORAL EVERY 4 HOURS PRN
Status: DISCONTINUED | OUTPATIENT
Start: 2020-08-19 | End: 2020-08-21 | Stop reason: HOSPADM

## 2020-08-19 RX ORDER — METOPROLOL SUCCINATE 100 MG/1
100 TABLET, EXTENDED RELEASE ORAL EVERY MORNING
Status: DISCONTINUED | OUTPATIENT
Start: 2020-08-20 | End: 2020-08-21 | Stop reason: HOSPADM

## 2020-08-19 RX ORDER — BISACODYL 10 MG
10 SUPPOSITORY, RECTAL RECTAL DAILY PRN
Status: DISCONTINUED | OUTPATIENT
Start: 2020-08-19 | End: 2020-08-21 | Stop reason: HOSPADM

## 2020-08-19 RX ORDER — LANOLIN ALCOHOL/MO/W.PET/CERES
3 CREAM (GRAM) TOPICAL
Status: DISCONTINUED | OUTPATIENT
Start: 2020-08-19 | End: 2020-08-21 | Stop reason: HOSPADM

## 2020-08-19 RX ORDER — AMOXICILLIN 250 MG
2 CAPSULE ORAL 2 TIMES DAILY PRN
Status: DISCONTINUED | OUTPATIENT
Start: 2020-08-19 | End: 2020-08-21 | Stop reason: HOSPADM

## 2020-08-19 RX ORDER — ATORVASTATIN CALCIUM 40 MG/1
40 TABLET, FILM COATED ORAL EVERY MORNING
Status: DISCONTINUED | OUTPATIENT
Start: 2020-08-20 | End: 2020-08-21 | Stop reason: HOSPADM

## 2020-08-19 RX ORDER — CLOPIDOGREL BISULFATE 75 MG/1
75 TABLET ORAL EVERY MORNING
Status: DISCONTINUED | OUTPATIENT
Start: 2020-08-20 | End: 2020-08-20

## 2020-08-19 RX ORDER — SILDENAFIL CITRATE 20 MG/1
20 TABLET ORAL 3 TIMES DAILY
Status: DISCONTINUED | OUTPATIENT
Start: 2020-08-20 | End: 2020-08-21 | Stop reason: HOSPADM

## 2020-08-19 RX ORDER — LIDOCAINE 40 MG/G
CREAM TOPICAL
Status: DISCONTINUED | OUTPATIENT
Start: 2020-08-19 | End: 2020-08-21 | Stop reason: HOSPADM

## 2020-08-19 RX ORDER — NITROGLYCERIN 0.4 MG/1
0.4 TABLET SUBLINGUAL EVERY 5 MIN PRN
Status: DISCONTINUED | OUTPATIENT
Start: 2020-08-19 | End: 2020-08-19 | Stop reason: SINTOL

## 2020-08-19 RX ORDER — NIFEDIPINE 30 MG/1
30 TABLET, EXTENDED RELEASE ORAL DAILY
Status: DISCONTINUED | OUTPATIENT
Start: 2020-08-20 | End: 2020-08-21 | Stop reason: HOSPADM

## 2020-08-19 RX ORDER — ACETAMINOPHEN 650 MG/1
650 SUPPOSITORY RECTAL EVERY 4 HOURS PRN
Status: DISCONTINUED | OUTPATIENT
Start: 2020-08-19 | End: 2020-08-21 | Stop reason: HOSPADM

## 2020-08-19 RX ORDER — ALUMINA, MAGNESIA, AND SIMETHICONE 2400; 2400; 240 MG/30ML; MG/30ML; MG/30ML
30 SUSPENSION ORAL EVERY 4 HOURS PRN
Status: DISCONTINUED | OUTPATIENT
Start: 2020-08-19 | End: 2020-08-21 | Stop reason: HOSPADM

## 2020-08-19 RX ORDER — HEPARIN SODIUM 5000 [USP'U]/.5ML
5000 INJECTION, SOLUTION INTRAVENOUS; SUBCUTANEOUS EVERY 12 HOURS
Status: DISCONTINUED | OUTPATIENT
Start: 2020-08-20 | End: 2020-08-21

## 2020-08-19 RX ORDER — ASPIRIN 81 MG/1
81 TABLET ORAL DAILY
Status: DISCONTINUED | OUTPATIENT
Start: 2020-08-20 | End: 2020-08-20

## 2020-08-19 RX ORDER — AMOXICILLIN 250 MG
1 CAPSULE ORAL 2 TIMES DAILY PRN
Status: DISCONTINUED | OUTPATIENT
Start: 2020-08-19 | End: 2020-08-21 | Stop reason: HOSPADM

## 2020-08-19 RX ORDER — LISINOPRIL 20 MG/1
40 TABLET ORAL EVERY MORNING
Status: DISCONTINUED | OUTPATIENT
Start: 2020-08-20 | End: 2020-08-21 | Stop reason: HOSPADM

## 2020-08-19 RX ORDER — POLYETHYLENE GLYCOL 3350 17 G/17G
17 POWDER, FOR SOLUTION ORAL DAILY PRN
Status: DISCONTINUED | OUTPATIENT
Start: 2020-08-19 | End: 2020-08-21 | Stop reason: HOSPADM

## 2020-08-19 RX ADMIN — SODIUM CHLORIDE, POTASSIUM CHLORIDE, SODIUM LACTATE AND CALCIUM CHLORIDE 500 ML: 600; 310; 30; 20 INJECTION, SOLUTION INTRAVENOUS at 16:32

## 2020-08-19 ASSESSMENT — ENCOUNTER SYMPTOMS
PALPITATIONS: 0
SHORTNESS OF BREATH: 1
BACK PAIN: 0
VOMITING: 0
NAUSEA: 0
NECK PAIN: 0
SORE THROAT: 0
TROUBLE SWALLOWING: 0
DIAPHORESIS: 0
LIGHT-HEADEDNESS: 1
DIFFICULTY URINATING: 0
CHILLS: 0
FEVER: 0
COUGH: 0
ABDOMINAL PAIN: 0
HEADACHES: 0

## 2020-08-19 ASSESSMENT — MIFFLIN-ST. JEOR: SCORE: 2236.02

## 2020-08-19 ASSESSMENT — PAIN SCALES - GENERAL: PAINLEVEL: NO PAIN (0)

## 2020-08-19 NOTE — PROGRESS NOTES
[facility]      1. Pulmonary hypertension  2. Elevated body mass index  3. Abnormal ECG: bradycardia, incomplete RBBB, RVH  4. Diabetes  5. Neuropathy  6. Hypertension  7. Negative serologic screen collagen vascular disease  8. CKD with proteinuria Estimated GFR 30-50  9. Elevated kappa light chains, low albumin, no evidence of monoclonal spike  10. Narcolepsy  11.Sleep disordered breath              Central sleep apnea              BIP with ASV currently  12. Elevated coronary calcium score with negative stress test              History of LAD stent  13, Questionable history of narcolepsy ? Central sleep apnea  14. History of methamphetamine usage  15. Exertional syncope  16. Epistaxis (ASA/Plavix)      Video visit with patient's permission for follow-up of pulmonary arterial hypertension (disproportionate) currently treated with sildenafil, weight, fluid, and blood pressure optimization.  He has known CAD with previous stenting of the LAD.  12:oo-12:20 Patient at home.    Plan:  1. Patient advised to go to [facility], preferably by ambulance for assessment of potential stent closure, pulmonary edema/volume overload.  Discussed case with [facility]  2. Renal function re-testing in ER  3. Blood pressure control  4. Discharge instructions from  to include need to be seen at [facility] weekly for a while to stark blood pressure and renal function      Complex decision making, patient counseling  and coordination of care involving multi-system disease including chronic renal failure, ASHD with previous stent and return of symptoms necessitating invasive assessmednt. assessment for classification of current overall status and the specific cardiac and vascular components.  The patient is on multiple, complex medications that require monitoring to achieve maximum therapeutic dosing and minimize off-target symptoms, drug-drug interactions or toxicities.  Serial imaging and hemodynamic studies reviewed and up-dated.       Interim History  The patient returns for follow-up of poorly controlled blood pressure, recent angioplasty/stenting, pulmonary hypertension.  Patient had been doing well until today.  He now has shortness of breath with any exertion.  It should be noted that the patient has never had classic symptoms of chest pain.  Heart catherization recently performed (see below) and in stent stenosis treated.  He has no intercurrent recent history of increased leg swelling, hemoptysis, pleuritic chest pain.  Weight is stable by his account.    Constitutional: weight loss, fever, chills, night sweats  HEENT: without visual changes, swallow difficulties  Pulmonary: with shortness of breath, but  without cough, wheeze, hemoptysis  Cardiac: without chest pain but+, SAINI, PND, orthopnea, edema, palpitation, + one episodepre-syncope, syncope,  GI: without diarrhea, constipation, jaundice, melena, GERD, hematemesis  : without frequency, urgency, dysuria, hematuria  Skin: rash, bruise, open lesions  Neuro: without TIA, focal neurologic complaints, seizure, trauma  Ortho: without pain, swelling, mobility impairment  Endocrine: diabetes, thyroid, heat/cold intolerance, polyuria, polyphagia, change bowel habits.  Sleep:no GIRISH, periodic breathing, fatigue       Current Outpatient Medications   Medication     aspirin (ASA) 81 MG EC tablet     atorvastatin (LIPITOR) 40 MG tablet     cloNIDine (CATAPRES) 0.3 MG tablet     clopidogrel (PLAVIX) 75 MG tablet     glipiZIDE (GLUCOTROL XL) 10 MG 24 hr tablet     insulin lispro (HUMALOG VIAL) 100 UNIT/ML vial     lisinopril (PRINIVIL/ZESTRIL) 40 MG tablet     macitentan (OPSUMIT) 10 MG tablet     metFORMIN (GLUCOPHAGE-XR) 750 MG 24 hr tablet     metoprolol succinate ER (TOPROL-XL) 100 MG 24 hr tablet     NIFEdipine ER (ADALAT CC) 30 MG 24 hr tablet     sildenafil (REVATIO) 20 MG tablet     ticagrelor (BRILINTA) 90 MG tablet     torsemide (DEMADEX) 20 MG tablet     No current  facility-administered medications for this visit.              Right sided filling pressures are mildly elevated.    Severely elevated pulmonary artery hypertension.    Left sided filling pressures are moderately elevated.    Normal cardiac output level.    Hemodynamic data has been modified in Epic per physician review.     Nipride study showed a slight drop in cardiac output with significant drop in mean PA pressure and PCWP pressure.  Severe in stent restenosis of a prior proximal LAD stent with a significant dPR of 0.62. Otherwise mild non obstructive CAD elsewhere in large epicardial vessels.  PCI with one drug eluting stent to the proximal LAD optimized with IVUS directed post dilation.                         Continuation of dual antiplatelet therapy for 12 months      Coronary Findings     Diagnostic   Dominance: Right   Left Main    The vessel was visualized by selective angiography and is moderate in size.    Left Anterior Descending    The vessel was visualized by selective angiography and is moderate in size. There was 40% diffuse vessel disease.    Prox LAD to Mid LAD lesion is 75% stenosed. Culprit lesion. The lesion is calcified. The lesion was previously treated using a stent of unknown type. Previous treatment took place >2 years ago. Pressure wire/iFR used. 0.62    First Diagonal Branch    The vessel is small. There is mild diffuse disease throughout the vessel.    Second Diagonal Branch    The vessel is moderate in size. There is mild diffuse disease throughout the vessel.    2nd Diag lesion is 90% stenosed.    Third Diagonal Branch    The vessel is small. There is mild diffuse disease throughout the vessel.    Left Circumflex    The vessel was visualized by selective angiography and is moderate in size.    Prox Cx lesion is 30% stenosed.    First Obtuse Marginal Branch    The vessel is small. There is mild diffuse disease throughout the vessel.    Second Obtuse Marginal Branch    The vessel is  moderate in size. The vessel exhibits minimal luminal irregularities.    Right Coronary Artery    The vessel was visualized by selective angiography and is moderate in size. There was 20% diffuse vessel disease.    Dist RCA lesion is 35% stenosed. Not the culprit lesion.    Acute Marginal Branch    The vessel is moderate in size and is angiographically normal.    Right Ventricular Branch    The vessel is moderate in size and is angiographically normal.    Right Posterior Descending Artery    The vessel is large. The vessel exhibits minimal luminal irregularities.    Right Posterior Atrioventricular Artery    The vessel is large. The vessel exhibits minimal luminal irregularities.    First Right Posterolateral Branch    The vessel is moderate in size. The vessel exhibits minimal luminal irregularities.    Second Right Posterolateral Branch    The vessel is moderate in size. The vessel exhibits minimal luminal irregularities.    Third Right Posterolateral Branch    The vessel is moderate in size. The vessel exhibits minimal luminal irregularities.       Intervention     Prox LAD to Mid LAD lesion    Stent    CATH GUIDING BLUE YELLOW PTFE XB3.5 [id number] guide catheter was successful. The pre-interventional distal flow is normal (MAYO 3). The post-interventional distal flow is normal (MAYO 3). A 6 Chinese XB 3.5 placed in the left main. An OpSens wire was advanced into the LAD and across the ISR of a previous stent with dPR measured at 0.62. A 2.5x20mm Emerge was then used to pre dilate the lesion followed by placement of a 3.0x28mm Synergy stent. A 3.5x20mm NC Emerge used for pre dilation initially followed by a 3.5x8mm NC Emerge distally and proximally at high pressure in areas of underexpansion. IVUS was then used to assess stent expansion and again showed underexpanded segments so a 3.75x8mm NC Emerge was then used to post dilate the stent further. IC nitroglycerin was given and final angiography performed  which showed MAYO III flow, no residual stenosis, no perforation or dissection.    There is a 0% residual stenosis post intervention.              Laboratories:     Ref. Range 8/11/2020 12:30   Sodium Latest Ref Range: 133 - 144 mmol/L 137   Potassium Latest Ref Range: 3.4 - 5.3 mmol/L 4.3   Chloride Latest Ref Range: 94 - 109 mmol/L 106   Carbon Dioxide Latest Ref Range: 20 - 32 mmol/L 27   Urea Nitrogen Latest Ref Range: 7 - 30 mg/dL 53 (H)   Creatinine Latest Ref Range: 0.66 - 1.25 mg/dL 2.05 (H)   GFR Estimate Latest Ref Range: >60 mL/min/1.73_m2 34 (L)   GFR Estimate If Black Latest Ref Range: >60 mL/min/1.73_m2 40 (L)   Calcium Latest Ref Range: 8.5 - 10.1 mg/dL 9.0   Anion Gap Latest Ref Range: 3 - 14 mmol/L 4   N-Terminal Pro BNP Inpatient Latest Ref Range: 0 - 900 pg/mL 250   Glucose Latest Ref Range: 70 - 99 mg/dL 305 (H)   WBC Latest Ref Range: 4.0 - 11.0 10e9/L 7.7   Hemoglobin Latest Ref Range: 13.3 - 17.7 g/dL 11.6 (L)   Hematocrit Latest Ref Range: 40.0 - 53.0 % 35.0 (L)   Platelet Count Latest Ref Range: 150 - 450 10e9/L 240   RBC Count Latest Ref Range: 4.4 - 5.9 10e12/L 4.01 (L)   MCV Latest Ref Range: 78 - 100 fl 87   MCH Latest Ref Range: 26.5 - 33.0 pg 28.9   MCHC Latest Ref Range: 31.5 - 36.5 g/dL 33.1   RDW Latest Ref Range: 10.0 - 15.0 % 13.2           Results for TERRI MCELROY (MRN [id number]) as of 8/3/2020 12:56   Ref. Range 1/19/2020 04:57 1/19/2020 11:50 1/22/2020 14:36 6/30/2020 08:00 7/30/2020 13:47   Creatinine Latest Ref Range: 0.66 - 1.25 mg/dL 2.37 (H) 2.09 (H) 2.40 (H) 1.74 (H) 2.19 (H)        Ref. Range 7/30/2020 13:47   Sodium Latest Ref Range: 133 - 144 mmol/L 139   Potassium Latest Ref Range: 3.4 - 5.3 mmol/L 4.0   Chloride Latest Ref Range: 94 - 109 mmol/L 107   Carbon Dioxide Latest Ref Range: 20 - 32 mmol/L 24   Urea Nitrogen Latest Ref Range: 7 - 30 mg/dL 50 (H)   Creatinine Latest Ref Range: 0.66 - 1.25 mg/dL 2.19 (H)   GFR Estimate Latest Ref Range: >60 mL/min/1.73_m2 32  (L)   GFR Estimate If Black Latest Ref Range: >60 mL/min/1.73_m2 37 (L)   Calcium Latest Ref Range: 8.5 - 10.1 mg/dL 9.3   Anion Gap Latest Ref Range: 3 - 14 mmol/L 8   Albumin Latest Ref Range: 3.4 - 5.0 g/dL 3.7   Protein Total Latest Ref Range: 6.8 - 8.8 g/dL 7.3   Bilirubin Total Latest Ref Range: 0.2 - 1.3 mg/dL 0.5   Alkaline Phosphatase Latest Ref Range: 40 - 150 U/L 108   ALT Latest Ref Range: 0 - 70 U/L 18   AST Latest Ref Range: 0 - 45 U/L 11   N-Terminal Pro Bnp Latest Ref Range: 0 - 125 pg/mL 615 (H)   Glucose Latest Ref Range: 70 - 99 mg/dL 235 (H)   WBC Latest Ref Range: 4.0 - 11.0 10e9/L 7.7   Hemoglobin Latest Ref Range: 13.3 - 17.7 g/dL 11.4 (L)   Hematocrit Latest Ref Range: 40.0 - 53.0 % 34.5 (L)   Platelet Count Latest Ref Range: 150 - 450 10e9/L 281   RBC Count Latest Ref Range: 4.4 - 5.9 10e12/L 3.93 (L)   MCV Latest Ref Range: 78 - 100 fl 88   MCH Latest Ref Range: 26.5 - 33.0 pg 29.0   MCHC Latest Ref Range: 31.5 - 36.5 g/dL 33.0   RDW Latest Ref Range: 10.0 - 15.0 % 12.8       Echocardiogram    MRN: [id number]  : 1960  Study Date: 2020 10:04 AM  Age: 60 yrs  Gender: Male  Patient Location: [facility]  Reason For Study: Dyspnea on exertion  Ordering Physician: INDIGO TERRY  Referring Physician: INDIGO TERRY  Performed By: Fabian Marino RDCS     BSA: 2.6 m2  Height: 70 in  Weight: 325 lb  HR: 89  BP: 210/105 mmHg  _____________________________________________________________________________  __     Procedure  Complete Echo Adult. Optison (NDC #[id number]) given intravenously.     _____________________________________________________________________________  __        Interpretation Summary     Mild concentric left ventricular hypertrophy.  Left ventricular size, global systolic function, and wall motion are normal,  estimated LVEF 60-65%.  Right ventricular global function is normal.  No significant valvular abnormalities.     There are no prior studies available for  comparison.  _____________________________________________________________________________  __        Left Ventricle  The left ventricle is normal in size. There is mild concentric left  ventricular hypertrophy. Left ventricular systolic function is normal. The  visual ejection fraction is estimated at 60-65%. Left ventricular diastolic  function is normal. No regional wall motion abnormalities noted.     Right Ventricle  Borderline right ventricular enlargement. The right ventricular systolic  function is normal.     Atria  Normal left atrial size. Right atrial size is normal.     Mitral Valve  The mitral valve is normal in structure and function.     Tricuspid Valve  The tricuspid valve is not well visualized, but is grossly normal. Right  ventricular systolic pressure could not be approximated due to inadequate  tricuspid regurgitation.        Aortic Valve  The aortic valve is normal in structure and function.     Pulmonic Valve  The pulmonic valve is not well seen, but is grossly normal.     Vessels  The aortic root is normal size. Normal size ascending aorta. The inferior vena  cava was normal in size with preserved respiratory variability.     Pericardium  There is no pericardial effusion.     _____________________________________________________________________________  __  MMode/2D Measurements & Calculations  RVDd: 4.2 cm  IVSd: 1.4 cm  LVIDd: 4.6 cm  LVIDs: 2.2 cm  LVPWd: 1.3 cm  FS: 52.4 %  LV mass(C)d: 239.4 grams  LV mass(C)dI: 93.3 grams/m2  Ao root diam: 3.7 cm  LA dimension: 4.8 cm  asc Aorta Diam: 3.6 cm  LA/Ao: 1.3     LA Volume Index (BP): 28.0 ml/m2  RWT: 0.56        Doppler Measurements & Calculations  MV E max benedict: 100.5 cm/sec  MV A max benedict: 89.1 cm/sec  MV E/A: 1.1  MV dec slope: 500.2 cm/sec2  MV dec time: 0.20 sec  PA acc time: 0.11 sec  E/E': 20.1  Peak E' Benedict: 5.0 cm/sec

## 2020-08-19 NOTE — ED NOTES
Pt had 1 stent placed on 8/11/20.   Pt reports sob with activity since yesterday.  Denies chest pain at this time.   Pt reports uses bipap at home while asleep.

## 2020-08-19 NOTE — Clinical Note
The first balloon was inserted into the left anterior descending and proximal left anterior descending.Max pressure = 12 kim. Total duration = 8 seconds.

## 2020-08-19 NOTE — ED PROVIDER NOTES
"  History     Chief Complaint   Patient presents with     Chest Pain     slight CP, feels SOA today, stent placed last week     HPI  Jeremiah Isaac is a 60 year old male with history of CAD status post LAD stent (restenosis and PCI 1 week ago), pulmonary hypertension, hypertension, diabetes, CKD, sleep apnea, , exertional syncope, obesity, who presents with shortness of breath.  Patient was in the shower this morning and was \"breathing heavily\" which is not normal for him.  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  He was able to  and help load but when they got to the food bank and was unloading he was unable to help as he was too tired and short of breath.  He went back home and was walking back inside he was feeling lightheaded and unsteady.  Denies any chest pain, cough, fever, chills, nausea, vomiting, or diaphoresis.  Has LAD stent with restenosis and recent PCI.  Discharged from the [facility] on 8/11/2020.  He had a virtual visit with cardiology this morning related to his symptoms and he was advised to go immediately to the emergency department.     The patient's PMHx, Surgical Hx, Allergies, and Medications were all reviewed with the patient.    Allergies:  No Known Allergies    Problem List:    Patient Active Problem List    Diagnosis Date Noted     Heart disease, ill-defined 08/04/2020     Priority: Medium     Added automatically from request for surgery [id number]       Status post coronary angiogram 06/30/2020     Priority: Medium     Heart failure (H) 01/16/2020     Priority: Medium     Acute on chronic right-sided heart failure (H) 01/16/2020     Priority: Medium     Added automatically from request for surgery [id number]       Dyspnea on exertion 01/14/2020     Priority: Medium     Added automatically from request for surgery [id number]       Pulmonary hypertension (H) 01/14/2020     Priority: Medium     Added automatically from request for surgery [id number]   "        Past Medical History:    Past Medical History:   Diagnosis Date     Acute on chronic right-sided heart failure (H) 1/16/2020     Central sleep apnea      CKD (chronic kidney disease)      DM2 (diabetes mellitus, type 2) (H)      Dyspnea on exertion 1/14/2020     Heart failure (H) 1/16/2020     Hypertension      Narcolepsy      Neuropathy      Pulmonary hypertension (H) 1/14/2020       Past Surgical History:    Past Surgical History:   Procedure Laterality Date     CV CORONARY ANGIOGRAM N/A 8/11/2020    Procedure: CV CORONARY ANGIOGRAM;  Surgeon: Thomas Norton MD;  Location: [facility] CARDIAC CATH LAB     CV INSTANTANEOUS WAVE-FREE RATIO N/A 8/11/2020    Procedure: Instantaneous Wave-Free Ratio;  Surgeon: Thomas Norton MD;  Location: [facility] CARDIAC CATH LAB     CV INTRAVASULAR ULTRASOUND N/A 8/11/2020    Procedure: Intravascular Ultrasound;  Surgeon: Thomas Norton MD;  Location:  HEART CARDIAC CATH LAB     CV LEFT HEART CATH N/A 1/17/2020    Procedure: Left Heart Cath;  Surgeon: Elia Salas MD;  Location: [facility] CARDIAC CATH LAB     CV PCI STENT DRUG ELUTING N/A 8/11/2020    Procedure: Percutaneous Coronary Intervention Stent Drug Eluting;  Surgeon: Thomas Norton MD;  Location: [facility] CARDIAC CATH LAB     CV RIGHT HEART CATH N/A 1/17/2020    Procedure: Heart Cath Right Heart Cath w/ nitric oxide reversal study;  Surgeon: Elia Salas MD;  Location:  HEART CARDIAC CATH LAB     CV RIGHT HEART CATH N/A 6/30/2020    Procedure: Heart Cath Right Heart Cath;  Surgeon: Isaac Bacon MD;  Location: [facility] CARDIAC CATH LAB     CV RIGHT HEART CATH N/A 8/11/2020    Procedure: CV RIGHT HEART CATH;  Surgeon: Thomas Norton MD;  Location: [facility] CARDIAC CATH LAB     CV STRESS EXERCISE STUDY N/A 8/11/2020    Procedure: Stress Drug Study;  Surgeon: Thomas Norton MD;  Location: [facility]  CATH LAB       Family History:    No family history on file.    Social History:  Marital Status:  Single [1]  Social History     Tobacco Use     Smoking status: Never Smoker     Smokeless tobacco: Never Used   Substance Use Topics     Alcohol use: Yes     Frequency: Monthly or less     Comment: once or twice a year      Drug use: Not Currently        Medications:    atorvastatin (LIPITOR) 40 MG tablet  cloNIDine (CATAPRES) 0.3 MG tablet  clopidogrel (PLAVIX) 75 MG tablet  glipiZIDE (GLUCOTROL XL) 10 MG 24 hr tablet  insulin lispro (HUMALOG VIAL) 100 UNIT/ML vial  lisinopril (PRINIVIL/ZESTRIL) 40 MG tablet  macitentan (OPSUMIT) 10 MG tablet  metFORMIN (GLUCOPHAGE-XR) 750 MG 24 hr tablet  metoprolol succinate ER (TOPROL-XL) 100 MG 24 hr tablet  NIFEdipine ER (ADALAT CC) 30 MG 24 hr tablet  sildenafil (REVATIO) 20 MG tablet  ticagrelor (BRILINTA) 90 MG tablet  torsemide (DEMADEX) 20 MG tablet  aspirin (ASA) 81 MG EC tablet          Review of Systems   Constitutional: Negative for chills, diaphoresis and fever.   HENT: Negative for sore throat and trouble swallowing.    Eyes: Negative for visual disturbance.   Respiratory: Positive for shortness of breath. Negative for cough.    Cardiovascular: Negative for chest pain, palpitations and leg swelling.   Gastrointestinal: Negative for abdominal pain, nausea and vomiting.   Genitourinary: Negative for difficulty urinating.   Musculoskeletal: Negative for back pain and neck pain.   Skin: Negative for rash.   Neurological: Positive for light-headedness. Negative for headaches.       Physical Exam   BP: 102/66  Pulse: 58  Temp: 97.6  F (36.4  C)  Resp: 18  Weight: 145.2 kg (320 lb)  SpO2: 98 %    Physical Exam  GEN: Awake, alert, and cooperative. No acute distress. Obese  HENT: MMM. External ears and nose normal bilaterally.  EYES: EOM intact. Conjunctiva clear. No discharge.   NECK: Supple, symmetric.   CV : Regular rate and rhythm. No murmurs appreciated.  PULM: Normal  effort. No wheezes, rales, or rhonchi bilaterally.  ABD: Soft, non-tender, non-distended. No rebound or guarding.   NEURO: Normal speech. Following commands. CN II-XII grossly intact. Answering questions and interacting appropriately.   EXT: No gross deformity. No lower extremity edema.   INT: Warm. No diaphoresis. Normal color.        ED Course        Procedures             EKG Interpretation:      Interpreted by Wesley Winston MD  Time reviewed: 1315  Symptoms at time of EKG: dyspnea   Rhythm: normal sinus   Rate: 64  Axis: Left Axis Deviation  Ectopy: none  Conduction: right bundle branch block (incomplete) and left anterior fasciclar block. 1st degree block  ST Segments/ T Waves: Non-specific ST-T wave changes and Poor R wave progression  Q Waves: none  Comparison to prior: Unchanged from 8/11/2020    Clinical Impression: 1st degree AV block, partial right bundle, left anterior fascicle block. No acute changes               Critical Care time:  none               Results for orders placed or performed during the hospital encounter of 08/19/20 (from the past 24 hour(s))   CBC with platelets differential   Result Value Ref Range    WBC 5.8 4.0 - 11.0 10e9/L    RBC Count 3.71 (L) 4.4 - 5.9 10e12/L    Hemoglobin 10.9 (L) 13.3 - 17.7 g/dL    Hematocrit 32.0 (L) 40.0 - 53.0 %    MCV 86 78 - 100 fl    MCH 29.4 26.5 - 33.0 pg    MCHC 34.1 31.5 - 36.5 g/dL    RDW 13.1 10.0 - 15.0 %    Platelet Count 216 150 - 450 10e9/L    Diff Method Automated Method     % Neutrophils 64.1 %    % Lymphocytes 22.3 %    % Monocytes 10.7 %    % Eosinophils 2.1 %    % Basophils 0.5 %    % Immature Granulocytes 0.3 %    Nucleated RBCs 0 0 /100    Absolute Neutrophil 3.7 1.6 - 8.3 10e9/L    Absolute Lymphocytes 1.3 0.8 - 5.3 10e9/L    Absolute Monocytes 0.6 0.0 - 1.3 10e9/L    Absolute Eosinophils 0.1 0.0 - 0.7 10e9/L    Absolute Basophils 0.0 0.0 - 0.2 10e9/L    Abs Immature Granulocytes 0.0 0 - 0.4 10e9/L    Absolute Nucleated RBC 0.0     Basic metabolic panel   Result Value Ref Range    Sodium 137 133 - 144 mmol/L    Potassium 3.7 3.4 - 5.3 mmol/L    Chloride 104 94 - 109 mmol/L    Carbon Dioxide 25 20 - 32 mmol/L    Anion Gap 8 3 - 14 mmol/L    Glucose 285 (H) 70 - 99 mg/dL    Urea Nitrogen 62 (H) 7 - 30 mg/dL    Creatinine 3.04 (H) 0.66 - 1.25 mg/dL    GFR Estimate 21 (L) >60 mL/min/[1.73_m2]    GFR Estimate If Black 25 (L) >60 mL/min/[1.73_m2]    Calcium 8.8 8.5 - 10.1 mg/dL   Troponin I   Result Value Ref Range    Troponin I ES <0.015 0.000 - 0.045 ug/L   XR Chest 2 Views    Narrative    CHEST TWO VIEWS  8/19/2020 2:32 PM     HISTORY: Dyspnea. History of CAD two weeks status post PCI.    COMPARISON: None.       Impression    IMPRESSION: The cardiac silhouette is within normal limits. No  evidence for infiltrate or effusion. Degenerative changes are noted  through the spine. A few compression deformities are noted through the  mid to lower thoracic spine.    KAREN SALDAÑA MD   UA reflex to Microscopic   Result Value Ref Range    Color Urine Straw     Appearance Urine Clear     Glucose Urine Negative NEG^Negative mg/dL    Bilirubin Urine Negative NEG^Negative    Ketones Urine Negative NEG^Negative mg/dL    Specific Gravity Urine 1.009 1.003 - 1.035    Blood Urine Negative NEG^Negative    pH Urine 5.0 5.0 - 7.0 pH    Protein Albumin Urine Negative NEG^Negative mg/dL    Urobilinogen mg/dL 0.0 0.0 - 2.0 mg/dL    Nitrite Urine Negative NEG^Negative    Leukocyte Esterase Urine Negative NEG^Negative    Source Midstream Urine    Troponin I (second draw)   Result Value Ref Range    Troponin I ES <0.015 0.000 - 0.045 ug/L       Medications   lactated ringers BOLUS 500 mL (0 mLs Intravenous Stopped 8/19/20 1807)       Assessments & Plan (with Medical Decision Making)   60 year old male with complex past medical history including coronary disease status post recent PCI, pulmonary hypertension, hypertension, chronic kidney disease, was advised to  come to emergency department after virtual visit with cardiology for evaluation of dyspnea.  On arrival to Emergency Department, vital signs were blood pressure 102/66, temperature 97.6, heart rate 58, respiratory rate 18, SPO2 98% on room air.    On exam his abdomen is nontender and lungs were clear.  No murmurs appreciated.  No lower extremity edema on exam.  ECG obtained and no acute changes compared to 8/11/2020.  Patient does have first-degree AV block as well as an complete right bundle and left anterior fascicular block. patient denies of any chest pain, however he denies ever having chest pain and does have known coronary artery disease.  Initial troponin below level detection.CBC without leukocytosis, hemoglobin 10.9 which appears near his baseline.  CMP notable for elevated creatinine of 3.04, up from 2.05 one week ago.  Repeat troponin below level of detection.  Chest x-ray without acute infiltrate, effusion, pneumothorax. Images reviewed personally as well as report from radiology which is detailed above.     Patient is medically complex and given his worsening kidney function, and dyspnea, he would benefit from hospital admission.  Given his clear lungs and clear chest x-ray, pulmonary edema unlikely cause of his symptoms.  Patient has no fever, cough, or infiltrate on x-ray, pneumonia unlikely.  Patient is likely over diuresed.  He was given 500 cc lactated Ringer's solution while in the emergency department.  Etiology of his dyspnea unclear at this time but cannot rule out acute cardiac etiology.  I discussed the case with Dr. Baez with the hospital service at [facility].  He felt that he would be better served at hospital with cardiology service.  Then I discussed the case with Dr. Alexandre with Cardiology at the [facility] who accepted the transfer.  Patient had his recent cardiac catheterization at the [place] on 8/11/2020.  Patient was agreeable to transfer the administered  [place] however he was adamant that he would not take an ambulance.  Says the bill is too expensive for him.  Reports last time he had private transportation from [facility] the emergency department.  I advised him against this and expressed my concerns and rationale.  He acknowledges understanding my concerns but is adamant about transport by private vehicle.    [facility] has bed available, however it is a double room and will need negative COVID test prior to accepting patient. Test is currently in process. COVID testing negative. Will send patient down to [place].         I have reviewed the nursing notes.         New Prescriptions    No medications on file       Final diagnoses:   Acute renal failure superimposed on chronic kidney disease, unspecified CKD stage, unspecified acute renal failure type (H)   Dyspnea on exertion   S/P cardiac cath - 8 days prior   Pulmonary hypertension (H)   Secondary hypertension     Wesley Winston MD    8/19/2020   [facility] EMERGENCY DEPARTMENT    Disclaimer: This note consists of words and symbols derived from keyboarding and dictation using voice recognition software.  As a result, there may be errors that have gone undetected.  Please consider this when interpreting information found in this note.             Wesley Winston MD  08/19/20 2958

## 2020-08-19 NOTE — Clinical Note
The first balloon was inserted into the left anterior descending and left anterior descending diagonal.Max pressure = 10 kim. Total duration = 28 seconds.

## 2020-08-19 NOTE — Clinical Note
Stent deployed in the proximal left anterior descending. Max pressure = 14 kim. Total duration = 10 seconds.

## 2020-08-19 NOTE — Clinical Note
The first balloon was inserted into the left anterior descending and proximal left anterior descending.Max pressure = 14 kim. Total duration = 8 seconds.     Max pressure = 14 kim. Total duration = 6 seconds.    Balloon reinflated a second time: Max pressure = 14 kim. Total duration = 6 seconds.

## 2020-08-19 NOTE — H&P
[facility]    Cardiology History and Physical - Cards 1    Date of Admission:  8/19/2020    Assessment & Plan: HVSL    Jeremiah Isaac is a 60 year old male admitted on 8/19/2020. He has a PMHx notable for pulmonary HTN, RVH/LVH, DMII, HTN, CKD, and CAD s/p LAD stent (8/11). He presented to [facility] ED with chronic dyspnea on exertion and lightheadedness and is now transferred to [facility] for concern of in-stent stenosis vs. progression of pulmonary hypertension vs. acute CHF exacerbation.    # Dyspnea, Lightheadedness  # Pulmonary HTN (WHO group 3 vs possible group 1 PVOD)  # Concern for In-stent stenosis vs acute heart failure exacerbation  Patient reports one year history of exertional syncope. Underwent LHC on 10/2019 that revealed 95% stenosis of prox LAD that was FFR positive necessitating ARINA to prox-LAD. Symptoms persisted despite ARINA without improvement. RHC revealed severe post capillary pulmonary HTN with a normal LVEDP lowering clinical suspicion for class 2 pulmonary HTN. V/Q scan revealed no CTEPH. Patient has history of sleep apnea, however class 3 unlikely to be only  of pulmonary HTN. On admission, patient is hemodynamically stable w/o hypoxia. Negative troponin x 2. . Examination appears euvolemic as patient is has clear lungs and weighs less than baseline. Suspect patient's chronic, progressive dyspnea with exertion is likely secondary to underlying pulmonary HTN. However, need to rule out in-stent stenosis given recent history.  - Volume: Euvolemic. Continue PTA torsemide 40mg qday  - ACEI: Will hold due to increasing Creatinine  - BB: Metop succinate 100mg qday   - Continue PTA Nifedipine 30mg PO Daily  - PH meds: Continue PTA Macitentan 10mg PO Daily and Sildenafil 20mg PO TID  - NPO midnight for RHC/LHC tomorrow  - TTE in the AM    # CAD s/p ARINA to prox LAD  AIRNA to prox LAD on 8/2019. Cath 8/11/20 showing prox LAD to mid LAD lesion 75%  stenosed, now stented, and mildly elevated R sided filling pressure, severely elevated PA pressure, moderately elevated L side filling pressure and normal cardiac output.  - Continue PTA Atorvastatin 40mg PO Daily  - Continue PTA ASA 81mg  - Continue PTA Ticagrelor 75mg qday  - Need to clarify if patient should be on Clopidogrel in the AM (as it is listed as his PTA meds in addition to ASA and Ticagrelor     # KENNETH on CKD  Cr noted to be 2.71 on admission. Appears baseline over the past year has ranged ~2. Possible cardiorenal however patient does not appear grossly volume overloaded.  - Continue to monitor  - RHC/LHC and TTE in the AM to better assess volume status    # GIRISH  Was recently transition to BIPAP last month. There could be a component of CSA  - BIPAP: 26/7  - Will consult sleep medicine to discuss if there is a CSA component to symptoms     # T2DM:  Controlled on home metformin, glipizide and V-go (wearable insulin pump).  - HOLD PTA metformin and glipizide  - Endocrinology consult for inpatient insulin regimen    # ?Narcolepsy  Patient with history of narcolepsy. Could be 2/2 to GIRISH. Currently perscribed amphetamine by sleep medicine. In the setting of significant PHTN, will hold amphetamine presently.  - Hold amphetamine   - Consider consulting neurology in the AM     Diet: NPO at midnight for possible RHC/LHR tomorrow  DVT Prophylaxis: Heparin SQ  Flanagan Catheter: not present  Code Status: FULL  Fluids: No mIVF  Lines: PIV    Disposition Plan   Expected discharge: 2 - 3 days, recommended to prior living arrangement once work up for chest pain is complete.    Entered: Felipe Honeycutt MD 08/19/2020, 5:38 PM     Patient will be staffed in the AM.    Felipe Honeycutt MD  Internal Medicine PGY-2  Pager #: [phone]  ______________________________________________________________________    Chief Complaint   SAINI, Lightheadedness    History of Present Illness   Jeremiah Isaac is a 60 year old male who has a  "PMHx notable for pulmonary HTN, RVH/LVH, DMII, HTN, CKD, and CAD s/p LAD stent (8/11). He presented to [facility] ED with shortness of breath and lightheadedness prior to being transferred to [facility] for further work up.    Patient states that it has been almost a year since the last time he felt \"normal.\" When he first started feeling short of breath with exertion a year ago, he was found to have LAD lesion and got a stent placed. He subsequently felt better for a few weeks before feeling dyspneic again. At that point, he states that was when he got diagnosed with pulmonary hypertension. After several months of slowly progressing difficulty with breathing, he was found to have in-stent stenosis ~2 weeks ago which led to another stent placed in his LAD. Since then, he has not felt any better. The morning of admission, patient was getting out of his car and going into his home when he suddenly felt lightheaded as if he was going to pass out, which was the reason his cardiologist (Dr. Riojas) wanted him to go to the ED for evaluation.    At [facility] ED, he was hemodynamically stable. Labs did not show any evidence of ACS with negative troponin x 2 and EKG. He was COVID negative prior to being transferred to [facility] for further work up. At the time of my evaluation, patient is symptom free and states he is \"tired of feeling this way and just want to be get this over with.\" He denies any recent fevers/chills, nausea/vomiting, chest pain, sore throat, cough, orthopnea, PND, abdominal pain, changes in BMs or swelling in his legs.    Review of Systems    The 10 point Review of Systems is negative other than noted in the HPI or here.     Past Medical History    I have reviewed this patient's medical history and updated it with pertinent information if needed.   Past Medical History:   Diagnosis Date     Acute on chronic right-sided heart failure (H) 1/16/2020    Added automatically from request for surgery [id number]     " Central sleep apnea      CKD (chronic kidney disease)      DM2 (diabetes mellitus, type 2) (H)      Dyspnea on exertion 1/14/2020    Added automatically from request for surgery [id number]     Heart failure (H) 1/16/2020     Hypertension      Narcolepsy      Neuropathy      Pulmonary hypertension (H) 1/14/2020    Added automatically from request for surgery [id number]     Past Surgical History   I have reviewed this patient's surgical history and updated it with pertinent information if needed.  Past Surgical History:   Procedure Laterality Date     CV CORONARY ANGIOGRAM N/A 8/11/2020    Procedure: CV CORONARY ANGIOGRAM;  Surgeon: Thomas Norton MD;  Location:  HEART CARDIAC CATH LAB     CV INSTANTANEOUS WAVE-FREE RATIO N/A 8/11/2020    Procedure: Instantaneous Wave-Free Ratio;  Surgeon: Thomas Norton MD;  Location: [facility] CARDIAC CATH LAB     CV INTRAVASULAR ULTRASOUND N/A 8/11/2020    Procedure: Intravascular Ultrasound;  Surgeon: Thomas Norton MD;  Location:  HEART CARDIAC CATH LAB     CV LEFT HEART CATH N/A 1/17/2020    Procedure: Left Heart Cath;  Surgeon: Elia Salas MD;  Location:  HEART CARDIAC CATH LAB     CV PCI STENT DRUG ELUTING N/A 8/11/2020    Procedure: Percutaneous Coronary Intervention Stent Drug Eluting;  Surgeon: Thomas Norton MD;  Location:  HEART CARDIAC CATH LAB     CV RIGHT HEART CATH N/A 1/17/2020    Procedure: Heart Cath Right Heart Cath w/ nitric oxide reversal study;  Surgeon: Elia Salas MD;  Location:  HEART CARDIAC CATH LAB     CV RIGHT HEART CATH N/A 6/30/2020    Procedure: Heart Cath Right Heart Cath;  Surgeon: Isaac Bacon MD;  Location: [facility] CARDIAC CATH LAB     CV RIGHT HEART CATH N/A 8/11/2020    Procedure: CV RIGHT HEART CATH;  Surgeon: Thomas Norton MD;  Location: [facility] CARDIAC CATH LAB     CV STRESS EXERCISE STUDY N/A 8/11/2020    Procedure: Stress Drug Study;   Surgeon: Thomas Norton MD;  Location:  HEART CARDIAC CATH LAB     Social History   I have reviewed this patient's social history and updated it with pertinent information if needed.  Social History     Tobacco Use     Smoking status: Never Smoker     Smokeless tobacco: Never Used   Substance Use Topics     Alcohol use: Yes     Frequency: Monthly or less     Comment: once or twice a year      Drug use: Not Currently     Family History   I have reviewed this patient's family history and updated it with pertinent information if needed.     Prior to Admission Medications   Prior to Admission Medications   Prescriptions Last Dose Informant Patient Reported? Taking?   NIFEdipine ER (ADALAT CC) 30 MG 24 hr tablet  Self No No   Sig: Take 1 tablet (30 mg) by mouth daily   aspirin (ASA) 81 MG EC tablet  Self No No   Sig: Take 1 tablet (81 mg) by mouth daily Start tomorrow morning.   Patient not taking: Reported on 8/19/2020   atorvastatin (LIPITOR) 40 MG tablet  Self Yes No   Sig: Take 40 mg by mouth every morning    cloNIDine (CATAPRES) 0.3 MG tablet  Self Yes No   Sig: Take 0.3 mg by mouth 2 times daily   clopidogrel (PLAVIX) 75 MG tablet  Self Yes No   Sig: Take 75 mg by mouth every morning    glipiZIDE (GLUCOTROL XL) 10 MG 24 hr tablet  Self Yes No   Sig: Take 10 mg by mouth every morning    insulin lispro (HUMALOG VIAL) 100 UNIT/ML vial  Self Yes No   Sig: Inject 100 Units Subcutaneous daily   lisinopril (PRINIVIL/ZESTRIL) 40 MG tablet  Self Yes No   Sig: Take 40 mg by mouth every morning    macitentan (OPSUMIT) 10 MG tablet  Self Yes No   Sig: Take 10 mg by mouth every morning    metFORMIN (GLUCOPHAGE-XR) 750 MG 24 hr tablet  Self Yes No   Sig: Take 2,250 mg by mouth daily (with breakfast)    metoprolol succinate ER (TOPROL-XL) 100 MG 24 hr tablet  Self Yes No   Sig: Take 100 mg by mouth every morning    sildenafil (REVATIO) 20 MG tablet  Self No No   Sig: Take 1 tablet (20 mg) by mouth 3 times daily    ticagrelor (BRILINTA) 90 MG tablet  Self No No   Sig: Take 1 tablet (90 mg) by mouth 2 times daily Start this evening.   torsemide (DEMADEX) 20 MG tablet  Self No No   Sig: Take 2 tablets (40 mg) by mouth daily      Facility-Administered Medications: None     Allergies   No Known Allergies    Physical Exam   Vital Signs: Temp: 98.4  F (36.9  C) Temp src: Oral BP: 124/51 Pulse: 60   Resp: 20 SpO2: 99 % O2 Device: None (Room air)    Weight: 311 lbs 14.4 oz    General:  male, sitting in bed, conversant, comfortable, in NAD  Eyes: Eyes are clear, pupils are reactive, no scleral icterus.  EOMI  Cardiovascular: Regular rate and rhythm, normal S1 and S2, and no murmur noted. JVP is normal. 1+ pitting edema in RLE > LLE  Respiratory: Clear to auscultation bilaterally. No wheezes or rhonchi appreciated  GI: Soft, non-tender, normal bowel sounds  Skin: Warm and dry, no rashes.   Neurologic: Neck supple. Cranial nerves are grossly intact.  is symmetric. CN II-XII intact, No lateralizing symptoms or focal neurologic deficits    Data   Data reviewed today: I reviewed all medications, new labs and imaging results over the last 24 hours.    Recent Labs   Lab 08/19/20  2350 08/19/20  1647 08/19/20  1312   WBC 7.3  --  5.8   HGB 11.6*  --  10.9*   MCV 86  --  86     --  216     --  137   POTASSIUM 3.8  --  3.7   CHLORIDE 108  --  104   CO2 24  --  25   BUN 60*  --  62*   CR 2.71*  --  3.04*   ANIONGAP 8  --  8   ANNITA 9.0  --  8.8   *  --  285*   TROPI  --  <0.015 <0.015     Recent Results (from the past 24 hour(s))   XR Chest 2 Views    Narrative    CHEST TWO VIEWS  8/19/2020 2:32 PM     HISTORY: Dyspnea. History of CAD two weeks status post PCI.    COMPARISON: None.       Impression    IMPRESSION: The cardiac silhouette is within normal limits. No  evidence for infiltrate or effusion. Degenerative changes are noted  through the spine. A few compression deformities are noted through the  mid to  lower thoracic spine.    KAREN SALDAÑA MD

## 2020-08-19 NOTE — NURSING NOTE
Pts plan of care per Santiago Riojas MD:    1. Patient advised to go to [facility], preferably by ambulance for assessment of potential stent closure, pulmonary edema/volume overload.  Discussed case with [place] [facility]  2. Renal function re-testing in ER  3. Blood pressure control  4. Discharge instructions from  to include need to be seen at [facility] weekly for a while to stark blood pressure and renal function    I will follow up on pts hospitalization so that I can assist with Follow up.  Senait Berg RN on 8/19/2020 at 4:21 PM

## 2020-08-19 NOTE — LETTER
8/19/2020      RE: Jeremiah Isaac  [address]  [place] [address]       Dear Colleague,    Thank you for the opportunity to participate in the care of your patient, Jeremiah Isaac, at the [facility]. Please see a copy of my visit note below.    [facility]      1. Pulmonary hypertension  2. Elevated body mass index  3. Abnormal ECG: bradycardia, incomplete RBBB, RVH  4. Diabetes  5. Neuropathy  6. Hypertension  7. Negative serologic screen collagen vascular disease  8. CKD with proteinuria Estimated GFR 30-50  9. Elevated kappa light chains, low albumin, no evidence of monoclonal spike  10. Narcolepsy  11.Sleep disordered breath              Central sleep apnea              BIP with ASV currently  12. Elevated coronary calcium score with negative stress test              History of LAD stent  13, Questionable history of narcolepsy ? Central sleep apnea  14. History of methamphetamine usage  15. Exertional syncope  16. Epistaxis (ASA/Plavix)      Video visit with patient's permission for follow-up of pulmonary arterial hypertension (disproportionate) currently treated with sildenafil, weight, fluid, and blood pressure optimization.  He has known CAD with previous stenting of the LAD.  12:oo-12:20 Patient at home.    Plan:  1. Patient advised to go to [facility], preferably by ambulance for assessment of potential stent closure, pulmonary edema/volume overload.  Discussed case with [place] [facility]  2. Renal function re-testing in ER  3. Blood pressure control  4. Discharge instructions from  to include need to be seen at [facility] weekly for a while to stark blood pressure and renal function      Complex decision making, patient counseling  and coordination of care involving multi-system disease including chronic renal failure, ASHD with previous stent and return of symptoms necessitating invasive assessmednt. assessment for classification of current  overall status and the specific cardiac and vascular components.  The patient is on multiple, complex medications that require monitoring to achieve maximum therapeutic dosing and minimize off-target symptoms, drug-drug interactions or toxicities.  Serial imaging and hemodynamic studies reviewed and up-dated.      Interim History  The patient returns for follow-up of poorly controlled blood pressure, recent angioplasty/stenting, pulmonary hypertension.  Patient had been doing well until today.  He now has shortness of breath with any exertion.  It should be noted that the patient has never had classic symptoms of chest pain.  Heart catherization recently performed (see below) and in stent stenosis treated.  He has no intercurrent recent history of increased leg swelling, hemoptysis, pleuritic chest pain.  Weight is stable by his account.    Constitutional: weight loss, fever, chills, night sweats  HEENT: without visual changes, swallow difficulties  Pulmonary: with shortness of breath, but  without cough, wheeze, hemoptysis  Cardiac: without chest pain but+, SAINI, PND, orthopnea, edema, palpitation, + one episodepre-syncope, syncope,  GI: without diarrhea, constipation, jaundice, melena, GERD, hematemesis  : without frequency, urgency, dysuria, hematuria  Skin: rash, bruise, open lesions  Neuro: without TIA, focal neurologic complaints, seizure, trauma  Ortho: without pain, swelling, mobility impairment  Endocrine: diabetes, thyroid, heat/cold intolerance, polyuria, polyphagia, change bowel habits.  Sleep:no GIRISH, periodic breathing, fatigue       Current Outpatient Medications   Medication     aspirin (ASA) 81 MG EC tablet     atorvastatin (LIPITOR) 40 MG tablet     cloNIDine (CATAPRES) 0.3 MG tablet     clopidogrel (PLAVIX) 75 MG tablet     glipiZIDE (GLUCOTROL XL) 10 MG 24 hr tablet     insulin lispro (HUMALOG VIAL) 100 UNIT/ML vial     lisinopril (PRINIVIL/ZESTRIL) 40 MG tablet     macitentan (OPSUMIT) 10 MG  tablet     metFORMIN (GLUCOPHAGE-XR) 750 MG 24 hr tablet     metoprolol succinate ER (TOPROL-XL) 100 MG 24 hr tablet     NIFEdipine ER (ADALAT CC) 30 MG 24 hr tablet     sildenafil (REVATIO) 20 MG tablet     ticagrelor (BRILINTA) 90 MG tablet     torsemide (DEMADEX) 20 MG tablet     No current facility-administered medications for this visit.              Right sided filling pressures are mildly elevated.    Severely elevated pulmonary artery hypertension.    Left sided filling pressures are moderately elevated.    Normal cardiac output level.    Hemodynamic data has been modified in Epic per physician review.     Nipride study showed a slight drop in cardiac output with significant drop in mean PA pressure and PCWP pressure.  Severe in stent restenosis of a prior proximal LAD stent with a significant dPR of 0.62. Otherwise mild non obstructive CAD elsewhere in large epicardial vessels.  PCI with one drug eluting stent to the proximal LAD optimized with IVUS directed post dilation.                         Continuation of dual antiplatelet therapy for 12 months      Coronary Findings     Diagnostic   Dominance: Right   Left Main    The vessel was visualized by selective angiography and is moderate in size.    Left Anterior Descending    The vessel was visualized by selective angiography and is moderate in size. There was 40% diffuse vessel disease.    Prox LAD to Mid LAD lesion is 75% stenosed. Culprit lesion. The lesion is calcified. The lesion was previously treated using a stent of unknown type. Previous treatment took place >2 years ago. Pressure wire/iFR used. 0.62    First Diagonal Branch    The vessel is small. There is mild diffuse disease throughout the vessel.    Second Diagonal Branch    The vessel is moderate in size. There is mild diffuse disease throughout the vessel.    2nd Diag lesion is 90% stenosed.    Third Diagonal Branch    The vessel is small. There is mild diffuse disease throughout the vessel.     Left Circumflex    The vessel was visualized by selective angiography and is moderate in size.    Prox Cx lesion is 30% stenosed.    First Obtuse Marginal Branch    The vessel is small. There is mild diffuse disease throughout the vessel.    Second Obtuse Marginal Branch    The vessel is moderate in size. The vessel exhibits minimal luminal irregularities.    Right Coronary Artery    The vessel was visualized by selective angiography and is moderate in size. There was 20% diffuse vessel disease.    Dist RCA lesion is 35% stenosed. Not the culprit lesion.    Acute Marginal Branch    The vessel is moderate in size and is angiographically normal.    Right Ventricular Branch    The vessel is moderate in size and is angiographically normal.    Right Posterior Descending Artery    The vessel is large. The vessel exhibits minimal luminal irregularities.    Right Posterior Atrioventricular Artery    The vessel is large. The vessel exhibits minimal luminal irregularities.    First Right Posterolateral Branch    The vessel is moderate in size. The vessel exhibits minimal luminal irregularities.    Second Right Posterolateral Branch    The vessel is moderate in size. The vessel exhibits minimal luminal irregularities.    Third Right Posterolateral Branch    The vessel is moderate in size. The vessel exhibits minimal luminal irregularities.       Intervention     Prox LAD to Mid LAD lesion    Stent    CATH GUIDING BLUE YELLOW PTFE XB3.5 [id number] guide catheter was successful. The pre-interventional distal flow is normal (MAYO 3). The post-interventional distal flow is normal (MAYO 3). A 6 Kazakh XB 3.5 placed in the left main. An OpSens wire was advanced into the LAD and across the ISR of a previous stent with dPR measured at 0.62. A 2.5x20mm Emerge was then used to pre dilate the lesion followed by placement of a 3.0x28mm Synergy stent. A 3.5x20mm NC Emerge used for pre dilation initially followed by a 3.5x8mm NC  Emerge distally and proximally at high pressure in areas of underexpansion. IVUS was then used to assess stent expansion and again showed underexpanded segments so a 3.75x8mm NC Emerge was then used to post dilate the stent further. IC nitroglycerin was given and final angiography performed which showed MAYO III flow, no residual stenosis, no perforation or dissection.    There is a 0% residual stenosis post intervention.              Laboratories:     Ref. Range 8/11/2020 12:30   Sodium Latest Ref Range: 133 - 144 mmol/L 137   Potassium Latest Ref Range: 3.4 - 5.3 mmol/L 4.3   Chloride Latest Ref Range: 94 - 109 mmol/L 106   Carbon Dioxide Latest Ref Range: 20 - 32 mmol/L 27   Urea Nitrogen Latest Ref Range: 7 - 30 mg/dL 53 (H)   Creatinine Latest Ref Range: 0.66 - 1.25 mg/dL 2.05 (H)   GFR Estimate Latest Ref Range: >60 mL/min/1.73_m2 34 (L)   GFR Estimate If Black Latest Ref Range: >60 mL/min/1.73_m2 40 (L)   Calcium Latest Ref Range: 8.5 - 10.1 mg/dL 9.0   Anion Gap Latest Ref Range: 3 - 14 mmol/L 4   N-Terminal Pro BNP Inpatient Latest Ref Range: 0 - 900 pg/mL 250   Glucose Latest Ref Range: 70 - 99 mg/dL 305 (H)   WBC Latest Ref Range: 4.0 - 11.0 10e9/L 7.7   Hemoglobin Latest Ref Range: 13.3 - 17.7 g/dL 11.6 (L)   Hematocrit Latest Ref Range: 40.0 - 53.0 % 35.0 (L)   Platelet Count Latest Ref Range: 150 - 450 10e9/L 240   RBC Count Latest Ref Range: 4.4 - 5.9 10e12/L 4.01 (L)   MCV Latest Ref Range: 78 - 100 fl 87   MCH Latest Ref Range: 26.5 - 33.0 pg 28.9   MCHC Latest Ref Range: 31.5 - 36.5 g/dL 33.1   RDW Latest Ref Range: 10.0 - 15.0 % 13.2           Results for TERRI MCELROY (MRN [id number]) as of 8/3/2020 12:56   Ref. Range 1/19/2020 04:57 1/19/2020 11:50 1/22/2020 14:36 6/30/2020 08:00 7/30/2020 13:47   Creatinine Latest Ref Range: 0.66 - 1.25 mg/dL 2.37 (H) 2.09 (H) 2.40 (H) 1.74 (H) 2.19 (H)        Ref. Range 7/30/2020 13:47   Sodium Latest Ref Range: 133 - 144 mmol/L 139   Potassium Latest Ref Range:  3.4 - 5.3 mmol/L 4.0   Chloride Latest Ref Range: 94 - 109 mmol/L 107   Carbon Dioxide Latest Ref Range: 20 - 32 mmol/L 24   Urea Nitrogen Latest Ref Range: 7 - 30 mg/dL 50 (H)   Creatinine Latest Ref Range: 0.66 - 1.25 mg/dL 2.19 (H)   GFR Estimate Latest Ref Range: >60 mL/min/1.73_m2 32 (L)   GFR Estimate If Black Latest Ref Range: >60 mL/min/1.73_m2 37 (L)   Calcium Latest Ref Range: 8.5 - 10.1 mg/dL 9.3   Anion Gap Latest Ref Range: 3 - 14 mmol/L 8   Albumin Latest Ref Range: 3.4 - 5.0 g/dL 3.7   Protein Total Latest Ref Range: 6.8 - 8.8 g/dL 7.3   Bilirubin Total Latest Ref Range: 0.2 - 1.3 mg/dL 0.5   Alkaline Phosphatase Latest Ref Range: 40 - 150 U/L 108   ALT Latest Ref Range: 0 - 70 U/L 18   AST Latest Ref Range: 0 - 45 U/L 11   N-Terminal Pro Bnp Latest Ref Range: 0 - 125 pg/mL 615 (H)   Glucose Latest Ref Range: 70 - 99 mg/dL 235 (H)   WBC Latest Ref Range: 4.0 - 11.0 10e9/L 7.7   Hemoglobin Latest Ref Range: 13.3 - 17.7 g/dL 11.4 (L)   Hematocrit Latest Ref Range: 40.0 - 53.0 % 34.5 (L)   Platelet Count Latest Ref Range: 150 - 450 10e9/L 281   RBC Count Latest Ref Range: 4.4 - 5.9 10e12/L 3.93 (L)   MCV Latest Ref Range: 78 - 100 fl 88   MCH Latest Ref Range: 26.5 - 33.0 pg 29.0   MCHC Latest Ref Range: 31.5 - 36.5 g/dL 33.0   RDW Latest Ref Range: 10.0 - 15.0 % 12.8       Echocardiogram    MRN: [id number]  : 1960  Study Date: 2020 10:04 AM  Age: 60 yrs  Gender: Male  Patient Location: [facility]  Reason For Study: Dyspnea on exertion  Ordering Physician: INDIGO TERRY  Referring Physician: INDIGO TERRY  Performed By: Fabian Marino RDCS     BSA: 2.6 m2  Height: 70 in  Weight: 325 lb  HR: 89  BP: 210/105 mmHg  _____________________________________________________________________________  __     Procedure  Complete Echo Adult. Optison (NDC #[id number]) given intravenously.     _____________________________________________________________________________  __        Interpretation  Summary     Mild concentric left ventricular hypertrophy.  Left ventricular size, global systolic function, and wall motion are normal,  estimated LVEF 60-65%.  Right ventricular global function is normal.  No significant valvular abnormalities.     There are no prior studies available for comparison.  _____________________________________________________________________________  __        Left Ventricle  The left ventricle is normal in size. There is mild concentric left  ventricular hypertrophy. Left ventricular systolic function is normal. The  visual ejection fraction is estimated at 60-65%. Left ventricular diastolic  function is normal. No regional wall motion abnormalities noted.     Right Ventricle  Borderline right ventricular enlargement. The right ventricular systolic  function is normal.     Atria  Normal left atrial size. Right atrial size is normal.     Mitral Valve  The mitral valve is normal in structure and function.     Tricuspid Valve  The tricuspid valve is not well visualized, but is grossly normal. Right  ventricular systolic pressure could not be approximated due to inadequate  tricuspid regurgitation.        Aortic Valve  The aortic valve is normal in structure and function.     Pulmonic Valve  The pulmonic valve is not well seen, but is grossly normal.     Vessels  The aortic root is normal size. Normal size ascending aorta. The inferior vena  cava was normal in size with preserved respiratory variability.     Pericardium  There is no pericardial effusion.     _____________________________________________________________________________  __  MMode/2D Measurements & Calculations  RVDd: 4.2 cm  IVSd: 1.4 cm  LVIDd: 4.6 cm  LVIDs: 2.2 cm  LVPWd: 1.3 cm  FS: 52.4 %  LV mass(C)d: 239.4 grams  LV mass(C)dI: 93.3 grams/m2  Ao root diam: 3.7 cm  LA dimension: 4.8 cm  asc Aorta Diam: 3.6 cm  LA/Ao: 1.3     LA Volume Index (BP): 28.0 ml/m2  RWT: 0.56        Doppler Measurements & Calculations  MV E  "max benedict: 100.5 cm/sec  MV A max benedict: 89.1 cm/sec  MV E/A: 1.1  MV dec slope: 500.2 cm/sec2  MV dec time: 0.20 sec  PA acc time: 0.11 sec  E/E': 20.1  Peak E' Benedict: 5.0 cm/sec    Jeremiah Isaac is a 60 year old male who is being evaluated via a billable video visit.      The patient has been notified of following:     \"This video visit will be conducted via a call between you and your physician/provider. We have found that certain health care needs can be provided without the need for an in-person physical exam.  This service lets us provide the care you need with a video conversation.  If a prescription is necessary we can send it directly to your pharmacy.  If lab work is needed we can place an order for that and you can then stop by our lab to have the test done at a later time.    Video visits are billed at different rates depending on your insurance coverage.  Please reach out to your insurance provider with any questions.    If during the course of the call the physician/provider feels a video visit is not appropriate, you will not be charged for this service.\"    Patient has given verbal consent for Video visit? Yes  How would you like to obtain your AVS? MyChart  If you are dropped from the video visit, the video invite should be resent to: Text to cell phone: [phone]  Will anyone else be joining your video visit? No            "

## 2020-08-19 NOTE — PROGRESS NOTES
"Jeremiah Isaac is a 60 year old male who is being evaluated via a billable video visit.      The patient has been notified of following:     \"This video visit will be conducted via a call between you and your physician/provider. We have found that certain health care needs can be provided without the need for an in-person physical exam.  This service lets us provide the care you need with a video conversation.  If a prescription is necessary we can send it directly to your pharmacy.  If lab work is needed we can place an order for that and you can then stop by our lab to have the test done at a later time.    Video visits are billed at different rates depending on your insurance coverage.  Please reach out to your insurance provider with any questions.    If during the course of the call the physician/provider feels a video visit is not appropriate, you will not be charged for this service.\"    Patient has given verbal consent for Video visit? Yes  How would you like to obtain your AVS? MyChart  If you are dropped from the video visit, the video invite should be resent to: Text to cell phone: [phone]  Will anyone else be joining your video visit? No            "

## 2020-08-20 ENCOUNTER — APPOINTMENT (OUTPATIENT)
Dept: CARDIOLOGY | Facility: CLINIC | Age: 60
DRG: 247 | End: 2020-08-20
Attending: INTERNAL MEDICINE
Payer: COMMERCIAL

## 2020-08-20 PROBLEM — I25.10 CORONARY ARTERY DISEASE INVOLVING NATIVE CORONARY ARTERY OF NATIVE HEART WITHOUT ANGINA PECTORIS: Status: ACTIVE | Noted: 2020-08-19

## 2020-08-20 LAB
ANION GAP SERPL CALCULATED.3IONS-SCNC: 6 MMOL/L (ref 3–14)
ANION GAP SERPL CALCULATED.3IONS-SCNC: 8 MMOL/L (ref 3–14)
BUN SERPL-MCNC: 60 MG/DL (ref 7–30)
BUN SERPL-MCNC: 62 MG/DL (ref 7–30)
CALCIUM SERPL-MCNC: 9 MG/DL (ref 8.5–10.1)
CALCIUM SERPL-MCNC: 9 MG/DL (ref 8.5–10.1)
CHLORIDE SERPL-SCNC: 106 MMOL/L (ref 94–109)
CHLORIDE SERPL-SCNC: 108 MMOL/L (ref 94–109)
CO2 SERPL-SCNC: 24 MMOL/L (ref 20–32)
CO2 SERPL-SCNC: 26 MMOL/L (ref 20–32)
CREAT SERPL-MCNC: 2.71 MG/DL (ref 0.66–1.25)
CREAT SERPL-MCNC: 2.84 MG/DL (ref 0.66–1.25)
ERYTHROCYTE [DISTWIDTH] IN BLOOD BY AUTOMATED COUNT: 13.2 % (ref 10–15)
GFR SERPL CREATININE-BSD FRML MDRD: 23 ML/MIN/{1.73_M2}
GFR SERPL CREATININE-BSD FRML MDRD: 24 ML/MIN/{1.73_M2}
GLUCOSE BLDC GLUCOMTR-MCNC: 120 MG/DL (ref 70–99)
GLUCOSE BLDC GLUCOMTR-MCNC: 125 MG/DL (ref 70–99)
GLUCOSE BLDC GLUCOMTR-MCNC: 127 MG/DL (ref 70–99)
GLUCOSE BLDC GLUCOMTR-MCNC: 128 MG/DL (ref 70–99)
GLUCOSE SERPL-MCNC: 146 MG/DL (ref 70–99)
GLUCOSE SERPL-MCNC: 172 MG/DL (ref 70–99)
HBA1C MFR BLD: 8.9 % (ref 0–5.6)
HCT VFR BLD AUTO: 30.9 % (ref 40–53)
HGB BLD-MCNC: 10.2 G/DL (ref 13.3–17.7)
INR PPP: 1.03 (ref 0.86–1.14)
KCT BLD-ACNC: 154 SEC (ref 75–150)
KCT BLD-ACNC: 192 SEC (ref 75–150)
KCT BLD-ACNC: 238 SEC (ref 75–150)
KCT BLD-ACNC: 298 SEC (ref 75–150)
MCH RBC QN AUTO: 29 PG (ref 26.5–33)
MCHC RBC AUTO-ENTMCNC: 33 G/DL (ref 31.5–36.5)
MCV RBC AUTO: 88 FL (ref 78–100)
NT-PROBNP SERPL-MCNC: 338 PG/ML (ref 0–900)
PLATELET # BLD AUTO: 216 10E9/L (ref 150–450)
POTASSIUM SERPL-SCNC: 3.4 MMOL/L (ref 3.4–5.3)
POTASSIUM SERPL-SCNC: 3.8 MMOL/L (ref 3.4–5.3)
POTASSIUM SERPL-SCNC: 3.8 MMOL/L (ref 3.4–5.3)
RBC # BLD AUTO: 3.52 10E12/L (ref 4.4–5.9)
SODIUM SERPL-SCNC: 138 MMOL/L (ref 133–144)
SODIUM SERPL-SCNC: 140 MMOL/L (ref 133–144)
WBC # BLD AUTO: 6.6 10E9/L (ref 4–11)

## 2020-08-20 PROCEDURE — 21400000 ZZH R&B CCU UMMC

## 2020-08-20 PROCEDURE — 85347 COAGULATION TIME ACTIVATED: CPT

## 2020-08-20 PROCEDURE — 25000132 ZZH RX MED GY IP 250 OP 250 PS 637: Performed by: STUDENT IN AN ORGANIZED HEALTH CARE EDUCATION/TRAINING PROGRAM

## 2020-08-20 PROCEDURE — 85027 COMPLETE CBC AUTOMATED: CPT | Performed by: INTERNAL MEDICINE

## 2020-08-20 PROCEDURE — 25800030 ZZH RX IP 258 OP 636: Performed by: NURSE PRACTITIONER

## 2020-08-20 PROCEDURE — 4A023N6 MEASUREMENT OF CARDIAC SAMPLING AND PRESSURE, RIGHT HEART, PERCUTANEOUS APPROACH: ICD-10-PCS | Performed by: INTERNAL MEDICINE

## 2020-08-20 PROCEDURE — 25000132 ZZH RX MED GY IP 250 OP 250 PS 637: Performed by: NURSE PRACTITIONER

## 2020-08-20 PROCEDURE — 83036 HEMOGLOBIN GLYCOSYLATED A1C: CPT | Performed by: INTERNAL MEDICINE

## 2020-08-20 PROCEDURE — C1894 INTRO/SHEATH, NON-LASER: HCPCS | Performed by: INTERNAL MEDICINE

## 2020-08-20 PROCEDURE — 99232 SBSQ HOSP IP/OBS MODERATE 35: CPT | Mod: 25 | Performed by: NURSE PRACTITIONER

## 2020-08-20 PROCEDURE — 27210794 ZZH OR GENERAL SUPPLY STERILE: Performed by: INTERNAL MEDICINE

## 2020-08-20 PROCEDURE — 93306 TTE W/DOPPLER COMPLETE: CPT | Mod: 26 | Performed by: INTERNAL MEDICINE

## 2020-08-20 PROCEDURE — C1760 CLOSURE DEV, VASC: HCPCS | Performed by: INTERNAL MEDICINE

## 2020-08-20 PROCEDURE — 93005 ELECTROCARDIOGRAM TRACING: CPT

## 2020-08-20 PROCEDURE — 80048 BASIC METABOLIC PNL TOTAL CA: CPT | Performed by: INTERNAL MEDICINE

## 2020-08-20 PROCEDURE — 92928 PRQ TCAT PLMT NTRAC ST 1 LES: CPT | Mod: LD | Performed by: INTERNAL MEDICINE

## 2020-08-20 PROCEDURE — 92920 PRQ TRLUML C ANGIOP 1ART&/BR: CPT | Performed by: INTERNAL MEDICINE

## 2020-08-20 PROCEDURE — 92921 ZZHC PRQ TRLUML CORONARY ANGIOPLASTY ADDL BRANCH: CPT | Mod: LD | Performed by: INTERNAL MEDICINE

## 2020-08-20 PROCEDURE — 84132 ASSAY OF SERUM POTASSIUM: CPT | Performed by: INTERNAL MEDICINE

## 2020-08-20 PROCEDURE — C1874 STENT, COATED/COV W/DEL SYS: HCPCS | Performed by: INTERNAL MEDICINE

## 2020-08-20 PROCEDURE — 25000128 H RX IP 250 OP 636: Performed by: INTERNAL MEDICINE

## 2020-08-20 PROCEDURE — 00000146 ZZHCL STATISTIC GLUCOSE BY METER IP

## 2020-08-20 PROCEDURE — C1887 CATHETER, GUIDING: HCPCS | Performed by: INTERNAL MEDICINE

## 2020-08-20 PROCEDURE — B2111ZZ FLUOROSCOPY OF MULTIPLE CORONARY ARTERIES USING LOW OSMOLAR CONTRAST: ICD-10-PCS | Performed by: INTERNAL MEDICINE

## 2020-08-20 PROCEDURE — 027034Z DILATION OF CORONARY ARTERY, ONE ARTERY WITH DRUG-ELUTING INTRALUMINAL DEVICE, PERCUTANEOUS APPROACH: ICD-10-PCS | Performed by: INTERNAL MEDICINE

## 2020-08-20 PROCEDURE — 93456 R HRT CORONARY ARTERY ANGIO: CPT | Mod: 26 | Performed by: INTERNAL MEDICINE

## 2020-08-20 PROCEDURE — 93456 R HRT CORONARY ARTERY ANGIO: CPT | Performed by: INTERNAL MEDICINE

## 2020-08-20 PROCEDURE — 25000125 ZZHC RX 250: Performed by: INTERNAL MEDICINE

## 2020-08-20 PROCEDURE — 99152 MOD SED SAME PHYS/QHP 5/>YRS: CPT | Mod: 59 | Performed by: INTERNAL MEDICINE

## 2020-08-20 PROCEDURE — 85610 PROTHROMBIN TIME: CPT | Performed by: INTERNAL MEDICINE

## 2020-08-20 PROCEDURE — 25000131 ZZH RX MED GY IP 250 OP 636 PS 637: Performed by: NURSE PRACTITIONER

## 2020-08-20 PROCEDURE — C1769 GUIDE WIRE: HCPCS | Performed by: INTERNAL MEDICINE

## 2020-08-20 PROCEDURE — C9600 PERC DRUG-EL COR STENT SING: HCPCS | Performed by: INTERNAL MEDICINE

## 2020-08-20 PROCEDURE — 02703ZZ DILATION OF CORONARY ARTERY, ONE ARTERY, PERCUTANEOUS APPROACH: ICD-10-PCS | Performed by: INTERNAL MEDICINE

## 2020-08-20 PROCEDURE — C1725 CATH, TRANSLUMIN NON-LASER: HCPCS | Performed by: INTERNAL MEDICINE

## 2020-08-20 PROCEDURE — 36415 COLL VENOUS BLD VENIPUNCTURE: CPT | Performed by: INTERNAL MEDICINE

## 2020-08-20 PROCEDURE — 25500064 ZZH RX 255 OP 636: Performed by: INTERNAL MEDICINE

## 2020-08-20 PROCEDURE — 93010 ELECTROCARDIOGRAM REPORT: CPT | Mod: 76 | Performed by: INTERNAL MEDICINE

## 2020-08-20 DEVICE — IMPLANTABLE DEVICE: Type: IMPLANTABLE DEVICE | Status: FUNCTIONAL

## 2020-08-20 RX ORDER — NITROGLYCERIN 0.4 MG/1
0.4 TABLET SUBLINGUAL EVERY 5 MIN PRN
Status: DISCONTINUED | OUTPATIENT
Start: 2020-08-20 | End: 2020-08-21

## 2020-08-20 RX ORDER — NICOTINE POLACRILEX 4 MG
15-30 LOZENGE BUCCAL
Status: DISCONTINUED | OUTPATIENT
Start: 2020-08-20 | End: 2020-08-21 | Stop reason: HOSPADM

## 2020-08-20 RX ORDER — HYDRALAZINE HYDROCHLORIDE 20 MG/ML
10 INJECTION INTRAMUSCULAR; INTRAVENOUS EVERY 4 HOURS PRN
Status: DISCONTINUED | OUTPATIENT
Start: 2020-08-20 | End: 2020-08-21 | Stop reason: HOSPADM

## 2020-08-20 RX ORDER — DEXTROSE MONOHYDRATE 25 G/50ML
25-50 INJECTION, SOLUTION INTRAVENOUS
Status: DISCONTINUED | OUTPATIENT
Start: 2020-08-20 | End: 2020-08-21 | Stop reason: HOSPADM

## 2020-08-20 RX ORDER — SODIUM CHLORIDE 9 MG/ML
INJECTION, SOLUTION INTRAVENOUS CONTINUOUS
Status: DISCONTINUED | OUTPATIENT
Start: 2020-08-20 | End: 2020-08-20 | Stop reason: HOSPADM

## 2020-08-20 RX ORDER — ONDANSETRON 2 MG/ML
INJECTION INTRAMUSCULAR; INTRAVENOUS
Status: DISCONTINUED | OUTPATIENT
Start: 2020-08-20 | End: 2020-08-20 | Stop reason: HOSPADM

## 2020-08-20 RX ORDER — SODIUM CHLORIDE 9 MG/ML
INJECTION, SOLUTION INTRAVENOUS CONTINUOUS
Status: DISCONTINUED | OUTPATIENT
Start: 2020-08-20 | End: 2020-08-21 | Stop reason: HOSPADM

## 2020-08-20 RX ORDER — METHOCARBAMOL 500 MG/1
500 TABLET, FILM COATED ORAL 4 TIMES DAILY
Status: DISCONTINUED | OUTPATIENT
Start: 2020-08-20 | End: 2020-08-21 | Stop reason: HOSPADM

## 2020-08-20 RX ORDER — NITROGLYCERIN 5 MG/ML
VIAL (ML) INTRAVENOUS
Status: DISCONTINUED | OUTPATIENT
Start: 2020-08-20 | End: 2020-08-20 | Stop reason: HOSPADM

## 2020-08-20 RX ORDER — POTASSIUM CHLORIDE 750 MG/1
20 TABLET, EXTENDED RELEASE ORAL
Status: DISCONTINUED | OUTPATIENT
Start: 2020-08-20 | End: 2020-08-20 | Stop reason: HOSPADM

## 2020-08-20 RX ORDER — NALOXONE HYDROCHLORIDE 0.4 MG/ML
.1-.4 INJECTION, SOLUTION INTRAMUSCULAR; INTRAVENOUS; SUBCUTANEOUS
Status: DISCONTINUED | OUTPATIENT
Start: 2020-08-20 | End: 2020-08-21 | Stop reason: HOSPADM

## 2020-08-20 RX ORDER — ASPIRIN 81 MG/1
81 TABLET, CHEWABLE ORAL DAILY
Status: DISCONTINUED | OUTPATIENT
Start: 2020-08-20 | End: 2020-08-21 | Stop reason: HOSPADM

## 2020-08-20 RX ORDER — LIDOCAINE 40 MG/G
CREAM TOPICAL
Status: DISCONTINUED | OUTPATIENT
Start: 2020-08-20 | End: 2020-08-20 | Stop reason: HOSPADM

## 2020-08-20 RX ORDER — POTASSIUM CHLORIDE 750 MG/1
40 TABLET, EXTENDED RELEASE ORAL
Status: COMPLETED | OUTPATIENT
Start: 2020-08-20 | End: 2020-08-20

## 2020-08-20 RX ORDER — FENTANYL CITRATE 50 UG/ML
25 INJECTION, SOLUTION INTRAMUSCULAR; INTRAVENOUS EVERY 10 MIN PRN
Status: DISCONTINUED | OUTPATIENT
Start: 2020-08-20 | End: 2020-08-21 | Stop reason: HOSPADM

## 2020-08-20 RX ORDER — METOPROLOL TARTRATE 1 MG/ML
5-10 INJECTION, SOLUTION INTRAVENOUS
Status: DISCONTINUED | OUTPATIENT
Start: 2020-08-20 | End: 2020-08-21 | Stop reason: HOSPADM

## 2020-08-20 RX ORDER — IOPAMIDOL 755 MG/ML
INJECTION, SOLUTION INTRAVASCULAR
Status: DISCONTINUED | OUTPATIENT
Start: 2020-08-20 | End: 2020-08-20 | Stop reason: HOSPADM

## 2020-08-20 RX ORDER — HEPARIN SODIUM 1000 [USP'U]/ML
INJECTION, SOLUTION INTRAVENOUS; SUBCUTANEOUS
Status: DISCONTINUED | OUTPATIENT
Start: 2020-08-20 | End: 2020-08-20 | Stop reason: HOSPADM

## 2020-08-20 RX ADMIN — MACITENTAN 10 MG: 10 TABLET, FILM COATED ORAL at 08:39

## 2020-08-20 RX ADMIN — NIFEDIPINE 30 MG: 30 TABLET, FILM COATED, EXTENDED RELEASE ORAL at 08:42

## 2020-08-20 RX ADMIN — TICAGRELOR 90 MG: 90 TABLET ORAL at 00:44

## 2020-08-20 RX ADMIN — ASPIRIN 81 MG CHEWABLE TABLET 81 MG: 81 TABLET CHEWABLE at 08:38

## 2020-08-20 RX ADMIN — INSULIN GLARGINE 32 UNITS: 100 INJECTION, SOLUTION SUBCUTANEOUS at 13:41

## 2020-08-20 RX ADMIN — ACETAMINOPHEN 650 MG: 325 TABLET, FILM COATED ORAL at 21:32

## 2020-08-20 RX ADMIN — TICAGRELOR 90 MG: 90 TABLET ORAL at 20:31

## 2020-08-20 RX ADMIN — ATORVASTATIN CALCIUM 40 MG: 40 TABLET, FILM COATED ORAL at 08:39

## 2020-08-20 RX ADMIN — METHOCARBAMOL 500 MG: 500 TABLET, FILM COATED ORAL at 20:31

## 2020-08-20 RX ADMIN — TORSEMIDE 40 MG: 20 TABLET ORAL at 08:39

## 2020-08-20 RX ADMIN — TICAGRELOR 90 MG: 90 TABLET ORAL at 08:39

## 2020-08-20 RX ADMIN — POTASSIUM CHLORIDE 40 MEQ: 750 TABLET, EXTENDED RELEASE ORAL at 13:00

## 2020-08-20 RX ADMIN — ACETAMINOPHEN 650 MG: 325 TABLET, FILM COATED ORAL at 18:09

## 2020-08-20 RX ADMIN — HUMAN ALBUMIN MICROSPHERES AND PERFLUTREN 5 ML: 10; .22 INJECTION, SOLUTION INTRAVENOUS at 10:00

## 2020-08-20 RX ADMIN — CLONIDINE HYDROCHLORIDE 0.3 MG: 0.1 TABLET ORAL at 08:38

## 2020-08-20 RX ADMIN — CLONIDINE HYDROCHLORIDE 0.3 MG: 0.1 TABLET ORAL at 00:44

## 2020-08-20 RX ADMIN — SODIUM CHLORIDE: 9 INJECTION, SOLUTION INTRAVENOUS at 13:01

## 2020-08-20 RX ADMIN — SILDENAFIL 20 MG: 20 TABLET ORAL at 08:44

## 2020-08-20 RX ADMIN — CLONIDINE HYDROCHLORIDE 0.3 MG: 0.1 TABLET ORAL at 20:30

## 2020-08-20 ASSESSMENT — ACTIVITIES OF DAILY LIVING (ADL)
BATHING: 0-->INDEPENDENT
TRANSFERRING: 0-->INDEPENDENT
RETIRED_EATING: 0-->INDEPENDENT
RETIRED_COMMUNICATION: 0-->UNDERSTANDS/COMMUNICATES WITHOUT DIFFICULTY
DRESS: 0-->INDEPENDENT
SWALLOWING: 0-->SWALLOWS FOODS/LIQUIDS WITHOUT DIFFICULTY
ADLS_ACUITY_SCORE: 10
ADLS_ACUITY_SCORE: 10
COGNITION: 0 - NO COGNITION ISSUES REPORTED
ADLS_ACUITY_SCORE: 10
TOILETING: 0-->INDEPENDENT
ADLS_ACUITY_SCORE: 10
FALL_HISTORY_WITHIN_LAST_SIX_MONTHS: NO
ADLS_ACUITY_SCORE: 10
AMBULATION: 0-->INDEPENDENT

## 2020-08-20 NOTE — PLAN OF CARE
Nursing Focus: Admission  D: Arrived at 6C from [facility].    Admitted for SOB with activity since stent placement 8/11 and increased creatinine. Hx includes CAD, pulm HTN, HTN, DM, CKD, sleep apnea, and obesity.     I: Admission done, care plan, education started.  Patient oriented to room, environment, call light.  MD was notified of patients arrival on the unit. 2 person skin assessment completed with Travis MAGALLON: Patient is A&OX4, AVSS. NPO. Room air, sleeping with home bipap, sinus rhythm. No complaints of CP/SOB since admission. Left arm pt has implanted glucose meter, left abdomen implanted insulin pump, MD aware. Independent in room. Voiding in urinal, last BM prior to admission on 8/19.     P: Implement plan of care when available. Continue to monitor patient. Nursing interventions as appropriate. Notify MD with changes in pt status.     Oren RN 8/20

## 2020-08-20 NOTE — PROGRESS NOTES
[facility], [place]  Cardiology Progress Note  Date of service: 8/20/20    Today's plan:  - TTE  - RHC and coronary angiogram, consent signed    All risks and benefits for this procedure have been explained to this patient and accepted.  This includes but is not limited to death, heart attack, stroke, blood clots, bleeding including the need for blood transfusion and the risk thereof, allergic reaction to x-ray dye, arrhythmia necessitating cardioversion, dye nephropathy.  We talked about intracoronary stenting and the risks thereof and bypass surgery.  No history of bleeding problems or current bleeding, and no scheduled surgeries or procedures in the next year. Patient understands and wishes to proceed with it.     Assessment & Plan   Jeremiah Isaac is a 60 year old male admitted on 8/19/2020. 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 He presented to [facility] ED with chronic dyspnea on exertion and lightheadedness and is now transferred to [facility] for concern of in-stent stenosis vs. progression of pulmonary hypertension vs. acute CHF exacerbation.    # Dyspnea, Lightheadedness  # Pulmonary HTN (WHO group 3 vs possible group 1 PVOD)  # Concern for In-stent stenosis vs acute heart failure exacerbation  Patient reports one year history of exertional syncope. Underwent LHC on 08/2019 that revealed 95% stenosis of prox LAD that was FFR positive necessitating ARINA. He had recent coronary angiogram earlier this month that showed in-stent restenosis of LAD stent that required PCI with one ARINA to pLAD. Symptoms continue to persist despite ARINA without improvement. RHC revealed severe post capillary pulmonary HTN with a normal LVEDP lowering clinical suspicion for class 2 pulmonary HTN. V/Q scan revealed no CTEPH. Patient has history of sleep apnea, however class 3 unlikely to be only  of  pulmonary HTN. On admission, patient is hemodynamically stable w/o hypoxia. Negative troponin x 2. . Examination appears euvolemic as patient is has clear lungs and weighs less than baseline. Suspect patient's chronic, progressive dyspnea with exertion is likely secondary to underlying pulmonary HTN. However, need to rule out in-stent stenosis given recent history.  - Volume: Continue PTA torsemide 40mg qday  - ACEI: Will hold due to increasing creatinine [PTA lisinopril 40 mg daily]  - BB: Continue PTA Toprol  mg daily   - Continue PTA Nifedipine 30mg PO Daily  - PH meds: Continue PTA Macitentan 10mg PO Daily and Sildenafil 20mg PO TID  - TTE pending   - NPO for RHC and coronary angiogram today  - Consent signed     # CAD s/p ARINA to prox LAD x 2 (8/2019 and 8/11/2020)  ARINA to prox LAD on 8/2019. Cath 8/11/20 showing prox LAD to mid LAD lesion 75% stenosed, now stented, and mildly elevated R sided filling pressure, severely elevated PA pressure, moderately elevated L side filling pressure and normal cardiac output. Patient states he has been compliant with all medications. Both ticagrelor and clopidogrel are on home med list, patient unsure to which he is taking.   - Continue PTA atorvastatin 40mg PO Daily  - DAPT: Continue PTA aspirin 81 mg and ticagrelor 90 mg BID  - Coronary angiogram today, see above     # KENNETH on CKD  Cr noted to be 2.71 on admission, now 2.84. Appears baseline over the past year has ranged ~2. Possible cardiorenal however patient does not appear grossly volume overloaded.  - RHC/LHC today to better assess volume status   - TTE pending  - Continue to monitor      # GIRISH  Was recently transition to BIPAP last month. There could be a component of CSA  - Will consult sleep medicine to discuss if there is a CSA component to symptoms     # T2DM:  Controlled on home metformin, glipizide and V-go (wearable insulin pump).  - HOLD PTA metformin and glipizide  - Endocrinology consult for  inpatient insulin regimen     # Narcolepsy  Patient with history of narcolepsy. Could be 2/2 to GIRISH. Currently perscribed amphetamine by sleep medicine. In the setting of significant PHTN, will hold amphetamine presently.  - Hold amphetamine   - Consider consulting neurology in the AM       Disposition Plan   Expected discharge in 1-2 days to prior living arrangement pending angiogram results.       Ángela Pineda, CNP     I have seen and discussed this patient with Dr. Alexandre. He agrees with the above plan.     Interval History   No acute events overnight. VSS. Trops negative. On CPAP. Denies chest pain, shortness of breath, dizziness, lightheadedness, PND, or orthopnea.     Physical Exam   Temp: 98.2  F (36.8  C) Temp src: Axillary BP: 113/61 Pulse: 54   Resp: 16 SpO2: 96 % O2 Device: None (Room air)    Vitals:    08/19/20 2323   Weight: 141.5 kg (311 lb 14.4 oz)     Vital Signs with Ranges  Temp:  [97.6  F (36.4  C)-98.4  F (36.9  C)] 98.2  F (36.8  C)  Pulse:  [54-68] 54  Resp:  [7-32] 16  BP: ()/(39-74) 113/61  SpO2:  [93 %-100 %] 96 %  I/O last 3 completed shifts:  In: -   Out: 300 [Urine:300]  Patient Active Problem List   Diagnosis     Dyspnea on exertion     Pulmonary hypertension (H)     Heart failure (H)     Acute on chronic right-sided heart failure (H)     Status post coronary angiogram     Heart disease, ill-defined     Dyspnea     Coronary artery disease involving native coronary artery of native heart without angina pectoris     Constitutional: awake, alert, cooperative, no apparent distress, and appears stated age  Respiratory: No increased work of breathing, good air exchange, clear to auscultation bilaterally, no crackles or wheezing  Cardiovascular: Normal apical impulse, regular rate and rhythm, normal S1 and S2, no S3 or S4, and no murmur noted  GI: No scars, normal bowel sounds, soft, non-distended, non-tender, no masses palpated, no hepatosplenomegally  Skin: no bruising or bleeding,  normal skin color, texture, turgor and no redness, warmth, or swelling  Musculoskeletal: no lower extremity pitting edema present  Neurologic: Mental Status Exam:  Level of Alertness:   awake  Neuropsychiatric: General: normal, calm and normal eye contact  Level of consciousness: alert / normal    Medications     - MEDICATION INSTRUCTIONS -         aspirin  81 mg Oral Daily     atorvastatin  40 mg Oral QAM     cloNIDine  0.3 mg Oral BID     [Held by provider] heparin ANTICOAGULANT  5,000 Units Subcutaneous Q12H     [Held by provider] lisinopril  40 mg Oral QAM     macitentan  10 mg Oral QAM     [Held by provider] metoprolol succinate ER  100 mg Oral QAM     NIFEdipine ER  30 mg Oral Daily     sildenafil  20 mg Oral TID     sodium chloride (PF)  3 mL Intracatheter Q8H     ticagrelor  90 mg Oral BID     torsemide  40 mg Oral Daily       Data   Results for orders placed or performed during the hospital encounter of 08/19/20 (from the past 24 hour(s))   Glucose by meter   Result Value Ref Range    Glucose 136 (H) 70 - 99 mg/dL   Nt probnp inpatient   Result Value Ref Range    N-Terminal Pro BNP Inpatient 338 0 - 900 pg/mL   CBC with platelets   Result Value Ref Range    WBC 7.3 4.0 - 11.0 10e9/L    RBC Count 4.03 (L) 4.4 - 5.9 10e12/L    Hemoglobin 11.6 (L) 13.3 - 17.7 g/dL    Hematocrit 34.7 (L) 40.0 - 53.0 %    MCV 86 78 - 100 fl    MCH 28.8 26.5 - 33.0 pg    MCHC 33.4 31.5 - 36.5 g/dL    RDW 13.0 10.0 - 15.0 %    Platelet Count 255 150 - 450 10e9/L   Basic metabolic panel   Result Value Ref Range    Sodium 140 133 - 144 mmol/L    Potassium 3.8 3.4 - 5.3 mmol/L    Chloride 108 94 - 109 mmol/L    Carbon Dioxide 24 20 - 32 mmol/L    Anion Gap 8 3 - 14 mmol/L    Glucose 146 (H) 70 - 99 mg/dL    Urea Nitrogen 60 (H) 7 - 30 mg/dL    Creatinine 2.71 (H) 0.66 - 1.25 mg/dL    GFR Estimate 24 (L) >60 mL/min/[1.73_m2]    GFR Estimate If Black 28 (L) >60 mL/min/[1.73_m2]    Calcium 9.0 8.5 - 10.1 mg/dL   EKG 12-lead, complete    Result Value Ref Range    Interpretation ECG Click View Image link to view waveform and result    INR   Result Value Ref Range    INR 1.03 0.86 - 1.14   CBC with platelets   Result Value Ref Range    WBC 6.6 4.0 - 11.0 10e9/L    RBC Count 3.52 (L) 4.4 - 5.9 10e12/L    Hemoglobin 10.2 (L) 13.3 - 17.7 g/dL    Hematocrit 30.9 (L) 40.0 - 53.0 %    MCV 88 78 - 100 fl    MCH 29.0 26.5 - 33.0 pg    MCHC 33.0 31.5 - 36.5 g/dL    RDW 13.2 10.0 - 15.0 %    Platelet Count 216 150 - 450 10e9/L   Basic metabolic panel   Result Value Ref Range    Sodium 138 133 - 144 mmol/L    Potassium 3.4 3.4 - 5.3 mmol/L    Chloride 106 94 - 109 mmol/L    Carbon Dioxide 26 20 - 32 mmol/L    Anion Gap 6 3 - 14 mmol/L    Glucose 172 (H) 70 - 99 mg/dL    Urea Nitrogen 62 (H) 7 - 30 mg/dL    Creatinine 2.84 (H) 0.66 - 1.25 mg/dL    GFR Estimate 23 (L) >60 mL/min/[1.73_m2]    GFR Estimate If Black 27 (L) >60 mL/min/[1.73_m2]    Calcium 9.0 8.5 - 10.1 mg/dL   Echocardiogram Complete    Narrative    [id number]  [id number]  [id number]  [id number]^MERON^TIARA^GABE           [facility],[place]  Echocardiography Laboratory  [address] [place] [id number]     Name: NADIA MCELROY  MRN: [id number]  : 1960  Study Date: 2020 09:18 AM  Age: 60 yrs  Gender: Male  Patient Location: [facility] [place]  Reason For Study: Heart Failure  Ordering Physician: TIARA CHANCE  Referring Physician: ALLY BOWIE  Performed By: Loren Phillips RDCS     BSA: 2.5 m2  Height: 70 in  Weight: 311 lb  HR: 62  BP: 124/51 mmHg  _____________________________________________________________________________  __        Procedure  Echocardiogram with two-dimensional, color and spectral Doppler performed.  Contrast Optison. Technically difficult study. Poor acoustic windows. Optison  (NDC #2168-9008-88) given intravenously. Patient was given 5 ml mixture of 3  ml Optison and 6 ml saline. 4 ml  wasted.  _____________________________________________________________________________  __        Interpretation Summary  Limited echo.  Global and regional left ventricular function is normal with an EF of 60-65%.  No regional wall motion abnormalities are seen.  Global right ventricular function is normal.  IVC diameter and respiratory changes fall into an intermediate range  suggesting an RA pressure of 8 mmHg.  No pericardial effusion is present.     This study was compared with the study from 20. No significant change in  biventricular function.  _____________________________________________________________________________  __        Left Ventricle  Global and regional left ventricular function is normal with an EF of 60-65%.  No regional wall motion abnormalities are seen.     Right Ventricle  Global right ventricular function is normal.     Atria  The atria cannot be assessed.     Mitral Valve  The mitral valve is normal.        Aortic Valve  Aortic valve is normal in structure and function.     Tricuspid Valve  The tricuspid valve is normal. The peak velocity of the tricuspid regurgitant  jet is not obtainable. Pulmonary artery systolic pressure cannot be assessed.     Pulmonic Valve  The pulmonic valve is normal.     Vessels  IVC diameter and respiratory changes fall into an intermediate range  suggesting an RA pressure of 8 mmHg.     Pericardium  No pericardial effusion is present.        Compared to Previous Study  This study was compared with the study from 20 .  _____________________________________________________________________________  __           Doppler Measurements & Calculations  MV E max audrey: 89.2 cm/sec  MV A max audrey: 79.1 cm/sec  MV E/A: 1.1  MV dec slope: 379.0 cm/sec2  PA acc time: 0.11 sec  E/E' av.3  Lateral E/e': 10.3  Medial E/e': 14.4     _____________________________________________________________________________  __           Report approved by: Joseph Ford  08/20/2020 10:29 AM

## 2020-08-20 NOTE — PHARMACY-ADMISSION MEDICATION HISTORY
Admission medication history interview status for the 8/19/2020 admission is complete. See Epic admission navigator for allergy information, pharmacy, prior to admission medications and immunization status.     Medication history interview sources:  Patient, [facility] pharmacy, [facility]     Changes made to PTA medication list (reason)  -No changes made     Additional medication history information (including reliability of information, actions taken by pharmacist):  -Patient has filled both Brilinta and Plavix in August and is unsure which he is supposed to be taking PTA. The Brilinta was first prescribed following his angio on 8/11. He also reports that he was told to stop his ASA recently. It is unclear, but possible he misunderstood which antiplatelet medication he was supposed to stop. He will need his antiplatelet regimen clarified to him this admission.   -Patient is in basal insulin pump.     Prior to Admission medications    Medication Sig Last Dose Taking? Auth Provider   aspirin (ASA) 81 MG EC tablet Take 1 tablet (81 mg) by mouth daily Start tomorrow morning. Past Month at Unknown time Yes Fabian Ragland MD   atorvastatin (LIPITOR) 40 MG tablet Take 40 mg by mouth every morning  8/19/2020  Reported, Patient   cloNIDine (CATAPRES) 0.3 MG tablet Take 0.3 mg by mouth 2 times daily 8/19/2020 at am  Reported, Patient   clopidogrel (PLAVIX) 75 MG tablet Take 75 mg by mouth every morning    Reported, Patient   glipiZIDE (GLUCOTROL XL) 10 MG 24 hr tablet Take 10 mg by mouth every morning  8/19/2020  Reported, Patient   insulin lispro (HUMALOG VIAL) 100 UNIT/ML vial Inject 100 Units Subcutaneous daily   Reported, Patient   lisinopril (PRINIVIL/ZESTRIL) 40 MG tablet Take 40 mg by mouth every morning  8/19/2020  Reported, Patient   macitentan (OPSUMIT) 10 MG tablet Take 10 mg by mouth every morning  8/19/2020  Santiago Riojas MD   metFORMIN (GLUCOPHAGE-XR) 750 MG 24 hr tablet Take 2,250 mg by mouth  daily (with breakfast)  8/19/2020  Reported, Patient   metoprolol succinate ER (TOPROL-XL) 100 MG 24 hr tablet Take 100 mg by mouth every morning  8/19/2020  Reported, Patient   NIFEdipine ER (ADALAT CC) 30 MG 24 hr tablet Take 1 tablet (30 mg) by mouth daily 8/19/2020  Santiago Riojas MD   sildenafil (REVATIO) 20 MG tablet Take 1 tablet (20 mg) by mouth 3 times daily 8/19/2020  Santiago Riojas MD   ticagrelor (BRILINTA) 90 MG tablet Take 1 tablet (90 mg) by mouth 2 times daily Start this evening.   Fabian Ragland MD   torsemide (DEMADEX) 20 MG tablet Take 2 tablets (40 mg) by mouth daily 8/19/2020  Laurie Peters PA         Medication history completed by: Henry Borden, PharmD

## 2020-08-20 NOTE — CONSULTS
"Diabetes/Hyperglycemia Management Consult    Chief Complaint patient on metformin, glipizide and VGo, inpatient insulin regime  Consult requested by: Dr. Honeycutt  History of Present Illness Jeremiah \" Hasmukh\" Marlin is a 60 year old male with history of  Type 2 diabetes, hypertension, CKD stage 3, hyperlipidemia, obesity, pulmonary hypertension, narcolepsy, CAD s/p LAD stent ( 8/11/2020).   transferred from OSH for admission ( 8/19/2020) and further work-up for shortness of breath and lightheadedness.    Information was obtained from review of medical records and interview with patient.      Mr. Isaac reports  Being dx with type 2 diabetes > 5 years ago. PTA medications as listed below. In addition to the oral medications, he uses a VGo-40 ( insulin pump). Basal rate is ~ 1.66 units in 24 hours ( total 40 units) with the capability of delivering additional 36 units over the course of 24 hours by \"bolus.\".  He will bolus depending on blood sugars and will try to use all of the 36 units in 24 hours.  Each bolus delivers 2 units of insulin.  He uses a Freestyle [place] ( CGM) for monitoring his blood sugars. He has been unable to monitor his blood sugars for about one week ( does not use a glucometer because he is unable to get blood from his fingers).   He ran out of the sensors and needed a new prescription. Resumed monitoring 1-2 days previous. Noted to have blood sugars of 326. His FreeStyle [place] is at home and unable to review blood sugar readings.  Mr. Isaac does not follow any specific diet.  Has not experienced hypoglycemia in a long time. Endorses glcusoe mostly in the range from 90 to 250.    On presentation, glcusoe 285. His oral medications ( metformin and glipizide) were placed on hold due to NPO today for possible right heart cath. He was continued on his VGo-40 ( located on abdomen). Glucose 136--> 146--> 172--> 125. ( basal rate ~ 1.66 units/hour).    Currently, denies fever, chills, chest pain, abdominal " pain, nausea and or vomiting, bowel or bladder issues.      Recent Labs   Lab 08/20/20  0550 08/19/20  2350 08/19/20  2323 08/19/20  1312   * 146*  --  285*   BGM  --   --  136*  --          Diabetes Type: type 2 daibetic  Diabetes Duration: > 5 years per patient  Usual Diabetes Regimen:   metfromin  mg tablets, 3 tablets  Glipizide XL 10 mg  VGo 40 ( basal rate 1.66 units per hour) additional 36 units to be used in 24 hours ( 18 clicks)  Will bolus depending on blood sugar, if 200 or > may give 1 click = 2 units  Glucose 300 or > will take 2 clicks = 4 units  - will try to use the additional 18 clicks or 36 units  throughout the day    Empagliflozin 25 mg , discontinued 1/31/2020  Ability to [place] Prescribed Regimen: yes  Diabetes Control:   Lab Results   Component Value Date    A1C 10.9 01/16/2020     Diabetes Complications: nephropathy, retinopathy, neuropathy  History of DKA: no  Able to Detect Hypoglycemia: has not experienced in a long time,   Usual Diabetes Care Provider: PCP Radhika Ashton PA-C at [facility]  Factors Impacting Glucose Control: diet, NPO CKD      Review of Systems  10 point ROS completed with pertinent positives and negatives noted in the HPI    Past medical, family and social histories are reviewed and updated.    Past Medical History  Past Medical History:   Diagnosis Date     Acute on chronic right-sided heart failure (H) 1/16/2020    Added automatically from request for surgery [id number]     Central sleep apnea      CKD (chronic kidney disease)      DM2 (diabetes mellitus, type 2) (H)      Dyspnea on exertion 1/14/2020    Added automatically from request for surgery [id number]     Heart failure (H) 1/16/2020     Hypertension      Narcolepsy      Neuropathy      Pulmonary hypertension (H) 1/14/2020    Added automatically from request for surgery [id number]       Family History    Social History  Social History     Socioeconomic History     Marital status:  "Single     Spouse name: Not on file     Number of children: Not on file     Years of education: Not on file     Highest education level: Not on file   Occupational History     Not on file   Social Needs     Financial resource strain: Not on file     Food insecurity     Worry: Not on file     Inability: Not on file     Transportation needs     Medical: Not on file     Non-medical: Not on file   Tobacco Use     Smoking status: Never Smoker     Smokeless tobacco: Never Used   Substance and Sexual Activity     Alcohol use: Yes     Frequency: Monthly or less     Comment: once or twice a year      Drug use: Not Currently     Sexual activity: Not on file   Lifestyle     Physical activity     Days per week: Not on file     Minutes per session: Not on file     Stress: Not on file   Relationships     Social connections     Talks on phone: Not on file     Gets together: Not on file     Attends [employer] service: Not on file     Active member of club or organization: Not on file     Attends meetings of clubs or organizations: Not on file     Relationship status: Not on file     Intimate partner violence     Fear of current or ex partner: Not on file     Emotionally abused: Not on file     Physically abused: Not on file     Forced sexual activity: Not on file   Other Topics Concern     Parent/sibling w/ CABG, MI or angioplasty before 65F 55M? Not Asked   Social History Narrative     Not on file         Physical Exam  /61 (BP Location: Left arm)   Pulse 57   Temp 97.7  F (36.5  C) (Oral)   Resp 18   Ht 1.778 m (5' 10\")   Wt 141.5 kg (311 lb 14.4 oz)   SpO2 99%   BMI 44.75 kg/m      General:  pleasant  resting in bed, in no distress.   HEENT:  PER and anicteric, non-injected, oral mucous membranes moist.   Lungs:  Non-labored  ABD: obese, soft  Skin: warm and dry  MSK:  Moving all extremteis  Mental status:  alert, oriented x3, communicating clearly  Psych:  calm, even mood    Laboratory  Recent Labs   Lab Test " "08/20/20  0550 08/19/20  2350    140   POTASSIUM 3.4 3.8   CHLORIDE 106 108   CO2 26 24   ANIONGAP 6 8   * 146*   BUN 62* 60*   CR 2.84* 2.71*   ANNITA 9.0 9.0     CBC RESULTS:   Recent Labs   Lab Test 08/20/20  0550   WBC 6.6   RBC 3.52*   HGB 10.2*   HCT 30.9*   MCV 88   MCH 29.0   MCHC 33.0   RDW 13.2          Liver Function Studies -   Recent Labs   Lab Test 07/30/20  1347   PROTTOTAL 7.3   ALBUMIN 3.7   BILITOTAL 0.5   ALKPHOS 108   AST 11   ALT 18       Active Medications  Current Facility-Administered Medications   Medication     acetaminophen (TYLENOL) Suppository 650 mg     acetaminophen (TYLENOL) tablet 650 mg     alum & mag hydroxide-simethicone (MAALOX  ES) suspension 30 mL     aspirin (ASA) chewable tablet 81 mg     atorvastatin (LIPITOR) tablet 40 mg     bisacodyl (DULCOLAX) Suppository 10 mg     cloNIDine (CATAPRES) tablet 0.3 mg     [Held by provider] heparin ANTICOAGULANT injection 5,000 Units     lidocaine (LMX4) cream     lidocaine 1 % 0.1-1 mL     [Held by provider] lisinopril (ZESTRIL) tablet 40 mg     macitentan (OPSUMIT) tablet 10 mg     medication instruction     melatonin tablet 3 mg     metoprolol succinate ER (TOPROL-XL) 24 hr tablet 100 mg     NIFEdipine ER OSMOTIC (PROCARDIA XL) 24 hr tablet 30 mg     ondansetron (ZOFRAN-ODT) ODT tab 4 mg    Or     ondansetron (ZOFRAN) injection 4 mg     polyethylene glycol (MIRALAX) Packet 17 g     senna-docusate (SENOKOT-S/PERICOLACE) 8.6-50 MG per tablet 1 tablet    Or     senna-docusate (SENOKOT-S/PERICOLACE) 8.6-50 MG per tablet 2 tablet     sildenafil (REVATIO) tablet 20 mg     sodium chloride (PF) 0.9% PF flush 3 mL     sodium chloride (PF) 0.9% PF flush 3 mL     ticagrelor (BRILINTA) tablet 90 mg     torsemide (DEMADEX) tablet 40 mg     No current outpatient medications on file.       Current Diet  Orders Placed This Encounter      NPO per Anesthesia Guidelines for Procedure/Surgery Except for: Meds        Assessment: Jeremiah \" " "Hasmukh\" Marlin is a 60 year old male with history of  Type 2 diabetes, hypertension, CKD stage 3, hyperlipidemia, obesity, pulmonary hypertension, narcolepsy, CAD s/p LAD stent ( 8/11/2020).   transferred from OSH for admission ( 8/19/2020) and further work-up for shortness of breath and lightheadedness.    Type 2 diabetes: PTA on glipizide, metformin, and VGo-40, poorly controlled with A1C > 10 (1/2020). Mr. Isaac does not have any more of his devices with him    CKD: GFR 21-23: recommendations with a GFR < 30 metformin is contraindicated.   - Glipizide Xl is not recommended in CKD, possibly you could use glipizide single strength at a low dose, but most likley would not be enough medication.    Plan:  -continue to hold metformin and glipizide ( and not resume)  -will require new diabetes plan due to GFR ( will do a test claim for GLP1)  -add A1C  - start lantus 32 units daily ( reduced by 20% for NPO)  -added meal insulin, 1 unit per 7 grams of CHO for meals/snacks/supplements ( when able to have a diet)  -novolog high intensity sliding scale every 4 hours ( while NPO),   -monitor glcuose every 4 hours while NPO  -remove VG0-40 once lantus has been given  -hypoglycemia protocol  - will continue to follow    Yolie Martinez, [phone]  Diabetes Management Team job code: [id number]      I spent a total of 80 minutes bedside and on the inpatient unit managing the glycemic care of Jeremiah Isaac. Over 50% of my time on the unit was spent counseling the patient  and/or coordinating care regarding .  See note for details.  "

## 2020-08-20 NOTE — UTILIZATION REVIEW
"Inpatient to Observation note:    Admission Status; Secondary Review Determination         Under the authority of the Utilization Management Committee, the utilization review process indicated a secondary review on the above patient.  The review outcome is based on review of the medical records, discussions with staff, and applying clinical experience noted on the date of the review.          (x) Observation Status Appropriate - This patient does not meet hospital inpatient criteria and is placed in observation status. If this patient's primary payer is Medicare and was admitted as an inpatient, Condition Code 44 should be used and patient status changed to \"observation\".     RATIONALE FOR DETERMINATION   60-year-old male with history of coronary artery disease status post recent PCI on 8/11//2020, essential hypertension, diabetes mellitus type 2, hypertension, chronic kidney disease initially presented to [facility] emergency room with chronic dyspnea on exertion and lightheadedness and was transferred to [facility] for further evaluation and care on 8/19/2020.  Patient was admitted with concerns for intra-stent stenosis versus worsening pulmonary hypertension.  Echocardiogram done today does not show any significant change from July 1, 2020.  Patient will undergo right and left heart cath today.  Discussed with Dr. Melo, from cardiology service, if heart cath does not show any new acute concerning finding he will likely discharge home later today.  Observation care is appropriate under these circumstances.  However if the heart cath shows findings that requires further evaluation and work-up, then inpatient care is appropriate.    The severity of illness, intensity of service provided, expected LOS and risk for adverse outcome make the care appropriate for further observation; however, doesn't meet criteria for hospital inpatient admission. Dr Melo notified of this determination.    This document was " produced using voice recognition software.      The information on this document is developed by the utilization review team in order for the business office to ensure compliance.  This only denotes the appropriateness of proper admission status and does not reflect the quality of care rendered.         The definitions of Inpatient Status and Observation Status used in making the determination above are those provided in the CMS Coverage Manual, Chapter 1 and Chapter 6, section 70.4.      Sincerely,  Aly Viveros MD    Utilization Review  Physician Advisor  [facility].

## 2020-08-21 VITALS
WEIGHT: 305 LBS | TEMPERATURE: 98 F | BODY MASS INDEX: 43.67 KG/M2 | OXYGEN SATURATION: 98 % | RESPIRATION RATE: 18 BRPM | DIASTOLIC BLOOD PRESSURE: 57 MMHG | HEIGHT: 70 IN | SYSTOLIC BLOOD PRESSURE: 91 MMHG | HEART RATE: 63 BPM

## 2020-08-21 LAB
ANION GAP SERPL CALCULATED.3IONS-SCNC: 7 MMOL/L (ref 3–14)
BUN SERPL-MCNC: 61 MG/DL (ref 7–30)
CALCIUM SERPL-MCNC: 8.6 MG/DL (ref 8.5–10.1)
CHLORIDE SERPL-SCNC: 106 MMOL/L (ref 94–109)
CO2 SERPL-SCNC: 24 MMOL/L (ref 20–32)
CREAT SERPL-MCNC: 2.77 MG/DL (ref 0.66–1.25)
ERYTHROCYTE [DISTWIDTH] IN BLOOD BY AUTOMATED COUNT: 13.4 % (ref 10–15)
GFR SERPL CREATININE-BSD FRML MDRD: 24 ML/MIN/{1.73_M2}
GLUCOSE BLDC GLUCOMTR-MCNC: 159 MG/DL (ref 70–99)
GLUCOSE BLDC GLUCOMTR-MCNC: 185 MG/DL (ref 70–99)
GLUCOSE BLDC GLUCOMTR-MCNC: 309 MG/DL (ref 70–99)
GLUCOSE SERPL-MCNC: 291 MG/DL (ref 70–99)
HCT VFR BLD AUTO: 35.6 % (ref 40–53)
HGB BLD-MCNC: 11.7 G/DL (ref 13.3–17.7)
INTERPRETATION ECG - MUSE: NORMAL
MCH RBC QN AUTO: 29 PG (ref 26.5–33)
MCHC RBC AUTO-ENTMCNC: 32.9 G/DL (ref 31.5–36.5)
MCV RBC AUTO: 88 FL (ref 78–100)
PLATELET # BLD AUTO: 243 10E9/L (ref 150–450)
POTASSIUM SERPL-SCNC: 4 MMOL/L (ref 3.4–5.3)
RBC # BLD AUTO: 4.03 10E12/L (ref 4.4–5.9)
SODIUM SERPL-SCNC: 138 MMOL/L (ref 133–144)
WBC # BLD AUTO: 7.5 10E9/L (ref 4–11)

## 2020-08-21 PROCEDURE — 25000132 ZZH RX MED GY IP 250 OP 250 PS 637: Performed by: STUDENT IN AN ORGANIZED HEALTH CARE EDUCATION/TRAINING PROGRAM

## 2020-08-21 PROCEDURE — 85027 COMPLETE CBC AUTOMATED: CPT | Performed by: NURSE PRACTITIONER

## 2020-08-21 PROCEDURE — 99238 HOSP IP/OBS DSCHRG MGMT 30/<: CPT | Performed by: NURSE PRACTITIONER

## 2020-08-21 PROCEDURE — 25000131 ZZH RX MED GY IP 250 OP 636 PS 637: Performed by: NURSE PRACTITIONER

## 2020-08-21 PROCEDURE — 36415 COLL VENOUS BLD VENIPUNCTURE: CPT | Performed by: NURSE PRACTITIONER

## 2020-08-21 PROCEDURE — 00000146 ZZHCL STATISTIC GLUCOSE BY METER IP

## 2020-08-21 PROCEDURE — 80048 BASIC METABOLIC PNL TOTAL CA: CPT | Performed by: NURSE PRACTITIONER

## 2020-08-21 RX ORDER — BLOOD PRESSURE TEST KIT
KIT MISCELLANEOUS
Qty: 100 EACH | Refills: 3 | Status: SHIPPED | OUTPATIENT
Start: 2020-08-21

## 2020-08-21 RX ORDER — LIRAGLUTIDE 6 MG/ML
0.6 INJECTION SUBCUTANEOUS DAILY
Qty: 3 ML | Refills: 3 | Status: SHIPPED | OUTPATIENT
Start: 2020-08-22

## 2020-08-21 RX ORDER — LIRAGLUTIDE 6 MG/ML
0.6 INJECTION SUBCUTANEOUS DAILY
Status: DISCONTINUED | OUTPATIENT
Start: 2020-08-22 | End: 2020-08-21 | Stop reason: HOSPADM

## 2020-08-21 RX ORDER — GLIPIZIDE 5 MG/1
5 TABLET ORAL
Qty: 90 TABLET | Refills: 3 | Status: SHIPPED | OUTPATIENT
Start: 2020-08-22

## 2020-08-21 RX ORDER — GLIPIZIDE 5 MG/1
5 TABLET ORAL
Status: DISCONTINUED | OUTPATIENT
Start: 2020-08-22 | End: 2020-08-21 | Stop reason: HOSPADM

## 2020-08-21 RX ORDER — CONTAINER,EMPTY
EACH MISCELLANEOUS
Qty: 1 EACH | Refills: 3 | Status: SHIPPED | OUTPATIENT
Start: 2020-08-21

## 2020-08-21 RX ADMIN — INSULIN ASPART 7 UNITS: 100 INJECTION, SOLUTION INTRAVENOUS; SUBCUTANEOUS at 05:46

## 2020-08-21 RX ADMIN — TORSEMIDE 40 MG: 20 TABLET ORAL at 09:15

## 2020-08-21 RX ADMIN — METHOCARBAMOL 500 MG: 500 TABLET, FILM COATED ORAL at 09:11

## 2020-08-21 RX ADMIN — SILDENAFIL 20 MG: 20 TABLET ORAL at 09:26

## 2020-08-21 RX ADMIN — ATORVASTATIN CALCIUM 40 MG: 40 TABLET, FILM COATED ORAL at 09:17

## 2020-08-21 RX ADMIN — CLONIDINE HYDROCHLORIDE 0.3 MG: 0.1 TABLET ORAL at 09:15

## 2020-08-21 RX ADMIN — ASPIRIN 81 MG CHEWABLE TABLET 81 MG: 81 TABLET CHEWABLE at 09:15

## 2020-08-21 RX ADMIN — METOPROLOL SUCCINATE 100 MG: 100 TABLET, EXTENDED RELEASE ORAL at 09:26

## 2020-08-21 RX ADMIN — NIFEDIPINE 30 MG: 30 TABLET, FILM COATED, EXTENDED RELEASE ORAL at 09:16

## 2020-08-21 RX ADMIN — MACITENTAN 10 MG: 10 TABLET, FILM COATED ORAL at 09:16

## 2020-08-21 RX ADMIN — TICAGRELOR 90 MG: 90 TABLET ORAL at 09:16

## 2020-08-21 ASSESSMENT — MIFFLIN-ST. JEOR: SCORE: 2204.72

## 2020-08-21 ASSESSMENT — ACTIVITIES OF DAILY LIVING (ADL)
ADLS_ACUITY_SCORE: 10

## 2020-08-21 NOTE — PLAN OF CARE
VSS on RA. RHC/LHC today. 1 stent to LAD. Returned to 6C from cath lab at 1730. VSS on arrival. C/o back pain 5/10, tylenol given x1. Voided 500cc via urinal. Last ACT at 1830 192, recheck scheduled for 1900, will continue POC, Cards 1 primary.

## 2020-08-21 NOTE — DISCHARGE SUMMARY
"    [address] [address]  p: [phone]    Discharge Summary: Cardiology Service    Jeremiah Isaac MRN# [id number]   YOB: 1960 Age: 60 year old       Admission Date: 8/19/2020  Discharge Date: 08/21/20    Discharge Diagnoses:  1. Coronary artery disease  2. Pulmonary HTN  3. KENNETH on CKD  4. Obstructive sleep apnea  5. Type II DM  6. Narcolepsy     Brief HPI:  Jeremiah Isaac is a 60 year old male who has a PMHx notable for pulmonary HTN, RVH/LVH, DMII, HTN, CKD, and CAD s/p LAD stent (8/11). He presented to [facility] ED with shortness of breath and lightheadedness prior to being transferred to [facility] for further work up.     Patient states that it has been almost a year since the last time he felt \"normal.\" When he first started feeling short of breath with exertion a year ago, he was found to have LAD lesion and got a stent placed. He subsequently felt better for a few weeks before feeling dyspneic again. At that point, he states that was when he got diagnosed with pulmonary hypertension. After several months of slowly progressing difficulty with breathing, he was found to have in-stent stenosis ~2 weeks ago which led to another stent placed in his LAD. Since then, he has not felt any better. The morning of admission, patient was getting out of his car and going into his home when he suddenly felt lightheaded as if he was going to pass out, which was the reason his cardiologist (Dr. Riojas) wanted him to go to the ED for evaluation.     At [facility] ED, he was hemodynamically stable. Labs did not show any evidence of ACS with negative troponin x 2 and EKG. He was COVID negative prior to being transferred to [facility] for further work up. At the time of my evaluation, patient is symptom free and states he is \"tired of feeling this way and just want to be get this over with.\" He denies any recent fevers/chills, nausea/vomiting, chest " pain, sore throat, cough, orthopnea, PND, abdominal pain, changes in BMs or swelling in his legs.    Hospital Course by Diagnosis:  # Dyspnea, Lightheadedness  # Pulmonary HTN (WHO group 3 vs possible group 1 PVOD)  # Concern for In-stent stenosis vs acute heart failure exacerbation  Patient reports one year history of exertional syncope. Underwent LHC on 08/2019 that revealed 95% stenosis of prox LAD that was FFR positive necessitating ARINA. He had recent coronary angiogram earlier this month that showed in-stent restenosis of LAD stent that required PCI with one ARINA to pLAD. Symptoms continue to persist despite ARINA without improvement. RHC revealed severe post capillary pulmonary HTN with a normal LVEDP lowering clinical suspicion for class 2 pulmonary HTN. V/Q scan revealed no CTEPH. Patient has history of sleep apnea, however class 3 unlikely to be only  of pulmonary HTN. On admission, patient is hemodynamically stable w/o hypoxia. Negative troponin x 2. . Examination appears euvolemic as patient is has clear lungs and weighs less than baseline. Suspect patient's chronic, progressive dyspnea with exertion is likely secondary to underlying pulmonary HTN. Repeat RHC was relatively unchanged. Coronary angiogram showed single vessel CAD with moderate to severe mid LAD disease s/p PCI with one drug eluting stent to the mid LAD and POBA of the D2, previous LAD stents widely patent.     - Continue PTA torsemide 40mg qday  - Continue PTA Toprol  mg daily   - Stop PTA lisinopril 40 mg given renal function, BP stable and LV function nml. Can evaluate as outpatient if needed for improved BP control.   - Continue PTA Nifedipine 30mg PO Daily  - Continue PTA Macitentan 10mg PO Daily and Sildenafil 20mg PO TID     # CAD s/p ARINA to prox LAD x 2 (8/2019 and 8/11/2020)  ARINA to prox LAD on 8/2019. Cath 8/11/20 showing prox LAD to mid LAD lesion 75% stenosed, now stented, and mildly elevated R sided filling  pressure, severely elevated PA pressure, moderately elevated L side filling pressure and normal cardiac output. Patient states he has been compliant with all medications. Both ticagrelor and clopidogrel are on home med list, patient unsure to which he is taking. Repeat coronary angiogram showed moderate to severe mid LAD disease s/p PCI with one ARINA and POBA of the D2, previous stents patent.     - Continue PTA atorvastatin 40mg PO Daily  - Continue PTA aspirin 81 mg and ticagrelor 90 mg BID for at least one year   - Continue Toprol XL, see above   - Stop lisinopril, see above     # KENNETH on CKD  Cr noted to be 2.71 on admission, now 2.84. Appears baseline over the past year has ranged ~2. Possible cardiorenal however patient does not appear grossly volume overloaded. Cr 2.77 at discharge.     - Stop PTA lisinopril  - Repeat BMP in 1 week as outpatient.        # GIRISH  Was recently transition to BIPAP last month. There could be a component of CSA  - Continue home Bipap.      # T2DM:  Not well controlled on home metformin, glipizide and V-go (wearable insulin pump). A1C 8.9 this admission. Endocrinology saw patient this admission and adjusted medications. Given renal function, discontinued metformin. Changed glipizide to regular acting vs XL. Added Victoza to regimen.     - Stop metformin  - Continue glipizide regular acting, this was changed from prior to admission XL dosing   - Start Victoza, 0.6 mg x 5 days then 1.2 mg daily thereafter   - Follow-up with endocrinology at [facility] in 5-7 days     # Narcolepsy  Patient with history of narcolepsy. Could be 2/2 to GIRISH. Currently perscribed amphetamine by sleep medicine. In the setting of significant PHTN..    Pertinent Procedures:  1. Right heart catheterization  2. Coronary angiogram     Consults:  Endocrinology     Medication Changes:  See below     Discharge medications:   Current Discharge Medication List      START taking these medications    Details   Alcohol  Swabs PADS Use to swab the area of the injection or abram as directed   Per insurance coverage  Qty: 100 each, Refills: 3    Associated Diagnoses: Type 2 diabetes mellitus without complication, unspecified whether long term insulin use (H)      glipiZIDE (GLUCOTROL) 5 MG tablet Take 1 tablet (5 mg) by mouth every morning (before breakfast)  Qty: 90 tablet, Refills: 3    Associated Diagnoses: Type 2 diabetes mellitus without complication, unspecified whether long term insulin use (H)      insulin pen needle (32G X 4 MM) 32G X 4 MM miscellaneous Use as directed by provider  Per insurance coverage  Qty: 100 each, Refills: 3    Associated Diagnoses: Type 2 diabetes mellitus without complication, unspecified whether long term insulin use (H)      liraglutide (VICTOZA) 18 MG/3ML solution Inject 0.6 mg Subcutaneous daily  Qty: 3 mL, Refills: 3    Associated Diagnoses: Type 2 diabetes mellitus without complication, unspecified whether long term insulin use (H)      Sharps Container MISC Use as directed to dispose of needles, lancets and other sharps  Per Insurance coverage  Qty: 1 each, Refills: 3    Associated Diagnoses: Type 2 diabetes mellitus without complication, unspecified whether long term insulin use (H)         CONTINUE these medications which have CHANGED    Details   ticagrelor (BRILINTA) 90 MG tablet Take 1 tablet (90 mg) by mouth 2 times daily  Qty: 180 tablet, Refills: 3    Associated Diagnoses: Coronary artery disease involving native coronary artery of native heart without angina pectoris         CONTINUE these medications which have NOT CHANGED    Details   aspirin (ASA) 81 MG EC tablet Take 1 tablet (81 mg) by mouth daily Start tomorrow morning.  Qty: 90 tablet, Refills: 3    Associated Diagnoses: Coronary artery disease involving native coronary artery of native heart with unstable angina pectoris (H)      atorvastatin (LIPITOR) 40 MG tablet Take 40 mg by mouth every morning       cloNIDine (CATAPRES)  0.3 MG tablet Take 0.3 mg by mouth 2 times daily      insulin lispro (HUMALOG VIAL) 100 UNIT/ML vial Inject 100 Units Subcutaneous daily      macitentan (OPSUMIT) 10 MG tablet Take 10 mg by mouth every morning     Comments: PA in process, Rx to be sent upon approval      metoprolol succinate ER (TOPROL-XL) 100 MG 24 hr tablet Take 100 mg by mouth every morning       NIFEdipine ER (ADALAT CC) 30 MG 24 hr tablet Take 1 tablet (30 mg) by mouth daily  Qty: 90 tablet, Refills: 3    Associated Diagnoses: Pulmonary hypertension (H)      sildenafil (REVATIO) 20 MG tablet Take 1 tablet (20 mg) by mouth 3 times daily  Qty: 270 tablet, Refills: 3    Associated Diagnoses: Pulmonary hypertension (H); Dyspnea on exertion      torsemide (DEMADEX) 20 MG tablet Take 2 tablets (40 mg) by mouth daily  Qty: 180 tablet, Refills: 3    Associated Diagnoses: Dyspnea on exertion; Pulmonary hypertension (H)         STOP taking these medications       clopidogrel (PLAVIX) 75 MG tablet Comments:   Reason for Stopping:         glipiZIDE (GLUCOTROL XL) 10 MG 24 hr tablet Comments:   Reason for Stopping:         lisinopril (PRINIVIL/ZESTRIL) 40 MG tablet Comments:   Reason for Stopping:         metFORMIN (GLUCOPHAGE-XR) 750 MG 24 hr tablet Comments:   Reason for Stopping:               Follow-up:  - Cardiology [facility] in 7-10 days   - Endocrinology at [facility] in 5-7 days  - PCP in 7-10 days     Labs or imaging requiring follow-up after discharge:  CBC and BMP    Code status:  Full     Condition on discharge  Temp:  [97.8  F (36.6  C)-98.1  F (36.7  C)] 98  F (36.7  C)  Pulse:  [54-66] 63  Resp:  [18] 18  BP: ()/(52-92) 91/57  SpO2:  [93 %-100 %] 98 %  General: Alert, interactive, NAD  Eyes: sclera anicteric, EOMI  Neck: JVP 0, carotid 2+ bilaterally  Cardiovascular: regular rate and rhythm, normal S1 and S2, no murmurs, gallops, or rubs  Resp: clear to auscultation bilaterally, no rales, wheezes, or rhonchi  GI: Soft, nontender,  nondistended. +BS.  No HSM or masses, no rebound or guarding.  Extremities: 1-2+ edema, no cyanosis or clubbing, dorsalis pedis and posterior tibialis pulses 2+ bilaterally  Skin: Warm and dry, no jaundice or rash. Right groin site CDI, no signs of bleeding or bruising  Neuro: CN 2-12 intact, moves all extremities equally  Psych: Alert & oriented x 3    Imaging with results:  Echocardiogram 8/21/20:  Interpretation Summary  Limited echo.  Global and regional left ventricular function is normal with an EF of 60-65%.  No regional wall motion abnormalities are seen.  Global right ventricular function is normal.  IVC diameter and respiratory changes fall into an intermediate range  suggesting an RA pressure of 8 mmHg.  No pericardial effusion is present.     This study was compared with the study from 7/1/20. No significant change in  biventricular function.    Coronary Angiogram 8/20/20:  Conclusion        Right sided filling pressures are mildly elevated.    Moderate pre capillary pulmonary hypertension.    Left sided filling pressures are mildly elevated.    Normal cardiac output.    Hemodynamic data has been modified in Epic per physician review.     Single vessel CAD with moderate to severe mid LAD disease.  PCI with one drug eluting stent to the mid LAD and POBA of the D2.          Plan       Follow bedrest per protocol    Continued medical management and lifestyle modifications for cardiovascular risk factor optimizations.    Arterial sheath removed from femoral artery with closure device.    Cardiac rehabilitation.    Return to the primary inpatient team for further evaluation and managmenet        Continuation of dual antiplatelet therapy for 12 months   Post antiplatelet therapy of    Aspirin; give 81 mg qd .     Ticagrelor; and 90 mg BID.      Continue high dose statin therapy      Hemodynamics     Right Heart Pressures     RA 12/13/10  RV 70/10  PA 70/31/40  PCWP --/--/15    PA Sat 66%  Ao 97%  Hgb 11.4  HR  61  Brittanie CO/CI 7.8 L/min; 3.1 L/min/m2  TD CO/CI 7.9 L/min; 3.2 L/min/m2    PVR 7.8  TPR 3.1 Right sided filling pressures are mildly elevated.Left sided filling pressures are mildly elevated. Moderately elevated pulmonary artery hypertension.Normal cardiac output level.Hemodynamic data has been modified in Epic per physician review.        Patient Care Team:  [facility], [facility] as PCP - General  Senait Berg, RN as Specialty Care Coordinator (Cardiology)  Melody Staley, BRYCE as Specialty Care Coordinator (Cardiology)  Lissette Carias, RN as Specialty Care Coordinator (Cardiology)    Ángela Pineda DNP, APRN, CNP  [facility] Cardiology  [phone]

## 2020-08-21 NOTE — DISCHARGE INSTRUCTIONS
Discharge Plan for Home:  - VGo-40 can be started at 1:30 pm today  - give yourself 2-5 clicks with each meal   -give yourself 1 click  if glucose is  > 250  - give your self 2 clicks if glucose > 350  --glipizide 5 mg daily in the morning with breakfast ( this will be a new prescription)  -start Victoza at 0.6 mg daily, start when you get home today  Give yourself the Victoza every morning   After 5 days, if no side effects ( nausea and or vomiting), increase your dose to 1.2 mg daily    -monitor your blood sugars with your FreeStyle Sridhar before meals and at bedtime  -bring your meter to clinic appointment so your doctor can review your blood sugars  - do not take the metformin or glipizide Xl you have at home

## 2020-08-21 NOTE — PLAN OF CARE
"/56 (BP Location: Left arm)   Pulse 63   Temp 98.1  F (36.7  C) (Oral)   Resp 18   Ht 1.778 m (5' 10\")   Wt 138.3 kg (305 lb)   SpO2 94%   BMI 43.76 kg/m      D: Patient was admitted from [facility] for Shortness of breath and lightheaded. Now S/P PCI with stent X1 to the LAD.  I/A: Patient had his venous sheath removed at 2050 and manual pressure applied at the R-groin area for 20-minutes.  There was no hematoma, bleeding and patient stayed on bed rest until 0110.  HR in high 50s to 60s.  BS checks q4hrs, while on bedrest did not eat but diet was advanced to regular, patient did eat sandwich and BS was 309, insulin coverage provided.  He dangled and went for walk in the hallway without any complication.  P: Will continue to monitor and notify MD of status changes.  "

## 2020-08-21 NOTE — PROGRESS NOTES
-Pt was educated by endocrine services regarding changes to diabetic medications.  -Pt received orders that were carried to discharge to home.  -Pt reviewed and understood his discharge instructions, orders, follow-up  Appointments and prescriptions.  -Pt arranged his own ride home.

## 2020-08-21 NOTE — PROGRESS NOTES
"                     Diabetes Consult Daily  Progress Note          Assessment/Plan:                          Jeremiah \" Hasmukh\" Marlin is a 60 year old male with history of  Type 2 diabetes, hypertension, CKD stage 3, hyperlipidemia, obesity, pulmonary hypertension, narcolepsy, CAD s/p LAD stent ( 8/11/2020).   transferred from OSH for admission ( 8/19/2020) and further work-up for shortness of breath and lightheadedness.     Type 2 diabetes: PTA on glipizide, metformin, and VGo-40, poorly controlled with A1C > 10 (1/2020). Mr. Isaac does not have any more of his devices with him  A1C 8.9% ( 8/20/202) with HGb of 10.2       Plan:  - lantus 32 units daily , discontinued  -added meal insulin, 1 unit per 7 grams of CHO for meals/snacks/supplements (discontinue at time of discharge)  -novolog high intensity sliding scale every 4 hours ( while NPO), changed to before meals and at bedtime  -monitor glcuose every 4 hours while NPO, changed to before meals, HS and 0200  -hypoglycemia protocol      Plan for Discharge:  - VGo-40 can be started at 1:30 pm at home  - give yourself 2-5 clicks with each meal   -give yourself 1 click  if glucose is  > 250  - give your self 2 clicks if glucose > 350  --glipizide 5 mg daily in the morning with breakfast ( this will be a new prescription)  -start Victoza at 0.6 mg daily, start when you get home today  Give yourself the Victoza every morning   After 5 days, if no side effects ( nausea and or vomiting), increase your dose to 1.2 mg daily    -monitor your blood sugars with your FreeStyle Sridhar before meals and at bedtime  -bring your meter to clinic appointment so your doctor can review your blood sugars  - do not take the metformin or glipizide XR you have at home    Please order at discharge:  Victoza  Glipizide 5 mg daily  Pen needles 4 mm x 32 jewel  Alcohol swabs  Sharps copntainer     Outpatient diabetes follow up: 5-7 days with PCP  Plan discussed with patient, bedside RN, and " primary team.           Interval History:   The last 24 hours progress and nursing notes reviewed.  RHC/LHC on 8/20 and received 1 stent to LAD.  Blood sugars monitored overnight every 4 hours, but no sliding scale insulin was given for glucose > 140  Ate a sandwich per documentation, glucose tested at 309. Carbohydrate coverage was not given only sliding scale insulin of 7 units around 0600.    Glucose was retested at ~ 0830, glucose 291 carb coverage + sliding insulin was given ( sliding scale insulin given within 2 hours of previous sliding scale)  Has a healthy appetite, ate 80 grams of CHO.    Really wants to go home:  Reviewed the rational of why he is unable to take metformin and extended releaser glipizide.  Discussed starting a new medication called Victoza, reviewed cost and side effects. No history or family history, per patient, of pancreatitis or medullary thyroid cancer.  He is willing to try this medication. Discussed when to increase the medication and when to stop the medication.  All questions were answered.    PTA medications:  metfromin  mg tablets, 3 tablets  Glipizide XL 10 mg  CKD: GFR 21-23: recommendations with a GFR < 30 metformin is contraindicated.   - Glipizide Xl is not recommended in CKD, possibly you could use glipizide single strength at a low dose, but most likley would not be enough medication.         Recent Labs   Lab 08/21/20  0546 08/21/20  0147 08/20/20  2023 08/20/20  1832 08/20/20  1341 08/20/20  1207 08/20/20  0550 08/19/20  2350  08/19/20  1312   GLC  --   --   --   --   --   --  172* 146*  --  285*   * 185* 128* 127* 120* 125*  --   --    < >  --     < > = values in this interval not displayed.               Review of Systems:   See interval hx          Medications:     Orders Placed This Encounter      Advance Diet as Tolerated: Regular Diet Adult       Physical Exam:  Gen: Alert, resting in bed, in NAD   HEENT:  mucous membranes are moist  Resp:  "non-labored  Ext: moving all extretmeis   Neuro:oriented x3, communicating clearly  /69 (BP Location: Left arm)   Pulse 64   Temp 97.9  F (36.6  C) (Oral)   Resp 18   Ht 1.778 m (5' 10\")   Wt 138.3 kg (305 lb)   SpO2 97%   BMI 43.76 kg/m             Data:     Lab Results   Component Value Date    A1C 8.9 08/20/2020    A1C 10.9 01/16/2020              CBC RESULTS:   Recent Labs   Lab Test 08/20/20  0550   WBC 6.6   RBC 3.52*   HGB 10.2*   HCT 30.9*   MCV 88   MCH 29.0   MCHC 33.0   RDW 13.2        Recent Labs   Lab Test 08/20/20  1816 08/20/20  0550 08/19/20  2350   NA  --  138 140   POTASSIUM 3.8 3.4 3.8   CHLORIDE  --  106 108   CO2  --  26 24   ANIONGAP  --  6 8   GLC  --  172* 146*   BUN  --  62* 60*   CR  --  2.84* 2.71*   ANNITA  --  9.0 9.0     Liver Function Studies -   Recent Labs   Lab Test 07/30/20  1347   PROTTOTAL 7.3   ALBUMIN 3.7   BILITOTAL 0.5   ALKPHOS 108   AST 11   ALT 18     Lab Results   Component Value Date    INR 1.03 08/20/2020    INR 0.92 06/30/2020    INR 0.99 01/19/2020    INR 1.02 01/18/2020    INR 1.05 01/17/2020    INR 0.97 01/16/2020         I spent a total of 35 minutes bedside and on the inpatient unit managing the glycemic care of Jeremiah Isaac. Over 50% of my time on the unit was spent counseling the patient  and/or coordinating care regarding .  See note for details.      Yolie Martinez -[phone]  Diabetes Management job code [id number]            "

## 2020-08-23 ENCOUNTER — PATIENT OUTREACH (OUTPATIENT)
Dept: CARE COORDINATION | Facility: CLINIC | Age: 60
End: 2020-08-23

## 2020-08-23 LAB — INTERPRETATION ECG - MUSE: NORMAL

## 2020-08-24 ENCOUNTER — TELEPHONE (OUTPATIENT)
Dept: CARDIOLOGY | Facility: CLINIC | Age: 60
End: 2020-08-24

## 2020-08-24 NOTE — PROGRESS NOTES
[facility]: Post-Discharge Note  SITUATION                                                      Admission:    Admission Date: 08/19/20   Reason for Admission: Coronary artery disease  Discharge:   Discharge Date: 08/21/20  Discharge Diagnosis: Coronary artery disease    BACKGROUND                                                      Jeremiah Isaac is a 60 year old male who has a PMHx notable for pulmonary HTN, RVH/LVH, DMII, HTN, CKD, and CAD s/p LAD stent (8/11). He presented to [facility] ED with shortness of breath and lightheadedness prior to being transferred to [facility] for further work up.    ASSESSMENT      Discharge Assessment  Patient reports symptoms are: Improved  Does the patient have all of their medications?: Yes  Does patient know what their new medications are for?: Yes  Does patient have a follow-up appointment scheduled?: Yes  Does patient have any other questions or concerns?: No    Post-op  Did the patient have surgery or a procedure: No  Fever: No  Chills: No  Eating & Drinking: unable to tolerate solid foods  PO Intake: soft foods  Bowel Function: normal  Urinary Status: voiding without complaint/concerns        PLAN                                                      Outpatient Plan:   Follow-up:  - Cardiology [facility] in 7-10 days   - Endocrinology at [facility] in 5-7 days  - PCP in 7-10 days      Labs or imaging requiring follow-up after discharge:  CBC and BMP    No future appointments.        Светлана Reagan, CMA

## 2020-08-24 NOTE — TELEPHONE ENCOUNTER
I spoke with the pt and was able to schedule pt for follow up on 8/27 at 1100 labs and 1145 with EL Sidhu.  Senait Berg RN on 8/24/2020 at 3:19 PM      I called pt to follow up on his hospital discharge and schedule him for a follow up appointment.  I was unable to reach him and left a detailed message.  Senait Berg RN on 8/24/2020 at 12:25 PM

## 2020-08-25 ENCOUNTER — VIRTUAL VISIT (OUTPATIENT)
Dept: PHARMACY | Facility: CLINIC | Age: 60
End: 2020-08-25
Attending: INTERNAL MEDICINE
Payer: COMMERCIAL

## 2020-08-25 DIAGNOSIS — Z79.4 TYPE 2 DIABETES MELLITUS WITHOUT COMPLICATION, WITH LONG-TERM CURRENT USE OF INSULIN (H): ICD-10-CM

## 2020-08-25 DIAGNOSIS — I50.813 ACUTE ON CHRONIC RIGHT-SIDED HEART FAILURE (H): Primary | ICD-10-CM

## 2020-08-25 DIAGNOSIS — E11.9 TYPE 2 DIABETES MELLITUS WITHOUT COMPLICATION, WITH LONG-TERM CURRENT USE OF INSULIN (H): ICD-10-CM

## 2020-08-25 DIAGNOSIS — R60.9 EDEMA, UNSPECIFIED TYPE: ICD-10-CM

## 2020-08-25 DIAGNOSIS — I27.20 PULMONARY HYPERTENSION (H): ICD-10-CM

## 2020-08-25 DIAGNOSIS — I25.10 CORONARY ARTERY DISEASE INVOLVING NATIVE CORONARY ARTERY OF NATIVE HEART WITHOUT ANGINA PECTORIS: ICD-10-CM

## 2020-08-25 PROCEDURE — 99605 MTMS BY PHARM NP 15 MIN: CPT | Mod: TEL | Performed by: PHARMACIST

## 2020-08-25 PROCEDURE — 99607 MTMS BY PHARM ADDL 15 MIN: CPT | Mod: TEL | Performed by: PHARMACIST

## 2020-08-25 NOTE — PROGRESS NOTES
MT ENCOUNTER  SUBJECTIVE/OBJECTIVE:                           Jeremiah Isaac is a 60 year old male called for a transitions of care visit. He was discharged from [facility] on 08/21/20 for 1. Coronary artery disease, 2. Pulmonary HTN 3. KENNETH on CKD 4. Obstructive sleep apnea  5. Type II DM 6. Narcolepsy.     Patient consented to a telehealth visit: yes  Telemedicine Visit Details  Type of service:  Telephone visit  Start Time: 2:00  End Time: 2:43  Originating Location (pt. Location): Home  Distant Location (provider location):  [facility] AND INFECTIOUS DISEASES [facility]  Mode of Communication:  Telephone    Chief Complaint: Reports for the last 15 years he has felt tired and nothing seems to improve this. Is wondering what home remedy is good to help with nausea.  Reports his home health care nurse told him he could drink boost as he is not really eating.  Personal Healthcare Goals: Wants to get stronger and mow the lawn without stopping. Wants to have more energy and not feel so tired all of the time.      Allergies/ADRs: Reviewed in EHR  Tobacco:  reports that he has never smoked. He has never used smokeless tobacco.  Alcohol: very little.  Caffeine: no caffeine  Activity: walking doing fishing. But feels tired all of the time.   PMH: Reviewed in EHR    Medication Adherence/Access: no issues reported.  Patient takes medications directly from bottles.  Patient takes medications 3 time(s) per day.   Per patient, misses medication 0 times per week.   Medication barriers: none.   The patient fills medications at [place]: NO, fills medications at [facility] in [place].    HFpEF/pulmonary hypertension/CAD/edema: Current medications include:  Aspirin 81 mg, once daily.  Reports no unusual bleeding or bruising symptoms.  Brilinta 90 mg twice daily.  Clonidine 0.3 mg, twice daily.  Reports monitors blood pressure at home, but did not take today.  Believes his blood pressures have been running low.  OPSUMIT  "(Macitentan) 10 mg in the morning.  Metoprolol XL, 100 mg daily in the morning.  Nifedipine ER, 30 mg, once daily.  Sildenafil 20 mg, 3 times daily.  Torsemide 20 mg, and takes 2 tablets (40 mg) daily in the morning.  Reports he wraps his legs and Ace bandage and reports edema symptoms have been stable since home from the hospital.  Reports he has not weighed himself since he has been home, but will start doing so.  Reports he was never told to do so, but is following a low-sodium diet.  Echo on 8/19/2020 showed an ejection fraction of 60 to 65%.  Patient reports no symptoms of heart failure.      Type 2 Diabetes:  Pt currently taking:  Glipizide 5 mg, once in the morning.  Using Victoza 0.6 mg daily.  Reports he is not sure whether his nausea symptoms are caused by Victoza, because he has had nausea before starting Victoza.  Reports he feels that tomorrow he will not use Victoza to see if he has nausea symptoms.  Reports using Humalog, for correction.  Reports his hemoglobin A1c has improved from 12, to 8.9.  Reports since having a continuous glucose monitor, he is able to check his blood sugars often during the day, and this has helped him control blood sugars better.  Reports this morning blood sugar was higher than usual at 287.  Reports the last few days he has been running between 108 to 120.  Reports he has no blood sugars over 300.  Reports he used to always be over 300.  Reports he continues to work with his doctor to improve his A1c, with the ultimate goal of 7.  Reports no low blood sugar symptoms.  Reports sometimes having high blood sugar symptoms, such as urine \"being sticky\", and feeling thirsty.  Reports he is trying to watch his diet, but has trouble exercising as he is always tired.  Statin: Yes: Atorvastatin 40 mg, by mouth once daily.  ACEi/ARB: No. Do to renal fxn.  Urine Albumin: No results found for: UMALCR   Lab Results   Component Value Date    A1C 8.9 08/20/2020    A1C 10.9 01/16/2020 "         ASSESSMENT:                              Medication Adherence: excellent, no issues identified.    HFpEF/pulmonary hypertension/CAD/edema: Stable. Patient is meeting BP goal of < 140/90mmHg. Per pt account he will start doing daily weights.  Per patient account, he will continue to monitor his blood pressures and if he feels they are too low he will discuss with his doctor.   Pt would benefit from no changes in current therapy.     Type 2 Diabetes: Improved. Patient is not meeting A1c goal of < 8%, but has improved from 12.  Patient working closely with MD on continued blood glucose control to get to goal of less than 8%. Per patient account he had nausea before starting Victoza, but he is not sure whether the nausea is is from Victoza.  We discussed some home remedies to help with nausea symptoms such as drinking peppermint or kristian tea.  Eating smaller meals throughout the day.  Voiding spicy, oily, fried foods.  Possibly trying a sea band bracelet. We discussed how boost may increase his blood sugars, and how switching to Glucerna may help.  He is also encouraged to let his doctor know that he is drinking boost once per day.  .      PLAN:                          Post Discharge Medication Reconciliation Status: discharge medications reconciled, continue medications without change.    1.  Continue medications as prescribed.    2.  Patient may concider switching to Glucerna instead of boost.    I spent 43 minutes with this patient today. I offer these suggestions with the understanding that I don't fully understand Jeremiah's past medical history and the complexity of his health conditions. Jeremiah should make no changes without the approval of his physician. A copy of the visit note was provided to the patient's cardiology provider.    Pt felt it not necessary to follow up with MTM.    The patient declined a summary of these recommendations.     Freya Gutierrez, [facility] Pharmacist.   [phone]

## 2020-08-26 DIAGNOSIS — I27.20 PULMONARY HYPERTENSION (H): ICD-10-CM

## 2020-08-26 DIAGNOSIS — R06.09 DYSPNEA ON EXERTION: Primary | ICD-10-CM

## 2020-08-27 ENCOUNTER — OFFICE VISIT (OUTPATIENT)
Dept: CARDIOLOGY | Facility: CLINIC | Age: 60
End: 2020-08-27
Attending: PHYSICIAN ASSISTANT
Payer: COMMERCIAL

## 2020-08-27 VITALS
OXYGEN SATURATION: 96 % | HEIGHT: 70 IN | SYSTOLIC BLOOD PRESSURE: 96 MMHG | DIASTOLIC BLOOD PRESSURE: 63 MMHG | WEIGHT: 312 LBS | BODY MASS INDEX: 44.67 KG/M2 | HEART RATE: 65 BPM

## 2020-08-27 DIAGNOSIS — I27.20 PULMONARY HYPERTENSION (H): ICD-10-CM

## 2020-08-27 DIAGNOSIS — R06.09 DYSPNEA ON EXERTION: ICD-10-CM

## 2020-08-27 LAB
ALBUMIN SERPL-MCNC: 3.8 G/DL (ref 3.4–5)
ALP SERPL-CCNC: 111 U/L (ref 40–150)
ALT SERPL W P-5'-P-CCNC: 22 U/L (ref 0–70)
ANION GAP SERPL CALCULATED.3IONS-SCNC: 8 MMOL/L (ref 3–14)
AST SERPL W P-5'-P-CCNC: 15 U/L (ref 0–45)
BILIRUB SERPL-MCNC: 0.5 MG/DL (ref 0.2–1.3)
BUN SERPL-MCNC: 64 MG/DL (ref 7–30)
CALCIUM SERPL-MCNC: 9.5 MG/DL (ref 8.5–10.1)
CHLORIDE SERPL-SCNC: 100 MMOL/L (ref 94–109)
CO2 SERPL-SCNC: 28 MMOL/L (ref 20–32)
CREAT SERPL-MCNC: 3.11 MG/DL (ref 0.66–1.25)
ERYTHROCYTE [DISTWIDTH] IN BLOOD BY AUTOMATED COUNT: 12.9 % (ref 10–15)
GFR SERPL CREATININE-BSD FRML MDRD: 21 ML/MIN/{1.73_M2}
GLUCOSE SERPL-MCNC: 199 MG/DL (ref 70–99)
HCT VFR BLD AUTO: 33.7 % (ref 40–53)
HGB BLD-MCNC: 11.5 G/DL (ref 13.3–17.7)
MCH RBC QN AUTO: 29.3 PG (ref 26.5–33)
MCHC RBC AUTO-ENTMCNC: 34.1 G/DL (ref 31.5–36.5)
MCV RBC AUTO: 86 FL (ref 78–100)
NT-PROBNP SERPL-MCNC: 430 PG/ML (ref 0–125)
PLATELET # BLD AUTO: 257 10E9/L (ref 150–450)
POTASSIUM SERPL-SCNC: 3.7 MMOL/L (ref 3.4–5.3)
PROT SERPL-MCNC: 7.4 G/DL (ref 6.8–8.8)
RBC # BLD AUTO: 3.92 10E12/L (ref 4.4–5.9)
SODIUM SERPL-SCNC: 135 MMOL/L (ref 133–144)
WBC # BLD AUTO: 10.1 10E9/L (ref 4–11)

## 2020-08-27 PROCEDURE — 83880 ASSAY OF NATRIURETIC PEPTIDE: CPT | Performed by: PHYSICIAN ASSISTANT

## 2020-08-27 PROCEDURE — 85027 COMPLETE CBC AUTOMATED: CPT | Performed by: PHYSICIAN ASSISTANT

## 2020-08-27 PROCEDURE — G0463 HOSPITAL OUTPT CLINIC VISIT: HCPCS | Mod: ZF

## 2020-08-27 PROCEDURE — 80053 COMPREHEN METABOLIC PANEL: CPT | Performed by: PHYSICIAN ASSISTANT

## 2020-08-27 PROCEDURE — 99215 OFFICE O/P EST HI 40 MIN: CPT | Mod: ZP | Performed by: PHYSICIAN ASSISTANT

## 2020-08-27 PROCEDURE — 36415 COLL VENOUS BLD VENIPUNCTURE: CPT | Performed by: PHYSICIAN ASSISTANT

## 2020-08-27 RX ORDER — TORSEMIDE 20 MG/1
20 TABLET ORAL DAILY
Qty: 180 TABLET | Refills: 3
Start: 2020-08-27 | End: 2020-12-10

## 2020-08-27 ASSESSMENT — PAIN SCALES - GENERAL: PAINLEVEL: NO PAIN (0)

## 2020-08-27 ASSESSMENT — MIFFLIN-ST. JEOR: SCORE: 2236.47

## 2020-08-27 NOTE — NURSING NOTE
Chief Complaint   Patient presents with     Follow Up     Rtn Primary Pulm     Vitals were taken and medications were reconciled.    John Haro CMA    11:42 AM

## 2020-08-27 NOTE — LETTER
8/27/2020      RE: Jeremiah Isaac  [address]  [place] [address]       Dear Colleague,    Thank you for the opportunity to participate in the care of your patient, Jeremiah Isaac, at the [facility]. Please see a copy of my visit note below.    August 27, 2020  [facility]      HPI:  Mr. Isaac is a pleasant 60 year old male with a PMHx including DM2, hypertension, neuropathy, obesity, CKD, central sleep apnea with possible narcolepsy on Bipap, prior methamphetamine use, and coronary artery disease.  He was initially evaluated by Dr. Riojas in Jan 2020 due to exertional syncope. He underwent a RHC also in January showing normal right and left sided filling pressures, but severe PAH, with normal cardiac output. Notably, at that time he had a significant reduction in PA pressures after receiving NTG and verapamil and was placed on nifedipine. Ultimately, this spring he was placed on dual oral therapy with sildenafil and macitentan with no further syncope, though continued to struggle with shortness of breath and volume overload.     He then underwent a repeat RHC on 6/30/2020. This showed continued severe pulmonary hypertension, along with both elevated right and left filling pressures  (RAP 12, PAP 96/35, mean 57, PCWP 22, Brittanie CI 2.59, and PVR of 5.44 del valle). Echo showed continued preserved EF 60-65%, and normal RV function with no new valvular abnormalities. When I saw him shortly after, weight was 320 lbs at home, and I changed his Lasix to torsemide at 40mg daily.     Upon follow up with Dr. Riojas shortly after, he endorsed again ongoing SAINI without much change. He was then sent back for a repeat L+RHC on 8/11/2020. This showed mildly elevated right sided filling pressures, and again severe PH with moderately elevated left sided filling pressures (RA 15, PAP 98/40, mean 60. PCWP 30 (however normal  "LVEDP), TD CO/CI 7.37/2.88. PVR 4.44 del valle). The Nipride study showed a slight drop in cardiac output with significant drop in mean PA pressure and PCWP pressure. Notably, he had severe in stent restenosis of a prior proximal LAD stent and received PCI with ARINA to the proximal LAD.     When he had a virtual follow up with Dr. Riojas the following week, he had apparently been doing better, but abruptly started not feeling well again that day with dizziness and presyncope. He was sent back to the ED. Repeat R+LHC 8/20/2020 showed moderate to severe mid LAD disease and he got another ARINA to the mid LAD with POBA of the D2. Previous LAD stents were patent. At that time PAP was 70/31, mean 40, with PCWP 15. He remained on the torsemide 40mg daily unchanged along with Toprol XL 100mg daily. His macitentan and sildenafil were also continued. His lisinopril was stopped due to renal concerns.     Today, we are doing a formal clinic visit to follow up after these changes. He tells me that his breathing is \"a little better\" but he still gets fatigued very easily. His dizziness is better but says he still has it occasionally. He is hoping at some point to be able to mow his lawn again without having to stop and rest after just one pass back and forth. His weight today is 312 lbs on our scale and he says was 305 lbs at home. He thinks his leg swelling is doing very well currently. Labs today, unfortunately show that his Scr has gone up further to 3.11, and BUN is 64. Electrolytes are unremarkable. NT-proBNP is 430, which is the lowest it has been in some time. CBC is unchanged.  BP is a bit on the lower side today at 96/63.        CURRENT PULMONARY HYPERTENSION REGIMEN:    PAH Rx: macitentan 10mg daily, sildenafil 20mg TID    Diuretics: torsemide 40mg daily       Assessment/Plan:     1. Pulmonary arterial hypertension.              --PAH felt out of proportion to his sleep disordered breathing. He remains compliant with his " nightly Bipap as directed.              --He has now been on macitentan 10mg daily and sildenafil 20mg TID for a few months along with nifedipine due to good response with Nipride study on RHC. As above, he still has severe pulmonary hypertension on repeat RHC with recent hospital stay. Ultimately, plan is to consider a prostacyclin or prostacyclin analog once fluid and BP is optimized.    --He had overall good response with change to torsemide from Lasix last month. Weight is down to 305 lbs at home (was reported at 320 lbs last month). PCWP was 15 on RHC last week. As his renal function is worsening, will reduce torsemide to 20mg today. I asked him to continue to weigh daily and monitor closely for worsening edema.        2. Coronary artery disease.              --Hx prox LAD stenting in Aug 2019. More recently, had severe in stent restenosis and on 8/11 received PCI with ARINA to proximal LAD. About a week later he returned with presyncope and dizziness, and repeat angiogram showed moderate to severe mid LAD disease; he received another ARINA to the mid LAD with POBA of the D2. Previous LAD stents were patent.    --He is now on DAPT with Brilinta and ASA which he appears to be tolerating.               --Continue atorvastatin 40mg daily. Last LDL Jan 2020 within goal at 53.     3. Hypertension.   --BP is running on the lower side today. His lisinopril was stopped recently due to renal concerns. Currently he remains on Toprol XL which we will continue with nifedipine as above. I am reducing his diuretic today but if BP continues to run lower we can also consider reduction in his clonidine.       Follow up plan: Return in ~2 weeks for a virtual visit, with repeat BMP prior.        Orders this Visit:  Orders Placed This Encounter   Procedures     Basic metabolic panel     N terminal pro BNP outpatient     CBC with platelets     Follow-Up with Pulmonary Hypertension Clinic     Orders Placed This Encounter   Medications      torsemide (DEMADEX) 20 MG tablet     Sig: Take 1 tablet (20 mg) by mouth daily     Dispense:  180 tablet     Refill:  3     Medications Discontinued During This Encounter   Medication Reason     torsemide (DEMADEX) 20 MG tablet          PAST MEDICAL HISTORY:  Past Medical History:   Diagnosis Date     Acute on chronic right-sided heart failure (H) 1/16/2020    Added automatically from request for surgery [id number]     Central sleep apnea      CKD (chronic kidney disease)      DM2 (diabetes mellitus, type 2) (H)      Dyspnea on exertion 1/14/2020    Added automatically from request for surgery [id number]     Heart failure (H) 1/16/2020     Hypertension      Narcolepsy      Neuropathy      Pulmonary hypertension (H) 1/14/2020    Added automatically from request for surgery [id number]         FAMILY HISTORY:  No family history on file.      SOCIAL HISTORY:  Social History     Tobacco Use     Smoking status: Never Smoker     Smokeless tobacco: Never Used   Substance Use Topics     Alcohol use: Yes     Frequency: Monthly or less     Comment: once or twice a year      Drug use: Not Currently         CURRENT MEDICATIONS:  Current Outpatient Medications   Medication Sig Dispense Refill     Alcohol Swabs PADS Use to swab the area of the injection or abram as directed   Per insurance coverage 100 each 3     aspirin (ASA) 81 MG EC tablet Take 1 tablet (81 mg) by mouth daily Start tomorrow morning. 90 tablet 3     atorvastatin (LIPITOR) 40 MG tablet Take 40 mg by mouth every morning        cloNIDine (CATAPRES) 0.3 MG tablet Take 0.3 mg by mouth 2 times daily       glipiZIDE (GLUCOTROL) 5 MG tablet Take 1 tablet (5 mg) by mouth every morning (before breakfast) 90 tablet 3     insulin lispro (HUMALOG VIAL) 100 UNIT/ML vial Inject 100 Units Subcutaneous daily       insulin pen needle (32G X 4 MM) 32G X 4 MM miscellaneous Use as directed by provider  Per insurance coverage 100 each 3     liraglutide (VICTOZA) 18 MG/3ML solution Inject  0.6 mg Subcutaneous daily 3 mL 3     macitentan (OPSUMIT) 10 MG tablet Take 10 mg by mouth every morning        metoprolol succinate ER (TOPROL-XL) 100 MG 24 hr tablet Take 100 mg by mouth every morning        NIFEdipine ER (ADALAT CC) 30 MG 24 hr tablet Take 1 tablet (30 mg) by mouth daily 90 tablet 3     Sharps Container MISC Use as directed to dispose of needles, lancets and other sharps  Per Insurance coverage 1 each 3     sildenafil (REVATIO) 20 MG tablet Take 1 tablet (20 mg) by mouth 3 times daily 270 tablet 3     ticagrelor (BRILINTA) 90 MG tablet Take 1 tablet (90 mg) by mouth 2 times daily 180 tablet 3     torsemide (DEMADEX) 20 MG tablet Take 1 tablet (20 mg) by mouth daily 180 tablet 3         ROS:   10 point ROS of systems including Constitutional, Eyes, Respiratory, Cardiovascular, Gastroenterology, Genitourinary, Integumentary, Muscularskeletal, Psychiatric were  negative except for pertinent positives noted in my HPI.      EXAM:  GEN:  In general, this is a well nourished  male in no acute distress on room air.  Patient ambulatory. Unaccompanied today.  HEENT:  Pupils grossly equal, sclerae nonicteric. Mucous membranes moist.  NECK: Supple, no masses appreciated. Trachea midline. Thick neck, no JVD.   C/V:  Regular rate and rhythm, no murmur, rub or gallop. No S3 or RV heave.   RESP: Respirations are unlabored. No use of accessory muscles. Clear to auscultation bilaterally without wheezing, rales, or rhonchi.  GI: Abdomen soft, nontender, nondistended.   EXTREM: Some brawny changes but only trace  LE edema. No cyanosis or clubbing.  NEURO: Alert and oriented, cooperative. Gait not assessed. No obvious focal deficits.   SKIN: Warm and dry. No rashes or petechiae appreciated.       Weight  Wt Readings from Last 10 Encounters:   08/27/20 141.5 kg (312 lb)   08/21/20 138.3 kg (305 lb)   08/19/20 145.2 kg (320 lb)   06/30/20 147.6 kg (325 lb 4.8 oz)   03/12/20 142.8 kg (314 lb 12.8 oz)   03/03/20  141.5 kg (312 lb)   01/22/20 146.4 kg (322 lb 12.8 oz)   01/19/20 145.2 kg (320 lb 3.2 oz)   01/14/20 149.1 kg (328 lb 11.2 oz)   08/22/19 145.2 kg (320 lb)       Labs:    CBC RESULTS:  Lab Results   Component Value Date    WBC 10.1 08/27/2020    RBC 3.92 (L) 08/27/2020    HGB 11.5 (L) 08/27/2020    HCT 33.7 (L) 08/27/2020    MCV 86 08/27/2020    MCH 29.3 08/27/2020    MCHC 34.1 08/27/2020    RDW 12.9 08/27/2020     08/27/2020       CMP RESULTS:  Lab Results   Component Value Date     08/27/2020    POTASSIUM 3.7 08/27/2020    CHLORIDE 100 08/27/2020    CO2 28 08/27/2020    ANIONGAP 8 08/27/2020     (H) 08/27/2020    BUN 64 (H) 08/27/2020    CR 3.11 (H) 08/27/2020    GFRESTIMATED 21 (L) 08/27/2020    GFRESTBLACK 24 (L) 08/27/2020    ANNITA 9.5 08/27/2020    BILITOTAL 0.5 08/27/2020    ALBUMIN 3.8 08/27/2020    ALKPHOS 111 08/27/2020    ALT 22 08/27/2020    AST 15 08/27/2020        NT-proBNP:  Results for TERRI MCELROY (MRN [id number]) as of 8/27/2020 12:35   Ref. Range 1/14/2020 11:27 1/22/2020 14:36 7/1/2020 10:46 7/30/2020 13:47 8/27/2020 11:05   N-Terminal Pro Bnp Latest Ref Range: 0 - 125 pg/mL 1,385 (H) 648 (H) 673 (H) 615 (H) 430 (H)         Most recent testing:      Echocardiogram (8/2020)     Interpretation Summary  Limited echo.  Global and regional left ventricular function is normal with an EF of 60-65%.  No regional wall motion abnormalities are seen.  Global right ventricular function is normal.  IVC diameter and respiratory changes fall into an intermediate range  suggesting an RA pressure of 8 mmHg.  No pericardial effusion is present.     This study was compared with the study from 7/1/20. No significant change in  biventricular function.      RHC (8/2020)  Right Heart Pressures     RA 12/13/10  RV 70/10  PA 70/31/40  PCWP --/--/15    PA Sat 66%  Ao 97%  Hgb 11.4  HR 61  Brittanie CO/CI 7.8 L/min; 3.1 L/min/m2  TD CO/CI 7.9 L/min; 3.2 L/min/m2    PVR 7.8  TPR 3.1 Right sided filling  pressures are mildly elevated.Left sided filling pressures are mildly elevated. Moderately elevated pulmonary artery hypertension.Normal cardiac output level.Hemodynamic data has been modified in Epic per physician review       NYHA Functional Class:  Functional class 3    1--No limitation of activity  2--Slight limitation, ordinary activities may cause symptoms  3--Marked limitation, less than ordinary activities cause symptoms  4--Severe limitation, minimal activity causes symptoms, or symptoms at rest      40 minutes of one on one time was spent with the patient during which greater than 50 percent of that time was spent in counseling and coordination of care.       Laurie Peters PA-C  [facility]  Pager [phone]      Please do not hesitate to contact me if you have any questions/concerns.     Sincerely,     EL Sidhu

## 2020-08-27 NOTE — PROGRESS NOTES
August 27, 2020  [facility]      HPI:  Mr. Isaac is a pleasant 60 year old male with a PMHx including DM2, hypertension, neuropathy, obesity, CKD, central sleep apnea with possible narcolepsy on Bipap, prior methamphetamine use, and coronary artery disease.  He was initially evaluated by Dr. Riojas in Jan 2020 due to exertional syncope. He underwent a RHC also in January showing normal right and left sided filling pressures, but severe PAH, with normal cardiac output. Notably, at that time he had a significant reduction in PA pressures after receiving NTG and verapamil and was placed on nifedipine. Ultimately, this spring he was placed on dual oral therapy with sildenafil and macitentan with no further syncope, though continued to struggle with shortness of breath and volume overload.     He then underwent a repeat RHC on 6/30/2020. This showed continued severe pulmonary hypertension, along with both elevated right and left filling pressures  (RAP 12, PAP 96/35, mean 57, PCWP 22, Brittanie CI 2.59, and PVR of 5.44 del valle). Echo showed continued preserved EF 60-65%, and normal RV function with no new valvular abnormalities. When I saw him shortly after, weight was 320 lbs at home, and I changed his Lasix to torsemide at 40mg daily.     Upon follow up with Dr. Riojas shortly after, he endorsed again ongoing SAINI without much change. He was then sent back for a repeat L+RHC on 8/11/2020. This showed mildly elevated right sided filling pressures, and again severe PH with moderately elevated left sided filling pressures (RA 15, PAP 98/40, mean 60. PCWP 30 (however normal LVEDP), TD CO/CI 7.37/2.88. PVR 4.44 del valle). The Nipride study showed a slight drop in cardiac output with significant drop in mean PA pressure and PCWP pressure. Notably, he had severe in stent restenosis of a prior proximal LAD stent and received PCI with ARINA to the proximal LAD.     When he had a virtual follow up  "with Dr. Riojas the following week, he had apparently been doing better, but abruptly started not feeling well again that day with dizziness and presyncope. He was sent back to the ED. Repeat R+LHC 8/20/2020 showed moderate to severe mid LAD disease and he got another ARINA to the mid LAD with POBA of the D2. Previous LAD stents were patent. At that time PAP was 70/31, mean 40, with PCWP 15. He remained on the torsemide 40mg daily unchanged along with Toprol XL 100mg daily. His macitentan and sildenafil were also continued. His lisinopril was stopped due to renal concerns.     Today, we are doing a formal clinic visit to follow up after these changes. He tells me that his breathing is \"a little better\" but he still gets fatigued very easily. His dizziness is better but says he still has it occasionally. He is hoping at some point to be able to mow his lawn again without having to stop and rest after just one pass back and forth. His weight today is 312 lbs on our scale and he says was 305 lbs at home. He thinks his leg swelling is doing very well currently. Labs today, unfortunately show that his Scr has gone up further to 3.11, and BUN is 64. Electrolytes are unremarkable. NT-proBNP is 430, which is the lowest it has been in some time. CBC is unchanged.  BP is a bit on the lower side today at 96/63.        CURRENT PULMONARY HYPERTENSION REGIMEN:    PAH Rx: macitentan 10mg daily, sildenafil 20mg TID    Diuretics: torsemide 40mg daily       Assessment/Plan:     1. Pulmonary arterial hypertension.              --PAH felt out of proportion to his sleep disordered breathing. He remains compliant with his nightly Bipap as directed.              --He has now been on macitentan 10mg daily and sildenafil 20mg TID for a few months along with nifedipine due to good response with Nipride study on RHC. As above, he still has severe pulmonary hypertension on repeat RHC with recent hospital stay. Ultimately, plan is to consider a " prostacyclin or prostacyclin analog once fluid and BP is optimized.    --He had overall good response with change to torsemide from Lasix last month. Weight is down to 305 lbs at home (was reported at 320 lbs last month). PCWP was 15 on RHC last week. As his renal function is worsening, will reduce torsemide to 20mg today. I asked him to continue to weigh daily and monitor closely for worsening edema.        2. Coronary artery disease.              --Hx prox LAD stenting in Aug 2019. More recently, had severe in stent restenosis and on 8/11 received PCI with ARINA to proximal LAD. About a week later he returned with presyncope and dizziness, and repeat angiogram showed moderate to severe mid LAD disease; he received another ARINA to the mid LAD with POBA of the D2. Previous LAD stents were patent.    --He is now on DAPT with Brilinta and ASA which he appears to be tolerating.               --Continue atorvastatin 40mg daily. Last LDL Jan 2020 within goal at 53.     3. Hypertension.   --BP is running on the lower side today. His lisinopril was stopped recently due to renal concerns. Currently he remains on Toprol XL which we will continue with nifedipine as above. I am reducing his diuretic today but if BP continues to run lower we can also consider reduction in his clonidine.       Follow up plan: Return in ~2 weeks for a virtual visit, with repeat BMP prior.        Orders this Visit:  Orders Placed This Encounter   Procedures     Basic metabolic panel     N terminal pro BNP outpatient     CBC with platelets     Follow-Up with Pulmonary Hypertension Clinic     Orders Placed This Encounter   Medications     torsemide (DEMADEX) 20 MG tablet     Sig: Take 1 tablet (20 mg) by mouth daily     Dispense:  180 tablet     Refill:  3     Medications Discontinued During This Encounter   Medication Reason     torsemide (DEMADEX) 20 MG tablet          PAST MEDICAL HISTORY:  Past Medical History:   Diagnosis Date     Acute on chronic  right-sided heart failure (H) 1/16/2020    Added automatically from request for surgery [id number]     Central sleep apnea      CKD (chronic kidney disease)      DM2 (diabetes mellitus, type 2) (H)      Dyspnea on exertion 1/14/2020    Added automatically from request for surgery [id number]     Heart failure (H) 1/16/2020     Hypertension      Narcolepsy      Neuropathy      Pulmonary hypertension (H) 1/14/2020    Added automatically from request for surgery [id number]         FAMILY HISTORY:  No family history on file.      SOCIAL HISTORY:  Social History     Tobacco Use     Smoking status: Never Smoker     Smokeless tobacco: Never Used   Substance Use Topics     Alcohol use: Yes     Frequency: Monthly or less     Comment: once or twice a year      Drug use: Not Currently         CURRENT MEDICATIONS:  Current Outpatient Medications   Medication Sig Dispense Refill     Alcohol Swabs PADS Use to swab the area of the injection or abram as directed   Per insurance coverage 100 each 3     aspirin (ASA) 81 MG EC tablet Take 1 tablet (81 mg) by mouth daily Start tomorrow morning. 90 tablet 3     atorvastatin (LIPITOR) 40 MG tablet Take 40 mg by mouth every morning        cloNIDine (CATAPRES) 0.3 MG tablet Take 0.3 mg by mouth 2 times daily       glipiZIDE (GLUCOTROL) 5 MG tablet Take 1 tablet (5 mg) by mouth every morning (before breakfast) 90 tablet 3     insulin lispro (HUMALOG VIAL) 100 UNIT/ML vial Inject 100 Units Subcutaneous daily       insulin pen needle (32G X 4 MM) 32G X 4 MM miscellaneous Use as directed by provider  Per insurance coverage 100 each 3     liraglutide (VICTOZA) 18 MG/3ML solution Inject 0.6 mg Subcutaneous daily 3 mL 3     macitentan (OPSUMIT) 10 MG tablet Take 10 mg by mouth every morning        metoprolol succinate ER (TOPROL-XL) 100 MG 24 hr tablet Take 100 mg by mouth every morning        NIFEdipine ER (ADALAT CC) 30 MG 24 hr tablet Take 1 tablet (30 mg) by mouth daily 90 tablet 3     Sharps Container  MISC Use as directed to dispose of needles, lancets and other sharps  Per Insurance coverage 1 each 3     sildenafil (REVATIO) 20 MG tablet Take 1 tablet (20 mg) by mouth 3 times daily 270 tablet 3     ticagrelor (BRILINTA) 90 MG tablet Take 1 tablet (90 mg) by mouth 2 times daily 180 tablet 3     torsemide (DEMADEX) 20 MG tablet Take 1 tablet (20 mg) by mouth daily 180 tablet 3         ROS:   10 point ROS of systems including Constitutional, Eyes, Respiratory, Cardiovascular, Gastroenterology, Genitourinary, Integumentary, Muscularskeletal, Psychiatric were  negative except for pertinent positives noted in my HPI.      EXAM:  GEN:  In general, this is a well nourished  male in no acute distress on room air.  Patient ambulatory. Unaccompanied today.  HEENT:  Pupils grossly equal, sclerae nonicteric. Mucous membranes moist.  NECK: Supple, no masses appreciated. Trachea midline. Thick neck, no JVD.   C/V:  Regular rate and rhythm, no murmur, rub or gallop. No S3 or RV heave.   RESP: Respirations are unlabored. No use of accessory muscles. Clear to auscultation bilaterally without wheezing, rales, or rhonchi.  GI: Abdomen soft, nontender, nondistended.   EXTREM: Some brawny changes but only trace  LE edema. No cyanosis or clubbing.  NEURO: Alert and oriented, cooperative. Gait not assessed. No obvious focal deficits.   SKIN: Warm and dry. No rashes or petechiae appreciated.       Weight  Wt Readings from Last 10 Encounters:   08/27/20 141.5 kg (312 lb)   08/21/20 138.3 kg (305 lb)   08/19/20 145.2 kg (320 lb)   06/30/20 147.6 kg (325 lb 4.8 oz)   03/12/20 142.8 kg (314 lb 12.8 oz)   03/03/20 141.5 kg (312 lb)   01/22/20 146.4 kg (322 lb 12.8 oz)   01/19/20 145.2 kg (320 lb 3.2 oz)   01/14/20 149.1 kg (328 lb 11.2 oz)   08/22/19 145.2 kg (320 lb)       Labs:    CBC RESULTS:  Lab Results   Component Value Date    WBC 10.1 08/27/2020    RBC 3.92 (L) 08/27/2020    HGB 11.5 (L) 08/27/2020    HCT 33.7 (L) 08/27/2020     MCV 86 08/27/2020    MCH 29.3 08/27/2020    MCHC 34.1 08/27/2020    RDW 12.9 08/27/2020     08/27/2020       CMP RESULTS:  Lab Results   Component Value Date     08/27/2020    POTASSIUM 3.7 08/27/2020    CHLORIDE 100 08/27/2020    CO2 28 08/27/2020    ANIONGAP 8 08/27/2020     (H) 08/27/2020    BUN 64 (H) 08/27/2020    CR 3.11 (H) 08/27/2020    GFRESTIMATED 21 (L) 08/27/2020    GFRESTBLACK 24 (L) 08/27/2020    ANNITA 9.5 08/27/2020    BILITOTAL 0.5 08/27/2020    ALBUMIN 3.8 08/27/2020    ALKPHOS 111 08/27/2020    ALT 22 08/27/2020    AST 15 08/27/2020        NT-proBNP:  Results for TERRI MCELROY (MRN [id number]) as of 8/27/2020 12:35   Ref. Range 1/14/2020 11:27 1/22/2020 14:36 7/1/2020 10:46 7/30/2020 13:47 8/27/2020 11:05   N-Terminal Pro Bnp Latest Ref Range: 0 - 125 pg/mL 1,385 (H) 648 (H) 673 (H) 615 (H) 430 (H)         Most recent testing:      Echocardiogram (8/2020)     Interpretation Summary  Limited echo.  Global and regional left ventricular function is normal with an EF of 60-65%.  No regional wall motion abnormalities are seen.  Global right ventricular function is normal.  IVC diameter and respiratory changes fall into an intermediate range  suggesting an RA pressure of 8 mmHg.  No pericardial effusion is present.     This study was compared with the study from 7/1/20. No significant change in  biventricular function.      RHC (8/2020)  Right Heart Pressures     RA 12/13/10  RV 70/10  PA 70/31/40  PCWP --/--/15    PA Sat 66%  Ao 97%  Hgb 11.4  HR 61  Brittanie CO/CI 7.8 L/min; 3.1 L/min/m2  TD CO/CI 7.9 L/min; 3.2 L/min/m2    PVR 7.8  TPR 3.1 Right sided filling pressures are mildly elevated.Left sided filling pressures are mildly elevated. Moderately elevated pulmonary artery hypertension.Normal cardiac output level.Hemodynamic data has been modified in Epic per physician review       NYHA Functional Class:  Functional class 3    1--No limitation of activity  2--Slight limitation,  ordinary activities may cause symptoms  3--Marked limitation, less than ordinary activities cause symptoms  4--Severe limitation, minimal activity causes symptoms, or symptoms at rest      40 minutes of one on one time was spent with the patient during which greater than 50 percent of that time was spent in counseling and coordination of care.       Laurie Peters PA-C  [facility]  Pager [phone]

## 2020-08-27 NOTE — PATIENT INSTRUCTIONS
Thank you for visiting the Pulmonary Hypertension Clinic today.      Today we discussed:   Your kidney function is a bit worse today. We will DECREASE your torsemide to 20mg once daily (1 pill instead of 2).      Additional Instructions:    1. Continue staying active and eat a heart healthy, low sodium diet.     2. Please keep current list of medications with you at all times.     3. Remember to weigh yourself daily after voiding and write it down on a log. If you have gained/lost 2 pounds overnight or 5 pounds in a week contact us for medication adjustments or further instructions.    4. Please call us immediately if you have syncope (fainting or passing out), chest pain, worsening edema (swelling or weight gain), or general worsening in how you are feeling.       Follow up Appointment Information:  Return in ~2 weeks via virtual visit with repeat labs prior.     -----------------------------------------------------------------------------------------------------------------    If you have questions or concerns please contact us at:    Senait Berg RN, BSN    Sharron Katz (Schedule,Prior Auth)  Nurse Coordinator     Clinic   Pulmonary Hypertension   Pulmonary Hypertension  [facility]             [facility]  (P)[phone]    (P) [phone]        (F)[phone]                ** Please note that you will NOT receive a reminder call regarding your scheduled testing, reminder calls are for provider appointments only.  If you are scheduled for testing within the [employer] system you may receive a call regarding pre-registration for billing purposes only.**     --------------------------------------------------------------------------------------------------------------    Interested in joining a support group?    Pulmonary Hypertension Association  Https://www.phassociation.org/  **Look at the Events Tab** They even have Support Groups that  you can call into    [facility] Support Group  Second Saturday of the Month from 1-3 PM   Location: [address] [place] [id number]  Leader: Bonnie Yee and Afshan Pena  Phone: [phone] or [phone]  Email: [email or web address]

## 2020-09-02 NOTE — NURSING NOTE
Pts plan of care per EL Sidhu:    Return in ~2 weeks for a virtual visit, with repeat BMP prior.     I called the pt and was able to get him scheduled for a 2 week virtual visit with EL Sidhu on 9/15 at 145.  Senait Berg RN on 9/2/2020 at 11:01 AM

## 2020-09-11 DIAGNOSIS — I25.10 CORONARY ATHEROSCLEROSIS OF NATIVE CORONARY ARTERY: Primary | ICD-10-CM

## 2020-09-11 DIAGNOSIS — I27.20 PULMONARY HYPERTENSION (H): ICD-10-CM

## 2020-09-11 DIAGNOSIS — E11.65 TYPE II OR UNSPECIFIED TYPE DIABETES MELLITUS WITH RENAL MANIFESTATIONS, UNCONTROLLED(250.42) (H): ICD-10-CM

## 2020-09-11 DIAGNOSIS — R06.09 DYSPNEA ON EXERTION: ICD-10-CM

## 2020-09-11 DIAGNOSIS — E11.29 TYPE II OR UNSPECIFIED TYPE DIABETES MELLITUS WITH RENAL MANIFESTATIONS, UNCONTROLLED(250.42) (H): ICD-10-CM

## 2020-09-11 LAB
ALT SERPL W P-5'-P-CCNC: 32 U/L (ref 0–70)
ANION GAP SERPL CALCULATED.3IONS-SCNC: 6 MMOL/L (ref 3–14)
BUN SERPL-MCNC: 40 MG/DL (ref 7–30)
CALCIUM SERPL-MCNC: 9.3 MG/DL (ref 8.5–10.1)
CHLORIDE SERPL-SCNC: 112 MMOL/L (ref 94–109)
CK SERPL-CCNC: 216 U/L (ref 30–300)
CO2 SERPL-SCNC: 24 MMOL/L (ref 20–32)
CREAT SERPL-MCNC: 1.97 MG/DL (ref 0.66–1.25)
ERYTHROCYTE [DISTWIDTH] IN BLOOD BY AUTOMATED COUNT: 14.2 % (ref 10–15)
GFR SERPL CREATININE-BSD FRML MDRD: 36 ML/MIN/{1.73_M2}
GLUCOSE SERPL-MCNC: 180 MG/DL (ref 70–99)
HBA1C MFR BLD: 9.3 % (ref 0–5.6)
HCT VFR BLD AUTO: 32.2 % (ref 40–53)
HGB BLD-MCNC: 10.7 G/DL (ref 13.3–17.7)
MCH RBC QN AUTO: 29.2 PG (ref 26.5–33)
MCHC RBC AUTO-ENTMCNC: 33.2 G/DL (ref 31.5–36.5)
MCV RBC AUTO: 88 FL (ref 78–100)
NT-PROBNP SERPL-MCNC: 415 PG/ML (ref 0–125)
PLATELET # BLD AUTO: 231 10E9/L (ref 150–450)
POTASSIUM SERPL-SCNC: 4.7 MMOL/L (ref 3.4–5.3)
RBC # BLD AUTO: 3.66 10E12/L (ref 4.4–5.9)
SODIUM SERPL-SCNC: 142 MMOL/L (ref 133–144)
WBC # BLD AUTO: 6.9 10E9/L (ref 4–11)

## 2020-09-11 PROCEDURE — 80048 BASIC METABOLIC PNL TOTAL CA: CPT | Performed by: PHYSICIAN ASSISTANT

## 2020-09-11 PROCEDURE — 83880 ASSAY OF NATRIURETIC PEPTIDE: CPT | Performed by: PHYSICIAN ASSISTANT

## 2020-09-11 PROCEDURE — 84460 ALANINE AMINO (ALT) (SGPT): CPT | Performed by: FAMILY MEDICINE

## 2020-09-11 PROCEDURE — 82550 ASSAY OF CK (CPK): CPT | Performed by: FAMILY MEDICINE

## 2020-09-11 PROCEDURE — 36415 COLL VENOUS BLD VENIPUNCTURE: CPT | Performed by: PHYSICIAN ASSISTANT

## 2020-09-11 PROCEDURE — 85027 COMPLETE CBC AUTOMATED: CPT | Performed by: PHYSICIAN ASSISTANT

## 2020-09-11 PROCEDURE — 83036 HEMOGLOBIN GLYCOSYLATED A1C: CPT | Performed by: FAMILY MEDICINE

## 2020-09-14 NOTE — PROGRESS NOTES
CARDIOLOGY PH CLINIC VIDEO VISIT    Jeremiah Isaac is a 60 year old male who is being evaluated via a billable video visit.      The patient has been notified of following:     This video visit will be conducted via a call between you and your physician/provider. We have found that certain health care needs can be provided without the need for an in-person physical exam.  This service lets us provide the care you need with a video conversation.  If a prescription is necessary we can send it directly to your pharmacy.  If lab work is needed we can place an order for that and you can then stop by our lab to have the test done at a later time.    Virtual visits are billed at different rates depending on your insurance coverage. During this emergency period, for some insurers they may be billed the same as an in-person visit.  Please reach out to your insurance provider with any questions.    If during the course of the call the physician/provider feels a video visit is not appropriate, you will not be charged for this service.      Physician has received verbal consent for a Video Visit from the patient? Yes    Patient would like the video invitation sent by: Other e-mail: [email or web address]       I have reviewed and updated the patient's Past Medical History, Social History, Family History and Medication List.    MEDICATIONS:  Current Outpatient Medications   Medication Sig Dispense Refill     Alcohol Swabs PADS Use to swab the area of the injection or abram as directed   Per insurance coverage 100 each 3     aspirin (ASA) 81 MG EC tablet Take 1 tablet (81 mg) by mouth daily Start tomorrow morning. 90 tablet 3     atorvastatin (LIPITOR) 40 MG tablet Take 40 mg by mouth every morning        cloNIDine (CATAPRES) 0.3 MG tablet Take 0.3 mg by mouth 2 times daily       glipiZIDE (GLUCOTROL) 5 MG tablet Take 1 tablet (5 mg) by mouth every morning (before breakfast) 90 tablet 3     insulin lispro (HUMALOG VIAL) 100 UNIT/ML vial Inject  100 Units Subcutaneous daily       insulin pen needle (32G X 4 MM) 32G X 4 MM miscellaneous Use as directed by provider  Per insurance coverage 100 each 3     liraglutide (VICTOZA) 18 MG/3ML solution Inject 0.6 mg Subcutaneous daily 3 mL 3     macitentan (OPSUMIT) 10 MG tablet Take 10 mg by mouth every morning        metoprolol succinate ER (TOPROL-XL) 100 MG 24 hr tablet Take 100 mg by mouth every morning        NIFEdipine ER (ADALAT CC) 30 MG 24 hr tablet Take 1 tablet (30 mg) by mouth daily 90 tablet 3     Sharps Container MISC Use as directed to dispose of needles, lancets and other sharps  Per Insurance coverage 1 each 3     sildenafil (REVATIO) 20 MG tablet Take 1 tablet (20 mg) by mouth 3 times daily 270 tablet 3     ticagrelor (BRILINTA) 90 MG tablet Take 1 tablet (90 mg) by mouth 2 times daily 180 tablet 3     torsemide (DEMADEX) 20 MG tablet Take 1 tablet (20 mg) by mouth daily 180 tablet 3       ALLERGIES  Patient has no known allergies.      Self reported vitals:  Weight: 317 lb  /64    Brief physical exam:  General: In no acute distress, upright and calm.  Eyes: No apparent redness or discharge.   Chest: No labored breathing, no cough during exam or audible wheezing.   Neuro: No obvious focal defects or tremors.   Psych: Alert and oriented. Does not appear anxious.     The rest of a comprehensive physical examination is deferred due to public health emergency video visit restrictions.       Most recent labs:   Results for TERRI MCELROY (MRN [id number]) as of 9/15/2020 13:40   Ref. Range 9/11/2020 15:39 9/11/2020 15:40   Sodium Latest Ref Range: 133 - 144 mmol/L 142    Potassium Latest Ref Range: 3.4 - 5.3 mmol/L 4.7    Chloride Latest Ref Range: 94 - 109 mmol/L 112 (H)    Carbon Dioxide Latest Ref Range: 20 - 32 mmol/L 24    Urea Nitrogen Latest Ref Range: 7 - 30 mg/dL 40 (H)    Creatinine Latest Ref Range: 0.66 - 1.25 mg/dL 1.97 (H)    GFR Estimate Latest Ref Range: >60 mL/min/1.73_m2 36 (L)    GFR  Estimate If Black Latest Ref Range: >60 mL/min/1.73_m2 42 (L)    Calcium Latest Ref Range: 8.5 - 10.1 mg/dL 9.3    Anion Gap Latest Ref Range: 3 - 14 mmol/L 6    ALT Latest Ref Range: 0 - 70 U/L  32   Hemoglobin A1C Latest Ref Range: 0 - 5.6 %  9.3 (H)   CK Total Latest Ref Range: 30 - 300 U/L  216   N-Terminal Pro Bnp Latest Ref Range: 0 - 125 pg/mL 415 (H)    Glucose Latest Ref Range: 70 - 99 mg/dL 180 (H)    WBC Latest Ref Range: 4.0 - 11.0 10e9/L 6.9    Hemoglobin Latest Ref Range: 13.3 - 17.7 g/dL 10.7 (L)    Hematocrit Latest Ref Range: 40.0 - 53.0 % 32.2 (L)    Platelet Count Latest Ref Range: 150 - 450 10e9/L 231    RBC Count Latest Ref Range: 4.4 - 5.9 10e12/L 3.66 (L)    MCV Latest Ref Range: 78 - 100 fl 88    MCH Latest Ref Range: 26.5 - 33.0 pg 29.2    MCHC Latest Ref Range: 31.5 - 36.5 g/dL 33.2    RDW Latest Ref Range: 10.0 - 15.0 % 14.2            Primary PH cardiologist: Dr. Santiago Riojas      HPI:  Mr. Isaac is a pleasant 60 year old male with a PMHx including DM2, hypertension, neuropathy, obesity, CKD, central sleep apnea with possible narcolepsy on Bipap, prior methamphetamine use, and coronary artery disease.  He was initially evaluated by Dr. Riojas in Jan 2020 due to exertional syncope. He underwent a RHC also in January showing normal right and left sided filling pressures, but severe PAH, with normal cardiac output. Notably, at that time he had a significant reduction in PA pressures after receiving NTG and verapamil and was placed on nifedipine. Ultimately, this spring he was placed on dual oral therapy with sildenafil and macitentan with no further syncope, though has continued to struggle with shortness of breath and volume overload.      He then underwent a repeat RHC on 6/30/2020. This showed continued severe pulmonary hypertension, along with both elevated right and left filling pressures  (RAP 12, PAP 96/35, mean 57, PCWP 22, Brittanie CI 2.59, and PVR of 5.44 del valle). Echo showed continued  preserved EF 60-65%, and normal RV function with no new valvular abnormalities. When I saw him shortly after, weight was 320 lbs at home, and I changed his Lasix to torsemide at 40mg daily.      Upon follow up with Dr. Riojas shortly after, he endorsed again ongoing SAINI without much change. He was then sent back for a repeat L+RHC on 8/11/2020. This showed mildly elevated right sided filling pressures, and again severe PH with moderately elevated left sided filling pressures (RA 15, PAP 98/40, mean 60. PCWP 30 (however normal LVEDP), TD CO/CI 7.37/2.88. PVR 4.44 del valle). The Nipride study showed a slight drop in cardiac output with significant drop in mean PA pressure and PCWP pressure. Notably, he had severe in stent restenosis of a prior proximal LAD stent and received PCI with ARINA to the proximal LAD.      When he had a virtual follow up with Dr. Riojas the following week, he had apparently been doing better, but abruptly started not feeling well again that day with dizziness and presyncope. He was sent back to the ED. Repeat R+LHC 8/20/2020 showed moderate to severe mid LAD disease and he got another ARINA to the mid LAD with POBA of the D2. Previous LAD stents were patent. At that time PAP was 70/31, mean 40, with PCWP 15. He remained on the torsemide 40mg daily unchanged along with Toprol XL 100mg daily. His macitentan and sildenafil were also continued. His lisinopril was stopped due to renal concerns. When I saw him back virtually a few weeks ago, he was feeling a little better and weight was down to 305 lbs at home. However, his SCr had jumped up to 3.11. NT-proBNP was the lowest it has been in quite some time, at 430. I asked him to reduce his torsemide to 20mg daily, and we kept him off the lisinopril.    Today we are doing a 2 week virtual follow up visit. Repeat labs done a few days ago show improvement with SCr back down to 1.97. Fortunately, his NT-proBNP remains down at 415. He tells me that he's noted  he has been feeling better over the last few weeks. He has some more energy during the day. He still endorses some SAINI and swelling, but overall notes a trend for the better. Interestingly despite the improvements, his weight is up to 317 lbs at home (about 10 lbs). He is wrapping his legs but thinks the edema is controlled. He is taking his BP at home, and has been running quite god, int e 120-130s/60s for the most part. He denies any recent dizziness/presyncope/syncope.         CURRENT PULMONARY HYPERTENSION REGIMEN:     PAH Rx: macitentan 10mg daily, sildenafil 20mg TID     Diuretics: torsemide 20mg daily         Assessment/Plan:     1. Pulmonary arterial hypertension.              --PAH felt out of proportion to his sleep disordered breathing. He remains compliant with his nightly Bipap as directed.              --He has now been on macitentan 10mg daily and sildenafil 20mg TID for a few months along with nifedipine due to good response with Nipride study on RHC. As above, he still has severe pulmonary hypertension on repeat RHC with recent hospital stay. Ultimately, plan is to consider a prostacyclin or prostacyclin analog. He is slowly improving clinically, with good BP control currently.    --He had a good response with change to torsemide from lasix. We reduced his torsemide from 40mg to 20mg a few weeks ago due to renal concerns. SCr has come down to his assumed baseline. Despite his weight being up, since he's feeling better, I made no changes today. I asked him to continue to weigh daily and monitor closely for worsening edema. We briefly reviewed the need to maintain a low salt diet.   --I encouraged him to proceed with cardiac rehab now that he is revascularized. His BP and fluid status are under reasonable control currently as well.       2. Coronary artery disease.              --Hx prox LAD stenting in Aug 2019. More recently, had severe in stent restenosis and on 8/11 received PCI with ARIAN to  proximal LAD. About a week later he returned with presyncope and dizziness, and repeat angiogram showed moderate to severe mid LAD disease; he received another ARINA to the mid LAD with POBA of the D2. Previous LAD stents were patent.               --He is now on DAPT with Brilinta and ASA which he appears to be tolerating.               --Continue atorvastatin 40mg daily. Last LDL Jan 2020 within goal at 53.    --Hemoglobin A1C with lab draw last week was 9.3%. Will need to continued close mgmt of his DM.     3. Hypertension.              --BP looks much better on his home checks. Now off lisinopril due to lower BP and renal concerns. Continue Toprol XL, nifedipine, and clonidine along with his diuretics as above.       Follow up plan: Return to see Dr. Riojas in 6-8 weeks with repeat labs.       Video-Visit Details    Type of service:  Video Visit    Video Start Time: 1343  Video End Time: 1355    Originating Location (pt. Location): Home    Distant Location (provider location):  [facility] HEART [facility]    Platform used for Video Visit: Roque Peters PA-C  [facility]  Pager [phone]

## 2020-09-15 ENCOUNTER — VIRTUAL VISIT (OUTPATIENT)
Dept: CARDIOLOGY | Facility: CLINIC | Age: 60
End: 2020-09-15
Attending: PHYSICIAN ASSISTANT
Payer: COMMERCIAL

## 2020-09-15 DIAGNOSIS — R06.09 DYSPNEA ON EXERTION: ICD-10-CM

## 2020-09-15 DIAGNOSIS — I27.20 PULMONARY HYPERTENSION (H): ICD-10-CM

## 2020-09-15 PROCEDURE — 99214 OFFICE O/P EST MOD 30 MIN: CPT | Mod: GT | Performed by: PHYSICIAN ASSISTANT

## 2020-09-15 NOTE — LETTER
9/15/2020      RE: Jeremiah Isaac  [id number] [id number]  [place] [address]       Dear Colleague,    Thank you for the opportunity to participate in the care of your patient, Jeremiah Isaac, at the [facility] at [facility]. Please see a copy of my visit note below.    CARDIOLOGY PH CLINIC VIDEO VISIT    Jeremiah Iasac is a 60 year old male who is being evaluated via a billable video visit.      The patient has been notified of following:     This video visit will be conducted via a call between you and your physician/provider. We have found that certain health care needs can be provided without the need for an in-person physical exam.  This service lets us provide the care you need with a video conversation.  If a prescription is necessary we can send it directly to your pharmacy.  If lab work is needed we can place an order for that and you can then stop by our lab to have the test done at a later time.    Virtual visits are billed at different rates depending on your insurance coverage. During this emergency period, for some insurers they may be billed the same as an in-person visit.  Please reach out to your insurance provider with any questions.    If during the course of the call the physician/provider feels a video visit is not appropriate, you will not be charged for this service.      Physician has received verbal consent for a Video Visit from the patient? Yes    Patient would like the video invitation sent by: Other e-mail: Jamarcus       I have reviewed and updated the patient's Past Medical History, Social History, Family History and Medication List.    MEDICATIONS:  Current Outpatient Medications   Medication Sig Dispense Refill     Alcohol Swabs PADS Use to swab the area of the injection or abram as directed   Per insurance coverage 100 each 3     aspirin (ASA) 81 MG EC tablet Take 1 tablet (81 mg) by mouth daily Start tomorrow morning. 90 tablet 3      atorvastatin (LIPITOR) 40 MG tablet Take 40 mg by mouth every morning        cloNIDine (CATAPRES) 0.3 MG tablet Take 0.3 mg by mouth 2 times daily       glipiZIDE (GLUCOTROL) 5 MG tablet Take 1 tablet (5 mg) by mouth every morning (before breakfast) 90 tablet 3     insulin lispro (HUMALOG VIAL) 100 UNIT/ML vial Inject 100 Units Subcutaneous daily       insulin pen needle (32G X 4 MM) 32G X 4 MM miscellaneous Use as directed by provider  Per insurance coverage 100 each 3     liraglutide (VICTOZA) 18 MG/3ML solution Inject 0.6 mg Subcutaneous daily 3 mL 3     macitentan (OPSUMIT) 10 MG tablet Take 10 mg by mouth every morning        metoprolol succinate ER (TOPROL-XL) 100 MG 24 hr tablet Take 100 mg by mouth every morning        NIFEdipine ER (ADALAT CC) 30 MG 24 hr tablet Take 1 tablet (30 mg) by mouth daily 90 tablet 3     Sharps Container MISC Use as directed to dispose of needles, lancets and other sharps  Per Insurance coverage 1 each 3     sildenafil (REVATIO) 20 MG tablet Take 1 tablet (20 mg) by mouth 3 times daily 270 tablet 3     ticagrelor (BRILINTA) 90 MG tablet Take 1 tablet (90 mg) by mouth 2 times daily 180 tablet 3     torsemide (DEMADEX) 20 MG tablet Take 1 tablet (20 mg) by mouth daily 180 tablet 3       ALLERGIES  Patient has no known allergies.      Self reported vitals:  Weight: 317 lb  /64    Brief physical exam:  General: In no acute distress, upright and calm.  Eyes: No apparent redness or discharge.   Chest: No labored breathing, no cough during exam or audible wheezing.   Neuro: No obvious focal defects or tremors.   Psych: Alert and oriented. Does not appear anxious.     The rest of a comprehensive physical examination is deferred due to public health emergency video visit restrictions.       Most recent labs:   Results for NAVITERRI CARR (MRN [id number]) as of 9/15/2020 13:40   Ref. Range 9/11/2020 15:39 9/11/2020 15:40   Sodium Latest Ref Range: 133 - 144 mmol/L 142    Potassium Latest  Ref Range: 3.4 - 5.3 mmol/L 4.7    Chloride Latest Ref Range: 94 - 109 mmol/L 112 (H)    Carbon Dioxide Latest Ref Range: 20 - 32 mmol/L 24    Urea Nitrogen Latest Ref Range: 7 - 30 mg/dL 40 (H)    Creatinine Latest Ref Range: 0.66 - 1.25 mg/dL 1.97 (H)    GFR Estimate Latest Ref Range: >60 mL/min/1.73_m2 36 (L)    GFR Estimate If Black Latest Ref Range: >60 mL/min/1.73_m2 42 (L)    Calcium Latest Ref Range: 8.5 - 10.1 mg/dL 9.3    Anion Gap Latest Ref Range: 3 - 14 mmol/L 6    ALT Latest Ref Range: 0 - 70 U/L  32   Hemoglobin A1C Latest Ref Range: 0 - 5.6 %  9.3 (H)   CK Total Latest Ref Range: 30 - 300 U/L  216   N-Terminal Pro Bnp Latest Ref Range: 0 - 125 pg/mL 415 (H)    Glucose Latest Ref Range: 70 - 99 mg/dL 180 (H)    WBC Latest Ref Range: 4.0 - 11.0 10e9/L 6.9    Hemoglobin Latest Ref Range: 13.3 - 17.7 g/dL 10.7 (L)    Hematocrit Latest Ref Range: 40.0 - 53.0 % 32.2 (L)    Platelet Count Latest Ref Range: 150 - 450 10e9/L 231    RBC Count Latest Ref Range: 4.4 - 5.9 10e12/L 3.66 (L)    MCV Latest Ref Range: 78 - 100 fl 88    MCH Latest Ref Range: 26.5 - 33.0 pg 29.2    MCHC Latest Ref Range: 31.5 - 36.5 g/dL 33.2    RDW Latest Ref Range: 10.0 - 15.0 % 14.2            Primary PH cardiologist: Dr. Santiago Riojas      HPI:  Mr. Isaac is a pleasant 60 year old male with a PMHx including DM2, hypertension, neuropathy, obesity, CKD, central sleep apnea with possible narcolepsy on Bipap, prior methamphetamine use, and coronary artery disease.  He was initially evaluated by Dr. Riojas in Jan 2020 due to exertional syncope. He underwent a RHC also in January showing normal right and left sided filling pressures, but severe PAH, with normal cardiac output. Notably, at that time he had a significant reduction in PA pressures after receiving NTG and verapamil and was placed on nifedipine. Ultimately, this spring he was placed on dual oral therapy with sildenafil and macitentan with no further syncope, though has  continued to struggle with shortness of breath and volume overload.      He then underwent a repeat RHC on 6/30/2020. This showed continued severe pulmonary hypertension, along with both elevated right and left filling pressures  (RAP 12, PAP 96/35, mean 57, PCWP 22, Brittanie CI 2.59, and PVR of 5.44 del valle). Echo showed continued preserved EF 60-65%, and normal RV function with no new valvular abnormalities. When I saw him shortly after, weight was 320 lbs at home, and I changed his Lasix to torsemide at 40mg daily.      Upon follow up with Dr. Riojas shortly after, he endorsed again ongoing SAINI without much change. He was then sent back for a repeat L+RHC on 8/11/2020. This showed mildly elevated right sided filling pressures, and again severe PH with moderately elevated left sided filling pressures (RA 15, PAP 98/40, mean 60. PCWP 30 (however normal LVEDP), TD CO/CI 7.37/2.88. PVR 4.44 del valle). The Nipride study showed a slight drop in cardiac output with significant drop in mean PA pressure and PCWP pressure. Notably, he had severe in stent restenosis of a prior proximal LAD stent and received PCI with ARINA to the proximal LAD.      When he had a virtual follow up with Dr. Riojas the following week, he had apparently been doing better, but abruptly started not feeling well again that day with dizziness and presyncope. He was sent back to the ED. Repeat R+LHC 8/20/2020 showed moderate to severe mid LAD disease and he got another ARINA to the mid LAD with POBA of the D2. Previous LAD stents were patent. At that time PAP was 70/31, mean 40, with PCWP 15. He remained on the torsemide 40mg daily unchanged along with Toprol XL 100mg daily. His macitentan and sildenafil were also continued. His lisinopril was stopped due to renal concerns. When I saw him back virtually a few weeks ago, he was feeling a little better and weight was down to 305 lbs at home. However, his SCr had jumped up to 3.11. NT-proBNP was the lowest it has  been in quite some time, at 430. I asked him to reduce his torsemide to 20mg daily, and we kept him off the lisinopril.    Today we are doing a 2 week virtual follow up visit. Repeat labs done a few days ago show improvement with SCr back down to 1.97. Fortunately, his NT-proBNP remains down at 415. He tells me that he's noted he has been feeling better over the last few weeks. He has some more energy during the day. He still endorses some SAINI and swelling, but overall notes a trend for the better. Interestingly despite the improvements, his weight is up to 317 lbs at home (about 10 lbs). He is wrapping his legs but thinks the edema is controlled. He is taking his BP at home, and has been running quite god, int e 120-130s/60s for the most part. He denies any recent dizziness/presyncope/syncope.         CURRENT PULMONARY HYPERTENSION REGIMEN:     PAH Rx: macitentan 10mg daily, sildenafil 20mg TID     Diuretics: torsemide 20mg daily         Assessment/Plan:     1. Pulmonary arterial hypertension.              --PAH felt out of proportion to his sleep disordered breathing. He remains compliant with his nightly Bipap as directed.              --He has now been on macitentan 10mg daily and sildenafil 20mg TID for a few months along with nifedipine due to good response with Nipride study on RHC. As above, he still has severe pulmonary hypertension on repeat RHC with recent hospital stay. Ultimately, plan is to consider a prostacyclin or prostacyclin analog. He is slowly improving clinically, with good BP control currently.    --He had a good response with change to torsemide from lasix. We reduced his torsemide from 40mg to 20mg a few weeks ago due to renal concerns. SCr has come down to his assumed baseline. Despite his weight being up, since he's feeling better, I made no changes today. I asked him to continue to weigh daily and monitor closely for worsening edema. We briefly reviewed the need to maintain a low salt  diet.   --I encouraged him to proceed with cardiac rehab now that he is revascularized. His BP and fluid status are under reasonable control currently as well.       2. Coronary artery disease.              --Hx prox LAD stenting in Aug 2019. More recently, had severe in stent restenosis and on 8/11 received PCI with ARINA to proximal LAD. About a week later he returned with presyncope and dizziness, and repeat angiogram showed moderate to severe mid LAD disease; he received another ARINA to the mid LAD with POBA of the D2. Previous LAD stents were patent.               --He is now on DAPT with Brilinta and ASA which he appears to be tolerating.               --Continue atorvastatin 40mg daily. Last LDL Jan 2020 within goal at 53.    --Hemoglobin A1C with lab draw last week was 9.3%. Will need to continued close mgmt of his DM.     3. Hypertension.              --BP looks much better on his home checks. Now off lisinopril due to lower BP and renal concerns. Continue Toprol XL, nifedipine, and clonidine along with his diuretics as above.       Follow up plan: Return to see Dr. Riojas in 6-8 weeks with repeat labs.       Video-Visit Details    Type of service:  Video Visit    Video Start Time: 1343  Video End Time: 1355    Originating Location (pt. Location): Home    Distant Location (provider location):  [facility]    Platform used for Video Visit: Roque Peters PA-C  [facility] Heart  Pager [phone]      Please do not hesitate to contact me if you have any questions/concerns.     Sincerely,     EL Sidhu

## 2020-09-15 NOTE — PATIENT INSTRUCTIONS
Thank you for visiting the [facility] today.      Today we discussed:   Continue to watch your daily weights and blood pressure at home. Call with any new concerns.  No medication changes today.   I encourage you to proceed with cardiac rehab as discussed.   Your kidney function is improved on your recent labs.       Additional Instructions:    1. Continue staying active and eat a heart healthy, low sodium diet.     2. Please keep current list of medications with you at all times.     3. Remember to weigh yourself daily after voiding and write it down on a log. If you have gained/lost 2 pounds overnight or 5 pounds in a week contact us for medication adjustments or further instructions.    4. Please call us immediately if you have syncope (fainting or passing out), chest pain, worsening edema (swelling or weight gain), or general worsening in how you are feeling.       Follow up Appointment Information:  Return to see Dr Riojas in 6-8 weeks with repeat labs. He will decide at that time if/when you need further testing for your PH.     -----------------------------------------------------------------------------------------------------------------    If you have questions or concerns please contact us at:    Senait Berg, RN, BSN    Sharron Katz (Schedule,Prior Auth)  Nurse Coordinator     Clinic   Pulmonary Hypertension   Pulmonary Hypertension  [facility]             [facility]  (P)[phone]    (P) [phone]        (F)[phone]                ** Please note that you will NOT receive a reminder call regarding your scheduled testing, reminder calls are for provider appointments only.  If you are scheduled for testing within the [facility] system you may receive a call regarding pre-registration for billing purposes only.**      --------------------------------------------------------------------------------------------------------------    Interested in joining a support group?    Pulmonary Hypertension Association  [email or web address]  **Look at the Events Tab** They even have Support Groups that you can call into    [facility] Support Group  Second Saturday of the Month from 1-3 PM   Location: [address] [place] [id number]  Leader: Bonnie Pena  Phone: [phone] or [phone]  Email: [email or web address]

## 2020-10-31 NOTE — PROGRESS NOTES
[facility]      1. Pulmonary hypertension  2. Elevated body mass index  3. Abnormal ECG: bradycardia, incomplete RBBB, RVH  4. Diabetes  5. Neuropathy  6. Hypertension  7. Negative serologic screen collagen vascular disease  8. CKD with proteinuria Estimated GFR 30-50  9. Elevated kappa light chains, low albumin, no evidence of monoclonal spike  10. Narcolepsy  11.Sleep disordered breath              Central sleep apnea              BIP with ASV currently  12. Elevated coronary calcium score with negative stress test              History of LAD stent  13, Questionable history of narcolepsy ? Central sleep apnea  14. History of methamphetamine usage  15. Exertional syncope  16. Epistaxis (ASA/Plavix)      Video visit with patient's permission with [employer] via IPAD x 15 minutes 10-10:15   for follow-up of pulmonary arterial hypertension (disproportionate) currently treated with sildenafil, weight, fluid, and blood pressure optimization.  He has known CAD with previous LAD stent g     Patient's weight is up and having asymmetric lower extremity swelling no responsive to wrapping but without findings of cellulitis.    He remai s short of breath with exertio  But without chest pain, tightness, heaviness, pressure, nausea or diaphoresis.  Oxygen  sats at rest >90% .    Plan:  1. Evaluate with [facility] this week for leg swelling , blood pressure control, and labs to include CMP, BNP, iron studies  2. [employer] video when completed above.        Complex decision making, patient counseling  and coordination of care involving multi-system disease including chronic renal failure, ASHD with previous stent and return of symptoms necessitating invasive assessmednt. assessment for classification of current overall status and the specific cardiac and vascular components.  The patient is on multiple, complex medications that require monitoring to achieve maximum therapeutic dosing and minimize off-target symptoms, drug-drug  interactions or toxicities.  Serial imaging and hemodynamic studies reviewed and up-dated.      Interim History    Mr. Isaac is a pleasant 60 year old male with a PMHx including DM2, hypertension, neuropathy, obesity, CKD, central sleep apnea with possible narcolepsy on Bipap, prior methamphetamine use, and coronary artery disease.  He was initially evaluated by Dr. Riojas in Jan 2020 due to exertional syncope. He underwent a RHC also in January showing normal right and left sided filling pressures, but severe PAH, with normal cardiac output. Notably, at that time he had a significant reduction in PA pressures after receiving NTG and verapamil and was placed on nifedipine. Ultimately, this spring he was placed on dual oral therapy with sildenafil and macitentan with no further syncope, though has continued to struggle with shortness of breath and volume overload.      He underwent repeat heart catherization and stenting in September that demonstrated mean PA 40/PCP 15 and CO of 7.o    Constitutional: weight loss, fever, chills, night sweats  HEENT: without visual changes, swallow difficulties  Pulmonary: with shortness of breath, but  without cough, wheeze, hemoptysis  Cardiac: without chest pain but+, SAINI, PND, orthopnea, edema, palpitation, + one episodepre-syncope, syncope,  GI: without diarrhea, constipation, jaundice, melena, GERD, hematemesis  : without frequency, urgency, dysuria, hematuria  Skin: rash, bruise, open lesions  Neuro: without TIA, focal neurologic complaints, seizure, trauma  Ortho: without pain, swelling, mobility impairment  Endocrine: diabetes, thyroid, heat/cold intolerance, polyuria, polyphagia, change bowel habits.  Sleep:no GIRISH, periodic breathing, fatigue       Current Outpatient Medications   Medication     Alcohol Swabs PADS     aspirin (ASA) 81 MG EC tablet     atorvastatin (LIPITOR) 40 MG tablet     cloNIDine (CATAPRES) 0.3 MG tablet     glipiZIDE (GLUCOTROL) 5 MG tablet      insulin lispro (HUMALOG VIAL) 100 UNIT/ML vial     insulin pen needle (32G X 4 MM) 32G X 4 MM miscellaneous     liraglutide (VICTOZA) 18 MG/3ML solution     macitentan (OPSUMIT) 10 MG tablet     metoprolol succinate ER (TOPROL-XL) 100 MG 24 hr tablet     NIFEdipine ER (ADALAT CC) 30 MG 24 hr tablet     [employer] MISC     sildenafil (REVATIO) 20 MG tablet     ticagrelor (BRILINTA) 90 MG tablet     torsemide (DEMADEX) 20 MG tablet     No current facility-administered medications for this visit.              Right sided filling pressures are mildly elevated.    Severely elevated pulmonary artery hypertension.    Left sided filling pressures are moderately elevated.    Normal cardiac output level.    Hemodynamic data has been modified in Epic per physician review.     Nipride study showed a slight drop in cardiac output with significant drop in mean PA pressure and PCWP pressure.  Severe in stent restenosis of a prior proximal LAD stent with a significant dPR of 0.62. Otherwise mild non obstructive CAD elsewhere in large epicardial vessels.  PCI with one drug eluting stent to the proximal LAD optimized with IVUS directed post dilation.    Most recent PVR  Calculated from PCP 15, mean PA 40 and CO of 7.0                          Continuation of dual antiplatelet therapy for 12 months      Coronary Findings     Diagnostic   Dominance: Right   Left Main    The vessel was visualized by selective angiography and is moderate in size.    Left Anterior Descending    The vessel was visualized by selective angiography and is moderate in size. There was 40% diffuse vessel disease.    Prox LAD to Mid LAD lesion is 75% stenosed. Culprit lesion. The lesion is calcified. The lesion was previously treated using a stent of unknown type. Previous treatment took place >2 years ago. Pressure wire/iFR used. 0.62    First Diagonal Branch    The vessel is small. There is mild diffuse disease throughout the vessel.    Second Diagonal  Branch    The vessel is moderate in size. There is mild diffuse disease throughout the vessel.    2nd Diag lesion is 90% stenosed.    Third Diagonal Branch    The vessel is small. There is mild diffuse disease throughout the vessel.    Left Circumflex    The vessel was visualized by selective angiography and is moderate in size.    Prox Cx lesion is 30% stenosed.    First Obtuse Marginal Branch    The vessel is small. There is mild diffuse disease throughout the vessel.    Second Obtuse Marginal Branch    The vessel is moderate in size. The vessel exhibits minimal luminal irregularities.    Right Coronary Artery    The vessel was visualized by selective angiography and is moderate in size. There was 20% diffuse vessel disease.    Dist RCA lesion is 35% stenosed. Not the culprit lesion.    Acute Marginal Branch    The vessel is moderate in size and is angiographically normal.    Right Ventricular Branch    The vessel is moderate in size and is angiographically normal.    Right Posterior Descending Artery    The vessel is large. The vessel exhibits minimal luminal irregularities.    Right Posterior Atrioventricular Artery    The vessel is large. The vessel exhibits minimal luminal irregularities.    First Right Posterolateral Branch    The vessel is moderate in size. The vessel exhibits minimal luminal irregularities.    Second Right Posterolateral Branch    The vessel is moderate in size. The vessel exhibits minimal luminal irregularities.    Third Right Posterolateral Branch    The vessel is moderate in size. The vessel exhibits minimal luminal irregularities.       Intervention     Prox LAD to Mid LAD lesion    Stent    CATH GUIDING BLUE YELLOW PTFE XB3.5 [id number] guide catheter was successful. The pre-interventional distal flow is normal (MAYO 3). The post-interventional distal flow is normal (MAYO 3). A 6 Urdu XB 3.5 placed in the left main. An OpSens wire was advanced into the LAD and across the ISR  of a previous stent with dPR measured at 0.62. A 2.5x20mm Emerge was then used to pre dilate the lesion followed by placement of a 3.0x28mm Synergy stent. A 3.5x20mm NC Emerge used for pre dilation initially followed by a 3.5x8mm NC Emerge distally and proximally at high pressure in areas of underexpansion. IVUS was then used to assess stent expansion and again showed underexpanded segments so a 3.75x8mm NC Emerge was then used to post dilate the stent further. IC nitroglycerin was given and final angiography performed which showed MAYO III flow, no residual stenosis, no perforation or dissection.    There is a 0% residual stenosis post intervention.              Laboratories:     Ref. Range 8/11/2020 12:30   Sodium Latest Ref Range: 133 - 144 mmol/L 137   Potassium Latest Ref Range: 3.4 - 5.3 mmol/L 4.3   Chloride Latest Ref Range: 94 - 109 mmol/L 106   Carbon Dioxide Latest Ref Range: 20 - 32 mmol/L 27   Urea Nitrogen Latest Ref Range: 7 - 30 mg/dL 53 (H)   Creatinine Latest Ref Range: 0.66 - 1.25 mg/dL 2.05 (H)   GFR Estimate Latest Ref Range: >60 mL/min/1.73_m2 34 (L)   GFR Estimate If Black Latest Ref Range: >60 mL/min/1.73_m2 40 (L)   Calcium Latest Ref Range: 8.5 - 10.1 mg/dL 9.0   Anion Gap Latest Ref Range: 3 - 14 mmol/L 4   N-Terminal Pro BNP Inpatient Latest Ref Range: 0 - 900 pg/mL 250   Glucose Latest Ref Range: 70 - 99 mg/dL 305 (H)   WBC Latest Ref Range: 4.0 - 11.0 10e9/L 7.7   Hemoglobin Latest Ref Range: 13.3 - 17.7 g/dL 11.6 (L)   Hematocrit Latest Ref Range: 40.0 - 53.0 % 35.0 (L)   Platelet Count Latest Ref Range: 150 - 450 10e9/L 240   RBC Count Latest Ref Range: 4.4 - 5.9 10e12/L 4.01 (L)   MCV Latest Ref Range: 78 - 100 fl 87   MCH Latest Ref Range: 26.5 - 33.0 pg 28.9   MCHC Latest Ref Range: 31.5 - 36.5 g/dL 33.1   RDW Latest Ref Range: 10.0 - 15.0 % 13.2           Results for TERRI MCELROY (MRN [id number]) as of 8/3/2020 12:56   Ref. Range 1/19/2020 04:57 1/19/2020 11:50 1/22/2020 14:36  2020 08:00 2020 13:47   Creatinine Latest Ref Range: 0.66 - 1.25 mg/dL 2.37 (H) 2.09 (H) 2.40 (H) 1.74 (H) 2.19 (H)        Ref. Range 2020 13:47   Sodium Latest Ref Range: 133 - 144 mmol/L 139   Potassium Latest Ref Range: 3.4 - 5.3 mmol/L 4.0   Chloride Latest Ref Range: 94 - 109 mmol/L 107   Carbon Dioxide Latest Ref Range: 20 - 32 mmol/L 24   Urea Nitrogen Latest Ref Range: 7 - 30 mg/dL 50 (H)   Creatinine Latest Ref Range: 0.66 - 1.25 mg/dL 2.19 (H)   GFR Estimate Latest Ref Range: >60 mL/min/1.73_m2 32 (L)   GFR Estimate If Black Latest Ref Range: >60 mL/min/1.73_m2 37 (L)   Calcium Latest Ref Range: 8.5 - 10.1 mg/dL 9.3   Anion Gap Latest Ref Range: 3 - 14 mmol/L 8   Albumin Latest Ref Range: 3.4 - 5.0 g/dL 3.7   Protein Total Latest Ref Range: 6.8 - 8.8 g/dL 7.3   Bilirubin Total Latest Ref Range: 0.2 - 1.3 mg/dL 0.5   Alkaline Phosphatase Latest Ref Range: 40 - 150 U/L 108   ALT Latest Ref Range: 0 - 70 U/L 18   AST Latest Ref Range: 0 - 45 U/L 11   N-Terminal Pro Bnp Latest Ref Range: 0 - 125 pg/mL 615 (H)   Glucose Latest Ref Range: 70 - 99 mg/dL 235 (H)   WBC Latest Ref Range: 4.0 - 11.0 10e9/L 7.7   Hemoglobin Latest Ref Range: 13.3 - 17.7 g/dL 11.4 (L)   Hematocrit Latest Ref Range: 40.0 - 53.0 % 34.5 (L)   Platelet Count Latest Ref Range: 150 - 450 10e9/L 281   RBC Count Latest Ref Range: 4.4 - 5.9 10e12/L 3.93 (L)   MCV Latest Ref Range: 78 - 100 fl 88   MCH Latest Ref Range: 26.5 - 33.0 pg 29.0   MCHC Latest Ref Range: 31.5 - 36.5 g/dL 33.0   RDW Latest Ref Range: 10.0 - 15.0 % 12.8       Echocardiogram    MRN: [id number]  : 1960  Study Date: 2020 10:04 AM  Age: 60 yrs  Gender: Male  Patient Location: [facility]  Reason For Study: Dyspnea on exertion  Ordering Physician: INDIGO TERRY  Referring Physician: INDIGO TERRY  Performed By: Fabian Marino RDCS     BSA: 2.6 m2  Height: 70 in  Weight: 325 lb  HR: 89  BP: 210/105  mmHg  _____________________________________________________________________________  __     Procedure  Complete Echo Adult. Optison (NDC #[id number]) given intravenously.     _____________________________________________________________________________  __        Interpretation Summary     Mild concentric left ventricular hypertrophy.  Left ventricular size, global systolic function, and wall motion are normal,  estimated LVEF 60-65%.  Right ventricular global function is normal.  No significant valvular abnormalities.     There are no prior studies available for comparison.  _____________________________________________________________________________  __        Left Ventricle  The left ventricle is normal in size. There is mild concentric left  ventricular hypertrophy. Left ventricular systolic function is normal. The  visual ejection fraction is estimated at 60-65%. Left ventricular diastolic  function is normal. No regional wall motion abnormalities noted.     Right Ventricle  Borderline right ventricular enlargement. The right ventricular systolic  function is normal.     Atria  Normal left atrial size. Right atrial size is normal.     Mitral Valve  The mitral valve is normal in structure and function.     Tricuspid Valve  The tricuspid valve is not well visualized, but is grossly normal. Right  ventricular systolic pressure could not be approximated due to inadequate  tricuspid regurgitation.        Aortic Valve  The aortic valve is normal in structure and function.     Pulmonic Valve  The pulmonic valve is not well seen, but is grossly normal.     Vessels  The aortic root is normal size. Normal size ascending aorta. The inferior vena  cava was normal in size with preserved respiratory variability.     Pericardium  There is no pericardial effusion.     _____________________________________________________________________________  __  MMode/2D Measurements & Calculations  RVDd: 4.2 cm  IVSd: 1.4 cm  LVIDd: 4.6  cm  LVIDs: 2.2 cm  LVPWd: 1.3 cm  FS: 52.4 %  LV mass(C)d: 239.4 grams  LV mass(C)dI: 93.3 grams/m2  Ao root diam: 3.7 cm  LA dimension: 4.8 cm  asc Aorta Diam: 3.6 cm  LA/Ao: 1.3     LA Volume Index (BP): 28.0 ml/m2  RWT: 0.56        Doppler Measurements & Calculations  MV E max benedict: 100.5 cm/sec  MV A max benedict: 89.1 cm/sec  MV E/A: 1.1  MV dec slope: 500.2 cm/sec2  MV dec time: 0.20 sec  PA acc time: 0.11 sec  E/E': 20.1  Peak E' Benedict: 5.0 cm/sec

## 2020-11-03 ENCOUNTER — VIRTUAL VISIT (OUTPATIENT)
Dept: CARDIOLOGY | Facility: CLINIC | Age: 60
End: 2020-11-03
Attending: INTERNAL MEDICINE
Payer: COMMERCIAL

## 2020-11-03 DIAGNOSIS — N18.4 CKD (CHRONIC KIDNEY DISEASE) STAGE 4, GFR 15-29 ML/MIN (H): ICD-10-CM

## 2020-11-03 DIAGNOSIS — Z79.4 TYPE 2 DIABETES MELLITUS WITH OTHER SPECIFIED COMPLICATION, WITH LONG-TERM CURRENT USE OF INSULIN (H): ICD-10-CM

## 2020-11-03 DIAGNOSIS — E11.69 TYPE 2 DIABETES MELLITUS WITH OTHER SPECIFIED COMPLICATION, WITH LONG-TERM CURRENT USE OF INSULIN (H): ICD-10-CM

## 2020-11-03 DIAGNOSIS — R06.09 DYSPNEA ON EXERTION: Primary | ICD-10-CM

## 2020-11-03 DIAGNOSIS — I50.9 HEART FAILURE (H): ICD-10-CM

## 2020-11-03 PROBLEM — E11.9 DIABETES MELLITUS, TYPE 2 (H): Status: ACTIVE | Noted: 2020-11-03

## 2020-11-03 PROCEDURE — 99214 OFFICE O/P EST MOD 30 MIN: CPT | Mod: GT | Performed by: INTERNAL MEDICINE

## 2020-11-03 NOTE — LETTER
11/3/2020      RE: Jeremiah Isaac  [id number] [facility] [address]  [place] [place] [facility] [address]       Dear Colleague,    Thank you for the opportunity to participate in the care of your patient, Jeremiah Isaac, at the [facility] at [facility]. Please see a copy of my visit note below.    [facility]      1. Pulmonary hypertension  2. Elevated body mass index  3. Abnormal ECG: bradycardia, incomplete RBBB, RVH  4. Diabetes  5. Neuropathy  6. Hypertension  7. Negative serologic screen collagen vascular disease  8. CKD with proteinuria Estimated GFR 30-50  9. Elevated kappa light chains, low albumin, no evidence of monoclonal spike  10. Narcolepsy  11.Sleep disordered breath              Central sleep apnea              BIP with ASV currently  12. Elevated coronary calcium score with negative stress test              History of LAD stent  13, Questionable history of narcolepsy ? Central sleep apnea  14. History of methamphetamine usage  15. Exertional syncope  16. Epistaxis (ASA/Plavix)      Video visit with patient's permission with [employer] via IPAD x 15 minutes 10-10:15   for follow-up of pulmonary arterial hypertension (disproportionate) currently treated with sildenafil, weight, fluid, and blood pressure optimization.  He has known CAD with previous LAD stent g     Patient's weight is up and having asymmetric lower extremity swelling no responsive to wrapping but without findings of cellulitis.    He remai s short of breath with exertio  But without chest pain, tightness, heaviness, pressure, nausea or diaphoresis.  Oxygen  sats at rest >90% .    Plan:  1. Evaluate with [facility] this week for leg swelling , blood pressure control, and labs to include CMP, BNP, iron studies  2. [employer] video when completed above.        Complex decision making, patient counseling  and coordination of care involving multi-system disease including chronic renal failure, ASHD with previous  stent and return of symptoms necessitating invasive assessmednt. assessment for classification of current overall status and the specific cardiac and vascular components.  The patient is on multiple, complex medications that require monitoring to achieve maximum therapeutic dosing and minimize off-target symptoms, drug-drug interactions or toxicities.  Serial imaging and hemodynamic studies reviewed and up-dated.      Interim History    Mr. Isaac is a pleasant 60 year old male with a PMHx including DM2, hypertension, neuropathy, obesity, CKD, central sleep apnea with possible narcolepsy on Bipap, prior methamphetamine use, and coronary artery disease.  He was initially evaluated by Dr. Riojas in Jan 2020 due to exertional syncope. He underwent a RHC also in January showing normal right and left sided filling pressures, but severe PAH, with normal cardiac output. Notably, at that time he had a significant reduction in PA pressures after receiving NTG and verapamil and was placed on nifedipine. Ultimately, this spring he was placed on dual oral therapy with sildenafil and macitentan with no further syncope, though has continued to struggle with shortness of breath and volume overload.      He underwent repeat heart catherization and stenting in September that demonstrated mean PA 40/PCP 15 and CO of 7.o    Constitutional: weight loss, fever, chills, night sweats  HEENT: without visual changes, swallow difficulties  Pulmonary: with shortness of breath, but  without cough, wheeze, hemoptysis  Cardiac: without chest pain but+, SAINI, PND, orthopnea, edema, palpitation, + one episodepre-syncope, syncope,  GI: without diarrhea, constipation, jaundice, melena, GERD, hematemesis  : without frequency, urgency, dysuria, hematuria  Skin: rash, bruise, open lesions  Neuro: without TIA, focal neurologic complaints, seizure, trauma  Ortho: without pain, swelling, mobility impairment  Endocrine: diabetes, thyroid, heat/cold  intolerance, polyuria, polyphagia, change bowel habits.  Sleep:no GIRISH, periodic breathing, fatigue       Current Outpatient Medications   Medication     Alcohol Swabs PADS     aspirin (ASA) 81 MG EC tablet     atorvastatin (LIPITOR) 40 MG tablet     cloNIDine (CATAPRES) 0.3 MG tablet     glipiZIDE (GLUCOTROL) 5 MG tablet     insulin lispro (HUMALOG VIAL) 100 UNIT/ML vial     insulin pen needle (32G X 4 MM) 32G X 4 MM miscellaneous     liraglutide (VICTOZA) 18 MG/3ML solution     macitentan (OPSUMIT) 10 MG tablet     metoprolol succinate ER (TOPROL-XL) 100 MG 24 hr tablet     NIFEdipine ER (ADALAT CC) 30 MG 24 hr tablet     [employer] MISC     sildenafil (REVATIO) 20 MG tablet     ticagrelor (BRILINTA) 90 MG tablet     torsemide (DEMADEX) 20 MG tablet     No current facility-administered medications for this visit.              Right sided filling pressures are mildly elevated.    Severely elevated pulmonary artery hypertension.    Left sided filling pressures are moderately elevated.    Normal cardiac output level.    Hemodynamic data has been modified in Epic per physician review.     Nipride study showed a slight drop in cardiac output with significant drop in mean PA pressure and PCWP pressure.  Severe in stent restenosis of a prior proximal LAD stent with a significant dPR of 0.62. Otherwise mild non obstructive CAD elsewhere in large epicardial vessels.  PCI with one drug eluting stent to the proximal LAD optimized with IVUS directed post dilation.    Most recent PVR  Calculated from PCP 15, mean PA 40 and CO of 7.0                          Continuation of dual antiplatelet therapy for 12 months      Coronary Findings     Diagnostic   Dominance: Right   Left Main    The vessel was visualized by selective angiography and is moderate in size.    Left Anterior Descending    The vessel was visualized by selective angiography and is moderate in size. There was 40% diffuse vessel disease.    Prox LAD to Mid LAD  lesion is 75% stenosed. Culprit lesion. The lesion is calcified. The lesion was previously treated using a stent of unknown type. Previous treatment took place >2 years ago. Pressure wire/iFR used. 0.62    First Diagonal Branch    The vessel is small. There is mild diffuse disease throughout the vessel.    Second Diagonal Branch    The vessel is moderate in size. There is mild diffuse disease throughout the vessel.    2nd Diag lesion is 90% stenosed.    Third Diagonal Branch    The vessel is small. There is mild diffuse disease throughout the vessel.    Left Circumflex    The vessel was visualized by selective angiography and is moderate in size.    Prox Cx lesion is 30% stenosed.    First Obtuse Marginal Branch    The vessel is small. There is mild diffuse disease throughout the vessel.    Second Obtuse Marginal Branch    The vessel is moderate in size. The vessel exhibits minimal luminal irregularities.    Right Coronary Artery    The vessel was visualized by selective angiography and is moderate in size. There was 20% diffuse vessel disease.    Dist RCA lesion is 35% stenosed. Not the culprit lesion.    Acute Marginal Branch    The vessel is moderate in size and is angiographically normal.    Right Ventricular Branch    The vessel is moderate in size and is angiographically normal.    Right Posterior Descending Artery    The vessel is large. The vessel exhibits minimal luminal irregularities.    Right Posterior Atrioventricular Artery    The vessel is large. The vessel exhibits minimal luminal irregularities.    First Right Posterolateral Branch    The vessel is moderate in size. The vessel exhibits minimal luminal irregularities.    Second Right Posterolateral Branch    The vessel is moderate in size. The vessel exhibits minimal luminal irregularities.    Third Right Posterolateral Branch    The vessel is moderate in size. The vessel exhibits minimal luminal irregularities.       Intervention     Prox LAD to Mid  LAD lesion    Stent    CATH GUIDING BLUE YELLOW PTFE XB3.5 [id number] guide catheter was successful. The pre-interventional distal flow is normal (MAYO 3). The post-interventional distal flow is normal (MAYO 3). A 6 Iraqi XB 3.5 placed in the left main. An OpSens wire was advanced into the LAD and across the ISR of a previous stent with dPR measured at 0.62. A 2.5x20mm Emerge was then used to pre dilate the lesion followed by placement of a 3.0x28mm Synergy stent. A 3.5x20mm NC Emerge used for pre dilation initially followed by a 3.5x8mm NC Emerge distally and proximally at high pressure in areas of underexpansion. IVUS was then used to assess stent expansion and again showed underexpanded segments so a 3.75x8mm NC Emerge was then used to post dilate the stent further. IC nitroglycerin was given and final angiography performed which showed MAYO III flow, no residual stenosis, no perforation or dissection.    There is a 0% residual stenosis post intervention.              Laboratories:     Ref. Range 8/11/2020 12:30   Sodium Latest Ref Range: 133 - 144 mmol/L 137   Potassium Latest Ref Range: 3.4 - 5.3 mmol/L 4.3   Chloride Latest Ref Range: 94 - 109 mmol/L 106   Carbon Dioxide Latest Ref Range: 20 - 32 mmol/L 27   Urea Nitrogen Latest Ref Range: 7 - 30 mg/dL 53 (H)   Creatinine Latest Ref Range: 0.66 - 1.25 mg/dL 2.05 (H)   GFR Estimate Latest Ref Range: >60 mL/min/1.73_m2 34 (L)   GFR Estimate If Black Latest Ref Range: >60 mL/min/1.73_m2 40 (L)   Calcium Latest Ref Range: 8.5 - 10.1 mg/dL 9.0   Anion Gap Latest Ref Range: 3 - 14 mmol/L 4   N-Terminal Pro BNP Inpatient Latest Ref Range: 0 - 900 pg/mL 250   Glucose Latest Ref Range: 70 - 99 mg/dL 305 (H)   WBC Latest Ref Range: 4.0 - 11.0 10e9/L 7.7   Hemoglobin Latest Ref Range: 13.3 - 17.7 g/dL 11.6 (L)   Hematocrit Latest Ref Range: 40.0 - 53.0 % 35.0 (L)   Platelet Count Latest Ref Range: 150 - 450 10e9/L 240   RBC Count Latest Ref Range: 4.4 - 5.9  10e12/L 4.01 (L)   MCV Latest Ref Range: 78 - 100 fl 87   MCH Latest Ref Range: 26.5 - 33.0 pg 28.9   MCHC Latest Ref Range: 31.5 - 36.5 g/dL 33.1   RDW Latest Ref Range: 10.0 - 15.0 % 13.2           Results for TERRI MCELROY (MRN [id number]) as of 8/3/2020 12:56   Ref. Range 2020 04:57 2020 11:50 2020 14:36 2020 08:00 2020 13:47   Creatinine Latest Ref Range: 0.66 - 1.25 mg/dL 2.37 (H) 2.09 (H) 2.40 (H) 1.74 (H) 2.19 (H)        Ref. Range 2020 13:47   Sodium Latest Ref Range: 133 - 144 mmol/L 139   Potassium Latest Ref Range: 3.4 - 5.3 mmol/L 4.0   Chloride Latest Ref Range: 94 - 109 mmol/L 107   Carbon Dioxide Latest Ref Range: 20 - 32 mmol/L 24   Urea Nitrogen Latest Ref Range: 7 - 30 mg/dL 50 (H)   Creatinine Latest Ref Range: 0.66 - 1.25 mg/dL 2.19 (H)   GFR Estimate Latest Ref Range: >60 mL/min/1.73_m2 32 (L)   GFR Estimate If Black Latest Ref Range: >60 mL/min/1.73_m2 37 (L)   Calcium Latest Ref Range: 8.5 - 10.1 mg/dL 9.3   Anion Gap Latest Ref Range: 3 - 14 mmol/L 8   Albumin Latest Ref Range: 3.4 - 5.0 g/dL 3.7   Protein Total Latest Ref Range: 6.8 - 8.8 g/dL 7.3   Bilirubin Total Latest Ref Range: 0.2 - 1.3 mg/dL 0.5   Alkaline Phosphatase Latest Ref Range: 40 - 150 U/L 108   ALT Latest Ref Range: 0 - 70 U/L 18   AST Latest Ref Range: 0 - 45 U/L 11   N-Terminal Pro Bnp Latest Ref Range: 0 - 125 pg/mL 615 (H)   Glucose Latest Ref Range: 70 - 99 mg/dL 235 (H)   WBC Latest Ref Range: 4.0 - 11.0 10e9/L 7.7   Hemoglobin Latest Ref Range: 13.3 - 17.7 g/dL 11.4 (L)   Hematocrit Latest Ref Range: 40.0 - 53.0 % 34.5 (L)   Platelet Count Latest Ref Range: 150 - 450 10e9/L 281   RBC Count Latest Ref Range: 4.4 - 5.9 10e12/L 3.93 (L)   MCV Latest Ref Range: 78 - 100 fl 88   MCH Latest Ref Range: 26.5 - 33.0 pg 29.0   MCHC Latest Ref Range: 31.5 - 36.5 g/dL 33.0   RDW Latest Ref Range: 10.0 - 15.0 % 12.8       Echocardiogram    MRN: [id number]  : 1960  Study Date: 2020 10:04  AM  Age: 60 yrs  Gender: Male  Patient Location: [facility]  Reason For Study: Dyspnea on exertion  Ordering Physician: INDIGO TERRY  Referring Physician: INDIGO TERRY  Performed By: Fabian Marino RDCS     BSA: 2.6 m2  Height: 70 in  Weight: 325 lb  HR: 89  BP: 210/105 mmHg  _____________________________________________________________________________  __     Procedure  Complete Echo Adult. Optison (NDC #[id number]) given intravenously.     _____________________________________________________________________________  __        Interpretation Summary     Mild concentric left ventricular hypertrophy.  Left ventricular size, global systolic function, and wall motion are normal,  estimated LVEF 60-65%.  Right ventricular global function is normal.  No significant valvular abnormalities.     There are no prior studies available for comparison.  _____________________________________________________________________________  __        Left Ventricle  The left ventricle is normal in size. There is mild concentric left  ventricular hypertrophy. Left ventricular systolic function is normal. The  visual ejection fraction is estimated at 60-65%. Left ventricular diastolic  function is normal. No regional wall motion abnormalities noted.     Right Ventricle  Borderline right ventricular enlargement. The right ventricular systolic  function is normal.     Atria  Normal left atrial size. Right atrial size is normal.     Mitral Valve  The mitral valve is normal in structure and function.     Tricuspid Valve  The tricuspid valve is not well visualized, but is grossly normal. Right  ventricular systolic pressure could not be approximated due to inadequate  tricuspid regurgitation.        Aortic Valve  The aortic valve is normal in structure and function.     Pulmonic Valve  The pulmonic valve is not well seen, but is grossly normal.     Vessels  The aortic root is normal size. Normal size ascending aorta. The inferior vena  cava  was normal in size with preserved respiratory variability.     Pericardium  There is no pericardial effusion.     _____________________________________________________________________________  __  MMode/2D Measurements & Calculations  RVDd: 4.2 cm  IVSd: 1.4 cm  LVIDd: 4.6 cm  LVIDs: 2.2 cm  LVPWd: 1.3 cm  FS: 52.4 %  LV mass(C)d: 239.4 grams  LV mass(C)dI: 93.3 grams/m2  Ao root diam: 3.7 cm  LA dimension: 4.8 cm  asc Aorta Diam: 3.6 cm  LA/Ao: 1.3     LA Volume Index (BP): 28.0 ml/m2  RWT: 0.56        Doppler Measurements & Calculations  MV E max benedict: 100.5 cm/sec  MV A max benedict: 89.1 cm/sec  MV E/A: 1.1  MV dec slope: 500.2 cm/sec2  MV dec time: 0.20 sec  PA acc time: 0.11 sec  E/E': 20.1  Peak E' Benedict: 5.0 cm/sec          Please do not hesitate to contact me if you have any questions/concerns.     Sincerely,     Santiago Riojas MD

## 2020-11-05 NOTE — NURSING NOTE
Pts plan of care per Santiago Riojas MD:    Plan:  1. Evaluate with [facility] this week for leg swelling , blood pressure control, and labs to include CMP, BNP, iron studies  2. [employer]ET[employer] video when completed above.    Orders placed an message sent to pt to complete his labs at a [facility].  Senait Berg RN on 11/5/2020 at 9:14 AM

## 2020-11-25 ENCOUNTER — VIRTUAL VISIT (OUTPATIENT)
Dept: CARDIOLOGY | Facility: CLINIC | Age: 60
End: 2020-11-25
Attending: INTERNAL MEDICINE
Payer: COMMERCIAL

## 2020-11-25 DIAGNOSIS — R06.09 DOE (DYSPNEA ON EXERTION): ICD-10-CM

## 2020-11-25 DIAGNOSIS — E11.69 TYPE 2 DIABETES MELLITUS WITH OTHER SPECIFIED COMPLICATION, WITH LONG-TERM CURRENT USE OF INSULIN (H): ICD-10-CM

## 2020-11-25 DIAGNOSIS — Z79.4 TYPE 2 DIABETES MELLITUS WITH OTHER SPECIFIED COMPLICATION, WITH LONG-TERM CURRENT USE OF INSULIN (H): ICD-10-CM

## 2020-11-25 DIAGNOSIS — I50.82 BIVENTRICULAR HEART FAILURE (H): ICD-10-CM

## 2020-11-25 DIAGNOSIS — I27.20 PULMONARY HYPERTENSION (H): ICD-10-CM

## 2020-11-25 DIAGNOSIS — N18.4 CKD (CHRONIC KIDNEY DISEASE) STAGE 4, GFR 15-29 ML/MIN (H): ICD-10-CM

## 2020-11-25 DIAGNOSIS — R06.09 DYSPNEA ON EXERTION: Primary | ICD-10-CM

## 2020-11-25 PROCEDURE — 99213 OFFICE O/P EST LOW 20 MIN: CPT | Mod: GT | Performed by: INTERNAL MEDICINE

## 2020-11-25 NOTE — PROGRESS NOTES
[facility]      1. Pulmonary hypertension  2. Elevated body mass index  3. Abnormal ECG: bradycardia, incomplete RBBB, RVH  4. Diabetes  5. Neuropathy  6. Hypertension  7. Negative serologic screen collagen vascular disease  8. CKD with proteinuria Estimated GFR 30-50  9. Elevated kappa light chains, low albumin, no evidence of monoclonal spike  10. Narcolepsy  11.Sleep disordered breath              Central sleep apnea              BIP with ASV currently  12. Elevated coronary calcium score with negative stress test              History of LAD stent  13, Questionable history of narcolepsy ? Central sleep apnea  14. History of methamphetamine usage  15. Exertional syncope  16. Epistaxis (ASA/Plavix)      Video visit with patient's permission with [employer] via IPAD x 15 minutes 10-10:15   for follow-up of pulmonary arterial hypertension (disproportionate) currently treated with sildenafil, weight, fluid, and blood pressure optimization.  He has known CAD with previous LAD stent g     Patient's weight is up and having asymmetric lower extremity swelling no responsive to wrapping but without findings of cellulitis.    He remai s short of breath with exertio  But without chest pain, tightness, heaviness, pressure, nausea or diaphoresis.  Oxygen  sats at rest >90% .    Plan:  1. Evaluate with [facility] this week for leg swelling , blood pressure control, and labs to include CMP, BNP, iron studies  11/11/2020 5/7/2020 1/31/2020 1/7/2020 11/4/2019 10/14/2019 8/14/2019 7/24/2019 3/14/2019 8/1/2018 6/15/2018 5/18/2018 6/6/2017 6/13/2016 6/17/2015 5/28/2013 4/12/2013 2/13/2013 1/12/2012 9/7/2010 6/21/2005     1.91 (H) 1.72 (H) 1.73 (H) 1.76 (H) 2.09 (H) 1.84 (H) 1.78 (H) 2.28 (H) 1.50 (H) 1.29 (H) 1.42 (H) 1.36 (H) 1.15 1.27 (H) 0.93 1.11   0.82 0.77 0.82     38 (L)                             2. [employer] video when completed above.        Complex decision making, patient counseling  and coordination of care involving  multi-system disease including chronic renal failure, ASHD with previous stent and return of symptoms necessitating invasive assessmednt. assessment for classification of current overall status and the specific cardiac and vascular components.  The patient is on multiple, complex medications that require monitoring to achieve maximum therapeutic dosing and minimize off-target symptoms, drug-drug interactions or toxicities.  Serial imaging and hemodynamic studies reviewed and up-dated.      Interim History    Mr. Isaac is a pleasant 60 year old male with a PMHx including DM2, hypertension, neuropathy, obesity, CKD, central sleep apnea with possible narcolepsy on Bipap, prior methamphetamine use, and coronary artery disease.  He was initially evaluated by Dr. Riojas in Jan 2020 due to exertional syncope. He underwent a RHC also in January showing normal right and left sided filling pressures, but severe PAH, with normal cardiac output. Notably, at that time he had a significant reduction in PA pressures after receiving NTG and verapamil and was placed on nifedipine. Ultimately, this spring he was placed on dual oral therapy with sildenafil and macitentan with no further syncope, though has continued to struggle with shortness of breath and volume overload.      He underwent repeat heart catherization and stenting in September that demonstrated mean PA 40/PCP 15 and CO of 7.o    Constitutional: weight loss, fever, chills, night sweats  HEENT: without visual changes, swallow difficulties  Pulmonary: with shortness of breath, but  without cough, wheeze, hemoptysis  Cardiac: without chest pain but+, SAINI, PND, orthopnea, edema, palpitation, + one episodepre-syncope, syncope,  GI: without diarrhea, constipation, jaundice, melena, GERD, hematemesis  : without frequency, urgency, dysuria, hematuria  Skin: rash, bruise, open lesions  Neuro: without TIA, focal neurologic complaints, seizure, trauma  Ortho: without pain,  swelling, mobility impairment  Endocrine: diabetes, thyroid, heat/cold intolerance, polyuria, polyphagia, change bowel habits.  Sleep:no GIRISH, periodic breathing, fatigue       Current Outpatient Medications   Medication     Alcohol Swabs PADS     aspirin (ASA) 81 MG EC tablet     atorvastatin (LIPITOR) 40 MG tablet     cloNIDine (CATAPRES) 0.3 MG tablet     glipiZIDE (GLUCOTROL) 5 MG tablet     insulin lispro (HUMALOG VIAL) 100 UNIT/ML vial     insulin pen needle (32G X 4 MM) 32G X 4 MM miscellaneous     liraglutide (VICTOZA) 18 MG/3ML solution     macitentan (OPSUMIT) 10 MG tablet     metoprolol succinate ER (TOPROL-XL) 100 MG 24 hr tablet     NIFEdipine ER (ADALAT CC) 30 MG 24 hr tablet     [employer] MISC     sildenafil (REVATIO) 20 MG tablet     ticagrelor (BRILINTA) 90 MG tablet     torsemide (DEMADEX) 20 MG tablet     No current facility-administered medications for this visit.            Right sided filling pressures are mildly elevated.    Severely elevated pulmonary artery hypertension.    Left sided filling pressures are moderately elevated.    Normal cardiac output level.    Hemodynamic data has been modified in Epic per physician review.     Nipride study showed a slight drop in cardiac output with significant drop in mean PA pressure and PCWP pressure.  Severe in stent restenosis of a prior proximal LAD stent with a significant dPR of 0.62. Otherwise mild non obstructive CAD elsewhere in large epicardial vessels.  PCI with one drug eluting stent to the proximal LAD optimized with IVUS directed post dilation.    Most recent PVR  Calculated from PCP 15, mean PA 40 and CO of 7.0                          Continuation of dual antiplatelet therapy for 12 months      Coronary Findings     Diagnostic   Dominance: Right   Left Main    The vessel was visualized by selective angiography and is moderate in size.    Left Anterior Descending    The vessel was visualized by selective angiography and is moderate  in size. There was 40% diffuse vessel disease.    Prox LAD to Mid LAD lesion is 75% stenosed. Culprit lesion. The lesion is calcified. The lesion was previously treated using a stent of unknown type. Previous treatment took place >2 years ago. Pressure wire/iFR used. 0.62    First Diagonal Branch    The vessel is small. There is mild diffuse disease throughout the vessel.    Second Diagonal Branch    The vessel is moderate in size. There is mild diffuse disease throughout the vessel.    2nd Diag lesion is 90% stenosed.    Third Diagonal Branch    The vessel is small. There is mild diffuse disease throughout the vessel.    Left Circumflex    The vessel was visualized by selective angiography and is moderate in size.    Prox Cx lesion is 30% stenosed.    First Obtuse Marginal Branch    The vessel is small. There is mild diffuse disease throughout the vessel.    Second Obtuse Marginal Branch    The vessel is moderate in size. The vessel exhibits minimal luminal irregularities.    Right Coronary Artery    The vessel was visualized by selective angiography and is moderate in size. There was 20% diffuse vessel disease.    Dist RCA lesion is 35% stenosed. Not the culprit lesion.    Acute Marginal Branch    The vessel is moderate in size and is angiographically normal.    Right Ventricular Branch    The vessel is moderate in size and is angiographically normal.    Right Posterior Descending Artery    The vessel is large. The vessel exhibits minimal luminal irregularities.    Right Posterior Atrioventricular Artery    The vessel is large. The vessel exhibits minimal luminal irregularities.    First Right Posterolateral Branch    The vessel is moderate in size. The vessel exhibits minimal luminal irregularities.    Second Right Posterolateral Branch    The vessel is moderate in size. The vessel exhibits minimal luminal irregularities.    Third Right Posterolateral Branch    The vessel is moderate in size. The vessel exhibits  minimal luminal irregularities.       Intervention     Prox LAD to Mid LAD lesion    Stent    CATH GUIDING BLUE YELLOW PTFE XB3.5 [id number] guide catheter was successful. The pre-interventional distal flow is normal (MAYO 3). The post-interventional distal flow is normal (MAYO 3). A 6 Montserratian XB 3.5 placed in the left main. An OpSens wire was advanced into the LAD and across the ISR of a previous stent with dPR measured at 0.62. A 2.5x20mm Emerge was then used to pre dilate the lesion followed by placement of a 3.0x28mm Synergy stent. A 3.5x20mm NC Emerge used for pre dilation initially followed by a 3.5x8mm NC Emerge distally and proximally at high pressure in areas of underexpansion. IVUS was then used to assess stent expansion and again showed underexpanded segments so a 3.75x8mm NC Emerge was then used to post dilate the stent further. IC nitroglycerin was given and final angiography performed which showed MAYO III flow, no residual stenosis, no perforation or dissection.    There is a 0% residual stenosis post intervention.              Laboratories:     Ref. Range 8/11/2020 12:30   Sodium Latest Ref Range: 133 - 144 mmol/L 137   Potassium Latest Ref Range: 3.4 - 5.3 mmol/L 4.3   Chloride Latest Ref Range: 94 - 109 mmol/L 106   Carbon Dioxide Latest Ref Range: 20 - 32 mmol/L 27   Urea Nitrogen Latest Ref Range: 7 - 30 mg/dL 53 (H)   Creatinine Latest Ref Range: 0.66 - 1.25 mg/dL 2.05 (H)   GFR Estimate Latest Ref Range: >60 mL/min/1.73_m2 34 (L)   GFR Estimate If Black Latest Ref Range: >60 mL/min/1.73_m2 40 (L)   Calcium Latest Ref Range: 8.5 - 10.1 mg/dL 9.0   Anion Gap Latest Ref Range: 3 - 14 mmol/L 4   N-Terminal Pro BNP Inpatient Latest Ref Range: 0 - 900 pg/mL 250   Glucose Latest Ref Range: 70 - 99 mg/dL 305 (H)   WBC Latest Ref Range: 4.0 - 11.0 10e9/L 7.7   Hemoglobin Latest Ref Range: 13.3 - 17.7 g/dL 11.6 (L)   Hematocrit Latest Ref Range: 40.0 - 53.0 % 35.0 (L)   Platelet Count Latest Ref  Range: 150 - 450 10e9/L 240   RBC Count Latest Ref Range: 4.4 - 5.9 10e12/L 4.01 (L)   MCV Latest Ref Range: 78 - 100 fl 87   MCH Latest Ref Range: 26.5 - 33.0 pg 28.9   MCHC Latest Ref Range: 31.5 - 36.5 g/dL 33.1   RDW Latest Ref Range: 10.0 - 15.0 % 13.2           Results for TERRI MCELROY (MRN [id number]) as of 8/3/2020 12:56   Ref. Range 1/19/2020 04:57 1/19/2020 11:50 1/22/2020 14:36 6/30/2020 08:00 7/30/2020 13:47   Creatinine Latest Ref Range: 0.66 - 1.25 mg/dL 2.37 (H) 2.09 (H) 2.40 (H) 1.74 (H) 2.19 (H)        Ref. Range 7/30/2020 13:47   Sodium Latest Ref Range: 133 - 144 mmol/L 139   Potassium Latest Ref Range: 3.4 - 5.3 mmol/L 4.0   Chloride Latest Ref Range: 94 - 109 mmol/L 107   Carbon Dioxide Latest Ref Range: 20 - 32 mmol/L 24   Urea Nitrogen Latest Ref Range: 7 - 30 mg/dL 50 (H)   Creatinine Latest Ref Range: 0.66 - 1.25 mg/dL 2.19 (H)   GFR Estimate Latest Ref Range: >60 mL/min/1.73_m2 32 (L)   GFR Estimate If Black Latest Ref Range: >60 mL/min/1.73_m2 37 (L)   Calcium Latest Ref Range: 8.5 - 10.1 mg/dL 9.3   Anion Gap Latest Ref Range: 3 - 14 mmol/L 8   Albumin Latest Ref Range: 3.4 - 5.0 g/dL 3.7   Protein Total Latest Ref Range: 6.8 - 8.8 g/dL 7.3   Bilirubin Total Latest Ref Range: 0.2 - 1.3 mg/dL 0.5   Alkaline Phosphatase Latest Ref Range: 40 - 150 U/L 108   ALT Latest Ref Range: 0 - 70 U/L 18   AST Latest Ref Range: 0 - 45 U/L 11   N-Terminal Pro Bnp Latest Ref Range: 0 - 125 pg/mL 615 (H)   Glucose Latest Ref Range: 70 - 99 mg/dL 235 (H)   WBC Latest Ref Range: 4.0 - 11.0 10e9/L 7.7   Hemoglobin Latest Ref Range: 13.3 - 17.7 g/dL 11.4 (L)   Hematocrit Latest Ref Range: 40.0 - 53.0 % 34.5 (L)   Platelet Count Latest Ref Range: 150 - 450 10e9/L 281   RBC Count Latest Ref Range: 4.4 - 5.9 10e12/L 3.93 (L)   MCV Latest Ref Range: 78 - 100 fl 88   MCH Latest Ref Range: 26.5 - 33.0 pg 29.0   MCHC Latest Ref Range: 31.5 - 36.5 g/dL 33.0   RDW Latest Ref Range: 10.0 - 15.0 % 12.8        Echocardiogram    MRN: [id number]  : 1960  Study Date: 2020 10:04 AM  Age: 60 yrs  Gender: Male  Patient Location: [facility]  Reason For Study: Dyspnea on exertion  Ordering Physician: INDIGO TERRY  Referring Physician: INDIGO TERRY  Performed By: Fabian Marino AGGIE     BSA: 2.6 m2  Height: 70 in  Weight: 325 lb  HR: 89  BP: 210/105 mmHg  _____________________________________________________________________________  __     Procedure  Complete Echo Adult. Optison (NDC #[id number]) given intravenously.     _____________________________________________________________________________  __        Interpretation Summary     Mild concentric left ventricular hypertrophy.  Left ventricular size, global systolic function, and wall motion are normal,  estimated LVEF 60-65%.  Right ventricular global function is normal.  No significant valvular abnormalities.     There are no prior studies available for comparison.  _____________________________________________________________________________  __        Left Ventricle  The left ventricle is normal in size. There is mild concentric left  ventricular hypertrophy. Left ventricular systolic function is normal. The  visual ejection fraction is estimated at 60-65%. Left ventricular diastolic  function is normal. No regional wall motion abnormalities noted.     Right Ventricle  Borderline right ventricular enlargement. The right ventricular systolic  function is normal.     Atria  Normal left atrial size. Right atrial size is normal.     Mitral Valve  The mitral valve is normal in structure and function.     Tricuspid Valve  The tricuspid valve is not well visualized, but is grossly normal. Right  ventricular systolic pressure could not be approximated due to inadequate  tricuspid regurgitation.        Aortic Valve  The aortic valve is normal in structure and function.     Pulmonic Valve  The pulmonic valve is not well seen, but is grossly normal.      Vessels  The aortic root is normal size. Normal size ascending aorta. The inferior vena  cava was normal in size with preserved respiratory variability.     Pericardium  There is no pericardial effusion.     _____________________________________________________________________________  __  MMode/2D Measurements & Calculations  RVDd: 4.2 cm  IVSd: 1.4 cm  LVIDd: 4.6 cm  LVIDs: 2.2 cm  LVPWd: 1.3 cm  FS: 52.4 %  LV mass(C)d: 239.4 grams  LV mass(C)dI: 93.3 grams/m2  Ao root diam: 3.7 cm  LA dimension: 4.8 cm  asc Aorta Diam: 3.6 cm  LA/Ao: 1.3     LA Volume Index (BP): 28.0 ml/m2  RWT: 0.56        Doppler Measurements & Calculations  MV E max benedict: 100.5 cm/sec  MV A max benedict: 89.1 cm/sec  MV E/A: 1.1  MV dec slope: 500.2 cm/sec2  MV dec time: 0.20 sec  PA acc time: 0.11 sec  E/E': 20.1  Peak E' Benedict: 5.0 cm/sec

## 2020-11-25 NOTE — LETTER
11/25/2020      RE: Jeremiah Isaac  [address] [address]  [place] [address]       Dear Colleague,    Thank you for the opportunity to participate in the care of your patient, Jeremiah Isaac, at the [facility] HEART [facility]. Please see a copy of my visit note below.    [facility]      1. Pulmonary hypertension  2. Elevated body mass index  3. Abnormal ECG: bradycardia, incomplete RBBB, RVH  4. Diabetes  5. Neuropathy  6. Hypertension  7. Negative serologic screen collagen vascular disease  8. CKD with proteinuria Estimated GFR 30-50  9. Elevated kappa light chains, low albumin, no evidence of monoclonal spike  10. Narcolepsy  11.Sleep disordered breath              Central sleep apnea              BIP with ASV currently  12. Elevated coronary calcium score with negative stress test              History of LAD stent  13, Questionable history of narcolepsy ? Central sleep apnea  14. History of methamphetamine usage  15. Exertional syncope  16. Epistaxis (ASA/Plavix)      Video visit with patient's permission with [place] via IPAD x 15 minutes 10-10:15   for follow-up of pulmonary arterial hypertension (disproportionate) currently treated with sildenafil, weight, fluid, and blood pressure optimization.  He has known CAD with previous LAD stent g     Patient's weight is up and having asymmetric lower extremity swelling no responsive to wrapping but without findings of cellulitis.    He remai s short of breath with exertio  But without chest pain, tightness, heaviness, pressure, nausea or diaphoresis.  Oxygen  sats at rest >90% .    Plan:  1. Evaluate with [facility] this week for leg swelling , blood pressure control, and labs to include CMP, BNP, iron studies  11/11/2020 5/7/2020 1/31/2020 1/7/2020 11/4/2019 10/14/2019 8/14/2019 7/24/2019 3/14/2019 8/1/2018 6/15/2018 5/18/2018 6/6/2017 6/13/2016 6/17/2015 5/28/2013 4/12/2013 2/13/2013 1/12/2012 9/7/2010  6/21/2005     1.91 (H) 1.72 (H) 1.73 (H) 1.76 (H) 2.09 (H) 1.84 (H) 1.78 (H) 2.28 (H) 1.50 (H) 1.29 (H) 1.42 (H) 1.36 (H) 1.15 1.27 (H) 0.93 1.11   0.82 0.77 0.82     38 (L)                             2. FACETIME video when completed above.        Complex decision making, patient counseling  and coordination of care involving multi-system disease including chronic renal failure, ASHD with previous stent and return of symptoms necessitating invasive assessmednt. assessment for classification of current overall status and the specific cardiac and vascular components.  The patient is on multiple, complex medications that require monitoring to achieve maximum therapeutic dosing and minimize off-target symptoms, drug-drug interactions or toxicities.  Serial imaging and hemodynamic studies reviewed and up-dated.      Interim History    Mr. Isaac is a pleasant 60 year old male with a PMHx including DM2, hypertension, neuropathy, obesity, CKD, central sleep apnea with possible narcolepsy on Bipap, prior methamphetamine use, and coronary artery disease.  He was initially evaluated by Dr. Riojas in Jan 2020 due to exertional syncope. He underwent a RHC also in January showing normal right and left sided filling pressures, but severe PAH, with normal cardiac output. Notably, at that time he had a significant reduction in PA pressures after receiving NTG and verapamil and was placed on nifedipine. Ultimately, this spring he was placed on dual oral therapy with sildenafil and macitentan with no further syncope, though has continued to struggle with shortness of breath and volume overload.      He underwent repeat heart catherization and stenting in September that demonstrated mean PA 40/PCP 15 and CO of 7.o    Constitutional: weight loss, fever, chills, night sweats  HEENT: without visual changes, swallow difficulties  Pulmonary: with shortness of breath, but  without cough, wheeze, hemoptysis  Cardiac: without chest pain  but+, SAINI, PND, orthopnea, edema, palpitation, + one episodepre-syncope, syncope,  GI: without diarrhea, constipation, jaundice, melena, GERD, hematemesis  : without frequency, urgency, dysuria, hematuria  Skin: rash, bruise, open lesions  Neuro: without TIA, focal neurologic complaints, seizure, trauma  Ortho: without pain, swelling, mobility impairment  Endocrine: diabetes, thyroid, heat/cold intolerance, polyuria, polyphagia, change bowel habits.  Sleep:no GIRISH, periodic breathing, fatigue       Current Outpatient Medications   Medication     Alcohol Swabs PADS     aspirin (ASA) 81 MG EC tablet     atorvastatin (LIPITOR) 40 MG tablet     cloNIDine (CATAPRES) 0.3 MG tablet     glipiZIDE (GLUCOTROL) 5 MG tablet     insulin lispro (HUMALOG VIAL) 100 UNIT/ML vial     insulin pen needle (32G X 4 MM) 32G X 4 MM miscellaneous     liraglutide (VICTOZA) 18 MG/3ML solution     macitentan (OPSUMIT) 10 MG tablet     metoprolol succinate ER (TOPROL-XL) 100 MG 24 hr tablet     NIFEdipine ER (ADALAT CC) 30 MG 24 hr tablet     [employer] Container MISC     sildenafil (REVATIO) 20 MG tablet     ticagrelor (BRILINTA) 90 MG tablet     torsemide (DEMADEX) 20 MG tablet     No current facility-administered medications for this visit.            Right sided filling pressures are mildly elevated.    Severely elevated pulmonary artery hypertension.    Left sided filling pressures are moderately elevated.    Normal cardiac output level.    Hemodynamic data has been modified in Epic per physician review.     Nipride study showed a slight drop in cardiac output with significant drop in mean PA pressure and PCWP pressure.  Severe in stent restenosis of a prior proximal LAD stent with a significant dPR of 0.62. Otherwise mild non obstructive CAD elsewhere in large epicardial vessels.  PCI with one drug eluting stent to the proximal LAD optimized with IVUS directed post dilation.    Most recent PVR  Calculated from PCP 15, mean PA 40 and CO of  7.0                          Continuation of dual antiplatelet therapy for 12 months      Coronary Findings     Diagnostic   Dominance: Right   Left Main    The vessel was visualized by selective angiography and is moderate in size.    Left Anterior Descending    The vessel was visualized by selective angiography and is moderate in size. There was 40% diffuse vessel disease.    Prox LAD to Mid LAD lesion is 75% stenosed. Culprit lesion. The lesion is calcified. The lesion was previously treated using a stent of unknown type. Previous treatment took place >2 years ago. Pressure wire/iFR used. 0.62    First Diagonal Branch    The vessel is small. There is mild diffuse disease throughout the vessel.    Second Diagonal Branch    The vessel is moderate in size. There is mild diffuse disease throughout the vessel.    2nd Diag lesion is 90% stenosed.    Third Diagonal Branch    The vessel is small. There is mild diffuse disease throughout the vessel.    Left Circumflex    The vessel was visualized by selective angiography and is moderate in size.    Prox Cx lesion is 30% stenosed.    First Obtuse Marginal Branch    The vessel is small. There is mild diffuse disease throughout the vessel.    Second Obtuse Marginal Branch    The vessel is moderate in size. The vessel exhibits minimal luminal irregularities.    Right Coronary Artery    The vessel was visualized by selective angiography and is moderate in size. There was 20% diffuse vessel disease.    Dist RCA lesion is 35% stenosed. Not the culprit lesion.    Acute Marginal Branch    The vessel is moderate in size and is angiographically normal.    Right Ventricular Branch    The vessel is moderate in size and is angiographically normal.    Right Posterior Descending Artery    The vessel is large. The vessel exhibits minimal luminal irregularities.    Right Posterior Atrioventricular Artery    The vessel is large. The vessel exhibits minimal luminal irregularities.    First  Right Posterolateral Branch    The vessel is moderate in size. The vessel exhibits minimal luminal irregularities.    Second Right Posterolateral Branch    The vessel is moderate in size. The vessel exhibits minimal luminal irregularities.    Third Right Posterolateral Branch    The vessel is moderate in size. The vessel exhibits minimal luminal irregularities.       Intervention     Prox LAD to Mid LAD lesion    Stent    CATH GUIDING BLUE YELLOW PTFE XB3.5 [id number] guide catheter was successful. The pre-interventional distal flow is normal (MAYO 3). The post-interventional distal flow is normal (MAYO 3). A 6 Croatian XB 3.5 placed in the left main. An MedAptus wire was advanced into the LAD and across the ISR of a previous stent with dPR measured at 0.62. A 2.5x20mm Emerge was then used to pre dilate the lesion followed by placement of a 3.0x28mm Synergy stent. A 3.5x20mm NC Emerge used for pre dilation initially followed by a 3.5x8mm NC Emerge distally and proximally at high pressure in areas of underexpansion. IVUS was then used to assess stent expansion and again showed underexpanded segments so a 3.75x8mm NC Emerge was then used to post dilate the stent further. IC nitroglycerin was given and final angiography performed which showed MAYO III flow, no residual stenosis, no perforation or dissection.    There is a 0% residual stenosis post intervention.              Laboratories:     Ref. Range 8/11/2020 12:30   Sodium Latest Ref Range: 133 - 144 mmol/L 137   Potassium Latest Ref Range: 3.4 - 5.3 mmol/L 4.3   Chloride Latest Ref Range: 94 - 109 mmol/L 106   Carbon Dioxide Latest Ref Range: 20 - 32 mmol/L 27   Urea Nitrogen Latest Ref Range: 7 - 30 mg/dL 53 (H)   Creatinine Latest Ref Range: 0.66 - 1.25 mg/dL 2.05 (H)   GFR Estimate Latest Ref Range: >60 mL/min/1.73_m2 34 (L)   GFR Estimate If Black Latest Ref Range: >60 mL/min/1.73_m2 40 (L)   Calcium Latest Ref Range: 8.5 - 10.1 mg/dL 9.0   Anion Gap  Latest Ref Range: 3 - 14 mmol/L 4   N-Terminal Pro BNP Inpatient Latest Ref Range: 0 - 900 pg/mL 250   Glucose Latest Ref Range: 70 - 99 mg/dL 305 (H)   WBC Latest Ref Range: 4.0 - 11.0 10e9/L 7.7   Hemoglobin Latest Ref Range: 13.3 - 17.7 g/dL 11.6 (L)   Hematocrit Latest Ref Range: 40.0 - 53.0 % 35.0 (L)   Platelet Count Latest Ref Range: 150 - 450 10e9/L 240   RBC Count Latest Ref Range: 4.4 - 5.9 10e12/L 4.01 (L)   MCV Latest Ref Range: 78 - 100 fl 87   MCH Latest Ref Range: 26.5 - 33.0 pg 28.9   MCHC Latest Ref Range: 31.5 - 36.5 g/dL 33.1   RDW Latest Ref Range: 10.0 - 15.0 % 13.2           Results for TERRI MCELROY (MRN [id number]) as of 8/3/2020 12:56   Ref. Range 1/19/2020 04:57 1/19/2020 11:50 1/22/2020 14:36 6/30/2020 08:00 7/30/2020 13:47   Creatinine Latest Ref Range: 0.66 - 1.25 mg/dL 2.37 (H) 2.09 (H) 2.40 (H) 1.74 (H) 2.19 (H)        Ref. Range 7/30/2020 13:47   Sodium Latest Ref Range: 133 - 144 mmol/L 139   Potassium Latest Ref Range: 3.4 - 5.3 mmol/L 4.0   Chloride Latest Ref Range: 94 - 109 mmol/L 107   Carbon Dioxide Latest Ref Range: 20 - 32 mmol/L 24   Urea Nitrogen Latest Ref Range: 7 - 30 mg/dL 50 (H)   Creatinine Latest Ref Range: 0.66 - 1.25 mg/dL 2.19 (H)   GFR Estimate Latest Ref Range: >60 mL/min/1.73_m2 32 (L)   GFR Estimate If Black Latest Ref Range: >60 mL/min/1.73_m2 37 (L)   Calcium Latest Ref Range: 8.5 - 10.1 mg/dL 9.3   Anion Gap Latest Ref Range: 3 - 14 mmol/L 8   Albumin Latest Ref Range: 3.4 - 5.0 g/dL 3.7   Protein Total Latest Ref Range: 6.8 - 8.8 g/dL 7.3   Bilirubin Total Latest Ref Range: 0.2 - 1.3 mg/dL 0.5   Alkaline Phosphatase Latest Ref Range: 40 - 150 U/L 108   ALT Latest Ref Range: 0 - 70 U/L 18   AST Latest Ref Range: 0 - 45 U/L 11   N-Terminal Pro Bnp Latest Ref Range: 0 - 125 pg/mL 615 (H)   Glucose Latest Ref Range: 70 - 99 mg/dL 235 (H)   WBC Latest Ref Range: 4.0 - 11.0 10e9/L 7.7   Hemoglobin Latest Ref Range: 13.3 - 17.7 g/dL 11.4 (L)   Hematocrit Latest Ref  Range: 40.0 - 53.0 % 34.5 (L)   Platelet Count Latest Ref Range: 150 - 450 10e9/L 281   RBC Count Latest Ref Range: 4.4 - 5.9 10e12/L 3.93 (L)   MCV Latest Ref Range: 78 - 100 fl 88   MCH Latest Ref Range: 26.5 - 33.0 pg 29.0   MCHC Latest Ref Range: 31.5 - 36.5 g/dL 33.0   RDW Latest Ref Range: 10.0 - 15.0 % 12.8       Echocardiogram    MRN: [id number]  : 1960  Study Date: 2020 10:04 AM  Age: 60 yrs  Gender: Male  Patient Location: [facility]  Reason For Study: Dyspnea on exertion  Ordering Physician: INDIGO TERRY  Referring Physician: INDIGO TERRY  Performed By: Fabian Marino RDCS     BSA: 2.6 m2  Height: 70 in  Weight: 325 lb  HR: 89  BP: 210/105 mmHg  _____________________________________________________________________________  __     Procedure  Complete Echo Adult. Optison (NDC #[id number]) given intravenously.     _____________________________________________________________________________  __        Interpretation Summary     Mild concentric left ventricular hypertrophy.  Left ventricular size, global systolic function, and wall motion are normal,  estimated LVEF 60-65%.  Right ventricular global function is normal.  No significant valvular abnormalities.     There are no prior studies available for comparison.  _____________________________________________________________________________  __        Left Ventricle  The left ventricle is normal in size. There is mild concentric left  ventricular hypertrophy. Left ventricular systolic function is normal. The  visual ejection fraction is estimated at 60-65%. Left ventricular diastolic  function is normal. No regional wall motion abnormalities noted.     Right Ventricle  Borderline right ventricular enlargement. The right ventricular systolic  function is normal.     Atria  Normal left atrial size. Right atrial size is normal.     Mitral Valve  The mitral valve is normal in structure and function.     Tricuspid Valve  The tricuspid valve is  not well visualized, but is grossly normal. Right  ventricular systolic pressure could not be approximated due to inadequate  tricuspid regurgitation.        Aortic Valve  The aortic valve is normal in structure and function.     Pulmonic Valve  The pulmonic valve is not well seen, but is grossly normal.     Vessels  The aortic root is normal size. Normal size ascending aorta. The inferior vena  cava was normal in size with preserved respiratory variability.     Pericardium  There is no pericardial effusion.     _____________________________________________________________________________  __  MMode/2D Measurements & Calculations  RVDd: 4.2 cm  IVSd: 1.4 cm  LVIDd: 4.6 cm  LVIDs: 2.2 cm  LVPWd: 1.3 cm  FS: 52.4 %  LV mass(C)d: 239.4 grams  LV mass(C)dI: 93.3 grams/m2  Ao root diam: 3.7 cm  LA dimension: 4.8 cm  asc Aorta Diam: 3.6 cm  LA/Ao: 1.3     LA Volume Index (BP): 28.0 ml/m2  RWT: 0.56        Doppler Measurements & Calculations  MV E max benedict: 100.5 cm/sec  MV A max benedict: 89.1 cm/sec  MV E/A: 1.1  MV dec slope: 500.2 cm/sec2  MV dec time: 0.20 sec  PA acc time: 0.11 sec  E/E': 20.1  Peak E' Benedict: 5.0 cm/sec        Please do not hesitate to contact me if you have any questions/concerns.     Sincerely,     Santiago Riojas MD

## 2020-11-29 ENCOUNTER — HEALTH MAINTENANCE LETTER (OUTPATIENT)
Age: 60
End: 2020-11-29

## 2020-11-30 NOTE — NURSING NOTE
Pts plan of care pre Santiago Riojas MD:  Labs at local [facility].    Follow up determinant of lab results      Senait Berg RN on 11/30/2020 at 1:46 PM

## 2020-12-08 DIAGNOSIS — I50.9 HEART FAILURE (H): ICD-10-CM

## 2020-12-08 DIAGNOSIS — I27.20 PULMONARY HYPERTENSION (H): ICD-10-CM

## 2020-12-08 DIAGNOSIS — R06.09 DOE (DYSPNEA ON EXERTION): ICD-10-CM

## 2020-12-08 DIAGNOSIS — D50.9 IRON DEFICIENCY ANEMIA, UNSPECIFIED: ICD-10-CM

## 2020-12-08 LAB
ALBUMIN SERPL-MCNC: 3.1 G/DL (ref 3.4–5)
ALP SERPL-CCNC: 173 U/L (ref 40–150)
ALT SERPL W P-5'-P-CCNC: 14 U/L (ref 0–70)
ANION GAP SERPL CALCULATED.3IONS-SCNC: 6 MMOL/L (ref 3–14)
AST SERPL W P-5'-P-CCNC: 12 U/L (ref 0–45)
BILIRUB SERPL-MCNC: 0.6 MG/DL (ref 0.2–1.3)
BUN SERPL-MCNC: 23 MG/DL (ref 7–30)
CALCIUM SERPL-MCNC: 9.3 MG/DL (ref 8.5–10.1)
CHLORIDE SERPL-SCNC: 106 MMOL/L (ref 94–109)
CO2 SERPL-SCNC: 26 MMOL/L (ref 20–32)
CREAT SERPL-MCNC: 1.77 MG/DL (ref 0.66–1.25)
CRP SERPL-MCNC: 10.3 MG/L (ref 0–8)
ERYTHROCYTE [DISTWIDTH] IN BLOOD BY AUTOMATED COUNT: 14 % (ref 10–15)
FERRITIN SERPL-MCNC: 150 NG/ML (ref 26–388)
GFR SERPL CREATININE-BSD FRML MDRD: 41 ML/MIN/{1.73_M2}
GLUCOSE SERPL-MCNC: 113 MG/DL (ref 70–99)
HCT VFR BLD AUTO: 33.1 % (ref 40–53)
HGB BLD-MCNC: 10.6 G/DL (ref 13.3–17.7)
IRON SATN MFR SERPL: 13 % (ref 15–46)
IRON SERPL-MCNC: 38 UG/DL (ref 35–180)
MCH RBC QN AUTO: 27.3 PG (ref 26.5–33)
MCHC RBC AUTO-ENTMCNC: 32 G/DL (ref 31.5–36.5)
MCV RBC AUTO: 85 FL (ref 78–100)
NT-PROBNP SERPL-MCNC: 1832 PG/ML (ref 0–125)
PLATELET # BLD AUTO: 326 10E9/L (ref 150–450)
POTASSIUM SERPL-SCNC: 3.7 MMOL/L (ref 3.4–5.3)
PROT SERPL-MCNC: 7.2 G/DL (ref 6.8–8.8)
RBC # BLD AUTO: 3.88 10E12/L (ref 4.4–5.9)
SODIUM SERPL-SCNC: 138 MMOL/L (ref 133–144)
TIBC SERPL-MCNC: 296 UG/DL (ref 240–430)
WBC # BLD AUTO: 7.4 10E9/L (ref 4–11)

## 2020-12-08 PROCEDURE — 83540 ASSAY OF IRON: CPT | Performed by: INTERNAL MEDICINE

## 2020-12-08 PROCEDURE — 36415 COLL VENOUS BLD VENIPUNCTURE: CPT | Performed by: INTERNAL MEDICINE

## 2020-12-08 PROCEDURE — 85027 COMPLETE CBC AUTOMATED: CPT | Performed by: INTERNAL MEDICINE

## 2020-12-08 PROCEDURE — 83880 ASSAY OF NATRIURETIC PEPTIDE: CPT | Performed by: INTERNAL MEDICINE

## 2020-12-08 PROCEDURE — 99000 SPECIMEN HANDLING OFFICE-LAB: CPT | Performed by: INTERNAL MEDICINE

## 2020-12-08 PROCEDURE — 86140 C-REACTIVE PROTEIN: CPT | Performed by: INTERNAL MEDICINE

## 2020-12-08 PROCEDURE — 80053 COMPREHEN METABOLIC PANEL: CPT | Performed by: INTERNAL MEDICINE

## 2020-12-08 PROCEDURE — 82728 ASSAY OF FERRITIN: CPT | Performed by: INTERNAL MEDICINE

## 2020-12-08 PROCEDURE — 84238 ASSAY NONENDOCRINE RECEPTOR: CPT | Mod: 90 | Performed by: INTERNAL MEDICINE

## 2020-12-08 PROCEDURE — 83550 IRON BINDING TEST: CPT | Performed by: INTERNAL MEDICINE

## 2020-12-09 PROBLEM — D50.9 ANEMIA, IRON DEFICIENCY: Status: ACTIVE | Noted: 2020-12-09

## 2020-12-09 LAB — STFR SERPL-SCNC: 4.9 MG/L (ref 2.2–5)

## 2020-12-30 ENCOUNTER — TELEPHONE (OUTPATIENT)
Dept: CARDIOLOGY | Facility: CLINIC | Age: 60
End: 2020-12-30

## 2020-12-30 DIAGNOSIS — I27.20 PULMONARY HYPERTENSION (H): ICD-10-CM

## 2020-12-30 DIAGNOSIS — R06.09 DOE (DYSPNEA ON EXERTION): ICD-10-CM

## 2020-12-30 LAB
ANION GAP SERPL CALCULATED.3IONS-SCNC: 3 MMOL/L (ref 3–14)
BUN SERPL-MCNC: 39 MG/DL (ref 7–30)
CALCIUM SERPL-MCNC: 9 MG/DL (ref 8.5–10.1)
CHLORIDE SERPL-SCNC: 107 MMOL/L (ref 94–109)
CO2 SERPL-SCNC: 26 MMOL/L (ref 20–32)
CREAT SERPL-MCNC: 1.95 MG/DL (ref 0.66–1.25)
GFR SERPL CREATININE-BSD FRML MDRD: 36 ML/MIN/{1.73_M2}
GLUCOSE SERPL-MCNC: 435 MG/DL (ref 70–99)
NT-PROBNP SERPL-MCNC: 2083 PG/ML (ref 0–125)
POTASSIUM SERPL-SCNC: 4.9 MMOL/L (ref 3.4–5.3)
SODIUM SERPL-SCNC: 136 MMOL/L (ref 133–144)

## 2020-12-30 PROCEDURE — 36415 COLL VENOUS BLD VENIPUNCTURE: CPT | Performed by: INTERNAL MEDICINE

## 2020-12-30 PROCEDURE — 83880 ASSAY OF NATRIURETIC PEPTIDE: CPT | Performed by: INTERNAL MEDICINE

## 2020-12-30 PROCEDURE — 80048 BASIC METABOLIC PNL TOTAL CA: CPT | Performed by: INTERNAL MEDICINE

## 2020-12-30 NOTE — TELEPHONE ENCOUNTER
[facility] Call Center    Phone Message    May a detailed message be left on voicemail: yes     Reason for Call: Other: Hasmukh has gained more than 10 lbs. Has shortness of breath and water weight gain. Has legs wrapped. Call Hasmukh at: [phone] coming in for labs today 12/30 @ 2:30.     Action Taken: Message routed to:  [facility] ([facility] Cardiology    Travel Screening: Not Applicable

## 2020-12-31 NOTE — TELEPHONE ENCOUNTER
Incorrectly routed to general [place].  Routed to PH team and spoke with Lissette MONTANO RN to inform her of Pt sxs.    Margarita Simpson LPN  --------------------------------  Called patient and he has a lot of trouble breathing right now, even getting into bed he's huffing or puffing. His RA sats remain >90%, but he hasn't confirmed this after he's been active.      Compared to when he spoke to Dr. Riojas 1 month ago he feels a little worse.  He remains active while using the shovel or  and it's been increasingly difficult in regards to breathing and the distance he is able to walk.    He uses ACE wraps on his legs, so they aren't very swollen.  He confirmed he has been taking the Torsemide as a rotating dose per instructions.  Advised patient based on his labs he is retaining more fluid than in the past and based off of his description and sx he should go to the ER.  patient agreed he would go to the [facility] ER Friday or Saturday.  Today is his son's Bday and he has a few things to wrap up knowing he will be gone a few days.  Advised him not to wait too long, or it may turn into an emergency.  Reminded him once he gets discharged from the hospital, we will want to follow up with him. Patient verbalized understanding, agreed with plan and denied any further questions. Lissette Carias RN on 12/31/2020 at 1:15 PM    Routed staff msg to Dr. Riojas updating him.

## 2021-01-02 ENCOUNTER — HOSPITAL ENCOUNTER (EMERGENCY)
Facility: CLINIC | Age: 61
Discharge: HOME | End: 2021-01-03
Attending: EMERGENCY MEDICINE | Admitting: EMERGENCY MEDICINE
Payer: COMMERCIAL

## 2021-01-02 ENCOUNTER — APPOINTMENT (OUTPATIENT)
Dept: GENERAL RADIOLOGY | Facility: CLINIC | Age: 61
End: 2021-01-02
Attending: EMERGENCY MEDICINE
Payer: COMMERCIAL

## 2021-01-02 DIAGNOSIS — I25.10 CORONARY ARTERY DISEASE INVOLVING NATIVE CORONARY ARTERY OF NATIVE HEART WITHOUT ANGINA PECTORIS: ICD-10-CM

## 2021-01-02 DIAGNOSIS — I50.9 ACUTE CONGESTIVE HEART FAILURE, UNSPECIFIED HEART FAILURE TYPE (H): ICD-10-CM

## 2021-01-02 DIAGNOSIS — I27.20 PULMONARY HYPERTENSION (H): ICD-10-CM

## 2021-01-02 DIAGNOSIS — R06.09 DYSPNEA ON EXERTION: ICD-10-CM

## 2021-01-02 LAB
ANION GAP SERPL CALCULATED.3IONS-SCNC: 5 MMOL/L (ref 3–14)
BASOPHILS # BLD AUTO: 0.1 10E9/L (ref 0–0.2)
BASOPHILS NFR BLD AUTO: 0.8 %
BUN SERPL-MCNC: 27 MG/DL (ref 7–30)
CALCIUM SERPL-MCNC: 8.9 MG/DL (ref 8.5–10.1)
CHLORIDE SERPL-SCNC: 111 MMOL/L (ref 94–109)
CO2 SERPL-SCNC: 24 MMOL/L (ref 20–32)
CREAT SERPL-MCNC: 1.72 MG/DL (ref 0.66–1.25)
DIFFERENTIAL METHOD BLD: ABNORMAL
EOSINOPHIL # BLD AUTO: 0.2 10E9/L (ref 0–0.7)
EOSINOPHIL NFR BLD AUTO: 2.3 %
ERYTHROCYTE [DISTWIDTH] IN BLOOD BY AUTOMATED COUNT: 14.6 % (ref 10–15)
GFR SERPL CREATININE-BSD FRML MDRD: 42 ML/MIN/{1.73_M2}
GLUCOSE SERPL-MCNC: 121 MG/DL (ref 70–99)
HCT VFR BLD AUTO: 33.7 % (ref 40–53)
HGB BLD-MCNC: 10.6 G/DL (ref 13.3–17.7)
IMM GRANULOCYTES # BLD: 0 10E9/L (ref 0–0.4)
IMM GRANULOCYTES NFR BLD: 0.3 %
LABORATORY COMMENT REPORT: NORMAL
LYMPHOCYTES # BLD AUTO: 1.5 10E9/L (ref 0.8–5.3)
LYMPHOCYTES NFR BLD AUTO: 19.5 %
MCH RBC QN AUTO: 26.4 PG (ref 26.5–33)
MCHC RBC AUTO-ENTMCNC: 31.5 G/DL (ref 31.5–36.5)
MCV RBC AUTO: 84 FL (ref 78–100)
MONOCYTES # BLD AUTO: 0.7 10E9/L (ref 0–1.3)
MONOCYTES NFR BLD AUTO: 9.2 %
NEUTROPHILS # BLD AUTO: 5.3 10E9/L (ref 1.6–8.3)
NEUTROPHILS NFR BLD AUTO: 67.9 %
NRBC # BLD AUTO: 0 10*3/UL
NRBC BLD AUTO-RTO: 0 /100
NT-PROBNP SERPL-MCNC: 2514 PG/ML (ref 0–900)
PLATELET # BLD AUTO: 295 10E9/L (ref 150–450)
POTASSIUM SERPL-SCNC: 4.2 MMOL/L (ref 3.4–5.3)
RBC # BLD AUTO: 4.01 10E12/L (ref 4.4–5.9)
SARS-COV-2 RNA SPEC QL NAA+PROBE: NORMAL
SODIUM SERPL-SCNC: 140 MMOL/L (ref 133–144)
SPECIMEN SOURCE: NORMAL
TROPONIN I SERPL-MCNC: <0.015 UG/L (ref 0–0.04)
WBC # BLD AUTO: 7.8 10E9/L (ref 4–11)

## 2021-01-02 PROCEDURE — 85025 COMPLETE CBC W/AUTO DIFF WBC: CPT | Performed by: EMERGENCY MEDICINE

## 2021-01-02 PROCEDURE — 83735 ASSAY OF MAGNESIUM: CPT | Performed by: EMERGENCY MEDICINE

## 2021-01-02 PROCEDURE — 71046 X-RAY EXAM CHEST 2 VIEWS: CPT

## 2021-01-02 PROCEDURE — 93010 ELECTROCARDIOGRAM REPORT: CPT | Performed by: EMERGENCY MEDICINE

## 2021-01-02 PROCEDURE — 84484 ASSAY OF TROPONIN QUANT: CPT | Performed by: EMERGENCY MEDICINE

## 2021-01-02 PROCEDURE — 83880 ASSAY OF NATRIURETIC PEPTIDE: CPT | Performed by: EMERGENCY MEDICINE

## 2021-01-02 PROCEDURE — C9803 HOPD COVID-19 SPEC COLLECT: HCPCS | Performed by: EMERGENCY MEDICINE

## 2021-01-02 PROCEDURE — 36415 COLL VENOUS BLD VENIPUNCTURE: CPT | Performed by: EMERGENCY MEDICINE

## 2021-01-02 PROCEDURE — 93005 ELECTROCARDIOGRAM TRACING: CPT | Performed by: EMERGENCY MEDICINE

## 2021-01-02 PROCEDURE — 99285 EMERGENCY DEPT VISIT HI MDM: CPT | Mod: 25 | Performed by: EMERGENCY MEDICINE

## 2021-01-02 PROCEDURE — 87635 SARS-COV-2 COVID-19 AMP PRB: CPT | Performed by: EMERGENCY MEDICINE

## 2021-01-02 PROCEDURE — 250N000011 HC RX IP 250 OP 636: Performed by: EMERGENCY MEDICINE

## 2021-01-02 PROCEDURE — 96374 THER/PROPH/DIAG INJ IV PUSH: CPT | Performed by: EMERGENCY MEDICINE

## 2021-01-02 PROCEDURE — 80048 BASIC METABOLIC PNL TOTAL CA: CPT | Performed by: EMERGENCY MEDICINE

## 2021-01-02 RX ORDER — FUROSEMIDE 10 MG/ML
60 INJECTION INTRAMUSCULAR; INTRAVENOUS ONCE
Status: COMPLETED | OUTPATIENT
Start: 2021-01-02 | End: 2021-01-02

## 2021-01-02 RX ADMIN — FUROSEMIDE 60 MG: 10 INJECTION, SOLUTION INTRAMUSCULAR; INTRAVENOUS at 21:51

## 2021-01-02 ASSESSMENT — MIFFLIN-ST. JEOR: SCORE: 2272.76

## 2021-01-03 ENCOUNTER — HOSPITAL ENCOUNTER (INPATIENT)
Facility: CLINIC | Age: 61
LOS: 2 days | Discharge: HOME | DRG: 286 | End: 2021-01-05
Attending: STUDENT IN AN ORGANIZED HEALTH CARE EDUCATION/TRAINING PROGRAM | Admitting: HOSPITALIST
Payer: COMMERCIAL

## 2021-01-03 ENCOUNTER — APPOINTMENT (OUTPATIENT)
Dept: CARDIOLOGY | Facility: CLINIC | Age: 61
DRG: 286 | End: 2021-01-03
Attending: HOSPITALIST
Payer: COMMERCIAL

## 2021-01-03 VITALS
HEIGHT: 70 IN | OXYGEN SATURATION: 92 % | DIASTOLIC BLOOD PRESSURE: 74 MMHG | HEART RATE: 64 BPM | RESPIRATION RATE: 13 BRPM | BODY MASS INDEX: 45.1 KG/M2 | SYSTOLIC BLOOD PRESSURE: 154 MMHG | WEIGHT: 315 LBS | TEMPERATURE: 98 F

## 2021-01-03 DIAGNOSIS — R06.09 DYSPNEA ON EXERTION: ICD-10-CM

## 2021-01-03 DIAGNOSIS — I50.33 ACUTE ON CHRONIC DIASTOLIC CONGESTIVE HEART FAILURE (H): ICD-10-CM

## 2021-01-03 DIAGNOSIS — I27.20 PULMONARY HYPERTENSION (H): Primary | ICD-10-CM

## 2021-01-03 PROBLEM — I50.9 CHF EXACERBATION (H): Status: ACTIVE | Noted: 2021-01-03

## 2021-01-03 LAB
ALBUMIN SERPL-MCNC: 2.9 G/DL (ref 3.4–5)
ALP SERPL-CCNC: 145 U/L (ref 40–150)
ALT SERPL W P-5'-P-CCNC: 16 U/L (ref 0–70)
ANION GAP SERPL CALCULATED.3IONS-SCNC: 4 MMOL/L (ref 3–14)
AST SERPL W P-5'-P-CCNC: 14 U/L (ref 0–45)
BASOPHILS # BLD AUTO: 0 10E9/L (ref 0–0.2)
BASOPHILS NFR BLD AUTO: 0.5 %
BILIRUB SERPL-MCNC: 0.6 MG/DL (ref 0.2–1.3)
BUN SERPL-MCNC: 27 MG/DL (ref 7–30)
CALCIUM SERPL-MCNC: 9 MG/DL (ref 8.5–10.1)
CHLORIDE SERPL-SCNC: 110 MMOL/L (ref 94–109)
CO2 SERPL-SCNC: 28 MMOL/L (ref 20–32)
CREAT SERPL-MCNC: 1.79 MG/DL (ref 0.66–1.25)
DIFFERENTIAL METHOD BLD: ABNORMAL
EOSINOPHIL # BLD AUTO: 0.2 10E9/L (ref 0–0.7)
EOSINOPHIL NFR BLD AUTO: 1.9 %
ERYTHROCYTE [DISTWIDTH] IN BLOOD BY AUTOMATED COUNT: 14.6 % (ref 10–15)
GFR SERPL CREATININE-BSD FRML MDRD: 40 ML/MIN/{1.73_M2}
GLUCOSE BLDC GLUCOMTR-MCNC: 169 MG/DL (ref 70–99)
GLUCOSE BLDC GLUCOMTR-MCNC: 200 MG/DL (ref 70–99)
GLUCOSE BLDC GLUCOMTR-MCNC: 216 MG/DL (ref 70–99)
GLUCOSE SERPL-MCNC: 133 MG/DL (ref 70–99)
HCT VFR BLD AUTO: 33.2 % (ref 40–53)
HGB BLD-MCNC: 10.6 G/DL (ref 13.3–17.7)
IMM GRANULOCYTES # BLD: 0 10E9/L (ref 0–0.4)
IMM GRANULOCYTES NFR BLD: 0.3 %
LABORATORY COMMENT REPORT: NORMAL
LYMPHOCYTES # BLD AUTO: 1.6 10E9/L (ref 0.8–5.3)
LYMPHOCYTES NFR BLD AUTO: 20.4 %
MAGNESIUM SERPL-MCNC: 2.1 MG/DL (ref 1.6–2.3)
MCH RBC QN AUTO: 26.4 PG (ref 26.5–33)
MCHC RBC AUTO-ENTMCNC: 31.9 G/DL (ref 31.5–36.5)
MCV RBC AUTO: 83 FL (ref 78–100)
MONOCYTES # BLD AUTO: 0.6 10E9/L (ref 0–1.3)
MONOCYTES NFR BLD AUTO: 7.4 %
NEUTROPHILS # BLD AUTO: 5.4 10E9/L (ref 1.6–8.3)
NEUTROPHILS NFR BLD AUTO: 69.5 %
NRBC # BLD AUTO: 0 10*3/UL
NRBC BLD AUTO-RTO: 0 /100
PLATELET # BLD AUTO: 301 10E9/L (ref 150–450)
POTASSIUM SERPL-SCNC: 3.6 MMOL/L (ref 3.4–5.3)
PROT SERPL-MCNC: 6.9 G/DL (ref 6.8–8.8)
RBC # BLD AUTO: 4.02 10E12/L (ref 4.4–5.9)
SARS-COV-2 RNA SPEC QL NAA+PROBE: NEGATIVE
SODIUM SERPL-SCNC: 142 MMOL/L (ref 133–144)
SPECIMEN SOURCE: NORMAL
TROPONIN I SERPL-MCNC: <0.015 UG/L (ref 0–0.04)
TROPONIN I SERPL-MCNC: <0.015 UG/L (ref 0–0.04)
WBC # BLD AUTO: 7.8 10E9/L (ref 4–11)

## 2021-01-03 PROCEDURE — 36415 COLL VENOUS BLD VENIPUNCTURE: CPT | Performed by: HOSPITALIST

## 2021-01-03 PROCEDURE — 84484 ASSAY OF TROPONIN QUANT: CPT | Performed by: HOSPITALIST

## 2021-01-03 PROCEDURE — 250N000011 HC RX IP 250 OP 636: Performed by: HOSPITALIST

## 2021-01-03 PROCEDURE — 250N000013 HC RX MED GY IP 250 OP 250 PS 637: Performed by: HOSPITALIST

## 2021-01-03 PROCEDURE — 999N000208 ECHOCARDIOGRAM COMPLETE

## 2021-01-03 PROCEDURE — 85025 COMPLETE CBC W/AUTO DIFF WBC: CPT | Performed by: HOSPITALIST

## 2021-01-03 PROCEDURE — 99223 1ST HOSP IP/OBS HIGH 75: CPT | Mod: 25 | Performed by: INTERNAL MEDICINE

## 2021-01-03 PROCEDURE — 210N000002 HC R&B HEART CARE

## 2021-01-03 PROCEDURE — 250N000012 HC RX MED GY IP 250 OP 636 PS 637: Performed by: HOSPITALIST

## 2021-01-03 PROCEDURE — 255N000002 HC RX 255 OP 636: Performed by: HOSPITALIST

## 2021-01-03 PROCEDURE — 999N001017 HC STATISTIC GLUCOSE BY METER IP

## 2021-01-03 PROCEDURE — 250N000013 HC RX MED GY IP 250 OP 250 PS 637: Performed by: INTERNAL MEDICINE

## 2021-01-03 PROCEDURE — 93306 TTE W/DOPPLER COMPLETE: CPT | Mod: 26 | Performed by: INTERNAL MEDICINE

## 2021-01-03 PROCEDURE — 80053 COMPREHEN METABOLIC PANEL: CPT | Performed by: HOSPITALIST

## 2021-01-03 PROCEDURE — 99223 1ST HOSP IP/OBS HIGH 75: CPT | Mod: AI | Performed by: HOSPITALIST

## 2021-01-03 RX ORDER — AMOXICILLIN 250 MG
1 CAPSULE ORAL 2 TIMES DAILY PRN
Status: DISCONTINUED | OUTPATIENT
Start: 2021-01-03 | End: 2021-01-05 | Stop reason: HOSPADM

## 2021-01-03 RX ORDER — BISACODYL 10 MG
10 SUPPOSITORY, RECTAL RECTAL DAILY PRN
Status: DISCONTINUED | OUTPATIENT
Start: 2021-01-03 | End: 2021-01-05 | Stop reason: HOSPADM

## 2021-01-03 RX ORDER — NIFEDIPINE 30 MG/1
30 TABLET, EXTENDED RELEASE ORAL DAILY
Status: DISCONTINUED | OUTPATIENT
Start: 2021-01-03 | End: 2021-01-04

## 2021-01-03 RX ORDER — ONDANSETRON 4 MG/1
4 TABLET, ORALLY DISINTEGRATING ORAL EVERY 6 HOURS PRN
Status: DISCONTINUED | OUTPATIENT
Start: 2021-01-03 | End: 2021-01-05 | Stop reason: HOSPADM

## 2021-01-03 RX ORDER — SILDENAFIL CITRATE 20 MG/1
20 TABLET ORAL 3 TIMES DAILY
Status: DISCONTINUED | OUTPATIENT
Start: 2021-01-03 | End: 2021-01-04

## 2021-01-03 RX ORDER — FUROSEMIDE 10 MG/ML
80 INJECTION INTRAMUSCULAR; INTRAVENOUS EVERY 8 HOURS
Status: DISCONTINUED | OUTPATIENT
Start: 2021-01-03 | End: 2021-01-04

## 2021-01-03 RX ORDER — CHLORTHALIDONE 25 MG/1
25 TABLET ORAL DAILY
Status: ON HOLD | COMMUNITY
End: 2021-01-05

## 2021-01-03 RX ORDER — CLOPIDOGREL BISULFATE 75 MG/1
75 TABLET ORAL DAILY
Status: ON HOLD | COMMUNITY
End: 2021-01-03

## 2021-01-03 RX ORDER — NICOTINE POLACRILEX 4 MG
15-30 LOZENGE BUCCAL
Status: DISCONTINUED | OUTPATIENT
Start: 2021-01-03 | End: 2021-01-05 | Stop reason: HOSPADM

## 2021-01-03 RX ORDER — ASPIRIN 81 MG/1
81 TABLET ORAL DAILY
Status: DISCONTINUED | OUTPATIENT
Start: 2021-01-03 | End: 2021-01-05 | Stop reason: HOSPADM

## 2021-01-03 RX ORDER — ONDANSETRON 2 MG/ML
4 INJECTION INTRAMUSCULAR; INTRAVENOUS EVERY 6 HOURS PRN
Status: DISCONTINUED | OUTPATIENT
Start: 2021-01-03 | End: 2021-01-05 | Stop reason: HOSPADM

## 2021-01-03 RX ORDER — AMOXICILLIN 250 MG
2 CAPSULE ORAL 2 TIMES DAILY PRN
Status: DISCONTINUED | OUTPATIENT
Start: 2021-01-03 | End: 2021-01-05 | Stop reason: HOSPADM

## 2021-01-03 RX ORDER — ACETAMINOPHEN 650 MG/1
650 SUPPOSITORY RECTAL EVERY 4 HOURS PRN
Status: DISCONTINUED | OUTPATIENT
Start: 2021-01-03 | End: 2021-01-05 | Stop reason: HOSPADM

## 2021-01-03 RX ORDER — ACETAMINOPHEN 325 MG/1
650 TABLET ORAL EVERY 4 HOURS PRN
Status: DISCONTINUED | OUTPATIENT
Start: 2021-01-03 | End: 2021-01-05 | Stop reason: HOSPADM

## 2021-01-03 RX ORDER — LANOLIN ALCOHOL/MO/W.PET/CERES
3 CREAM (GRAM) TOPICAL
Status: DISCONTINUED | OUTPATIENT
Start: 2021-01-03 | End: 2021-01-05 | Stop reason: HOSPADM

## 2021-01-03 RX ORDER — LIDOCAINE 40 MG/G
CREAM TOPICAL
Status: DISCONTINUED | OUTPATIENT
Start: 2021-01-03 | End: 2021-01-05 | Stop reason: HOSPADM

## 2021-01-03 RX ORDER — DEXTROSE MONOHYDRATE 25 G/50ML
25-50 INJECTION, SOLUTION INTRAVENOUS
Status: DISCONTINUED | OUTPATIENT
Start: 2021-01-03 | End: 2021-01-05 | Stop reason: HOSPADM

## 2021-01-03 RX ORDER — POLYETHYLENE GLYCOL 3350 17 G/17G
17 POWDER, FOR SOLUTION ORAL DAILY PRN
Status: DISCONTINUED | OUTPATIENT
Start: 2021-01-03 | End: 2021-01-05 | Stop reason: HOSPADM

## 2021-01-03 RX ORDER — METFORMIN HYDROCHLORIDE 750 MG/1
750 TABLET, EXTENDED RELEASE ORAL
Status: ON HOLD | COMMUNITY
End: 2021-01-03

## 2021-01-03 RX ADMIN — HUMAN ALBUMIN MICROSPHERES AND PERFLUTREN 9 ML: 10; .22 INJECTION, SOLUTION INTRAVENOUS at 11:30

## 2021-01-03 RX ADMIN — NIFEDIPINE 30 MG: 30 TABLET, FILM COATED, EXTENDED RELEASE ORAL at 15:45

## 2021-01-03 RX ADMIN — TICAGRELOR 90 MG: 90 TABLET ORAL at 21:40

## 2021-01-03 RX ADMIN — INSULIN ASPART 2 UNITS: 100 INJECTION, SOLUTION INTRAVENOUS; SUBCUTANEOUS at 18:11

## 2021-01-03 RX ADMIN — FUROSEMIDE 80 MG: 10 INJECTION, SOLUTION INTRAVENOUS at 06:36

## 2021-01-03 RX ADMIN — INSULIN ASPART 2 UNITS: 100 INJECTION, SOLUTION INTRAVENOUS; SUBCUTANEOUS at 09:12

## 2021-01-03 RX ADMIN — FUROSEMIDE 80 MG: 10 INJECTION, SOLUTION INTRAVENOUS at 21:39

## 2021-01-03 RX ADMIN — MACITENTAN 10 MG: 10 TABLET, FILM COATED ORAL at 15:47

## 2021-01-03 RX ADMIN — SILDENAFIL 20 MG: 20 TABLET, FILM COATED ORAL at 15:47

## 2021-01-03 RX ADMIN — FUROSEMIDE 80 MG: 10 INJECTION, SOLUTION INTRAVENOUS at 13:33

## 2021-01-03 RX ADMIN — ASPIRIN 81 MG: 81 TABLET, DELAYED RELEASE ORAL at 09:12

## 2021-01-03 RX ADMIN — SILDENAFIL 20 MG: 20 TABLET, FILM COATED ORAL at 21:40

## 2021-01-03 RX ADMIN — TICAGRELOR 90 MG: 90 TABLET ORAL at 09:12

## 2021-01-03 RX ADMIN — INSULIN ASPART 1 UNITS: 100 INJECTION, SOLUTION INTRAVENOUS; SUBCUTANEOUS at 13:28

## 2021-01-03 ASSESSMENT — ENCOUNTER SYMPTOMS
MYALGIAS: 0
VOMITING: 0
DIFFICULTY URINATING: 0
LIGHT-HEADEDNESS: 0
DIAPHORESIS: 0
NAUSEA: 0
COUGH: 0
HEADACHES: 0
FEVER: 0
CHILLS: 0
SHORTNESS OF BREATH: 1
SORE THROAT: 0
COLOR CHANGE: 1

## 2021-01-03 ASSESSMENT — ACTIVITIES OF DAILY LIVING (ADL)
ADLS_ACUITY_SCORE: 10

## 2021-01-03 ASSESSMENT — MIFFLIN-ST. JEOR: SCORE: 2293.17

## 2021-01-03 NOTE — PLAN OF CARE
Pt alert, oriented and able to initiate needs. Vitals remain stable and pt is in SR.  No complaints of discomfort. Independent in room. Valuables remain in room and pt declined to have them sent to security.  Plan to diurese.    Heart Failure Care Map  GOALS TO BE MET BEFORE DISCHARGE:    1. Decrease congestion and/or edema with diuretic therapy to achieve near optimal volume status.     Dyspnea improved: No, further care required to meet this goal. Please explain new admit. Just starting diuresing.    Edema improved: No, further care required to meet this goal. Please explain Starting diuresing         Net I/O and Weights since admission:   No intake/output data recorded.     Vitals:    01/03/21 0300   Weight: 147.2 kg (324 lb 8 oz)       2.  O2 sats > 90% on room air, or at prior home O2 therapy level.      Able to wean O2 this shift to keep sats above 90%?: Yes, satisfactory for discharge.   Does patient use Home O2? No          Current oxygenation status:   SpO2: 98 %     O2 Device: None (Room air),      3.  Tolerates ambulation and mobility near baseline.     Ambulation: Yes, satisfactory for discharge.   Times patient ambulated with staff this shift: Ambulating in room independently.    Please review the Heart Failure Care Map for additional HF goal outcomes.    CHELI SULLIVAN, RN  1/3/2021

## 2021-01-03 NOTE — H&P
[facility]    History and Physical  Hospitalist       Date of Admission:  1/3/2021  Date of Service (when I saw the patient): 01/03/21    ASSESSMENT  Jeremiah Isaac is a very pleasant 60 year old gentleman with extensive past medical history that is most notable for severe pulmonary hypertension, CAD, hypertension, GIRISH, CKD 4, and Type 2 Diabetes mellitus, among others; who presents with progressive exertional dyspnea and anasarca and is found to have possible acute right sided systolic CHF exacerbation.    PLAN    Possible acute right sided diastolic CHF exacerbation: vs worsening pulmonary hypertension. Note is made of known CAD status post prior stent to the LAD reportedly in 8/2019. He has since developed chronic exertional dyspnea and was seen by Dr. Riojas of Cardiology and underwent repeat cardiac catheterization 1/2020 which reportedly showed normal right sided filling pressures, normal to mildly elevated wedge pressure and LVEDP, low borderline cardiac output, and severe pulmonary hypertension with modest response to vasodilators. He has been taking Sildenafil, Macitentan, and Nifedipine. More recently he underwent repeat catheterization in 8/11/2020 and again 8/20/2020 and underwent ARINA placement both times for severe in-stent LAD re-stenosis. TTE 8/2020 showed preserved LVEF. Now he presents for progressive exertional dyspnea and leg edema. His BNP level is elevated at 2514, up from 415 on 9/2020. We suspect acute right sided CHF exacerbation vs worsening pulmonary hypertension; vs progressive obesity hypoventilation syndrome in the setting of severe sleep apnea.     -- Inpatient. Telemetry, serial enzymes, TTE ordered. Cardiology consulted. Diurese with 80 mg IV Lasix TID. Resume ASA, Brilinta, Lipitor, as well as Clonidine, Toprol,  Sildenafil, Macitentan, and Nifedipine when verified. Strict I and O's, daily weights, and low sodium diet ordered.    Type 2 Diabetes mellitus:  Possible uncontrolled; Most recent A1c 9.3 in 9/2020 . On Lispro, Glipizide, and Victoza as an outpatient.    -- ISS insulin while hospitalized. Hold oral anti-diabetic medications. Resume Lispro when verified.    CKD 4: Cr today is 1.72 and was 1.95 on 12/30/2020. Monitor labs at least daily as we diurese.    GIRISH: vs Central Sleep Apnea, possibly also chronic narcolepsy. This is severe per his report and he uses BIPAP with ASV at night time; continued.    Chronic iron deficiency anemia: HGB 10.6 and was 10.7 on 9/2020. Monitor closely as we continue DAPT.    Rule Out COVID-19 infection  This patient was evaluated during a global COVID-19 pandemic, which necessitated consideration that the patient might be at risk for infection with the SARS-CoV-2 virus that causes COVID-19. Applicable protocols for evaluation were followed during the patient's care. LOW  suspicion for infection and COVID-19 PCR test result is NEGATIVE.  -no current indication for precautions    Chief Complaint   Dyspnea    History is obtained from the patient and the ED medical record    History of Present Illness   Jeremiah Isaac is a very pleasant 60 year old gentleman who presents with dyspnea. This is exertional; it has been present for over 2 years now. It comes on when he walks about a block and resolves with rest. He says when it first started he was also having episodes of syncope; he got two stents placed and initially he felt much better and could walk further, but over the summer the symptoms returned and he has since had further stents (8/2020) without any real amelioration of symptoms. He was diagnosed this past year with severe pulmonary hypertension and has started two medications for that and is unsure if they are helping. Since the summer time his exertional dyspnea has progressed to the point he gets winded carrying bags or walking more than about 20 feet. More recently, in the past month he has noticed progressive weight gain of  "about twenty pounds with increased bilateral leg and thigh swelling (not to the groin or belly). Two weeks ago he increased his Torsemide to 40 mg BID on even days from 20 daily (he still takes 20 mg daily on odd days) and feels this has improved the swelling in his legs but his exertional dyspnea continues to plague him. He also has associated progressive fatigue and lack of energy.  A few days ago he had a large amount of epistaxis that eventually resolved. Tonight he feels he could not walk by himself into our building. He has been in contact with his outpatient Cardiology team and inpatient admission has been advised for trial of diuresis, and [facility] Otherwise, he denies any recent chest pain, cough, fever, sore throat, diarrhea, dysuria, medication mis-adherence, or any other acute complaints.    In the ED, T 98.0, /71, pulse 64, sats 88% on 2 L NC.    CBC and BMP were notable for HGB 10.6, BUN 27, Cr 1.72, Cl 111, otherwise were within the normal reference range. Mag level was 2.1. BNP elevated at 2514. Troponin negative; EKG showed NSR with low voltage, no ST segment elevations. COVID was negative.     CXR showed: \"IMPRESSION: Two views of the chest were obtained. Stable cardiomediastinal silhouette. Small right pleural effusion and  associated basilar atelectasis/consolidation. No significant left  pleural effusion. No significant pneumothorax.\"    He was given 60 mg IV Lasix prior to transfer.     PHYSICAL EXAM  Blood pressure (!) 150/83, pulse 79, temperature 98.6  F (37  C), resp. rate 20, height 1.778 m (5' 10\"), weight 147.2 kg (324 lb 8 oz), SpO2 98 %.  Constitutional: Alert and oriented to person, place and time; no apparent distress; morbid obesity  HEENT: normocephalic moist mucus membranes  Respiratory: lungs diminished at both bases to auscultation bilaterally  Cardiovascular: regular S1 S2   GI: abdomen soft non tender non distended bowel " sounds positive  Lymph/Hematologic: no pallor, no cervical lymphadenopathy  Skin: no rash, good turgor  Musculoskeletal: moderate to severe bilateral edema with dusky erythema  Neurologic: extra-ocular muscles intact; moves all four extremities  Psychiatric: appropriate affect, insight and judgment     DVT Prophylaxis: Pneumatic Compression Devices and DAPT  Code Status: Full Code    Disposition: Expected discharge in 3-5 days    Noam Mcconnell MD    Past Medical History    I have reviewed this patient's medical history and updated it with pertinent information if needed.   Past Medical History:   Diagnosis Date     Acute on chronic right-sided heart failure (H) 1/16/2020    Added automatically from request for surgery [id number]     Central sleep apnea      CKD (chronic kidney disease)      DM2 (diabetes mellitus, type 2) (H)      Dyspnea on exertion 1/14/2020    Added automatically from request for surgery [id number]     Heart failure (H) 1/16/2020     Hypertension      Narcolepsy      Neuropathy      Pulmonary hypertension (H) 1/14/2020    Added automatically from request for surgery [id number]       Past Surgical History   I have reviewed this patient's surgical history and updated it with pertinent information if needed.  Past Surgical History:   Procedure Laterality Date     CV CORONARY ANGIOGRAM N/A 8/11/2020    Procedure: CV CORONARY ANGIOGRAM;  Surgeon: Thomas Norton MD;  Location: [facility] CARDIAC CATH LAB     CV CORONARY ANGIOGRAM N/A 8/20/2020    Procedure: Coronary Angiogram with possible intervention;  Surgeon: Thomas Norton MD;  Location: [facility] CARDIAC CATH LAB     CV INSTANTANEOUS WAVE-FREE RATIO N/A 8/11/2020    Procedure: Instantaneous Wave-Free Ratio;  Surgeon: Thomas Norton MD;  Location: [facility] CARDIAC CATH LAB     CV INTRAVASULAR ULTRASOUND N/A 8/11/2020    Procedure: Intravascular Ultrasound;  Surgeon: Thomas Norton MD;   Location:  HEART CARDIAC CATH LAB     CV LEFT HEART CATH N/A 1/17/2020    Procedure: Left Heart Cath;  Surgeon: Elia Salas MD;  Location: [facility] CARDIAC CATH LAB     CV PCI ANGIOPLASTY N/A 8/20/2020    Procedure: Percutaneous Coronary Intervention Angioplasty;  Surgeon: Thomas Norton MD;  Location: [facility] CARDIAC CATH LAB     CV PCI STENT DRUG ELUTING N/A 8/11/2020    Procedure: Percutaneous Coronary Intervention Stent Drug Eluting;  Surgeon: Thomas Norton MD;  Location: [facility] CARDIAC CATH LAB     CV PCI STENT DRUG ELUTING N/A 8/20/2020    Procedure: Percutaneous Coronary Intervention Stent Drug Eluting;  Surgeon: Thomas Norton MD;  Location:  HEART CARDIAC CATH LAB     CV RIGHT HEART CATH N/A 1/17/2020    Procedure: Heart Cath Right Heart Cath w/ nitric oxide reversal study;  Surgeon: Elia Salas MD;  Location:  HEART CARDIAC CATH LAB     CV RIGHT HEART CATH N/A 6/30/2020    Procedure: Heart Cath Right Heart Cath;  Surgeon: Isaac Bacon MD;  Location: [facility] CARDIAC CATH LAB     CV RIGHT HEART CATH N/A 8/11/2020    Procedure: CV RIGHT HEART CATH;  Surgeon: Thomas Norton MD;  Location:  HEART CARDIAC CATH LAB     CV RIGHT HEART CATH N/A 8/20/2020    Procedure: Right Heart Cath;  Surgeon: Thomas Norton MD;  Location:  HEART CARDIAC CATH LAB     CV STRESS EXERCISE STUDY N/A 8/11/2020    Procedure: Stress Drug Study;  Surgeon: Thomas Norton MD;  Location:  HEART CARDIAC CATH LAB       Prior to Admission Medications   Prior to Admission Medications   Prescriptions Last Dose Informant Patient Reported? Taking?   Alcohol Swabs PADS   No No   Sig: Use to swab the area of the injection or abram as directed   Per insurance coverage   NIFEdipine ER (ADALAT CC) 30 MG 24 hr tablet  Self No No   Sig: Take 1 tablet (30 mg) by mouth daily   Sharps Container MISC   No No   Sig: Use as  directed to dispose of needles, lancets and other sharps  Per Insurance coverage   aspirin (ASA) 81 MG EC tablet  Self No No   Sig: Take 1 tablet (81 mg) by mouth daily Start tomorrow morning.   atorvastatin (LIPITOR) 40 MG tablet  Self Yes No   Sig: Take 40 mg by mouth every morning    cloNIDine (CATAPRES) 0.3 MG tablet  Self Yes No   Sig: Take 0.3 mg by mouth 2 times daily   glipiZIDE (GLUCOTROL) 5 MG tablet   No No   Sig: Take 1 tablet (5 mg) by mouth every morning (before breakfast)   insulin lispro (HUMALOG VIAL) 100 UNIT/ML vial  Self Yes No   Sig: Inject 100 Units Subcutaneous daily   insulin pen needle (32G X 4 MM) 32G X 4 MM miscellaneous   No No   Sig: Use as directed by provider  Per insurance coverage   liraglutide (VICTOZA) 18 MG/3ML solution   No No   Sig: Inject 0.6 mg Subcutaneous daily   macitentan (OPSUMIT) 10 MG tablet  Self Yes No   Sig: Take 10 mg by mouth every morning    metoprolol succinate ER (TOPROL-XL) 100 MG 24 hr tablet  Self Yes No   Sig: Take 100 mg by mouth every morning    potassium chloride ER (KLOR-CON M) 20 MEQ CR tablet   No No   Sig: Take 40 mEq on even days and 20 mEq on Odd days   sildenafil (REVATIO) 20 MG tablet  Self No No   Sig: Take 1 tablet (20 mg) by mouth 3 times daily   ticagrelor (BRILINTA) 90 MG tablet   No No   Sig: Take 1 tablet (90 mg) by mouth 2 times daily   torsemide (DEMADEX) 20 MG tablet   No No   Sig: Take torsemide 20 mg twice daily on even days and 20 mg Once on Odd days      Facility-Administered Medications: None     Allergies   No Known Allergies    Social History   I have reviewed this patient's social history and updated it with pertinent information if needed. Jeremiah Isaac  reports that he has never smoked. He has never used smokeless tobacco. He reports current alcohol use. He reports previous drug use.    Family History   Family history assessed and, except as above, is non-contributory.    Review of Systems   The 10 point Review of Systems is  negative other than noted in the HPI or here.     Primary Care Physician   [facility]    Data   Labs Ordered and Resulted from Time of ED Arrival Up to the Time of Departure from the ED   GLUCOSE MONITOR NURSING POCT   COMPREHENSIVE METABOLIC PANEL   CBC WITH PLATELETS DIFFERENTIAL   GLUCOSE MONITOR NURSING POCT   GLUCOSE MONITOR NURSING POCT   GLUCOSE MONITOR NURSING POCT   GLUCOSE MONITOR NURSING POCT   ASSIGN ATTENDING PROVIDER   VITAL SIGNS   PERIPHERAL IV CATHETER   APPLY PNEUMATIC COMPRESSION DEVICE (PCD)   TELEMETRY MONITORING CARDIOLOGY ADULT   DAILY WEIGHTS   STRICT INTAKE AND OUTPUT   PATIENT TEACHING: HEART FAILURE   HEART FAILURE CARE PATHWAY TOOL   NOTIFY PROVIDER   IP CARBOHYDRATE COUNTING BY NURSING   PATIENT EDUCATION   ASSESS FOR HYPOGLYCEMIA SYMPTOMS   NOTIFY PHYSICIAN       Data reviewed today:  I personally reviewed the EKG tracing showing NSR.    Recent Results (from the past 24 hour(s))   XR Chest 2 Views    Narrative    CHEST TWO VIEWS   1/2/2021 9:04 PM     HISTORY: One-month dyspnea. History of congestive heart failure. No  fever or cough.    COMPARISON: Chest x-ray on 8/19/2020      Impression    IMPRESSION: Two views of the chest were obtained. Stable  cardiomediastinal silhouette. Small right pleural effusion and  associated basilar atelectasis/consolidation. No significant left  pleural effusion. No significant pneumothorax.    ADAM RICH MD

## 2021-01-03 NOTE — ED PROVIDER NOTES
"  History     Chief Complaint   Patient presents with     Leg Swelling     pt having increased leg swelling, keeps his lower legs wrapped but noticed it in his upper legs now.      Shortness of Breath     has been having increasing shortness of breath with exhertion. cardiology told him he needs to come in to \"get fluid taken off\"     HPI  Jeremiah Isaac is a 60 year old male with history of coronary artery disease status post LAD stent with restenosis, pulmonary hypertension, hypertension, CKD, diabetes, sleep apnea, obesity who presents to emergency department with dyspnea with exertion.  No acute change in his breathing but says over the past month he tires quite easily no fevers, chills, cough, or chest pain.  Has been taking increased dose of his diuretic over the past 2 weeks.  He takes 20 mg torsemide twice daily on even days and 20 mg once daily on odd days.  Has been in contact with his cardiologist office and given his symptoms, weight gain of approximately 20 pounds over the past month, and recent laboratory work they advised him to come to emergency department immediately with plans for hospital admission for diuresis.  This was 3 days ago and 2 days the first opportunity he has had to come to the ED.  Has chronic lower extremity edema and uses wraps on his leg which help limit his swelling.  If he takes the wraps off he said his legs swell up significantly.  He has been using BiPAP in the evenings for his sleep apnea.    The patient's PMHx, Surgical Hx, Allergies, and Medications were all reviewed with the patient.    Allergies:  No Known Allergies    Problem List:    Patient Active Problem List    Diagnosis Date Noted     Anemia, iron deficiency 12/09/2020     Priority: Medium     Diabetes mellitus, type 2 (H) 11/03/2020     Priority: Medium     CKD (chronic kidney disease) stage 4, GFR 15-29 ml/min (H) 11/03/2020     Priority: Medium     Dyspnea 08/19/2020     Priority: Medium     Coronary artery " disease involving native coronary artery of native heart without angina pectoris 08/19/2020     Priority: Medium     Added automatically from request for surgery [id number]       Heart disease, ill-defined 08/04/2020     Priority: Medium     Added automatically from request for surgery [id number]       Status post coronary angiogram 06/30/2020     Priority: Medium     Heart failure (H) 01/16/2020     Priority: Medium     Acute on chronic right-sided heart failure (H) 01/16/2020     Priority: Medium     Added automatically from request for surgery [id number]       Dyspnea on exertion 01/14/2020     Priority: Medium     Added automatically from request for surgery [id number]       Pulmonary hypertension (H) 01/14/2020     Priority: Medium     Added automatically from request for surgery [id number]          Past Medical History:    Past Medical History:   Diagnosis Date     Acute on chronic right-sided heart failure (H) 1/16/2020     Central sleep apnea      CKD (chronic kidney disease)      DM2 (diabetes mellitus, type 2) (H)      Dyspnea on exertion 1/14/2020     Heart failure (H) 1/16/2020     Hypertension      Narcolepsy      Neuropathy      Pulmonary hypertension (H) 1/14/2020       Past Surgical History:    Past Surgical History:   Procedure Laterality Date     CV CORONARY ANGIOGRAM N/A 8/11/2020    Procedure: CV CORONARY ANGIOGRAM;  Surgeon: Thomas Norton MD;  Location:  HEART CARDIAC CATH LAB     CV CORONARY ANGIOGRAM N/A 8/20/2020    Procedure: Coronary Angiogram with possible intervention;  Surgeon: Thomas Norton MD;  Location: [facility] CARDIAC CATH LAB     CV INSTANTANEOUS WAVE-FREE RATIO N/A 8/11/2020    Procedure: Instantaneous Wave-Free Ratio;  Surgeon: Thomas Norton MD;  Location: [facility] CARDIAC CATH LAB     CV INTRAVASULAR ULTRASOUND N/A 8/11/2020    Procedure: Intravascular Ultrasound;  Surgeon: Thomas Norton MD;  Location: [facility] CARDIAC CATH  LAB     CV LEFT HEART CATH N/A 1/17/2020    Procedure: Left Heart Cath;  Surgeon: Elia Salas MD;  Location:  HEART CARDIAC CATH LAB     CV PCI ANGIOPLASTY N/A 8/20/2020    Procedure: Percutaneous Coronary Intervention Angioplasty;  Surgeon: Thomas Norton MD;  Location: [facility] CARDIAC CATH LAB     CV PCI STENT DRUG ELUTING N/A 8/11/2020    Procedure: Percutaneous Coronary Intervention Stent Drug Eluting;  Surgeon: Thomas Norton MD;  Location: [facility] CARDIAC CATH LAB     CV PCI STENT DRUG ELUTING N/A 8/20/2020    Procedure: Percutaneous Coronary Intervention Stent Drug Eluting;  Surgeon: Thomas Norton MD;  Location:  HEART CARDIAC CATH LAB     CV RIGHT HEART CATH N/A 1/17/2020    Procedure: Heart Cath Right Heart Cath w/ nitric oxide reversal study;  Surgeon: Elia Salas MD;  Location:  HEART CARDIAC CATH LAB     CV RIGHT HEART CATH N/A 6/30/2020    Procedure: Heart Cath Right Heart Cath;  Surgeon: Isaac Bacon MD;  Location: [facility] CARDIAC CATH LAB     CV RIGHT HEART CATH N/A 8/11/2020    Procedure: CV RIGHT HEART CATH;  Surgeon: Thomas Norton MD;  Location:  HEART CARDIAC CATH LAB     CV RIGHT HEART CATH N/A 8/20/2020    Procedure: Right Heart Cath;  Surgeon: Thomas Norton MD;  Location:  HEART CARDIAC CATH LAB     CV STRESS EXERCISE STUDY N/A 8/11/2020    Procedure: Stress Drug Study;  Surgeon: Thomas Norton MD;  Location:  HEART CARDIAC CATH LAB       Family History:    No family history on file.    Social History:  Marital Status:  Single [1]  Social History     Tobacco Use     Smoking status: Never Smoker     Smokeless tobacco: Never Used   Substance Use Topics     Alcohol use: Yes     Frequency: Monthly or less     Comment: once or twice a year      Drug use: Not Currently        Medications:         Alcohol Swabs PADS       aspirin (ASA) 81 MG EC tablet        "atorvastatin (LIPITOR) 40 MG tablet       cloNIDine (CATAPRES) 0.3 MG tablet       glipiZIDE (GLUCOTROL) 5 MG tablet       insulin lispro (HUMALOG VIAL) 100 UNIT/ML vial       insulin pen needle (32G X 4 MM) 32G X 4 MM miscellaneous       liraglutide (VICTOZA) 18 MG/3ML solution       macitentan (OPSUMIT) 10 MG tablet       metoprolol succinate ER (TOPROL-XL) 100 MG 24 hr tablet       NIFEdipine ER (ADALAT CC) 30 MG 24 hr tablet       potassium chloride ER (KLOR-CON M) 20 MEQ CR tablet       Sharps Container MISC       sildenafil (REVATIO) 20 MG tablet       ticagrelor (BRILINTA) 90 MG tablet       torsemide (DEMADEX) 20 MG tablet          Review of Systems   Constitutional: Negative for chills, diaphoresis and fever.   HENT: Negative for sore throat.    Eyes: Negative for visual disturbance.   Respiratory: Positive for shortness of breath. Negative for cough.    Cardiovascular: Positive for leg swelling. Negative for chest pain.   Gastrointestinal: Negative for nausea and vomiting.   Genitourinary: Negative for difficulty urinating.   Musculoskeletal: Negative for myalgias.   Skin: Positive for color change.   Neurological: Negative for light-headedness and headaches.       Physical Exam   BP: (!) 149/77  Pulse: 75  Temp: 98  F (36.7  C)  Resp: 16  Height: 177.8 cm (5' 10\")  Weight: 145.2 kg (320 lb)(stated)  SpO2: 94 %    Physical Exam  GEN: Awake, alert, and cooperative.  Appears distressed  HENT: MMM. External ears and nose normal bilaterally.  EYES: EOM intact. Conjunctiva clear. No discharge.   NECK: Symmetric and freely mobile.  CV : Regular rate and rhythm.  Capillary refill less than 3 seconds.  No murmurs appreciated.  No JVD.  PULM: Normal effort. No wheezes, rales, or rhonchi bilaterally.  Decreased breath sounds in right base.  ABD: Soft, obese, non-tender, non-distended. No rebound or guarding.   NEURO: Normal speech. Following commands. CN II-XII grossly intact. Answering questions and interacting " appropriately.   EXT: Chronic stasis changes of bilateral lower extremities with 1+ pitting edema to knees.  INT: Warm. No diaphoresis. Normal color.        ED Course        Procedures             EKG Interpretation:      Interpreted by Wesley Winston MD  Time reviewed: 1925  Symptoms at time of EKG: dyspnea   Rhythm: normal sinus   Rate: 67  Axis: Left Axis Deviation  Ectopy: none  Conduction: right bundle branch block (incomplete) left anterior fascicle block  ST Segments/ T Waves: Poor R wave progression  Q Waves: III  Comparison to prior: Unchanged from 8/19/2020    Clinical Impression: Abnormal ECG.  Incomplete right bundle branch block, anterior fascicle block, poor R wave progression, unchanged from prior              Critical Care time:  none               Results for orders placed or performed during the hospital encounter of 01/02/21 (from the past 24 hour(s))   Basic metabolic panel   Result Value Ref Range    Sodium 140 133 - 144 mmol/L    Potassium 4.2 3.4 - 5.3 mmol/L    Chloride 111 (H) 94 - 109 mmol/L    Carbon Dioxide 24 20 - 32 mmol/L    Anion Gap 5 3 - 14 mmol/L    Glucose 121 (H) 70 - 99 mg/dL    Urea Nitrogen 27 7 - 30 mg/dL    Creatinine 1.72 (H) 0.66 - 1.25 mg/dL    GFR Estimate 42 (L) >60 mL/min/[1.73_m2]    GFR Estimate If Black 49 (L) >60 mL/min/[1.73_m2]    Calcium 8.9 8.5 - 10.1 mg/dL   Troponin I   Result Value Ref Range    Troponin I ES <0.015 0.000 - 0.045 ug/L   NT pro BNP   Result Value Ref Range    N-Terminal Pro BNP Inpatient 2,514 (H) 0 - 900 pg/mL   CBC with platelets differential   Result Value Ref Range    WBC 7.8 4.0 - 11.0 10e9/L    RBC Count 4.01 (L) 4.4 - 5.9 10e12/L    Hemoglobin 10.6 (L) 13.3 - 17.7 g/dL    Hematocrit 33.7 (L) 40.0 - 53.0 %    MCV 84 78 - 100 fl    MCH 26.4 (L) 26.5 - 33.0 pg    MCHC 31.5 31.5 - 36.5 g/dL    RDW 14.6 10.0 - 15.0 %    Platelet Count 295 150 - 450 10e9/L    Diff Method Automated Method     % Neutrophils 67.9 %    % Lymphocytes 19.5 %     % Monocytes 9.2 %    % Eosinophils 2.3 %    % Basophils 0.8 %    % Immature Granulocytes 0.3 %    Nucleated RBCs 0 0 /100    Absolute Neutrophil 5.3 1.6 - 8.3 10e9/L    Absolute Lymphocytes 1.5 0.8 - 5.3 10e9/L    Absolute Monocytes 0.7 0.0 - 1.3 10e9/L    Absolute Eosinophils 0.2 0.0 - 0.7 10e9/L    Absolute Basophils 0.1 0.0 - 0.2 10e9/L    Abs Immature Granulocytes 0.0 0 - 0.4 10e9/L    Absolute Nucleated RBC 0.0    XR Chest 2 Views    Narrative    CHEST TWO VIEWS   1/2/2021 9:04 PM     HISTORY: One-month dyspnea. History of congestive heart failure. No  fever or cough.    COMPARISON: Chest x-ray on 8/19/2020      Impression    IMPRESSION: Two views of the chest were obtained. Stable  cardiomediastinal silhouette. Small right pleural effusion and  associated basilar atelectasis/consolidation. No significant left  pleural effusion. No significant pneumothorax.    ADAM RICH MD   SARS-CoV-2 COVID-19 Virus (Coronavirus) by PCR    Specimen: Nasopharyngeal   Result Value Ref Range    SARS-CoV-2 Virus Specimen Source Nasopharyngeal     SARS-CoV-2 PCR Result NEGATIVE     SARS-CoV-2 PCR Comment (Note)    Asymptomatic SARS-CoV-2 COVID-19 Virus (Coronavirus) by PCR    Specimen: Nasopharyngeal   Result Value Ref Range    SARS-CoV-2 Virus Specimen Source Canceled, Test credited     SARS-CoV-2 PCR Result Canceled, Test credited     SARS-CoV-2 PCR Comment Canceled, Test credited        Medications   furosemide (LASIX) injection 60 mg (60 mg Intravenous Given 1/2/21 2151)       Assessments & Plan (with Medical Decision Making)   60 year old male with history of coronary disease status post stenting, pulmonary hypertension, diabetes, CKD with increasing dyspnea with exertion and weight gain over the past month.  Presented to ED at advice of  cardiologist.  Review of nursing notes show recommendation from cardiologist office for admission for diuresis.  Patient very dyspneic with minimal exertion.  Patient moving close  "and moving on cart became short of breath with mild hypoxia with SPO2 of 87%.  Patient placed on 2 L by nasal cannula with improvement of oxygen saturations.  Was maintaining adequate oxygenation with rest but dyspneic and hypoxic with exertion.  He does have bilateral pitting edema and chronic venous stasis changes in his lower extremities.  ECG without evidence of acute ischemia and unchanged from most recent.  Cardiac troponin below the detection.  NT proBNP elevated to 2514.  Creatinine elevated to 1.72 which appears near his baseline.  CBC with no leukocytosis and hemoglobin of 10.6 which appears near his baseline.  Chest x-ray with small right pleural effusion with fits with exam with decreased breath sounds in right base.  No definitive pneumothorax or infiltrate.  I have a lower suspicion for infectious etiology.  Chart review shows that he has been taking 40 mg of torsemide alternating with 20 mg on a daily basis and has been for the past 2 weeks.  No improvement of his symptoms.  He was given 60 mg of IV furosemide while in the emergency department.  Plans for admission for diuresis given his complex cardiac history and pulmonary hypertension.  No beds available at [facility] and patient will be transferred to [facility].  I reviewed the case with Dr. Borrero who accepted the transfer.  Patient unwilling to be transferred via EMS.  States \"it is a ridiculous expense\".  I advised him that this was against my recommendation but he is insistent.  Patient will be transferred by a friend will not be driving himself.  Given the current pandemic SARS-CoV-2 testing obtained and initial specimen was invalid and results pending at time of disposition.      I have reviewed the nursing notes.         New Prescriptions    No medications on file       Final diagnoses:   Acute congestive heart failure, unspecified heart failure type (H)   Coronary artery disease involving native coronary artery of native " heart without angina pectoris   Dyspnea on exertion   Pulmonary hypertension (H)     Wesley Winston MD    1/2/2021   [facility] EMERGENCY DEPT    Disclaimer: This note consists of words and symbols derived from keyboarding and dictation using voice recognition software.  As a result, there may be errors that have gone undetected.  Please consider this when interpreting information found in this note.             Wesley Winston MD  01/03/21 0014

## 2021-01-03 NOTE — CONSULTS
"CLINICAL NUTRITION SERVICES BRIEF NOTE  Consult received - \"Heart Failure - Dietitian to instruct patient on 2 gram sodium diet\"    New Findings  - Pt admitted with suspected CHF exacerbation. Cardiology consult and ECHO pending.    Intervention  - RD to see for diet education prior to discharge.     Bonnie Vilchis RD,   [facility] [place], [address], [place]   Pager: [phone]  Weekend Pager: [phone]    "

## 2021-01-03 NOTE — PHARMACY-ADMISSION MEDICATION HISTORY
"Pharmacy Medication History  Admission medication history interview status for the 1/3/2021  admission is complete. See EPIC admission navigator for prior to admission medications     Location of Interview: Phone  Medication history sources: Patient, Surescripts, [facility] pharmacy, EMR  Medication history source reliability: Poor  Adherence assessment: Poor    Significant changes made to the medication list:  Changed: Victoza- 0.6 mg for 5 days then 1.2 mg thereafter (clinic notes 08/21/2020)    Added: Chlorthalidone     In the past week, patient estimated taking medication this percent of the time: 50-90% due to other-Hard to assess. Patient claims he takes his medications regularly. However, his fill history suggests otherwise.     Additional medication history information:   I gathered background from sure scripts prior to calling the patient. He reported to me that his dose of metformin has changed from the 750 mg ER tablet to a \"much smaller dose\". He noted that he fills all his meds at [facility] pharmacy.() I called the pharmacy and the staff pharmacist noted that his last fill for metformin was in Jan 2020. Per chart review, I was able to find that his metformin was discontinued (on the note 08/27/2020) due to his renal function.     Additionally, the patient reports that his dose of Victoza was changed to 1.3 mg from 0.6 mg. He notes that he fills this medication at the same [facility] pharmacy. When I spoke to the pharmacist, there is no fill history for the victoza. His original fill for this was in August 2020 at [facility]. ([phone]) The staff pharmacist reported that the directions are to inject subcutaneous 0.6 mg daily. However, per chart review, (clinic note 08/21/2020) the patient is supposed to take \"0.6 mg for 5 days then 1.2 mg daily thereafter.\" Per this note, the patient is no longer on Plavix 75 mg daily. However, the patient claims he is still taking it, with the last dose on " 0900 yesterday morning. Given the level of confusion of the patient with his prior medications, I do not feel confident leaving this on the med list.     Medication reconciliation completed by provider prior to medication history? No    Time spent in this activity: 1 hour     Prior to Admission medications    Medication Sig Last Dose Taking? Auth Provider   aspirin (ASA) 81 MG EC tablet Take 1 tablet (81 mg) by mouth daily Start tomorrow morning. 1/2/2021 at AM Yes Fabian Ragland MD   atorvastatin (LIPITOR) 40 MG tablet Take 40 mg by mouth every morning  1/2/2021 at AM Yes Reported, Patient   chlorthalidone (HYGROTON) 25 MG tablet Take 25 mg by mouth daily 1/2/2021 at AM Yes Unknown, Entered By History   cloNIDine (CATAPRES) 0.3 MG tablet Take 0.3 mg by mouth 2 times daily 1/2/2021 at AM Yes Reported, Patient   glipiZIDE (GLUCOTROL) 5 MG tablet Take 1 tablet (5 mg) by mouth every morning (before breakfast) 1/2/2021 at AM Yes Ángela Pineda CNP   insulin lispro (HUMALOG VIAL) 100 UNIT/ML vial Inject 100 Units Subcutaneous daily 1/2/2021 at AM Yes Reported, Patient   liraglutide (VICTOZA) 18 MG/3ML solution Inject 0.6 mg Subcutaneous daily  Patient taking differently: Inject 0.6 mg Subcutaneous daily for 5 days. then 1.2 mg daily after 1/2/2021 at AM Yes Ángela Pineda CNP   macitentan (OPSUMIT) 10 MG tablet Take 10 mg by mouth every morning  1/2/2021 at AM Yes Santiago Riojas MD   metoprolol succinate ER (TOPROL-XL) 100 MG 24 hr tablet Take 100 mg by mouth every morning  1/2/2021 at AM Yes Reported, Patient   NIFEdipine ER (ADALAT CC) 30 MG 24 hr tablet Take 1 tablet (30 mg) by mouth daily 1/2/2021 at AM Yes Santiago Riojas MD   potassium chloride ER (KLOR-CON M) 20 MEQ CR tablet Take 40 mEq on even days and 20 mEq on Odd days 1/2/2021 at AM Yes Santiago Riojas MD   sildenafil (REVATIO) 20 MG tablet Take 1 tablet (20 mg) by mouth 3 times daily 1/2/2021 at AM Yes Santiago Riojas  MD Wesley   ticagrelor (BRILINTA) 90 MG tablet Take 1 tablet (90 mg) by mouth 2 times daily 1/2/2021 at AM Yes Santiago Riojas MD   torsemide (DEMADEX) 20 MG tablet Take torsemide 20 mg twice daily on even days and 20 mg Once on Odd days 1/2/2021 at AM Yes Santiago Riojas MD   Alcohol Swabs PADS Use to swab the area of the injection or abram as directed   Per insurance coverage   Ángela Pineda CNP   insulin pen needle (32G X 4 MM) 32G X 4 MM miscellaneous Use as directed by provider  Per insurance coverage   Ángela Pineda CNP   Sharps Container MISC Use as directed to dispose of needles, lancets and other sharps  Per Insurance coverage   Ángela Pineda, CNP

## 2021-01-03 NOTE — ED NOTES
Pt refusing ambulance transfer and states his friend will drive him to [facility]. PIV removed per Dr. Catrachito BRADLEY. Pt given paperwork. Hernan WU, Ángela, informed pt will be arriving POV.

## 2021-01-03 NOTE — PROGRESS NOTES
This RN was originally assigned to take over care for this patient so entered chart. Assignments where then changed and this RN did not receive this pt.

## 2021-01-03 NOTE — CONSULTS
[facility]    Cardiology Consultation     Date of Admission:  1/3/2021    Assessment & Plan   Jeremiah Isaac is a 60 year old male who was admitted on 1/3/2021.    1. Severe precapillary pulmonary hypertension on pulmonary vasodilators  2. Likely some degree of mild postcapillary pulmonary hypertension as well due to diastolic dysfunction from underlying coronary disease and obesity  3. Coronary artery disease status post LAD stenting in 2019 with restenosis in 2020 status post further stenting and another stent to mid LAD and angioplasty of the diagonal  4. History of methamphetamine use  5. Sleep disordered breathing  6. Severe obesity  7. Systemic hypertension on multiple drugs    Recommendations  1. The patient is clearly volume overloaded on clinical exam.  This seems to be all right-sided.  Having said that while he has very severe precapillary pulmonary hypertension on PH specific therapy, prior hemodynamic assessments of also showed mild degree of left-sided filling pressure elevation likely from obesity and diastolic dysfunction from his coronary disease.  2. Complicating matters is the presence of chronic kidney disease and inability to accurately  his volume status given his obesity.  3. I recommend a right heart catheterization tomorrow to very objectively assess left and right-sided hemodynamics.  4. For now I agree with intravenous Lasix 3 times a day.  Stop home dose of oral torsemide.  I would recommend continuing nifedipine, macitentan, and sildenafil.  5. We will defer other antihypertensive management to primary team.  However I would attempt to aim at normotension prior to going into right heart catheterization tomorrow so as to not 'muddy' data for left-sided filling pressures. If left-sided filling pressures are elevated in the setting of systemic hypertension tomorrow, may be reasonable to use intravenous nitroprusside to accurately quantify wedge.  6. CAD is  stable, no CP, continue DAPT and statin.  7. Cardiology will continue to follow along tomorrow morning.    Alexandra Raines MD    Primary Care Physician   [facility]    Reason for Consult   Reason for consult: I was asked by medicine to evaluate this patient for R CHF and PH.    History of Present Illness   Jeremiah Isaac is a 60 year old male who presents with SOB. This is a very pleasant 60-year-old gentleman who presented with shortness of breath and dyspnea on exertion.  The patient has very complex medical issues.  He follows up at [facility] in the pulmonary hypertension clinic.  He has obesity and sleep disordered breathing.  He also has coronary artery disease. I spent a significant amount of time reviewing his chart I will try to summarize his cardiac issues to the best that I can.    1. It seems like he has a history of proximal LAD stent in August 2019.    2. Around early 2020 he was diagnosed with severe precapillary pulmonary hypertension.  Pulmonary venoocclusive disease was ruled out.  Cardiac MRI showed findings consistent with pulmonary hypertension.  LVEF normal.  VQ scan negative for type IV disease.  To summarize over 2020 he has had total of 3 right heart catheterizations.  Initial studies had shown normal left-sided filling pressures and modest vasodilator responses.  He has been maintained on macitentan 10 mg along with sildenafil 20 mg 3 times daily along with 20 mg of torsemide since spring of 2020.    3. In August 2020 he was noted to have severe in-stent restenosis of his previous LAD stent.  This was stented.  A few weeks later he had another repeat coronary angiography that showed a new lesion in the mid LAD that was stented as well along with balloon dilatation of the diagonal.   4. Meth use with sleep disordered breathing and obesity.   5. His LVEF is normal however there are clear signs of right ventricular pressure and volume overload.  He was last seen a few  months ago at the pulmonary hypertension clinic at [facility].  Etiology of all of this has been unclear but thought to be likely related to sleep disordered breathing obesity and prior methamphetamine use.  In any case on PH specific therapy, his last right heart catheterization has shown some improvement with PA pressures of 70/30 with a mean of 40 mmHg.  At that time his wedge was 10. Cardiac output was normal.  6. Chronic kidney disease.    Now the patient presents with exertional dyspnea which has been worse over the last few months.  Given worsening outpatient symptoms and failure outpatient medical therapy, he decided to come into [facility] emergency department and then was transferred to [facility].  He denies any recent chest discomfort cough fever or Covid-like symptoms.  He is compliant with his medications.  In the emergency department his blood pressure 154/71 with a pulse of 64 and he was satting 88% on 2 L nasal oxygen.  His troponins were negative.  ECG did not show any ST elevations.  Covid was negative.  NT proBNP level was 2500.  He has chronic kidney disease and his creatinine is stable at 1.8.      Patient Active Problem List   Diagnosis     Dyspnea on exertion     Pulmonary hypertension (H)     Heart failure (H)     Acute on chronic right-sided heart failure (H)     Status post coronary angiogram     Heart disease, ill-defined     Dyspnea     Coronary artery disease involving native coronary artery of native heart without angina pectoris     Diabetes mellitus, type 2 (H)     CKD (chronic kidney disease) stage 4, GFR 15-29 ml/min (H)     Anemia, iron deficiency     CHF exacerbation (H)       Past Medical History   I have reviewed this patient's medical history and updated it with pertinent information if needed.   Past Medical History:   Diagnosis Date     Acute on chronic right-sided heart failure (H) 1/16/2020    Added automatically from request for surgery [id number]      Central sleep apnea      CKD (chronic kidney disease)      DM2 (diabetes mellitus, type 2) (H)      Dyspnea on exertion 1/14/2020    Added automatically from request for surgery [id number]     Heart failure (H) 1/16/2020     Hypertension      Narcolepsy      Neuropathy      Pulmonary hypertension (H) 1/14/2020    Added automatically from request for surgery [id number]       Past Surgical History   I have reviewed this patient's surgical history and updated it with pertinent information if needed.  Past Surgical History:   Procedure Laterality Date     CV CORONARY ANGIOGRAM N/A 8/11/2020    Procedure: CV CORONARY ANGIOGRAM;  Surgeon: Thomas Norton MD;  Location: [facility] CARDIAC CATH LAB     CV CORONARY ANGIOGRAM N/A 8/20/2020    Procedure: Coronary Angiogram with possible intervention;  Surgeon: Thomas Norton MD;  Location: [facility] CARDIAC CATH LAB     CV INSTANTANEOUS WAVE-FREE RATIO N/A 8/11/2020    Procedure: Instantaneous Wave-Free Ratio;  Surgeon: Thomas Norton MD;  Location: [facility] CARDIAC CATH LAB     CV INTRAVASULAR ULTRASOUND N/A 8/11/2020    Procedure: Intravascular Ultrasound;  Surgeon: Thomas Norton MD;  Location: [facility] CARDIAC CATH LAB     CV LEFT HEART CATH N/A 1/17/2020    Procedure: Left Heart Cath;  Surgeon: Elia Salas MD;  Location: [facility] CARDIAC CATH LAB     CV PCI ANGIOPLASTY N/A 8/20/2020    Procedure: Percutaneous Coronary Intervention Angioplasty;  Surgeon: Thomas Norton MD;  Location: [facility] CARDIAC CATH LAB     CV PCI STENT DRUG ELUTING N/A 8/11/2020    Procedure: Percutaneous Coronary Intervention Stent Drug Eluting;  Surgeon: Thomas Norton MD;  Location: [facility] CARDIAC CATH LAB     CV PCI STENT DRUG ELUTING N/A 8/20/2020    Procedure: Percutaneous Coronary Intervention Stent Drug Eluting;  Surgeon: Thomas Norton MD;  Location: [facility] CARDIAC CATH LAB      CV RIGHT HEART CATH N/A 1/17/2020    Procedure: Heart Cath Right Heart Cath w/ nitric oxide reversal study;  Surgeon: Elia Salas MD;  Location:  HEART CARDIAC CATH LAB     CV RIGHT HEART CATH N/A 6/30/2020    Procedure: Heart Cath Right Heart Cath;  Surgeon: Isaac Bacon MD;  Location:  HEART CARDIAC CATH LAB     CV RIGHT HEART CATH N/A 8/11/2020    Procedure: CV RIGHT HEART CATH;  Surgeon: Thomas Norton MD;  Location:  HEART CARDIAC CATH LAB     CV RIGHT HEART CATH N/A 8/20/2020    Procedure: Right Heart Cath;  Surgeon: Thomas Norton MD;  Location:  HEART CARDIAC CATH LAB     CV STRESS EXERCISE STUDY N/A 8/11/2020    Procedure: Stress Drug Study;  Surgeon: Thomas Norton MD;  Location:  HEART CARDIAC CATH LAB       Prior to Admission Medications   Prior to Admission Medications   Prescriptions Last Dose Informant Patient Reported? Taking?   Alcohol Swabs PADS  Self No No   Sig: Use to swab the area of the injection or abram as directed   Per insurance coverage   NIFEdipine ER (ADALAT CC) 30 MG 24 hr tablet 1/2/2021 at AM Self No Yes   Sig: Take 1 tablet (30 mg) by mouth daily   Sharps Container MISC  Self No No   Sig: Use as directed to dispose of needles, lancets and other sharps  Per Insurance coverage   aspirin (ASA) 81 MG EC tablet 1/2/2021 at AM Self No Yes   Sig: Take 1 tablet (81 mg) by mouth daily Start tomorrow morning.   atorvastatin (LIPITOR) 40 MG tablet 1/2/2021 at AM Self Yes Yes   Sig: Take 40 mg by mouth every morning    chlorthalidone (HYGROTON) 25 MG tablet 1/2/2021 at AM Self Yes Yes   Sig: Take 25 mg by mouth daily   cloNIDine (CATAPRES) 0.3 MG tablet 1/2/2021 at AM Self Yes Yes   Sig: Take 0.3 mg by mouth 2 times daily   glipiZIDE (GLUCOTROL) 5 MG tablet 1/2/2021 at AM Self No Yes   Sig: Take 1 tablet (5 mg) by mouth every morning (before breakfast)   insulin lispro (HUMALOG VIAL) 100 UNIT/ML vial 1/2/2021 at AM Self  Yes Yes   Sig: Inject 100 Units Subcutaneous daily   insulin pen needle (32G X 4 MM) 32G X 4 MM miscellaneous  Self No No   Sig: Use as directed by provider  Per insurance coverage   liraglutide (VICTOZA) 18 MG/3ML solution 1/2/2021 at AM Self No Yes   Sig: Inject 0.6 mg Subcutaneous daily   Patient taking differently: Inject 0.6 mg Subcutaneous daily for 5 days. then 1.2 mg daily after   macitentan (OPSUMIT) 10 MG tablet 1/2/2021 at AM Self Yes Yes   Sig: Take 10 mg by mouth every morning    metoprolol succinate ER (TOPROL-XL) 100 MG 24 hr tablet 1/2/2021 at AM Self Yes Yes   Sig: Take 100 mg by mouth every morning    potassium chloride ER (KLOR-CON M) 20 MEQ CR tablet 1/2/2021 at AM Self No Yes   Sig: Take 40 mEq on even days and 20 mEq on Odd days   sildenafil (REVATIO) 20 MG tablet 1/2/2021 at AM Self No Yes   Sig: Take 1 tablet (20 mg) by mouth 3 times daily   ticagrelor (BRILINTA) 90 MG tablet 1/2/2021 at AM Self No Yes   Sig: Take 1 tablet (90 mg) by mouth 2 times daily   torsemide (DEMADEX) 20 MG tablet 1/2/2021 at AM Self No Yes   Sig: Take torsemide 20 mg twice daily on even days and 20 mg Once on Odd days      Facility-Administered Medications: None     Current Facility-Administered Medications   Medication Dose Route Frequency     aspirin  81 mg Oral Daily     furosemide  80 mg Intravenous Q8H     insulin aspart  1-7 Units Subcutaneous TID AC     insulin aspart  1-5 Units Subcutaneous At Bedtime     macitentan  10 mg Oral Daily     sildenafil  20 mg Oral TID     sodium chloride (PF)  3 mL Intracatheter Q8H     ticagrelor  90 mg Oral BID     Current Facility-Administered Medications   Medication Last Rate     Allergies   No Known Allergies    Social History    reports that he has never smoked. He has never used smokeless tobacco. He reports current alcohol use. He reports previous drug use.    Family History   No family history on file.    Review of Systems   The comprehensive 10 point Review of Systems  "is negative other than noted in the HPI or here.     Physical Exam   Vital Signs with Ranges  Temp:  [98  F (36.7  C)-98.6  F (37  C)] 98.6  F (37  C)  Pulse:  [] 67  Resp:  [13-28] 20  BP: ()/(73-83) 156/82  SpO2:  [88 %-98 %] 91 %  Wt Readings from Last 4 Encounters:   01/03/21 147.2 kg (324 lb 8 oz)   01/02/21 145.2 kg (320 lb)   08/27/20 141.5 kg (312 lb)   08/21/20 138.3 kg (305 lb)     No intake/output data recorded.      Vitals: BP (!) 156/82 (BP Location: Right arm)   Pulse 67   Temp 98.6  F (37  C) (Oral)   Resp 20   Ht 1.778 m (5' 10\")   Wt 147.2 kg (324 lb 8 oz)   SpO2 91%   BMI 46.56 kg/m      General alert oriented x3 no acute distress neck is thick JVP is difficult to see.  Lungs are clear to auscultation bilaterally.  Cardiac sounds are regular.  Soft systolic murmur without rubs or gallops.  Extremities are warm however there is 2-3+ tense bilateral pitting edema.  Psych appropriate affect HEENT no icterus pallor.  Abdomen he is obese but no rebound or guarding.  No gross motor neuro focal deficits.    Recent Labs   Lab 01/03/21  0656 01/03/21 0413 01/02/21 1953   TROPI <0.015 <0.015 <0.015       Recent Labs   Lab 01/03/21  0656 01/03/21 0413 01/02/21 2043 01/02/21 1953 12/30/20  1451   WBC  --  7.8 7.8  --   --    HGB  --  10.6* 10.6*  --   --    MCV  --  83 84  --   --    PLT  --  301 295  --   --    NA  --  142  --  140 136   POTASSIUM  --  3.6  --  4.2 4.9   CHLORIDE  --  110*  --  111* 107   CO2  --  28  --  24 26   BUN  --  27  --  27 39*   CR  --  1.79*  --  1.72* 1.95*   GFRESTIMATED  --  40*  --  42* 36*   GFRESTBLACK  --  47*  --  49* 42*   ANIONGAP  --  4  --  5 3   ANNITA  --  9.0  --  8.9 9.0   GLC  --  133*  --  121* 435*   ALBUMIN  --  2.9*  --   --   --    PROTTOTAL  --  6.9  --   --   --    BILITOTAL  --  0.6  --   --   --    ALKPHOS  --  145  --   --   --    ALT  --  16  --   --   --    AST  --  14  --   --   --    TROPI <0.015 <0.015  --  <0.015  --      Recent " Labs   Lab Test 01/16/20  1501   CHOL 145   HDL 45   LDL 53   TRIG 234*     Recent Labs   Lab 01/03/21  0413 01/02/21 2043   WBC 7.8 7.8   HGB 10.6* 10.6*   HCT 33.2* 33.7*   MCV 83 84    295     No results for input(s): PH, PHV, PO2, PO2V, SAT, PCO2, PCO2V, HCO3, HCO3V in the last 168 hours.  Recent Labs   Lab 01/02/21  1953 12/30/20  1451   NTBNPI 2,514*  --    NTBNP  --  2,083*     No results for input(s): DD in the last 168 hours.  No results for input(s): SED, CRP in the last 168 hours.  Recent Labs   Lab 01/03/21 0413 01/02/21 2043    295     No results for input(s): TSH in the last 168 hours.  No results for input(s): COLOR, APPEARANCE, URINEGLC, URINEBILI, URINEKETONE, SG, UBLD, URINEPH, PROTEIN, UROBILINOGEN, NITRITE, LEUKEST, RBCU, WBCU in the last 168 hours.    Imaging:  Recent Results (from the past 48 hour(s))   XR Chest 2 Views    Narrative    CHEST TWO VIEWS   1/2/2021 9:04 PM     HISTORY: One-month dyspnea. History of congestive heart failure. No  fever or cough.    COMPARISON: Chest x-ray on 8/19/2020      Impression    IMPRESSION: Two views of the chest were obtained. Stable  cardiomediastinal silhouette. Small right pleural effusion and  associated basilar atelectasis/consolidation. No significant left  pleural effusion. No significant pneumothorax.    ADAM RICH MD       Echo:  No results found for this or any previous visit (from the past 4320 hour(s)).

## 2021-01-03 NOTE — ED NOTES
Assumed care of pt who came in reporting SOB that has been chronic but worse over the past 3 months. He has a long hx and appears moderately SOB but in no acute resp distress.

## 2021-01-03 NOTE — PLAN OF CARE
Heart Failure Care Map  GOALS TO BE MET BEFORE DISCHARGE:    1. Decrease congestion and/or edema with diuretic therapy to achieve near optimal volume status.     Dyspnea improved: No, further care required to meet this goal. Please explain SAINI   Edema improved: No, further care required to meet this goal. Please explain improved        Net I/O and Weights since admission:   12/04 2300 - 01/03 2259  In: 720 [P.O.:720]  Out: 2250 [Urine:2250]  Net: -1530     Vitals:    01/03/21 0300   Weight: 147.2 kg (324 lb 8 oz)       2.  O2 sats > 90% on room air, or at prior home O2 therapy level.      Able to wean O2 this shift to keep sats above 90%?: Yes, satisfactory for discharge.   Does patient use Home O2? No          Current oxygenation status:   SpO2: 95 %     O2 Device: None (Room air),      3.  Tolerates ambulation and mobility near baseline.     Ambulation: Yes, satisfactory for discharge.   Times patient ambulated with staff this shift: 2    Please review the Heart Failure Care Map for additional HF goal outcomes.    A+Ox4, Indep to BR. SAINI. Lungs diminished. NSR. On RA, uses BiPAP from home with naps and HS. No pain. JASON nolen, +2 edema. Plan is right heart cath tomorrow.     Rachel Morley RN  1/3/2021

## 2021-01-04 LAB
ANION GAP SERPL CALCULATED.3IONS-SCNC: 7 MMOL/L (ref 3–14)
BUN SERPL-MCNC: 28 MG/DL (ref 7–30)
CALCIUM SERPL-MCNC: 9.3 MG/DL (ref 8.5–10.1)
CHLORIDE SERPL-SCNC: 107 MMOL/L (ref 94–109)
CO2 BLDCOV-SCNC: 26 MMOL/L (ref 21–28)
CO2 SERPL-SCNC: 27 MMOL/L (ref 20–32)
CREAT SERPL-MCNC: 2.01 MG/DL (ref 0.66–1.25)
GFR SERPL CREATININE-BSD FRML MDRD: 35 ML/MIN/{1.73_M2}
GLUCOSE SERPL-MCNC: 233 MG/DL (ref 70–99)
PCO2 BLDV: 40 MM HG (ref 40–50)
PH BLDV: 7.42 PH (ref 7.32–7.43)
PO2 BLDV: 37 MM HG (ref 25–47)
POTASSIUM SERPL-SCNC: 3.8 MMOL/L (ref 3.4–5.3)
POTASSIUM SERPL-SCNC: 3.9 MMOL/L (ref 3.4–5.3)
SAO2 % BLDV FROM PO2: 72 %
SODIUM SERPL-SCNC: 141 MMOL/L (ref 133–144)

## 2021-01-04 PROCEDURE — 93456 R HRT CORONARY ARTERY ANGIO: CPT | Mod: 26 | Performed by: INTERNAL MEDICINE

## 2021-01-04 PROCEDURE — 250N000011 HC RX IP 250 OP 636: Performed by: HOSPITALIST

## 2021-01-04 PROCEDURE — 36415 COLL VENOUS BLD VENIPUNCTURE: CPT | Performed by: HOSPITALIST

## 2021-01-04 PROCEDURE — 99232 SBSQ HOSP IP/OBS MODERATE 35: CPT | Performed by: STUDENT IN AN ORGANIZED HEALTH CARE EDUCATION/TRAINING PROGRAM

## 2021-01-04 PROCEDURE — 210N000002 HC R&B HEART CARE

## 2021-01-04 PROCEDURE — 80048 BASIC METABOLIC PNL TOTAL CA: CPT | Performed by: STUDENT IN AN ORGANIZED HEALTH CARE EDUCATION/TRAINING PROGRAM

## 2021-01-04 PROCEDURE — 99232 SBSQ HOSP IP/OBS MODERATE 35: CPT | Mod: 25 | Performed by: INTERNAL MEDICINE

## 2021-01-04 PROCEDURE — 36415 COLL VENOUS BLD VENIPUNCTURE: CPT | Performed by: STUDENT IN AN ORGANIZED HEALTH CARE EDUCATION/TRAINING PROGRAM

## 2021-01-04 PROCEDURE — 250N000013 HC RX MED GY IP 250 OP 250 PS 637: Performed by: PHYSICIAN ASSISTANT

## 2021-01-04 PROCEDURE — 250N000013 HC RX MED GY IP 250 OP 250 PS 637: Performed by: INTERNAL MEDICINE

## 2021-01-04 PROCEDURE — 999N000111 HC STATISTIC OT IP EVAL DEFER: Performed by: OCCUPATIONAL THERAPIST

## 2021-01-04 PROCEDURE — 93451 RIGHT HEART CATH: CPT | Performed by: INTERNAL MEDICINE

## 2021-01-04 PROCEDURE — 272N000001 HC OR GENERAL SUPPLY STERILE: Performed by: INTERNAL MEDICINE

## 2021-01-04 PROCEDURE — C1894 INTRO/SHEATH, NON-LASER: HCPCS | Performed by: INTERNAL MEDICINE

## 2021-01-04 PROCEDURE — 4A023N6 MEASUREMENT OF CARDIAC SAMPLING AND PRESSURE, RIGHT HEART, PERCUTANEOUS APPROACH: ICD-10-PCS | Performed by: INTERNAL MEDICINE

## 2021-01-04 PROCEDURE — 84132 ASSAY OF SERUM POTASSIUM: CPT | Performed by: HOSPITALIST

## 2021-01-04 PROCEDURE — 82803 BLOOD GASES ANY COMBINATION: CPT

## 2021-01-04 PROCEDURE — 250N000013 HC RX MED GY IP 250 OP 250 PS 637: Performed by: HOSPITALIST

## 2021-01-04 PROCEDURE — 250N000009 HC RX 250: Performed by: INTERNAL MEDICINE

## 2021-01-04 RX ORDER — ATORVASTATIN CALCIUM 40 MG/1
40 TABLET, FILM COATED ORAL EVERY EVENING
Status: DISCONTINUED | OUTPATIENT
Start: 2021-01-04 | End: 2021-01-05 | Stop reason: HOSPADM

## 2021-01-04 RX ORDER — NIFEDIPINE 30 MG/1
30 TABLET, EXTENDED RELEASE ORAL DAILY
Status: DISCONTINUED | OUTPATIENT
Start: 2021-01-04 | End: 2021-01-05 | Stop reason: HOSPADM

## 2021-01-04 RX ORDER — LORAZEPAM 0.5 MG/1
0.5 TABLET ORAL
Status: DISCONTINUED | OUTPATIENT
Start: 2021-01-04 | End: 2021-01-04 | Stop reason: HOSPADM

## 2021-01-04 RX ORDER — POTASSIUM CHLORIDE 1500 MG/1
20 TABLET, EXTENDED RELEASE ORAL
Status: COMPLETED | OUTPATIENT
Start: 2021-01-04 | End: 2021-01-04

## 2021-01-04 RX ORDER — LORAZEPAM 2 MG/ML
0.5 INJECTION INTRAMUSCULAR
Status: DISCONTINUED | OUTPATIENT
Start: 2021-01-04 | End: 2021-01-04 | Stop reason: HOSPADM

## 2021-01-04 RX ORDER — SILDENAFIL CITRATE 20 MG/1
20 TABLET ORAL 3 TIMES DAILY
Status: DISCONTINUED | OUTPATIENT
Start: 2021-01-04 | End: 2021-01-05 | Stop reason: HOSPADM

## 2021-01-04 RX ORDER — NIFEDIPINE 30 MG/1
30 TABLET, EXTENDED RELEASE ORAL 2 TIMES DAILY
Status: DISCONTINUED | OUTPATIENT
Start: 2021-01-04 | End: 2021-01-04

## 2021-01-04 RX ORDER — SILDENAFIL 25 MG/1
25 TABLET, FILM COATED ORAL 3 TIMES DAILY
Status: DISCONTINUED | OUTPATIENT
Start: 2021-01-04 | End: 2021-01-04

## 2021-01-04 RX ORDER — LIDOCAINE 40 MG/G
CREAM TOPICAL
Status: DISCONTINUED | OUTPATIENT
Start: 2021-01-04 | End: 2021-01-04

## 2021-01-04 RX ORDER — TORSEMIDE 20 MG/1
20 TABLET ORAL DAILY
Status: DISCONTINUED | OUTPATIENT
Start: 2021-01-05 | End: 2021-01-05 | Stop reason: HOSPADM

## 2021-01-04 RX ORDER — METOPROLOL SUCCINATE 50 MG/1
50 TABLET, EXTENDED RELEASE ORAL DAILY
Status: DISCONTINUED | OUTPATIENT
Start: 2021-01-04 | End: 2021-01-05

## 2021-01-04 RX ORDER — METOPROLOL SUCCINATE 50 MG/1
50 TABLET, EXTENDED RELEASE ORAL DAILY
Status: DISCONTINUED | OUTPATIENT
Start: 2021-01-05 | End: 2021-01-04

## 2021-01-04 RX ADMIN — MACITENTAN 10 MG: 10 TABLET, FILM COATED ORAL at 10:44

## 2021-01-04 RX ADMIN — METOPROLOL SUCCINATE 50 MG: 50 TABLET, EXTENDED RELEASE ORAL at 17:34

## 2021-01-04 RX ADMIN — ASPIRIN 81 MG: 81 TABLET, DELAYED RELEASE ORAL at 09:15

## 2021-01-04 RX ADMIN — SILDENAFIL 20 MG: 20 TABLET, FILM COATED ORAL at 09:15

## 2021-01-04 RX ADMIN — TICAGRELOR 90 MG: 90 TABLET ORAL at 09:15

## 2021-01-04 RX ADMIN — ATORVASTATIN CALCIUM 40 MG: 40 TABLET, FILM COATED ORAL at 21:00

## 2021-01-04 RX ADMIN — FUROSEMIDE 80 MG: 10 INJECTION, SOLUTION INTRAVENOUS at 16:22

## 2021-01-04 RX ADMIN — NIFEDIPINE 30 MG: 30 TABLET, FILM COATED, EXTENDED RELEASE ORAL at 09:15

## 2021-01-04 RX ADMIN — SILDENAFIL 20 MG: 20 TABLET, FILM COATED ORAL at 21:52

## 2021-01-04 RX ADMIN — TICAGRELOR 90 MG: 90 TABLET ORAL at 21:00

## 2021-01-04 RX ADMIN — POTASSIUM CHLORIDE 20 MEQ: 1500 TABLET, EXTENDED RELEASE ORAL at 07:15

## 2021-01-04 RX ADMIN — INSULIN ASPART 2 UNITS: 100 INJECTION, SOLUTION INTRAVENOUS; SUBCUTANEOUS at 17:19

## 2021-01-04 RX ADMIN — SILDENAFIL 20 MG: 20 TABLET, FILM COATED ORAL at 16:23

## 2021-01-04 RX ADMIN — NIFEDIPINE 30 MG: 30 TABLET, FILM COATED, EXTENDED RELEASE ORAL at 17:34

## 2021-01-04 RX ADMIN — FUROSEMIDE 80 MG: 10 INJECTION, SOLUTION INTRAVENOUS at 05:26

## 2021-01-04 RX ADMIN — SILDENAFIL 5 MG: 20 TABLET, FILM COATED ORAL at 21:52

## 2021-01-04 RX ADMIN — INSULIN ASPART 2 UNITS: 100 INJECTION, SOLUTION INTRAVENOUS; SUBCUTANEOUS at 09:16

## 2021-01-04 ASSESSMENT — ACTIVITIES OF DAILY LIVING (ADL)
ADLS_ACUITY_SCORE: 10

## 2021-01-04 ASSESSMENT — MIFFLIN-ST. JEOR: SCORE: 2249.63

## 2021-01-04 NOTE — PRE-PROCEDURE
GENERAL PRE-PROCEDURE:   Procedure:  Right heart catheterization  Date/Time:  1/4/2021 3:40 PM    Written consent obtained?: Yes    Risks and benefits: Risks, benefits and alternatives were discussed    Consent given by:  Patient  Patient states understanding of procedure being performed: Yes    Patient's understanding of procedure matches consent: Yes    Procedure consent matches procedure scheduled: Yes    Expected level of sedation:  Moderate  Appropriately NPO:  Yes  ASA Class:  Class 3- Severe systemic disease, definite functional limitations  Mallampati  :  Grade 2- soft palate, base of uvula, tonsillar pillars, and portion of posterior pharyngeal wall visible  Lungs:  Lungs clear with good breath sounds bilaterally  Heart:  Normal heart sounds and rate  History & Physical reviewed:  History and physical reviewed and no updates needed  Statement of review:  I have reviewed the lab findings, diagnostic data, medications, and the plan for sedation

## 2021-01-04 NOTE — PROGRESS NOTES
[facility]  Cardiology Progress Note  Date of Service: 01/04/2021  Primary Cardiologist: Dr. Samuels    Assessment & Plan    Jeremiah Isaac is a 60 year old male with past medical history significant for CAD, pulmonary htn, sleep disordered breathing with bipap, CKD, obesity, hx of meth, admitted on 1/3/2021 with worsening SAINI.    Assessment:  1.  Acute on chronic heart failure with preserved EF comorbid with precapillary pulmonary HTN   - started on lasix 80 mg IV, has gotten 5 doses IV prior to RHC   - admit wt 324, current wt 314- likely euvolemic based on rhc   - RHC today Mean RA 6 mmHg , PA 72/25 (41 mmHg), wedge 12 mmHg, CO 9.5 l/min- euvolemic with ongoing PH values similar to cath 8/20 will increase vasodilators gently   -Discussed that it would be difficult to determine if his shortness of breath is due to volume versus simply his pulmonary hypertension, close tracking of weights and low-sodium diet will be important.    2.  CAD with hx of eden to mid LAD, hx of instent restenosis of prox LAD with EDEN placed 8/20.    - on asa and brilanta, states sob is different than prior to stenting    3.  HTN, remains elevated, restarting meds here    4.  HLD, on atorvastatin 40 pta, will restart here       Plan:   1. Stop iv lasix  2. Start torsemide 40 mg daily tomorrow, bmp in am  3. Restart toprol at 50 mg tonight  4. Restart lipitor 40  5. Increase sildenafil to 25 mg tid  6. Will defer to outpt PH clinic for further adjustments of PH meds or addition of prostacyclin or prostacyclin analog  7. Will need f/u with Laurie Peters PA-C in PH clinic next week with bmp prior  8. Likely d.c tomorrow    Shana Arreaga PA-C  [facility]  Pager: [phone]     Interval History   Feels better since admission still has some peripheral edema.  Denies chest pain.  Has not really been able to get up and test to this breathing so far he is no longer short of breath walking back and forth to the bathroom  which he was on admission.  He states he has been very compliant with his sodium and thinks he is less than 2000 mg a day.  He also weighs himself every day but it is difficult for him to do that at the same time.  He is motivated to make positive changes.    Physical Exam   Temp: 97.3  F (36.3  C) Temp src: Axillary BP: 136/81 Pulse: 86   Resp: 18 SpO2: 96 % O2 Device: None (Room air)    Vitals:    01/03/21 0300 01/04/21 0530   Weight: 147.2 kg (324 lb 8 oz) 142.8 kg (314 lb 14.4 oz)   Well-developed well-nourished gentleman in no acute distress.  He is normocephalic and atraumatic.  Heart is regular mildly tachycardic I do not appreciate murmur rub or gallop.  Lungs are clear without wheezes rales or rhonchi.  Patient has 2+ edema to his mid to upper shin.  He has lymphedema changes noted as well.      Medications       aspirin  81 mg Oral Daily     insulin aspart  1-7 Units Subcutaneous TID AC     insulin aspart  1-5 Units Subcutaneous At Bedtime     macitentan  10 mg Oral Daily     metoprolol succinate ER  50 mg Oral Daily     NIFEdipine ER OSMOTIC  30 mg Oral Daily     sildenafil  25 mg Oral TID     sodium chloride (PF)  3 mL Intracatheter Q8H     ticagrelor  90 mg Oral BID     [START ON 1/5/2021] torsemide  20 mg Oral Daily       Data   Most Recent 3 CBC's:  Recent Labs   Lab Test 01/03/21  0413 01/02/21  2043 12/08/20  1418   WBC 7.8 7.8 7.4   HGB 10.6* 10.6* 10.6*   MCV 83 84 85    295 326     Most Recent 3 BMP's:  Recent Labs   Lab Test 01/04/21  1421 01/04/21  0542 01/03/21  0413 01/02/21  1953     --  142 140   POTASSIUM 3.8 3.9 3.6 4.2   CHLORIDE 107  --  110* 111*   CO2 27  --  28 24   BUN 28  --  27 27   CR 2.01*  --  1.79* 1.72*   ANIONGAP 7  --  4 5   ANNITA 9.3  --  9.0 8.9   *  --  133* 121*     Most Recent 3 Troponin's:  Recent Labs   Lab Test 01/03/21  0656 01/03/21  0413 01/02/21 1953   TROPI <0.015 <0.015 <0.015     Most Recent 3 BNP's:  Recent Labs   Lab Test 01/02/21 1953  12/30/20  1451 12/08/20  1418 09/11/20  1539 08/19/20  2350 08/19/20  2350 08/11/20  1230   NTBNPI 2,514*  --   --   --   --  338 250   NTBNP  --  2,083* 1,832* 415*   < >  --   --     < > = values in this interval not displayed.     Last 24 hours labs reviewed

## 2021-01-04 NOTE — PROGRESS NOTES
[facility]    Medicine Progress Note - Hospitalist Service       Date of Admission:  1/3/2021  Date of Service: 01/04/2021    Assessment & Plan          Jeremiah Isaac is a very pleasant 60 year old gentleman with extensive past medical history that is most notable for severe pulmonary hypertension, CAD, hypertension, GIRISH, CKD 4, and Type 2 Diabetes mellitus, among others; who presents with progressive exertional dyspnea and anasarca and is found to have possible acute right sided systolic CHF exacerbation.     PLAN     Possible acute right sided diastolic CHF exacerbation: vs worsening pulmonary hypertension. Note is made of known CAD status post prior stent to the LAD reportedly in 8/2019. He has since developed chronic exertional dyspnea and was seen by Dr. Riojas of Cardiology and underwent repeat cardiac catheterization 1/2020 which reportedly showed normal right sided filling pressures, normal to mildly elevated wedge pressure and LVEDP, low borderline cardiac output, and severe pulmonary hypertension with modest response to vasodilators. He has been taking Sildenafil, Macitentan, and Nifedipine. More recently he underwent repeat catheterization in 8/11/2020 and again 8/20/2020 and underwent ARINA placement both times for severe in-stent LAD re-stenosis. TTE 8/2020 showed preserved LVEF. Now he presents for progressive exertional dyspnea and leg edema. His BNP level is elevated at 2514, up from 415 on 9/2020. We suspect acute right sided CHF exacerbation vs worsening pulmonary hypertension; vs progressive obesity hypoventilation syndrome in the setting of severe sleep apnea.   Plan:  - RHC today  - Cardiology following  - Continue to diurese with 80 mg IV Lasix TID.   - Resume ASA, Brilinta, Lipitor, as well as Clonidine, Toprol,  Sildenafil, Macitentan, and Nifedipine  - Strict I and O's, daily weights, and low sodium diet ordered.     Type 2 Diabetes mellitus: Possible  uncontrolled; Most recent A1c 9.3 in 9/2020 . On Lispro, Glipizide, and Victoza as an outpatient.    -- ISS insulin while hospitalized. Hold oral anti-diabetic medications. Resume Lispro when verified.     CKD 4: Cr today is 1.72 and was 1.95 on 12/30/2020. Monitor labs at least daily as we diurese.     GIRISH: vs Central Sleep Apnea, possibly also chronic narcolepsy. This is severe per his report and he uses BIPAP with ASV at night time; continued.     Chronic iron deficiency anemia: HGB 10.6 and was 10.7 on 9/2020. Monitor closely as we continue DAPT.       Diet: Combination Diet 8946-8709 Calories: Moderate Consistent CHO (4-6 CHO units/meal); 2 gm NA Diet  NPO for Medical/Clinical Reasons Except for: Meds    DVT Prophylaxis: DAPT  Flanagan Catheter: not present  Code Status: Full Code           Disposition Plan   Expected discharge: 2 - 3 days, recommended to prior living arrangement once Cardiology work up completed.  Entered: Jus Tenorio MD 01/04/2021, 1:13 PM       The patient's care was discussed with the Bedside Nurse and Patient.    Jus Tenorio MD  Hospitalist Service  [facility]  Contact information available via [facility] Paging/Directory    ______________________________________________________________________    Interval History     Comfortable on CPAP  No CP/SOB  No fevers or chills  No nausea/vomiting or abdominal pain  No new complaints today    Data reviewed today: I reviewed all medications, new labs and imaging results over the last 24 hours. I personally reviewed no images or EKG's today.    Physical Exam   Vital Signs: Temp: 97.3  F (36.3  C) Temp src: Axillary BP: 127/74 Pulse: 88   Resp: 18 SpO2: 96 % O2 Device: None (Room air)    Weight: 314 lbs 14.4 oz    Constitutional: no apparent distress  Respiratory: lungs diminished at both bases to auscultation bilaterally   Cardiovascular: regular S1 S2   GI: abdomen soft non tender non distended bowel sounds  positive  Musculoskeletal: moderate bilateral edema with dusky erythema  Neurologic: extra-ocular muscles intact; moves all four extremities  Psychiatric: appropriate affect, insight and judgment    Data   Recent Labs   Lab 01/04/21  0542 01/03/21  0656 01/03/21  0413 01/02/21 2043 01/02/21  1953 12/30/20  1451   WBC  --   --  7.8 7.8  --   --    HGB  --   --  10.6* 10.6*  --   --    MCV  --   --  83 84  --   --    PLT  --   --  301 295  --   --    NA  --   --  142  --  140 136   POTASSIUM 3.9  --  3.6  --  4.2 4.9   CHLORIDE  --   --  110*  --  111* 107   CO2  --   --  28  --  24 26   BUN  --   --  27  --  27 39*   CR  --   --  1.79*  --  1.72* 1.95*   ANIONGAP  --   --  4  --  5 3   ANNITA  --   --  9.0  --  8.9 9.0   GLC  --   --  133*  --  121* 435*   ALBUMIN  --   --  2.9*  --   --   --    PROTTOTAL  --   --  6.9  --   --   --    BILITOTAL  --   --  0.6  --   --   --    ALKPHOS  --   --  145  --   --   --    ALT  --   --  16  --   --   --    AST  --   --  14  --   --   --    TROPI  --  <0.015 <0.015  --  <0.015  --      No results found for this or any previous visit (from the past 24 hour(s)).  Medications     - MEDICATION INSTRUCTIONS -         aspirin  81 mg Oral Daily     furosemide  80 mg Intravenous Q8H     insulin aspart  1-7 Units Subcutaneous TID AC     insulin aspart  1-5 Units Subcutaneous At Bedtime     macitentan  10 mg Oral Daily     NIFEdipine ER OSMOTIC  30 mg Oral Daily     sildenafil  20 mg Oral TID     sodium chloride (PF)  3 mL Intracatheter Q8H     ticagrelor  90 mg Oral BID

## 2021-01-04 NOTE — PLAN OF CARE
Pt stable over night, VSS, no c/o Chest pain.  Pt does c/o SAINI when up to the BR.  He has had good urinary output r/t IV Lasix.  The Pt in NPO for possible right Heart Cath today.  He is in a SR with occasional PVC's.  No new issues noted.

## 2021-01-04 NOTE — PLAN OF CARE
OT: orders received for CHF education, chart reviewed and checked in with nursing. Nursing states pt is up ind in his room, has had stents in the past and is very familiar with CHF, no further education needed at this point. Per discussion with nursing, will complete orders for OT

## 2021-01-05 VITALS
RESPIRATION RATE: 18 BRPM | HEART RATE: 86 BPM | WEIGHT: 310.4 LBS | DIASTOLIC BLOOD PRESSURE: 82 MMHG | OXYGEN SATURATION: 91 % | SYSTOLIC BLOOD PRESSURE: 150 MMHG | BODY MASS INDEX: 44.44 KG/M2 | HEIGHT: 70 IN | TEMPERATURE: 98.4 F

## 2021-01-05 LAB
ANION GAP SERPL CALCULATED.3IONS-SCNC: 6 MMOL/L (ref 3–14)
BUN SERPL-MCNC: 30 MG/DL (ref 7–30)
CALCIUM SERPL-MCNC: 9.2 MG/DL (ref 8.5–10.1)
CHLORIDE SERPL-SCNC: 105 MMOL/L (ref 94–109)
CO2 SERPL-SCNC: 28 MMOL/L (ref 20–32)
CREAT SERPL-MCNC: 2.06 MG/DL (ref 0.66–1.25)
GFR SERPL CREATININE-BSD FRML MDRD: 34 ML/MIN/{1.73_M2}
GLUCOSE SERPL-MCNC: 257 MG/DL (ref 70–99)
POTASSIUM SERPL-SCNC: 3.8 MMOL/L (ref 3.4–5.3)
SODIUM SERPL-SCNC: 139 MMOL/L (ref 133–144)

## 2021-01-05 PROCEDURE — 99239 HOSP IP/OBS DSCHRG MGMT >30: CPT | Performed by: STUDENT IN AN ORGANIZED HEALTH CARE EDUCATION/TRAINING PROGRAM

## 2021-01-05 PROCEDURE — 250N000013 HC RX MED GY IP 250 OP 250 PS 637: Performed by: PHYSICIAN ASSISTANT

## 2021-01-05 PROCEDURE — 36415 COLL VENOUS BLD VENIPUNCTURE: CPT | Performed by: STUDENT IN AN ORGANIZED HEALTH CARE EDUCATION/TRAINING PROGRAM

## 2021-01-05 PROCEDURE — 80048 BASIC METABOLIC PNL TOTAL CA: CPT | Performed by: STUDENT IN AN ORGANIZED HEALTH CARE EDUCATION/TRAINING PROGRAM

## 2021-01-05 PROCEDURE — 99232 SBSQ HOSP IP/OBS MODERATE 35: CPT | Performed by: INTERNAL MEDICINE

## 2021-01-05 PROCEDURE — 250N000013 HC RX MED GY IP 250 OP 250 PS 637: Performed by: INTERNAL MEDICINE

## 2021-01-05 PROCEDURE — 250N000013 HC RX MED GY IP 250 OP 250 PS 637: Performed by: HOSPITALIST

## 2021-01-05 RX ORDER — TORSEMIDE 20 MG/1
TABLET ORAL
Qty: 120 TABLET | Refills: 0 | Status: SHIPPED | OUTPATIENT
Start: 2021-01-05 | End: 2021-03-25

## 2021-01-05 RX ORDER — METOPROLOL SUCCINATE 100 MG/1
100 TABLET, EXTENDED RELEASE ORAL DAILY
Status: DISCONTINUED | OUTPATIENT
Start: 2021-01-06 | End: 2021-01-05 | Stop reason: HOSPADM

## 2021-01-05 RX ORDER — SILDENAFIL CITRATE 20 MG/1
25 TABLET ORAL 3 TIMES DAILY
Qty: 113 TABLET | Refills: 0 | Status: SHIPPED | OUTPATIENT
Start: 2021-01-05 | End: 2021-02-09

## 2021-01-05 RX ADMIN — ASPIRIN 81 MG: 81 TABLET, DELAYED RELEASE ORAL at 09:02

## 2021-01-05 RX ADMIN — TICAGRELOR 90 MG: 90 TABLET ORAL at 09:00

## 2021-01-05 RX ADMIN — TORSEMIDE 20 MG: 20 TABLET ORAL at 09:00

## 2021-01-05 RX ADMIN — SILDENAFIL 20 MG: 20 TABLET, FILM COATED ORAL at 09:01

## 2021-01-05 RX ADMIN — NIFEDIPINE 30 MG: 30 TABLET, FILM COATED, EXTENDED RELEASE ORAL at 09:02

## 2021-01-05 RX ADMIN — SILDENAFIL 5 MG: 20 TABLET, FILM COATED ORAL at 09:02

## 2021-01-05 RX ADMIN — MACITENTAN 10 MG: 10 TABLET, FILM COATED ORAL at 09:02

## 2021-01-05 RX ADMIN — INSULIN ASPART 3 UNITS: 100 INJECTION, SOLUTION INTRAVENOUS; SUBCUTANEOUS at 09:00

## 2021-01-05 RX ADMIN — METOPROLOL SUCCINATE 50 MG: 50 TABLET, EXTENDED RELEASE ORAL at 09:02

## 2021-01-05 ASSESSMENT — ACTIVITIES OF DAILY LIVING (ADL)
DEPENDENT_IADLS:: INDEPENDENT
ADLS_ACUITY_SCORE: 10

## 2021-01-05 ASSESSMENT — MIFFLIN-ST. JEOR: SCORE: 2229.22

## 2021-01-05 NOTE — DISCHARGE SUMMARY
[facility]  Hospitalist Discharge Summary      Date of Admission:  1/3/2021  Date of Discharge:  1/5/2021  Discharging Provider: Jus Tenorio MD      Discharge Diagnoses     acute right sided diastolic CHF exacerbation:    Follow-ups Needed After Discharge   Follow-up Appointments     Follow-up and recommended labs and tests       Follow up with primary care provider, Radhika Ashton, within 7 days   for hospital follow- up.  *PATIENT TO SCHEDULE HIMSELF.    The following labs/tests are recommended: BMP and BNP  **PATIENT REQUESTS   TO HAVE DRAWN AT [facility].  **SEE BELOW FOR APPOINTMENT DETAILS ON   Saturday, JAN. 9TH.    **Follow Up with Cardiology at [facility] clinic early next week with either   Maninder Peters or Dr. Riojas.   *PLEASE CALL TO SCHEDULE AFTER YOU SPEAK   WITH CRYSTAL WITH FINANCE AT THE CLINIC.    **[phone] AND CHOSE OPTION 1           Unresulted Labs Ordered in the Past 30 Days of this Admission     No orders found from 12/4/2020 to 1/4/2021.        Discharge Disposition     Discharged to Home  Condition at discharge: Stable    Hospital Course            Jeremiah Isaac is a very pleasant 60 year old gentleman with extensive past medical history that is most notable for severe pulmonary hypertension, CAD, hypertension, GIRISH, CKD 4, and Type 2 Diabetes mellitus, among others; who presents with progressive exertional dyspnea and anasarca and is found to have possible acute right sided systolic CHF exacerbation.     PLAN     Possible acute right sided diastolic CHF exacerbation: vs worsening pulmonary hypertension. Note is made of known CAD status post prior stent to the LAD reportedly in 8/2019. He has since developed chronic exertional dyspnea and was seen by Dr. Riojas of Cardiology and underwent repeat cardiac catheterization 1/2020 which reportedly showed normal right sided filling pressures, normal to mildly elevated wedge pressure and LVEDP, low borderline  cardiac output, and severe pulmonary hypertension with modest response to vasodilators. He has been taking Sildenafil, Macitentan, and Nifedipine. More recently he underwent repeat catheterization in 8/11/2020 and again 8/20/2020 and underwent ARINA placement both times for severe in-stent LAD re-stenosis. TTE 8/2020 showed preserved LVEF. Now he presents for progressive exertional dyspnea and leg edema. His BNP level is elevated at 2514, up from 415 on 9/2020. We suspect acute right sided CHF exacerbation vs worsening pulmonary hypertension; vs progressive obesity hypoventilation syndrome in the setting of severe sleep apnea.   Plan:  - Discharge on torsemide 40 mg alternating with 20 mg every other day.   - Toprol to 100 mg daily.  - Resume ASA, Brilinta, Lipitor, as well as , Toprol,  Sildenafil at increased dose, Macitentan, and Nifedipine  -  low sodium diet ordered.     Type 2 Diabetes mellitus: Possible uncontrolled; Most recent A1c 9.3 in 9/2020 . On Lispro, Glipizide, and Victoza as an outpatient.    -- Resume PTA regimen     CKD 4: Cr today is 1.72 and was 1.95 on 12/30/2020. Monitor labs at least daily as we diurese.     GIRISH: vs Central Sleep Apnea, possibly also chronic narcolepsy. This is severe per his report and he uses BIPAP with ASV at night time; continued.     Chronic iron deficiency anemia: HGB 10.6 and was 10.7 on 9/2020. Monitor closely as we continue DAPT.      Consultations This Hospital Stay   CORE CLINIC EVALUATION IP CONSULT  OCCUPATIONAL THERAPY ADULT IP CONSULT  NUTRITION SERVICES ADULT IP CONSULT  CARE MANAGEMENT / SOCIAL WORK IP CONSULT  CARDIOLOGY IP CONSULT  PHARMACY IP CONSULT    Code Status   Full Code    Time Spent on this Encounter   I, Jus Tenorio MD, personally saw the patient today and spent greater than 30 minutes discharging this patient.       Jus Tenorio MD  [facility] HEART [facility]  [address]  [facility] [address]  Phone:  [phone]  ______________________________________________________________________    Physical Exam   Vital Signs: Temp: 98.4  F (36.9  C) Temp src: Oral BP: (!) 150/82 Pulse: 86   Resp: 18 SpO2: 91 % O2 Device: None (Room air)    Weight: 310 lbs 6.4 oz       Primary Care Physician   Radhika Ashton    Discharge Orders      Basic metabolic panel     N terminal pro BNP outpatient     Follow-Up with cardiac sub-specialty clinic      Reason for your hospital stay    You had shortness of breath from fluid overload and was evaluated by our Cardiology team and had medication adjustments in addition to undergoing an angiogram to assess the pressures in your heart.     Follow-up and recommended labs and tests     Follow up with primary care provider, Radhika Ashton, within 7 days for hospital follow- up.  *PATIENT TO SCHEDULE HIMSELF.    The following labs/tests are recommended: BMP and BNP  **PATIENT REQUESTS TO HAVE DRAWN AT [facility].  **SEE BELOW FOR APPOINTMENT DETAILS ON Saturday, JAN. 9TH.    **Follow Up with Cardiology at [facility] clinic early next week with either Maninder Peters or Dr. Riojas.   *PLEASE CALL TO SCHEDULE AFTER YOU SPEAK WITH CRYSTAL WITH FINANCE AT THE CLINIC.    **[phone] AND CHOSE OPTION 1     Activity    Your activity upon discharge: activity as tolerated     Diet    Follow this diet upon discharge: Orders Placed This Encounter      Combination Diet 8250-5430 Calories: Moderate Consistent CHO (4-6 CHO units/meal); 2 gm NA Diet       Significant Results and Procedures   Most Recent 3 CBC's:  Recent Labs   Lab Test 01/03/21  0413 01/02/21  2043 12/08/20  1418   WBC 7.8 7.8 7.4   HGB 10.6* 10.6* 10.6*   MCV 83 84 85    295 326     Most Recent 3 BMP's:  Recent Labs   Lab Test 01/05/21  0536 01/04/21  1421 01/04/21  0542 01/03/21  0413    141  --  142   POTASSIUM 3.8 3.8 3.9 3.6   CHLORIDE 105 107  --  110*   CO2 28 27  --  28   BUN 30 28  --  27   CR 2.06* 2.01*  --   1.79*   ANIONGAP 6 7  --  4   ANNITA 9.2 9.3  --  9.0   * 233*  --  133*     Most Recent 2 LFT's:  Recent Labs   Lab Test 21  0413 20  1418   AST 14 12   ALT 16 14   ALKPHOS 145 173*   BILITOTAL 0.6 0.6     Most Recent 3 INR's:  Recent Labs   Lab Test 20  0550 20  0800 20  0457   INR 1.03 0.92 0.99     Most Recent 3 Troponin's:  Recent Labs   Lab Test 21  0656 21  0413 21   TROPI <0.015 <0.015 <0.015   ,   Results for orders placed or performed during the hospital encounter of 21   Echocardiogram Complete    Narrative    [id number]  [id number]  [id number]  [id number]^KENROY^BELIA^[facility]  Echocardiography Laboratory  [address] [place] [address]        Name: NADIA MCELROY  MRN: [id number]  : 1960  Study Date: 2021 11:06 AM  Age: 60 yrs  Gender: Male  Patient Location: [facility]  Reason For Study: Heart Failure  Ordering Physician: BELIA JASMINE  Referring Physician: ALLY BOWIE  Performed By: Any Daniels     BSA: 2.6 m2  Height: 70 in  Weight: 324 lb  HR: 74  BP: 150/83 mmHg  _____________________________________________________________________________  __        Procedure  Complete Portable Echo Adult. Optison (NDC #[id number]) given intravenously.  _____________________________________________________________________________  __        Interpretation Summary     The visual ejection fraction is estimated at 60-65%. No regional wall motion  abnormalities noted.  The right ventricle is moderately dilated. Mild-moderately decreased right  ventricular systolic function.  Right ventricular systolic pressure could not be approximated due to  inadequate tricuspid regurgitation.  Dilation of the inferior vena cava is present with abnormal respiratory  variation in diameter.  There is no pericardial effusion.     RV function appears mildly worse compared to prior study (was mildly reduced  on  that study as well visually).  _____________________________________________________________________________  __        Left Ventricle  The left ventricle is normal in size. There is normal left ventricular wall  thickness. Left ventricular systolic function is normal. The visual ejection  fraction is estimated at 60-65%. Diastolic Doppler findings (E/E' ratio and/or  other parameters) suggest left ventricular filling pressures are increased. No  regional wall motion abnormalities noted.     Right Ventricle  The right ventricle is moderately dilated. The right ventricular systolic  function is mild to moderately reduced.     Atria  There is mild-moderate biatrial enlargement.     Mitral Valve  The mitral valve is normal in structure and function. There is mild mitral  annular calcification. There is trace to mild mitral regurgitation.        Tricuspid Valve  There is trace to mild tricuspid regurgitation. Right ventricular systolic  pressure could not be approximated due to inadequate tricuspid regurgitation.     Aortic Valve  The aortic valve is not well visualized. No aortic regurgitation is present.  No hemodynamically significant valvular aortic stenosis.     Pulmonic Valve  The pulmonic valve is not well visualized.     Vessels  The aortic root is normal size. The ascending aorta is Borderline dilated.  Dilation of the inferior vena cava is present with abnormal respiratory  variation in diameter.     Pericardium  There is no pericardial effusion.     _____________________________________________________________________________  __  MMode/2D Measurements & Calculations  IVSd: 1.2 cm  LVIDd: 5.2 cm  LVIDs: 3.0 cm  LVPWd: 1.2 cm  FS: 42.3 %  LV mass(C)d: 245.6 grams  LV mass(C)dI: 95.8 grams/m2     Ao root diam: 3.7 cm  LA dimension: 4.4 cm  asc Aorta Diam: 3.9 cm  LA/Ao: 1.2  LA Volume (BP): 101.0 ml  LA Volume Index (BP): 39.5 ml/m2  RWT: 0.46        Doppler Measurements & Calculations  MV E max audrey: 110.0  cm/sec  MV A max audrey: 80.6 cm/sec  MV E/A: 1.4  MV dec slope: 482.7 cm/sec2  MV dec time: 0.23 sec     PA acc time: 0.15 sec  E/E' av.5  Lateral E/e': 12.9  Medial E/e': 20.1           _____________________________________________________________________________  __           Report approved by: Joseph Turner 2021 01:47 PM      Cardiac Catheterization    Narrative    1.  Moderate-severe precapillary pulmonary hypertension  2.  Normal right and left-sided filling pressures  3.  Normal cardiac index by Brittanie.       Discharge Medications   Discharge Medication List as of 2021 12:27 PM      CONTINUE these medications which have CHANGED    Details   sildenafil (REVATIO) 20 MG tablet Take 1.25 tablets (25 mg) by mouth 3 times daily, Disp-113 tablet, R-0, E-Prescribe      torsemide (DEMADEX) 20 MG tablet Take torsemide 40 mg twice daily on even days and 20 mg Once on Odd days, Disp-120 tablet, R-0, E-Prescribe         CONTINUE these medications which have NOT CHANGED    Details   aspirin (ASA) 81 MG EC tablet Take 1 tablet (81 mg) by mouth daily Start tomorrow morning., Disp-90 tablet,R-3, Local Print      atorvastatin (LIPITOR) 40 MG tablet Take 40 mg by mouth every morning , Historical      glipiZIDE (GLUCOTROL) 5 MG tablet Take 1 tablet (5 mg) by mouth every morning (before breakfast), Disp-90 tablet, R-3, E-Prescribe      insulin lispro (HUMALOG VIAL) 100 UNIT/ML vial Inject 100 Units Subcutaneous daily, Historical      liraglutide (VICTOZA) 18 MG/3ML solution Inject 0.6 mg Subcutaneous daily, Disp-3 mL, R-3, E-Prescribe      macitentan (OPSUMIT) 10 MG tablet Take 10 mg by mouth every morning , HistoricalPA in process, Rx to be sent upon approval      metoprolol succinate ER (TOPROL-XL) 100 MG 24 hr tablet Take 100 mg by mouth every morning , Historical      NIFEdipine ER (ADALAT CC) 30 MG 24 hr tablet Take 1 tablet (30 mg) by mouth daily, Disp-90 tablet,R-3, E-Prescribe      potassium chloride ER  (KLOR-CON M) 20 MEQ CR tablet Take 40 mEq on even days and 20 mEq on Odd days, Disp-140 tablet, R-3, E-Prescribe      ticagrelor (BRILINTA) 90 MG tablet Take 1 tablet (90 mg) by mouth 2 times daily, Disp-180 tablet, R-3, E-Prescribe      Alcohol Swabs PADS Use to swab the area of the injection or abram as directed   Per insurance coverage, Disp-100 each, R-3, Local Print      insulin pen needle (32G X 4 MM) 32G X 4 MM miscellaneous Use as directed by provider  Per insurance coverageDisp-100 each, R-3Local Print      Sharps Container MISC Use as directed to dispose of needles, lancets and other sharps  Per Insurance coverage, Disp-1 each, R-3, Local Print         STOP taking these medications       chlorthalidone (HYGROTON) 25 MG tablet Comments:   Reason for Stopping:         cloNIDine (CATAPRES) 0.3 MG tablet Comments:   Reason for Stopping:             Allergies   No Known Allergies

## 2021-01-05 NOTE — CONSULTS
[facility]-CORE Clinic    Received CORE Clinic consult on admission from hospitalist team.    Reviewed inpatient cardiology notes from 1/4 and 1/5 and see that recommendation has been made for Mr. Isaac to follow-up with his typical PH/cardiology team at [facility].     Will defer CORE clinic follow-up as above.    Please call with questions.    Zainab Lindquist RN BSN  CORE Clinic Care Coordinator [facility]  [phone]

## 2021-01-05 NOTE — CONSULTS
Care Management Initial Consult    General Information  Assessment completed with: Patient,    Type of CM/SW Visit: Initial Assessment    Primary Care Provider verified and updated as needed: Yes   Readmission within the last 30 days: no previous admission in last 30 days         Advance Care Planning: Advance Care Planning Reviewed: no concerns identified          Communication Assessment  Patient's communication style: spoken language (English or Bilingual)    Hearing Difficulty or Deaf: no   Wear Glasses or Blind: no    Cognitive  Cognitive/Neuro/Behavioral: WDL                      Living Environment:   People in home: child(chavo), adult     Current living Arrangements: house      Able to return to prior arrangements: yes       Family/Social Support:  Care provided by: self  Provides care for: no one  Marital Status: Single  Children, None(friends)          Description of Support System: Supportive         Current Resources:   Skilled Home Care Services:    Community Resources: None  Equipment currently used at home: none  Supplies currently used at home: Diabetic Supplies    Employment/Financial:  Employment Status: disabled        Financial Concerns: No concerns identified   Referral to Financial Counselor: No       Lifestyle & Psychosocial Needs:        Socioeconomic History     Marital status: Single     Spouse name: Not on file     Number of children: Not on file     Years of education: Not on file     Highest education level: Not on file     Tobacco Use     Smoking status: Never Smoker     Smokeless tobacco: Never Used   Substance and Sexual Activity     Alcohol use: Yes     Frequency: Monthly or less     Comment: once or twice a year      Drug use: Not Currently       Functional Status:  Prior to admission patient needed assistance:   Dependent ADLs:: Independent  Dependent IADLs:: Independent       Mental Health Status:  Mental Health Status: No Current Concerns       Chemical Dependency Status:  Chemical  Dependency Status: No Current Concerns             Values/Beliefs:  Spiritual, Cultural Beliefs, Jainism Practices, Values that affect care: no         Care Management Discharge Note    Discharge Date: 01/06/21      Discharge Disposition:  [place]    Discharge Services:  none    Discharge DME:  none    Discharge Transportation:  family    Private pay costs discussed: Not applicable    PAS Confirmation Code:    Patient/family educated on Medicare website which has current facility and service quality ratings:      Education Provided on the Discharge Plan: yes   Persons Notified of Discharge Plans: patient  Patient/Family in Agreement with the Plan:  yes    Handoff Referral Completed: No    Additional Information:  Patient independent at baseline. Follow up labs scheduled and patient to schedule follow up with cardiology once he reaches financial office at clinic to resolve outstanding bill.  Patient will schedule follow up with PCP.    Dianna Tucker RN  Care Coordinator  [facility]  [phone]

## 2021-01-05 NOTE — CONSULTS
REASON FOR ASSESSMENT:  CHF Consult for 2 gm NA Diet Education    NUTRITION HISTORY:    Information obtained from:  Patient- was resting with CPAP on, unable to obtain full nutrition history.     Living situation:   Independent     Grocery shopping:  Shops for himself     Meal preparation:  Enjoys cooking. Does not add salt too his food, nor does he use high sodium processed/convenience foods    Previous diet instructions:  Pt is familiar with a low sodium diet.     CURRENT DIET:  Moderate CHO; 2g Na     NUTRITION DIAGNOSIS:  No nutrition diagnosis identified at this time    INTERVENTIONS:    Nutrition Prescription:  2g Na     Implementation:    Assessed learning needs, learning preferences, and willingness to learn    Nutrition Education (Content):  a) Provided handouts:  1) Tips for Low Na Diet  2) Label Reading  3) Low Na Foods/Drinks  b) Discussed rational for limiting Na for CHF and stressed importance of following 2 gm Na guidelines    Nutrition Education (Application):  a) Discussed current eating habits and recommended alternative food choices    Anticipated good compliance    Diet Education - refer to Education Flowsheet    Goals:    Patient verbalizes understanding of diet     All of the above goals met during the education session    Follow Up:    Provided RD contact information for future questions.    Recommend Out-Patient Nutrition Referral, if further diet instructions are needed.    Bonnie Vilchis RD, LD  [facility] [place], [place]   Pager: [phone]  Weekend Pager: [phone]

## 2021-01-05 NOTE — PLAN OF CARE
Neuro- A&Ox4  Most Recent Vitals- Temp: 98.5  F (36.9  C) Temp src: Oral BP: 101/65 Pulse: 81   Resp: 18 SpO2: 95 % O2 Device: BiPAP/CPAP    Tele/Cardiac- SR with PAC's, denies CP  Resp- Stable on RA, LS diminished, gets SAINI but improving per patient  Activity- Independent  Pain- denies  Drips- none, SL  Drains/Tubes- PIV  Skin- BLE +2  GI/- WDL  Aggression Color- Green  COVID status- Negative  Plan- Likely discharge home  Misc- NA    Maria Esther Lara, RN

## 2021-01-05 NOTE — PLAN OF CARE
A+Ox4, Indep in room. VSS on RA 92%. Some SAINI but improved. R internal jugular site WDL. Educated on HF and low salt diet and change in med doses. Pt to call and schedule cardiology follow up and primary. Discharging home with RX at 1330.

## 2021-01-05 NOTE — PROGRESS NOTES
Patient is A/O x 4, vss, a-ferile, denies chest pain, up independently in the room, diuresing adequately with lasix, switch to demadex starting tomorrow, patient had rt heart catheterization this afternoon through rt internal jugular, site dressing CDI, no bleeding, no hematoma, no intervention, medical manage pulmonary hypertension,   Started on metoprolol and procardia XL, discharge to home tomorrow.

## 2021-01-06 ENCOUNTER — TELEPHONE (OUTPATIENT)
Dept: CARDIOLOGY | Facility: CLINIC | Age: 61
End: 2021-01-06

## 2021-01-06 NOTE — TELEPHONE ENCOUNTER
Patient was evaluated by cardiology while inpatient for acute on chronic HF, SOB, anasarca. PMH: CAD, pulmonary HTN, sleep disordered breathing with BiPAP, CKD stage IV, DM2, obesity, hx of meth. 1/4/21: RHC via RIJ showed normal right and left filling pressures. IV Lasix diuresed 14 lbs. PTA Toprol increased and Torsemide every other day. Called patient to discuss any post hospital d/c questions, review discharge medication, and confirm f/u appts. Patient denied any questions regarding new or changes to PTA medications. Patient denied any SOB, chest pain, edema, or light headedness. RN confirmed with patient that he needs to be scheduled for pHTN follow up appt. Instructed patient to weigh self every AM, after waking and using the restroom, but before breakfast and medications. Call clinic for a weight gain of 2 lbs overnight or 5 lbs in a week. Low Na+ diet encouraged. Pt instructed to bring daily wt/BP diary and medications with to f/u OV. Patient advised to call clinic with any cardiac related questions or concerns prior to his appt. Patient verbalized understanding and agreed with plan. BERNARD Scott RN.

## 2021-01-15 ENCOUNTER — HEALTH MAINTENANCE LETTER (OUTPATIENT)
Age: 61
End: 2021-01-15

## 2021-01-20 DIAGNOSIS — I27.20 PULMONARY HYPERTENSION (H): ICD-10-CM

## 2021-01-20 DIAGNOSIS — I50.9 HEART FAILURE (H): ICD-10-CM

## 2021-01-20 DIAGNOSIS — R06.09 DYSPNEA ON EXERTION: ICD-10-CM

## 2021-01-20 DIAGNOSIS — N18.4 CKD (CHRONIC KIDNEY DISEASE) STAGE 4, GFR 15-29 ML/MIN (H): ICD-10-CM

## 2021-01-20 DIAGNOSIS — I50.33 ACUTE ON CHRONIC DIASTOLIC CONGESTIVE HEART FAILURE (H): ICD-10-CM

## 2021-01-20 DIAGNOSIS — E11.69 TYPE 2 DIABETES MELLITUS WITH OTHER SPECIFIED COMPLICATION, WITH LONG-TERM CURRENT USE OF INSULIN (H): ICD-10-CM

## 2021-01-20 DIAGNOSIS — Z79.4 TYPE 2 DIABETES MELLITUS WITH OTHER SPECIFIED COMPLICATION, WITH LONG-TERM CURRENT USE OF INSULIN (H): ICD-10-CM

## 2021-01-20 LAB
ALBUMIN SERPL-MCNC: 3.4 G/DL (ref 3.4–5)
ALP SERPL-CCNC: 134 U/L (ref 40–150)
ALT SERPL W P-5'-P-CCNC: 20 U/L (ref 0–70)
ANION GAP SERPL CALCULATED.3IONS-SCNC: 8 MMOL/L (ref 3–14)
AST SERPL W P-5'-P-CCNC: 15 U/L (ref 0–45)
BILIRUB SERPL-MCNC: 0.3 MG/DL (ref 0.2–1.3)
BUN SERPL-MCNC: 29 MG/DL (ref 7–30)
CALCIUM SERPL-MCNC: 9.5 MG/DL (ref 8.5–10.1)
CHLORIDE SERPL-SCNC: 107 MMOL/L (ref 94–109)
CO2 SERPL-SCNC: 24 MMOL/L (ref 20–32)
CREAT SERPL-MCNC: 1.6 MG/DL (ref 0.66–1.25)
ERYTHROCYTE [DISTWIDTH] IN BLOOD BY AUTOMATED COUNT: 14.9 % (ref 10–15)
GFR SERPL CREATININE-BSD FRML MDRD: 46 ML/MIN/{1.73_M2}
GLUCOSE SERPL-MCNC: 240 MG/DL (ref 70–99)
HCT VFR BLD AUTO: 36.1 % (ref 40–53)
HGB BLD-MCNC: 11.5 G/DL (ref 13.3–17.7)
MCH RBC QN AUTO: 26.6 PG (ref 26.5–33)
MCHC RBC AUTO-ENTMCNC: 31.9 G/DL (ref 31.5–36.5)
MCV RBC AUTO: 83 FL (ref 78–100)
NT-PROBNP SERPL-MCNC: 880 PG/ML (ref 0–125)
PLATELET # BLD AUTO: 277 10E9/L (ref 150–450)
POTASSIUM SERPL-SCNC: 4.5 MMOL/L (ref 3.4–5.3)
PROT SERPL-MCNC: 7.6 G/DL (ref 6.8–8.8)
RBC # BLD AUTO: 4.33 10E12/L (ref 4.4–5.9)
SODIUM SERPL-SCNC: 139 MMOL/L (ref 133–144)
WBC # BLD AUTO: 7.7 10E9/L (ref 4–11)

## 2021-01-20 PROCEDURE — 82728 ASSAY OF FERRITIN: CPT | Performed by: INTERNAL MEDICINE

## 2021-01-20 PROCEDURE — 36415 COLL VENOUS BLD VENIPUNCTURE: CPT | Performed by: INTERNAL MEDICINE

## 2021-01-20 PROCEDURE — 99000 SPECIMEN HANDLING OFFICE-LAB: CPT | Performed by: INTERNAL MEDICINE

## 2021-01-20 PROCEDURE — 83550 IRON BINDING TEST: CPT | Performed by: INTERNAL MEDICINE

## 2021-01-20 PROCEDURE — 84238 ASSAY NONENDOCRINE RECEPTOR: CPT | Mod: 90 | Performed by: INTERNAL MEDICINE

## 2021-01-20 PROCEDURE — 83880 ASSAY OF NATRIURETIC PEPTIDE: CPT | Performed by: INTERNAL MEDICINE

## 2021-01-20 PROCEDURE — 80053 COMPREHEN METABOLIC PANEL: CPT | Performed by: INTERNAL MEDICINE

## 2021-01-20 PROCEDURE — 85027 COMPLETE CBC AUTOMATED: CPT | Performed by: INTERNAL MEDICINE

## 2021-01-20 PROCEDURE — 83540 ASSAY OF IRON: CPT | Performed by: INTERNAL MEDICINE

## 2021-01-21 LAB
FERRITIN SERPL-MCNC: 105 NG/ML (ref 26–388)
IRON SATN MFR SERPL: 12 % (ref 15–46)
IRON SERPL-MCNC: 37 UG/DL (ref 35–180)
STFR SERPL-SCNC: 5 MG/L (ref 2.2–5)
TIBC SERPL-MCNC: 308 UG/DL (ref 240–430)

## 2021-01-25 NOTE — PROGRESS NOTES
1. Pulmonary hypertension  2. Elevated body mass index  3. Abnormal ECG: bradycardia, incomplete RBBB, RVH  4. Diabetes  5. Neuropathy  6. Hypertension  7. Negative serologic screen collagen vascular disease  8. CKD with proteinuria Estimated GFR 30-50  9. Elevated kappa light chains, low albumin, no evidence of monoclonal spike  10. Narcolepsy  11.Sleep disordered breath              Central sleep apnea              BIP with ASV currently  12. Elevated coronary calcium score with negative stress test              History of LAD stent  13, Questionable history of narcolepsy ? Central sleep apnea  14. History of methamphetamine usage  15. Exertional syncope  16. Epistaxis (ASA/Plavix)              Wt Readings from Last 24 Encounters:   01/05/21 140.8 kg (310 lb 6.4 oz)   01/02/21 145.2 kg (320 lb)   08/27/20 141.5 kg (312 lb)   08/21/20 138.3 kg (305 lb)   08/19/20 145.2 kg (320 lb)   06/30/20 147.6 kg (325 lb 4.8 oz)   03/12/20 142.8 kg (314 lb 12.8 oz)   03/03/20 141.5 kg (312 lb)   01/22/20 146.4 kg (322 lb 12.8 oz)   01/19/20 145.2 kg (320 lb 3.2 oz)   01/14/20 149.1 kg (328 lb 11.2 oz)   08/22/19 145.2 kg (320 lb)         Current Outpatient Medications   Medication     Alcohol Swabs PADS     aspirin (ASA) 81 MG EC tablet     atorvastatin (LIPITOR) 40 MG tablet     glipiZIDE (GLUCOTROL) 5 MG tablet     insulin lispro (HUMALOG VIAL) 100 UNIT/ML vial     insulin pen needle (32G X 4 MM) 32G X 4 MM miscellaneous     liraglutide (VICTOZA) 18 MG/3ML solution     macitentan (OPSUMIT) 10 MG tablet     metoprolol succinate ER (TOPROL-XL) 100 MG 24 hr tablet     NIFEdipine ER (ADALAT CC) 30 MG 24 hr tablet     potassium chloride ER (KLOR-CON M) 20 MEQ CR tablet     Sharps Container MISC     sildenafil (REVATIO) 20 MG tablet     ticagrelor (BRILINTA) 90 MG tablet     torsemide (DEMADEX) 20 MG tablet     No current facility-administered medications for this visit.            Results for TERRI MCELROY (MRN [id number]) as  of 1/25/2021 17:56   Ref. Range 1/4/2021 14:21 1/4/2021 15:53 1/4/2021 15:57 1/5/2021 05:36 1/20/2021 14:13   Sodium Latest Ref Range: 133 - 144 mmol/L 141   139 139   Potassium Latest Ref Range: 3.4 - 5.3 mmol/L 3.8   3.8 4.5   Chloride Latest Ref Range: 94 - 109 mmol/L 107   105 107   Carbon Dioxide Latest Ref Range: 20 - 32 mmol/L 27   28 24   Urea Nitrogen Latest Ref Range: 7 - 30 mg/dL 28   30 29   Creatinine Latest Ref Range: 0.66 - 1.25 mg/dL 2.01 (H)   2.06 (H) 1.60 (H)   GFR Estimate Latest Ref Range: >60 mL/min/1.73_m2 35 (L)   34 (L) 46 (L)   GFR Estimate If Black Latest Ref Range: >60 mL/min/1.73_m2 41 (L)   39 (L) 54 (L)   Calcium Latest Ref Range: 8.5 - 10.1 mg/dL 9.3   9.2 9.5   Anion Gap Latest Ref Range: 3 - 14 mmol/L 7   6 8   Albumin Latest Ref Range: 3.4 - 5.0 g/dL     3.4   Protein Total Latest Ref Range: 6.8 - 8.8 g/dL     7.6   Bilirubin Total Latest Ref Range: 0.2 - 1.3 mg/dL     0.3   Alkaline Phosphatase Latest Ref Range: 40 - 150 U/L     134   ALT Latest Ref Range: 0 - 70 U/L     20   AST Latest Ref Range: 0 - 45 U/L     15   Ferritin Latest Ref Range: 26 - 388 ng/mL     105   Iron Latest Ref Range: 35 - 180 ug/dL     37   Iron Binding Cap Latest Ref Range: 240 - 430 ug/dL     308   Iron Saturation Index Latest Ref Range: 15 - 46 %     12 (L)   N-Terminal Pro Bnp Latest Ref Range: 0 - 125 pg/mL     880 (H)   Soluble Transferrin Receptor Latest Ref Range: 2.2 - 5.0 mg/L     5.0   Glucose Latest Ref Range: 70 - 99 mg/dL 233 (H)   257 (H) 240 (H)   Ph Venous Latest Ref Range: 7.32 - 7.43 pH  7.42      PCO2 Venous Latest Ref Range: 40 - 50 mm Hg  40      PO2 Venous Latest Ref Range: 25 - 47 mm Hg  37      Bicarbonate Venous Latest Ref Range: 21 - 28 mmol/L  26      O2 Sat Venous Latest Units: %  72      WBC Latest Ref Range: 4.0 - 11.0 10e9/L     7.7   Hemoglobin Latest Ref Range: 13.3 - 17.7 g/dL     11.5 (L)   Hematocrit Latest Ref Range: 40.0 - 53.0 %     36.1 (L)   Platelet Count Latest  Ref Range: 150 - 450 10e9/L     277   RBC Count Latest Ref Range: 4.4 - 5.9 10e12/L     4.33 (L)   MCV Latest Ref Range: 78 - 100 fl     83   MCH Latest Ref Range: 26.5 - 33.0 pg     26.6   MCHC Latest Ref Range: 31.5 - 36.5 g/dL     31.9   RDW Latest Ref Range: 10.0 - 15.0 %     14.9   CV CARDIAC CATHERIZATION Unknown   Attch       .  Moderate-severe precapillary pulmonary hypertension  2.  Normal right and left-sided filling pressures  3.  Normal cardiac index by Brittanie.       Plan      Follow bedrest per protocol    Continued medical management and lifestyle modifications for cardiovascular risk factor optimizations.    Return to the primary inpatient team for further evaluation and managmenet   Hemodynamics    Hemodynamics Mean RA 6 mmHg  PA 72/25 (41 mmHg)  Wedge 12 mmHg Right sided filling pressures are normal. Left sided filling pressures are normal. Moderately elevated pulmonary artery hypertension. Normal cardiac output level.   Pressures(don't display values >20,000) Phase: Baseline     Time Systolic (mmHg) Diastolic (mmHg) Mean (mmHg) A Wave (mmHg) V Wave (mmHg) EDP (mmHg) Max dp/dt (mmHg/sec) HR (bpm) Content (mL/dL) SAT (%)   RA Pressures  3:52 PM   6    10    7      90        RV Pressures  3:34 PM       682           3:53 PM 73        11     81        PA Pressures  3:54 PM 72    25    41        90        PCW Pressures  3:53 PM   12    15    14      90        AO Pressures  3:56     58    84        91        Blood Flow Results Phase: Baseline     Time Results  Indexed Values (L/min/m2)   QP  3:34 PM 9.53 L/min    3.76      QS  3:34 PM 9.53 L/min    3.76      Blood Oximetry Phase: Baseline          Name: NADIA MCELROY  MRN: [id number]  : 1960  Study Date: 2021 11:06 AM  Age: 60 yrs  Gender: Male  Patient Location: [facility]  Reason For Study: Heart Failure  Ordering Physician: BELIA JASMINE  Referring Physician: ALLY BOWIE  Performed By: Any Daniels     BSA: 2.6  m2  Height: 70 in  Weight: 324 lb  HR: 74  BP: 150/83 mmHg  _____________________________________________________________________________  __        Procedure  Complete Portable Echo Adult. Optison (NDC #2321-7301) given intravenously.  _____________________________________________________________________________  __        Interpretation Summary     The visual ejection fraction is estimated at 60-65%. No regional wall motion  abnormalities noted.  The right ventricle is moderately dilated. Mild-moderately decreased right  ventricular systolic function.  Right ventricular systolic pressure could not be approximated due to  inadequate tricuspid regurgitation.  Dilation of the inferior vena cava is present with abnormal respiratory  variation in diameter.  There is no pericardial effusion.     RV function appears mildly worse compared to prior study (was mildly reduced  on that study as well visually).  _____________________________________________________________________________  __        Left Ventricle  The left ventricle is normal in size. There is normal left ventricular wall  thickness. Left ventricular systolic function is normal. The visual ejection  fraction is estimated at 60-65%. Diastolic Doppler findings (E/E' ratio and/or  other parameters) suggest left ventricular filling pressures are increased. No  regional wall motion abnormalities noted.     Right Ventricle  The right ventricle is moderately dilated. The right ventricular systolic  function is mild to moderately reduced.     Atria  There is mild-moderate biatrial enlargement.     Mitral Valve  The mitral valve is normal in structure and function. There is mild mitral  annular calcification. There is trace to mild mitral regurgitation.        Tricuspid Valve  There is trace to mild tricuspid regurgitation. Right ventricular systolic  pressure could not be approximated due to inadequate tricuspid regurgitation.     Aortic Valve  The aortic valve is  not well visualized. No aortic regurgitation is present.  No hemodynamically significant valvular aortic stenosis.     Pulmonic Valve  The pulmonic valve is not well visualized.     Vessels  The aortic root is normal size. The ascending aorta is Borderline dilated.  Dilation of the inferior vena cava is present with abnormal respiratory  variation in diameter.     Pericardium  There is no pericardial effusion.     _____________________________________________________________________________  __  MMode/2D Measurements & Calculations  IVSd: 1.2 cm  LVIDd: 5.2 cm  LVIDs: 3.0 cm  LVPWd: 1.2 cm  FS: 42.3 %  LV mass(C)d: 245.6 grams  LV mass(C)dI: 95.8 grams/m2     Ao root diam: 3.7 cm  LA dimension: 4.4 cm  asc Aorta Diam: 3.9 cm  LA/Ao: 1.2  LA Volume (BP): 101.0 ml  LA Volume Index (BP): 39.5 ml/m2  RWT: 0.46        Doppler Measurements & Calculations  MV E max audrey: 110.0 cm/sec  MV A max audrey: 80.6 cm/sec  MV E/A: 1.4  MV dec slope: 482.7 cm/sec2  MV dec time: 0.23 sec     PA acc time: 0.15 sec  E/E' av.5  Lateral E/e': 12.9  Medial E/e': 20.1

## 2021-01-27 ENCOUNTER — VIRTUAL VISIT (OUTPATIENT)
Dept: CARDIOLOGY | Facility: CLINIC | Age: 61
End: 2021-01-27
Attending: INTERNAL MEDICINE
Payer: COMMERCIAL

## 2021-01-27 DIAGNOSIS — I27.20 PULMONARY HYPERTENSION (H): Primary | ICD-10-CM

## 2021-01-27 PROCEDURE — 99213 OFFICE O/P EST LOW 20 MIN: CPT | Mod: GT | Performed by: INTERNAL MEDICINE

## 2021-01-27 NOTE — PROGRESS NOTES
Hasmukh is a 60 year old who is being evaluated via a billable video visit.      How would you like to obtain your AVS? [employer]  If the video visit is dropped, the invitation should be resent by: Text to cell phone: [phone]  Will anyone else be joining your video visit? No

## 2021-01-27 NOTE — LETTER
1/27/2021      RE: Jeremiah Isaac  [id number]  [place] [address]       Dear Colleague,    Thank you for the opportunity to participate in the care of your patient, Jeremiah Isaac, at the [facility]. Please see a copy of my visit note below.        1. Pulmonary hypertension  2. Elevated body mass index  3. Abnormal ECG: bradycardia, incomplete RBBB, RVH  4. Diabetes  5. Neuropathy  6. Hypertension  7. Negative serologic screen collagen vascular disease  8. CKD with proteinuria Estimated GFR 30-50  9. Elevated kappa light chains, low albumin, no evidence of monoclonal spike  10. Narcolepsy  11.Sleep disordered breath              Central sleep apnea              BIP with ASV currently  12. Elevated coronary calcium score with negative stress test              History of LAD stent  13, Questionable history of narcolepsy ? Central sleep apnea  14. History of methamphetamine usage  15. Exertional syncope  16. Epistaxis (ASA/Plavix)              Wt Readings from Last 24 Encounters:   01/05/21 140.8 kg (310 lb 6.4 oz)   01/02/21 145.2 kg (320 lb)   08/27/20 141.5 kg (312 lb)   08/21/20 138.3 kg (305 lb)   08/19/20 145.2 kg (320 lb)   06/30/20 147.6 kg (325 lb 4.8 oz)   03/12/20 142.8 kg (314 lb 12.8 oz)   03/03/20 141.5 kg (312 lb)   01/22/20 146.4 kg (322 lb 12.8 oz)   01/19/20 145.2 kg (320 lb 3.2 oz)   01/14/20 149.1 kg (328 lb 11.2 oz)   08/22/19 145.2 kg (320 lb)         Current Outpatient Medications   Medication     Alcohol Swabs PADS     aspirin (ASA) 81 MG EC tablet     atorvastatin (LIPITOR) 40 MG tablet     glipiZIDE (GLUCOTROL) 5 MG tablet     insulin lispro (HUMALOG VIAL) 100 UNIT/ML vial     insulin pen needle (32G X 4 MM) 32G X 4 MM miscellaneous     liraglutide (VICTOZA) 18 MG/3ML solution     macitentan (OPSUMIT) 10 MG tablet     metoprolol succinate ER (TOPROL-XL) 100 MG 24 hr tablet     NIFEdipine ER (ADALAT CC) 30  MG 24 hr tablet     potassium chloride ER (KLOR-CON M) 20 MEQ CR tablet     Sharps Container MISC     sildenafil (REVATIO) 20 MG tablet     ticagrelor (BRILINTA) 90 MG tablet     torsemide (DEMADEX) 20 MG tablet     No current facility-administered medications for this visit.            Results for TERRI MCELROY (MRN [id number]) as of 1/25/2021 17:56   Ref. Range 1/4/2021 14:21 1/4/2021 15:53 1/4/2021 15:57 1/5/2021 05:36 1/20/2021 14:13   Sodium Latest Ref Range: 133 - 144 mmol/L 141   139 139   Potassium Latest Ref Range: 3.4 - 5.3 mmol/L 3.8   3.8 4.5   Chloride Latest Ref Range: 94 - 109 mmol/L 107   105 107   Carbon Dioxide Latest Ref Range: 20 - 32 mmol/L 27   28 24   Urea Nitrogen Latest Ref Range: 7 - 30 mg/dL 28   30 29   Creatinine Latest Ref Range: 0.66 - 1.25 mg/dL 2.01 (H)   2.06 (H) 1.60 (H)   GFR Estimate Latest Ref Range: >60 mL/min/1.73_m2 35 (L)   34 (L) 46 (L)   GFR Estimate If Black Latest Ref Range: >60 mL/min/1.73_m2 41 (L)   39 (L) 54 (L)   Calcium Latest Ref Range: 8.5 - 10.1 mg/dL 9.3   9.2 9.5   Anion Gap Latest Ref Range: 3 - 14 mmol/L 7   6 8   Albumin Latest Ref Range: 3.4 - 5.0 g/dL     3.4   Protein Total Latest Ref Range: 6.8 - 8.8 g/dL     7.6   Bilirubin Total Latest Ref Range: 0.2 - 1.3 mg/dL     0.3   Alkaline Phosphatase Latest Ref Range: 40 - 150 U/L     134   ALT Latest Ref Range: 0 - 70 U/L     20   AST Latest Ref Range: 0 - 45 U/L     15   Ferritin Latest Ref Range: 26 - 388 ng/mL     105   Iron Latest Ref Range: 35 - 180 ug/dL     37   Iron Binding Cap Latest Ref Range: 240 - 430 ug/dL     308   Iron Saturation Index Latest Ref Range: 15 - 46 %     12 (L)   N-Terminal Pro Bnp Latest Ref Range: 0 - 125 pg/mL     880 (H)   Soluble Transferrin Receptor Latest Ref Range: 2.2 - 5.0 mg/L     5.0   Glucose Latest Ref Range: 70 - 99 mg/dL 233 (H)   257 (H) 240 (H)   Ph Venous Latest Ref Range: 7.32 - 7.43 pH  7.42      PCO2 Venous Latest Ref Range: 40 - 50 mm Hg  40      PO2 Venous  Latest Ref Range: 25 - 47 mm Hg  37      Bicarbonate Venous Latest Ref Range: 21 - 28 mmol/L  26      O2 Sat Venous Latest Units: %  72      WBC Latest Ref Range: 4.0 - 11.0 10e9/L     7.7   Hemoglobin Latest Ref Range: 13.3 - 17.7 g/dL     11.5 (L)   Hematocrit Latest Ref Range: 40.0 - 53.0 %     36.1 (L)   Platelet Count Latest Ref Range: 150 - 450 10e9/L     277   RBC Count Latest Ref Range: 4.4 - 5.9 10e12/L     4.33 (L)   MCV Latest Ref Range: 78 - 100 fl     83   MCH Latest Ref Range: 26.5 - 33.0 pg     26.6   MCHC Latest Ref Range: 31.5 - 36.5 g/dL     31.9   RDW Latest Ref Range: 10.0 - 15.0 %     14.9   CV CARDIAC CATHERIZATION Unknown   Attch       .  Moderate-severe precapillary pulmonary hypertension  2.  Normal right and left-sided filling pressures  3.  Normal cardiac index by Brittanie.       Plan      Follow bedrest per protocol    Continued medical management and lifestyle modifications for cardiovascular risk factor optimizations.    Return to the primary inpatient team for further evaluation and managmenet   Hemodynamics    Hemodynamics Mean RA 6 mmHg  PA 72/25 (41 mmHg)  Wedge 12 mmHg Right sided filling pressures are normal. Left sided filling pressures are normal. Moderately elevated pulmonary artery hypertension. Normal cardiac output level.   Pressures(don't display values >20,000) Phase: Baseline     Time Systolic (mmHg) Diastolic (mmHg) Mean (mmHg) A Wave (mmHg) V Wave (mmHg) EDP (mmHg) Max dp/dt (mmHg/sec) HR (bpm) Content (mL/dL) SAT (%)   RA Pressures  3:52 PM   6    10    7      90        RV Pressures  3:34 PM       682           3:53 PM 73        11     81        PA Pressures  3:54 PM 72    25    41        90        PCW Pressures  3:53 PM   12    15    14      90        AO Pressures  3:56     58    84        91        Blood Flow Results Phase: Baseline     Time Results  Indexed Values (L/min/m2)   QP  3:34 PM 9.53 L/min    3.76      QS  3:34 PM 9.53 L/min    3.76      Blood Oximetry  Phase: Baseline          Name: NADIA MCELROY  MRN: [id number]  : 1960  Study Date: 2021 11:06 AM  Age: 60 yrs  Gender: Male  Patient Location: [facility]  Reason For Study: Heart Failure  Ordering Physician: BELIA JASMINE  Referring Physician: ALLY BOWIE  Performed By: Any Daniels     BSA: 2.6 m2  Height: 70 in  Weight: 324 lb  HR: 74  BP: 150/83 mmHg  _____________________________________________________________________________  __        Procedure  Complete Portable Echo Adult. Optison ([facility]C #5245-4347) given intravenously.  _____________________________________________________________________________  __        Interpretation Summary     The visual ejection fraction is estimated at 60-65%. No regional wall motion  abnormalities noted.  The right ventricle is moderately dilated. Mild-moderately decreased right  ventricular systolic function.  Right ventricular systolic pressure could not be approximated due to  inadequate tricuspid regurgitation.  Dilation of the inferior vena cava is present with abnormal respiratory  variation in diameter.  There is no pericardial effusion.     RV function appears mildly worse compared to prior study (was mildly reduced  on that study as well visually).  _____________________________________________________________________________  __        Left Ventricle  The left ventricle is normal in size. There is normal left ventricular wall  thickness. Left ventricular systolic function is normal. The visual ejection  fraction is estimated at 60-65%. Diastolic Doppler findings (E/E' ratio and/or  other parameters) suggest left ventricular filling pressures are increased. No  regional wall motion abnormalities noted.     Right Ventricle  The right ventricle is moderately dilated. The right ventricular systolic  function is mild to moderately reduced.     Atria  There is mild-moderate biatrial enlargement.     Mitral Valve  The mitral valve is normal in  structure and function. There is mild mitral  annular calcification. There is trace to mild mitral regurgitation.        Tricuspid Valve  There is trace to mild tricuspid regurgitation. Right ventricular systolic  pressure could not be approximated due to inadequate tricuspid regurgitation.     Aortic Valve  The aortic valve is not well visualized. No aortic regurgitation is present.  No hemodynamically significant valvular aortic stenosis.     Pulmonic Valve  The pulmonic valve is not well visualized.     Vessels  The aortic root is normal size. The ascending aorta is Borderline dilated.  Dilation of the inferior vena cava is present with abnormal respiratory  variation in diameter.     Pericardium  There is no pericardial effusion.     _____________________________________________________________________________  __  MMode/2D Measurements & Calculations  IVSd: 1.2 cm  LVIDd: 5.2 cm  LVIDs: 3.0 cm  LVPWd: 1.2 cm  FS: 42.3 %  LV mass(C)d: 245.6 grams  LV mass(C)dI: 95.8 grams/m2     Ao root diam: 3.7 cm  LA dimension: 4.4 cm  asc Aorta Diam: 3.9 cm  LA/Ao: 1.2  LA Volume (BP): 101.0 ml  LA Volume Index (BP): 39.5 ml/m2  RWT: 0.46        Doppler Measurements & Calculations  MV E max audrey: 110.0 cm/sec  MV A max audrey: 80.6 cm/sec  MV E/A: 1.4  MV dec slope: 482.7 cm/sec2  MV dec time: 0.23 sec     PA acc time: 0.15 sec  E/E' av.5  Lateral E/e': 12.9  Medial E/e': 20.1    Hasmukh is a 60 year old who is being evaluated via a billable video visit.      How would you like to obtain your AVS? MyChart  If the video visit is dropped, the invitation should be resent by: Text to cell phone: [phone]  Will anyone else be joining your video visit? No              Please do not hesitate to contact me if you have any questions/concerns.     Sincerely,     Santiago Riojas MD

## 2021-01-27 NOTE — NURSING NOTE
Chief Complaint   Patient presents with     Follow Up     3 month follow up     Rupinder Calhoun

## 2021-01-28 DIAGNOSIS — E11.22: Primary | ICD-10-CM

## 2021-01-28 DIAGNOSIS — N18.2: Primary | ICD-10-CM

## 2021-01-28 DIAGNOSIS — E11.22 TYPE 2 DIABETES MELLITUS WITH STAGE 3 CHRONIC KIDNEY DISEASE (H): ICD-10-CM

## 2021-01-28 DIAGNOSIS — N18.30 TYPE 2 DIABETES MELLITUS WITH STAGE 3 CHRONIC KIDNEY DISEASE (H): ICD-10-CM

## 2021-01-28 DIAGNOSIS — I15.1 HYPERTENSION SECONDARY TO OTHER RENAL DISORDERS: ICD-10-CM

## 2021-01-29 DIAGNOSIS — N18.30 TYPE 2 DIABETES MELLITUS WITH STAGE 3 CHRONIC KIDNEY DISEASE (H): ICD-10-CM

## 2021-01-29 DIAGNOSIS — I15.1 HYPERTENSION SECONDARY TO OTHER RENAL DISORDERS: ICD-10-CM

## 2021-01-29 DIAGNOSIS — E11.22 TYPE 2 DIABETES MELLITUS WITH STAGE 3 CHRONIC KIDNEY DISEASE (H): ICD-10-CM

## 2021-01-29 LAB
ALBUMIN SERPL-MCNC: 3 G/DL (ref 3.4–5)
ALBUMIN UR-MCNC: >=300 MG/DL
ANION GAP SERPL CALCULATED.3IONS-SCNC: 4 MMOL/L (ref 3–14)
APPEARANCE UR: CLEAR
BILIRUB UR QL STRIP: NEGATIVE
BUN SERPL-MCNC: 37 MG/DL (ref 7–30)
CALCIUM SERPL-MCNC: 9.3 MG/DL (ref 8.5–10.1)
CHLORIDE SERPL-SCNC: 106 MMOL/L (ref 94–109)
CO2 SERPL-SCNC: 26 MMOL/L (ref 20–32)
COLOR UR AUTO: YELLOW
CREAT SERPL-MCNC: 1.96 MG/DL (ref 0.66–1.25)
CREAT UR-MCNC: 49 MG/DL
ERYTHROCYTE [DISTWIDTH] IN BLOOD BY AUTOMATED COUNT: 15.2 % (ref 10–15)
GFR SERPL CREATININE-BSD FRML MDRD: 36 ML/MIN/{1.73_M2}
GLUCOSE SERPL-MCNC: 247 MG/DL (ref 70–99)
GLUCOSE UR STRIP-MCNC: 100 MG/DL
HCT VFR BLD AUTO: 38.5 % (ref 40–53)
HGB BLD-MCNC: 12.1 G/DL (ref 13.3–17.7)
HGB UR QL STRIP: ABNORMAL
KETONES UR STRIP-MCNC: NEGATIVE MG/DL
LEUKOCYTE ESTERASE UR QL STRIP: NEGATIVE
MCH RBC QN AUTO: 26.2 PG (ref 26.5–33)
MCHC RBC AUTO-ENTMCNC: 31.4 G/DL (ref 31.5–36.5)
MCV RBC AUTO: 84 FL (ref 78–100)
NITRATE UR QL: NEGATIVE
PH UR STRIP: 5.5 PH (ref 5–7)
PHOSPHATE SERPL-MCNC: 3.7 MG/DL (ref 2.5–4.5)
PLATELET # BLD AUTO: 319 10E9/L (ref 150–450)
POTASSIUM SERPL-SCNC: 4.5 MMOL/L (ref 3.4–5.3)
PROT UR-MCNC: 1.99 G/L
PROT/CREAT 24H UR: 4.08 G/G CR (ref 0–0.2)
RBC # BLD AUTO: 4.61 10E12/L (ref 4.4–5.9)
RBC #/AREA URNS AUTO: ABNORMAL /HPF
SODIUM SERPL-SCNC: 136 MMOL/L (ref 133–144)
SOURCE: ABNORMAL
SP GR UR STRIP: 1.02 (ref 1–1.03)
UROBILINOGEN UR STRIP-ACNC: 0.2 EU/DL (ref 0.2–1)
WBC # BLD AUTO: 8.9 10E9/L (ref 4–11)
WBC #/AREA URNS AUTO: ABNORMAL /HPF

## 2021-01-29 PROCEDURE — 80069 RENAL FUNCTION PANEL: CPT | Performed by: INTERNAL MEDICINE

## 2021-01-29 PROCEDURE — 36415 COLL VENOUS BLD VENIPUNCTURE: CPT | Performed by: INTERNAL MEDICINE

## 2021-01-29 PROCEDURE — 84156 ASSAY OF PROTEIN URINE: CPT | Performed by: INTERNAL MEDICINE

## 2021-01-29 PROCEDURE — 81001 URINALYSIS AUTO W/SCOPE: CPT | Performed by: INTERNAL MEDICINE

## 2021-01-29 PROCEDURE — 85027 COMPLETE CBC AUTOMATED: CPT | Performed by: INTERNAL MEDICINE

## 2021-02-09 ENCOUNTER — VIRTUAL VISIT (OUTPATIENT)
Dept: CARDIOLOGY | Facility: CLINIC | Age: 61
End: 2021-02-09
Attending: PHYSICIAN ASSISTANT
Payer: COMMERCIAL

## 2021-02-09 DIAGNOSIS — R06.09 DOE (DYSPNEA ON EXERTION): Primary | ICD-10-CM

## 2021-02-09 DIAGNOSIS — I27.20 PULMONARY HYPERTENSION (H): ICD-10-CM

## 2021-02-09 PROCEDURE — 99214 OFFICE O/P EST MOD 30 MIN: CPT | Mod: GT | Performed by: PHYSICIAN ASSISTANT

## 2021-02-09 RX ORDER — SILDENAFIL CITRATE 20 MG/1
20 TABLET ORAL 3 TIMES DAILY
Qty: 90 TABLET | Refills: 0
Start: 2021-02-09 | End: 2021-05-17

## 2021-02-09 NOTE — PROGRESS NOTES
CARDIOLOGY PH CLINIC VIDEO VISIT    Jeremiah Isaac is a 60 year old male who is being evaluated via a billable video visit.        I have reviewed and updated the patient's Past Medical History, Social History, Family History and Medication List.    MEDICATIONS:  Current Outpatient Medications   Medication Sig Dispense Refill     Alcohol Swabs PADS Use to swab the area of the injection or abram as directed   Per insurance coverage 100 each 3     aspirin (ASA) 81 MG EC tablet Take 1 tablet (81 mg) by mouth daily Start tomorrow morning. 90 tablet 3     atorvastatin (LIPITOR) 40 MG tablet Take 40 mg by mouth every morning        glipiZIDE (GLUCOTROL) 5 MG tablet Take 1 tablet (5 mg) by mouth every morning (before breakfast) 90 tablet 3     insulin lispro (HUMALOG VIAL) 100 UNIT/ML vial Inject 100 Units Subcutaneous daily       insulin pen needle (32G X 4 MM) 32G X 4 MM miscellaneous Use as directed by provider  Per insurance coverage 100 each 3     liraglutide (VICTOZA) 18 MG/3ML solution Inject 0.6 mg Subcutaneous daily (Patient taking differently: Inject 0.6 mg Subcutaneous daily for 5 days. then 1.2 mg daily after) 3 mL 3     macitentan (OPSUMIT) 10 MG tablet Take 10 mg by mouth every morning        metoprolol succinate ER (TOPROL-XL) 100 MG 24 hr tablet Take 100 mg by mouth every morning        NIFEdipine ER (ADALAT CC) 30 MG 24 hr tablet Take 1 tablet (30 mg) by mouth daily 90 tablet 3     potassium chloride ER (KLOR-CON M) 20 MEQ CR tablet Take 40 mEq on even days and 20 mEq on Odd days 140 tablet 3     Sharps Container MISC Use as directed to dispose of needles, lancets and other sharps  Per Insurance coverage 1 each 3     sildenafil (REVATIO) 20 MG tablet Take 1.25 tablets (25 mg) by mouth 3 times daily 113 tablet 0     ticagrelor (BRILINTA) 90 MG tablet Take 1 tablet (90 mg) by mouth 2 times daily 180 tablet 3     torsemide (DEMADEX) 20 MG tablet Take torsemide 40 mg twice daily on even days and 20 mg  Once on Odd days 120 tablet 0       ALLERGIES  Patient has no known allergies.      Self reported vitals:  Weight: 318 lb yesterday  /64       Brief physical exam:  General: In no acute distress, upright and calm.  Eyes: No apparent redness or discharge.   Chest: No labored breathing, no cough during exam or audible wheezing.   Neuro: No obvious focal defects or tremors.   Psych: Alert and oriented. Does not appear anxious.     The rest of a comprehensive physical examination is deferred due to public health emergency video visit restrictions.       Most recent labs:   Results for TERRI MCELROY (MRN [id number]) as of 2/9/2021 18:01   Ref. Range 1/29/2021 12:41   Sodium Latest Ref Range: 133 - 144 mmol/L 136   Potassium Latest Ref Range: 3.4 - 5.3 mmol/L 4.5   Chloride Latest Ref Range: 94 - 109 mmol/L 106   Carbon Dioxide Latest Ref Range: 20 - 32 mmol/L 26   Urea Nitrogen Latest Ref Range: 7 - 30 mg/dL 37 (H)   Creatinine Latest Ref Range: 0.66 - 1.25 mg/dL 1.96 (H)   GFR Estimate Latest Ref Range: >60 mL/min/1.73_m2 36 (L)   GFR Estimate If Black Latest Ref Range: >60 mL/min/1.73_m2 42 (L)   Calcium Latest Ref Range: 8.5 - 10.1 mg/dL 9.3   Anion Gap Latest Ref Range: 3 - 14 mmol/L 4   Phosphorus Latest Ref Range: 2.5 - 4.5 mg/dL 3.7   Albumin Latest Ref Range: 3.4 - 5.0 g/dL 3.0 (L)         Primary PH cardiologist: Dr. Riojas        HPI:  Mr. Mcelroy is a pleasant 60 year old male with a PMHx including DM2, hypertension, neuropathy, obesity, CKD, central sleep apnea with possible narcolepsy on Bipap, prior methamphetamine use, and coronary artery disease.  He was initially evaluated by Dr. Riojas in Jan 2020 due to exertional syncope. He underwent a RHC at that time showing normal right and left sided filling pressures, but severe PAH, with normal cardiac output. Notably, at that time he had a significant reduction in PA pressures after receiving NTG and verapamil and was placed on nifedipine. Ultimately,  this spring he was placed on dual oral therapy with sildenafil and macitentan with no further syncope, though has continued to struggle with shortness of breath and volume overload.      He then underwent a repeat RHC in June 2020. This showed continued severe pulmonary hypertension, along with both elevated right and left filling pressures  (RAP 12, PAP 96/35, mean 57, PCWP 22, Brittanie CI 2.59, and PVR of 5.44 del valle). Echo showed continued preserved EF 60-65%, and normal RV function with no new valvular abnormalities. When I saw him shortly after, weight was 320 lbs at home, and I changed his Lasix to torsemide at 40mg daily.     He returned again in August, with ongoing SAINI unchanged. He went back for a repeat L+RHC In Aug 2020. This showed mildly elevated right sided filling pressures, and again severe PH with moderately elevated left sided filling pressures (RA 15, PAP 98/40, mean 60. PCWP 30 (however normal LVEDP), TD CO/CI 7.37/2.88. PVR 4.44 del valle). The Nipride study showed a slight drop in cardiac output with significant drop in mean PA pressure and PCWP pressure. Notably, he had severe in stent restenosis of a prior proximal LAD stent and received PCI with ARINA to the proximal LAD.      When he had a virtual follow up with Dr. Riojas the following week, he had apparently been doing better, but abruptly started not feeling well again that day with dizziness and presyncope. He was sent back to the ED. Repeat R+LHC 8/20/2020 showed moderate to severe mid LAD disease and he got another ARINA to the mid LAD with POBA of the D2. Previous LAD stents were patent. At that time PAP was 70/31, mean 40, with PCWP 15. He remained on the torsemide 40mg daily unchanged along with Toprol XL 100mg daily. His macitentan and sildenafil were also continued. His lisinopril was stopped due to renal concerns. Since then, we have continued to periodically adjust his diuretics, which has also been complicated by some renal concerns.   "    More recently, Hasmukh was hospitalized at [facility] from 1/3 to 1/5 after presenting back with worsening shortness of breath and leg edema once again. He was diuresed, and then underwent a repeat RHC 1/4/2021 again showing moderate to severe PAH with normal right and left sided filling pressures. His cardiac output, however, was preserved (RAP 6, PAP 72/25, mean 41, PCWP 12, CO/CI Brittanie 9.5/3.76, PVR 3.04wu). Notably, weight at time of cath was 315 lbs. His sildenafil was increased just slightly to 25mg TID, and he was discharged on torsemide 40mg alternating every other day with 20mg.     He followed up with Dr Riojas via virtual visit shortly after, and he asked him to take the torsemide 40mg for two days in a row and only take the 20mg dose every third day, as he was still reporting some edema. It does not appear any additional changes were made.    Today, we are doing another virtual follow up. He tells me that overall, he continues to be winded with exertion, bending over, or just generally moving too fast. He keeps his legs wrapped but is certain if he skipped this he would have more swelling. Weight fluctuates between 316-320 lbs, but he reports was 318 lbs yesterday. He has occasional dizziness if he stands too fast. He's having trouble cutting the sildenafil pill into 1/4ths for the 25mg dose as he was instructed to do upon discharge. He reports home BPs under good control recently, mostly 120-130s systolic. He saw his nephrologist last week and was told his kidney function is \"not worse.\" He has iron infusions planned for next week.     Labs done last 1/29 show BUN/SCr 37/196, K 4.5, Na 136.   On 1/20 he had an NT-proBNP that was 880, SCr was 1.60.        CURRENT PULMONARY HYPERTENSION REGIMEN:     PAH Rx: macitentan 10mg daily, sildenafil 25mg TID     Diuretics: torsemide 40mg, with 20mg every third day    Anticoagulation: None        Assessment/Plan:     1. Pulmonary arterial " hypertension.              --PAH felt out of proportion to his sleep disordered breathing. He recently was hospitalized for decompensated heart failure requiring diuresis. Repeat RHC post diuresis showed moderate to severe PAH with normal right and left sided filling pressures. His cardiac output was preserved.  His sildenafil was increased to 25mg TID but he's having trouble cutting the pills. Will reduce this back to 20mg TID as per previous, and continue Opsumit 10mg daily unchanged.   --I'm having trouble sorting out whether his diuretics need to be adjusted via virtual visit. He is still having a lot of SAINI, but reports home weights overall stable between 316-320 lbs, and he is wrapping his legs as directed. I offered him an in person visit, but he would like to defer for now, until he gets his iron infusions as he's hoping this will help his symptoms. For now, will keep his torsemide as is. We again briefly reviewed the need to maintain a low salt diet.   --He remains compliant with his nightly Bipap as directed.        2. Coronary artery disease.              --Hx prox LAD stenting in Aug 2019. More recently, had severe in stent restenosis and in Aug 2020 received PCI with ARINA to proximal LAD. About a week later he returned with presyncope and dizziness, and repeat angiogram showed moderate to severe mid LAD disease; he received another ARINA to the mid LAD with POBA of the D2. Previous LAD stents were patent.    --He is free of chest pain currently. He remains on DAPT with Brilinta and ASA.               --Continue atorvastatin 40mg daily. Last LDL Jan 2020 within goal at 53.      3. Hypertension.              --BP appears to be under reasonable control currently. He was taken off lisinopril due to renal concerns. Continue Toprol XL, nifedipine, and diuretics as above.        Follow up plan: Return in ~3 weeks to see myself in clinic, after iron infusions. Repeat renal labs that visit.       NYHA Functional  Class:  Functional class 3b    1--No limitation of activity  2--Slight limitation, ordinary activities may cause symptoms  3--Marked limitation, less than ordinary activities cause symptoms  4--Severe limitation, minimal activity causes symptoms, or symptoms at rest      Video-Visit Details    Type of service:  Video Visit    Video Start Time: 1407  Video End Time: 1426    An additional 20 minutes was spent performing chart and history review, documentation, and care coordination.      Originating Location (pt. Location): Home    Distant Location (provider location):  [facility]    Platform used for Video Visit: Jose Angel Peters PA-C  [facility]  Pager [phone]

## 2021-02-09 NOTE — PROGRESS NOTES
Hasmukh is a 60 year old who is being evaluated via a billable video visit.      How would you like to obtain your AVS? MyChart  If the video visit is dropped, the invitation should be resent by: Send to e-mail at: [email or web address] to be used on ipad [email or web address]

## 2021-02-09 NOTE — LETTER
2/9/2021      RE: Jeremiah Isaac  [address]  [place] [address]       Dear Colleague,    Thank you for the opportunity to participate in the care of your patient, Jeremiah Isaac, at the [facility] [place] [facility]. Please see a copy of my visit note below.        CARDIOLOGY  CLINIC VIDEO VISIT    Jeremiah Isaac is a 60 year old male who is being evaluated via a billable video visit.        I have reviewed and updated the patient's Past Medical History, Social History, Family History and Medication List.    MEDICATIONS:  Current Outpatient Medications   Medication Sig Dispense Refill     Alcohol Swabs PADS Use to swab the area of the injection or abram as directed   Per insurance coverage 100 each 3     aspirin (ASA) 81 MG EC tablet Take 1 tablet (81 mg) by mouth daily Start tomorrow morning. 90 tablet 3     atorvastatin (LIPITOR) 40 MG tablet Take 40 mg by mouth every morning        glipiZIDE (GLUCOTROL) 5 MG tablet Take 1 tablet (5 mg) by mouth every morning (before breakfast) 90 tablet 3     insulin lispro (HUMALOG VIAL) 100 UNIT/ML vial Inject 100 Units Subcutaneous daily       insulin pen needle (32G X 4 MM) 32G X 4 MM miscellaneous Use as directed by provider  Per insurance coverage 100 each 3     liraglutide (VICTOZA) 18 MG/3ML solution Inject 0.6 mg Subcutaneous daily (Patient taking differently: Inject 0.6 mg Subcutaneous daily for 5 days. then 1.2 mg daily after) 3 mL 3     macitentan (OPSUMIT) 10 MG tablet Take 10 mg by mouth every morning        metoprolol succinate ER (TOPROL-XL) 100 MG 24 hr tablet Take 100 mg by mouth every morning        NIFEdipine ER (ADALAT CC) 30 MG 24 hr tablet Take 1 tablet (30 mg) by mouth daily 90 tablet 3     potassium chloride ER (KLOR-CON M) 20 MEQ CR tablet Take 40 mEq on even days and 20 mEq on Odd days 140 tablet 3     Sharps Container MISC Use as directed to dispose of needles, lancets  and other sharps  Per Insurance coverage 1 each 3     sildenafil (REVATIO) 20 MG tablet Take 1.25 tablets (25 mg) by mouth 3 times daily 113 tablet 0     ticagrelor (BRILINTA) 90 MG tablet Take 1 tablet (90 mg) by mouth 2 times daily 180 tablet 3     torsemide (DEMADEX) 20 MG tablet Take torsemide 40 mg twice daily on even days and 20 mg Once on Odd days 120 tablet 0       ALLERGIES  Patient has no known allergies.      Self reported vitals:  Weight: 318 lb yesterday  /64       Brief physical exam:  General: In no acute distress, upright and calm.  Eyes: No apparent redness or discharge.   Chest: No labored breathing, no cough during exam or audible wheezing.   Neuro: No obvious focal defects or tremors.   Psych: Alert and oriented. Does not appear anxious.     The rest of a comprehensive physical examination is deferred due to public [facility] emergency video visit restrictions.       Most recent labs:   Results for TERRI MCELROY (MRN [id number]) as of 2/9/2021 18:01   Ref. Range 1/29/2021 12:41   Sodium Latest Ref Range: 133 - 144 mmol/L 136   Potassium Latest Ref Range: 3.4 - 5.3 mmol/L 4.5   Chloride Latest Ref Range: 94 - 109 mmol/L 106   Carbon Dioxide Latest Ref Range: 20 - 32 mmol/L 26   Urea Nitrogen Latest Ref Range: 7 - 30 mg/dL 37 (H)   Creatinine Latest Ref Range: 0.66 - 1.25 mg/dL 1.96 (H)   GFR Estimate Latest Ref Range: >60 mL/min/1.73_m2 36 (L)   GFR Estimate If Black Latest Ref Range: >60 mL/min/1.73_m2 42 (L)   Calcium Latest Ref Range: 8.5 - 10.1 mg/dL 9.3   Anion Gap Latest Ref Range: 3 - 14 mmol/L 4   Phosphorus Latest Ref Range: 2.5 - 4.5 mg/dL 3.7   Albumin Latest Ref Range: 3.4 - 5.0 g/dL 3.0 (L)         Primary PH cardiologist: Dr. Riojas        HPI:  Mr. Mcelroy is a pleasant 60 year old male with a PMHx including DM2, hypertension, neuropathy, obesity, CKD, central sleep apnea with possible narcolepsy on Bipap, prior methamphetamine use, and coronary artery disease.  He was initially  evaluated by Dr. Riojas in Jan 2020 due to exertional syncope. He underwent a RHC at that time showing normal right and left sided filling pressures, but severe PAH, with normal cardiac output. Notably, at that time he had a significant reduction in PA pressures after receiving NTG and verapamil and was placed on nifedipine. Ultimately, this spring he was placed on dual oral therapy with sildenafil and macitentan with no further syncope, though has continued to struggle with shortness of breath and volume overload.      He then underwent a repeat RHC in June 2020. This showed continued severe pulmonary hypertension, along with both elevated right and left filling pressures  (RAP 12, PAP 96/35, mean 57, PCWP 22, Brittanie CI 2.59, and PVR of 5.44 del valle). Echo showed continued preserved EF 60-65%, and normal RV function with no new valvular abnormalities. When I saw him shortly after, weight was 320 lbs at home, and I changed his Lasix to torsemide at 40mg daily.     He returned again in August, with ongoing SAINI unchanged. He went back for a repeat L+RHC In Aug 2020. This showed mildly elevated right sided filling pressures, and again severe PH with moderately elevated left sided filling pressures (RA 15, PAP 98/40, mean 60. PCWP 30 (however normal LVEDP), TD CO/CI 7.37/2.88. PVR 4.44 del valle). The Nipride study showed a slight drop in cardiac output with significant drop in mean PA pressure and PCWP pressure. Notably, he had severe in stent restenosis of a prior proximal LAD stent and received PCI with ARINA to the proximal LAD.      When he had a virtual follow up with Dr. Riojas the following week, he had apparently been doing better, but abruptly started not feeling well again that day with dizziness and presyncope. He was sent back to the ED. Repeat R+LHC 8/20/2020 showed moderate to severe mid LAD disease and he got another ARINA to the mid LAD with POBA of the D2. Previous LAD stents were patent. At that time PAP was 70/31,  "mean 40, with PCWP 15. He remained on the torsemide 40mg daily unchanged along with Toprol XL 100mg daily. His macitentan and sildenafil were also continued. His lisinopril was stopped due to renal concerns. Since then, we have continued to periodically adjust his diuretics, which has also been complicated by some renal concerns.      More recently, Hasmukh was hospitalized at [facility] from 1/3 to 1/5 after presenting back with worsening shortness of breath and leg edema once again. He was diuresed, and then underwent a repeat RHC 1/4/2021 again showing moderate to severe PAH with normal right and left sided filling pressures. His cardiac output, however, was preserved (RAP 6, PAP 72/25, mean 41, PCWP 12, CO/CI Brittanie 9.5/3.76, PVR 3.04wu). Notably, weight at time of cath was 315 lbs. His sildenafil was increased just slightly to 25mg TID, and he was discharged on torsemide 40mg alternating every other day with 20mg.     He followed up with Dr Riojas via virtual visit shortly after, and he asked him to take the torsemide 40mg for two days in a row and only take the 20mg dose every third day, as he was still reporting some edema. It does not appear any additional changes were made.    Today, we are doing another virtual follow up. He tells me that overall, he continues to be winded with exertion, bending over, or just generally moving too fast. He keeps his legs wrapped but is certain if he skipped this he would have more swelling. Weight fluctuates between 316-320 lbs, but he reports was 318 lbs yesterday. He has occasional dizziness if he stands too fast. He's having trouble cutting the sildenafil pill into 1/4ths for the 25mg dose as he was instructed to do upon discharge. He reports home BPs under good control recently, mostly 120-130s systolic. He saw his nephrologist last week and was told his kidney function is \"not worse.\" He has iron infusions planned for next week.     Labs done last 1/29 show BUN/SCr " 37/196, K 4.5, Na 136.   On 1/20 he had an NT-proBNP that was 880, SCr was 1.60.        CURRENT PULMONARY HYPERTENSION REGIMEN:     PAH Rx: macitentan 10mg daily, sildenafil 25mg TID     Diuretics: torsemide 40mg, with 20mg every third day    Anticoagulation: None        Assessment/Plan:     1. Pulmonary arterial hypertension.              --PAH felt out of proportion to his sleep disordered breathing. He recently was hospitalized for decompensated heart failure requiring diuresis. Repeat RHC post diuresis showed moderate to severe PAH with normal right and left sided filling pressures. His cardiac output was preserved.  His sildenafil was increased to 25mg TID but he's having trouble cutting the pills. Will reduce this back to 20mg TID as per previous, and continue Opsumit 10mg daily unchanged.   --I'm having trouble sorting out whether his diuretics need to be adjusted via virtual visit. He is still having a lot of SAINI, but reports home weights overall stable between 316-320 lbs, and he is wrapping his legs as directed. I offered him an in person visit, but he would like to defer for now, until he gets his iron infusions as he's hoping this will help his symptoms. For now, will keep his torsemide as is. We again briefly reviewed the need to maintain a low salt diet.   --He remains compliant with his nightly Bipap as directed.        2. Coronary artery disease.              --Hx prox LAD stenting in Aug 2019. More recently, had severe in stent restenosis and in Aug 2020 received PCI with ARINA to proximal LAD. About a week later he returned with presyncope and dizziness, and repeat angiogram showed moderate to severe mid LAD disease; he received another ARINA to the mid LAD with POBA of the D2. Previous LAD stents were patent.    --He is free of chest pain currently. He remains on DAPT with Brilinta and ASA.               --Continue atorvastatin 40mg daily. Last LDL Jan 2020 within goal at 53.      3.  Hypertension.              --BP appears to be under reasonable control currently. He was taken off lisinopril due to renal concerns. Continue Toprol XL, nifedipine, and diuretics as above.        Follow up plan: Return in ~3 weeks to see myself in clinic, after iron infusions. Repeat renal labs that visit.       NYHA Functional Class:  Functional class 3b    1--No limitation of activity  2--Slight limitation, ordinary activities may cause symptoms  3--Marked limitation, less than ordinary activities cause symptoms  4--Severe limitation, minimal activity causes symptoms, or symptoms at rest      Video-Visit Details    Type of service:  Video Visit    Video Start Time: 1407  Video End Time: 1426    An additional 20 minutes was spent performing chart and history review, documentation, and care coordination.      Originating Location (pt. Location): Home    Distant Location (provider location):  [facility]    Platform used for Video Visit: Jose Angel Peters PA-C  [facility]  Pager [phone]      Hasmukh is a 60 year old who is being evaluated via a billable video visit.      How would you like to obtain your AVS? MyChart  If the video visit is dropped, the invitation should be resent by: Send to e-mail at: [email or web address] to be used on [email or web address] [email or web address]        Please do not hesitate to contact me if you have any questions/concerns.     Sincerely,     EL Sidhu

## 2021-02-09 NOTE — NURSING NOTE
"Vitals - Patient Reported 7/9/2020 1/27/2021 2/9/2021   Height (Patient Reported) 5' 10\" 5' 10\" -   Weight (Patient Reported) 320 lb 320 lb 320 lb   BMI (Based on Pt Reported Ht/Wt) 45.91 kg/m2 45.91 kg/m2 -   Systolic (Patient Reported) 117 - 120   Diastolic (Patient Reported) 70 - 70   SpO2 (Patient Reported) - - 91           "

## 2021-02-10 NOTE — PATIENT INSTRUCTIONS
Thank you for visiting the Pul[facility]pertension Clinic today.      Today we discussed:   You can reduce your sildenafil back to 20mg (one table) three times daily. No need to cut into quarter tablets.    Keep weighing at home and call if it trends upward.      Follow up Appointment Information:  We will have you return to see Laurie in clinic in ~3-4 weeks, after your iron infusions to see how you're feeling.       Additional Instructions:    1. Continue staying active and eat a heart healthy, low sodium diet.     2. Please keep current list of medications with you at all times.     3. Remember to weigh yourself daily after voiding and write it down on a log. If you have gained/lost 2 pounds overnight or 5 pounds in a week contact us for medication adjustments or further instructions.    4. Please call us immediately if you have syncope (fainting or passing out), chest pain, worsening edema (swelling or weight gain), or general worsening in how you are feeling.       -----------------------------------------------------------------------------------------------------------------    If you have questions or concerns please contact us at:    Senait Berg RN, BSN    Sharron Katz (Schedule,Prior Auth)  Nurse Coordinator     Clinic   Pulmonary Hypertension   Pulmonary Hypertension  [facility]             [facility]  (P)[phone]    (P) [phone]        (F)[phone]                ** Please note that you will NOT receive a reminder call regarding your scheduled testing, reminder calls are for provider appointments only.  If you are scheduled for testing within the [facility] system you may receive a call regarding pre-registration for billing purposes only.**     --------------------------------------------------------------------------------------------------------------    Interested in joining a support group?    Pulmonary Hypertension  Association  [email or web address]  **Look at the Events Tab** They even have Support Groups that you can call into    [facility] Support Group  Second Saturday of the Month from 1-3 PM   [facility] [address]  Leader: Bonnie Pena  Phone: [phone] or [phone]  Email: [email or web address]

## 2021-02-17 ENCOUNTER — INFUSION THERAPY VISIT (OUTPATIENT)
Dept: INFUSION THERAPY | Facility: CLINIC | Age: 61
End: 2021-02-17
Attending: INTERNAL MEDICINE
Payer: COMMERCIAL

## 2021-02-17 VITALS — DIASTOLIC BLOOD PRESSURE: 84 MMHG | HEART RATE: 81 BPM | SYSTOLIC BLOOD PRESSURE: 163 MMHG | TEMPERATURE: 97.7 F

## 2021-02-17 DIAGNOSIS — D50.9 IRON DEFICIENCY ANEMIA, UNSPECIFIED IRON DEFICIENCY ANEMIA TYPE: Primary | ICD-10-CM

## 2021-02-17 PROCEDURE — 250N000011 HC RX IP 250 OP 636: Performed by: INTERNAL MEDICINE

## 2021-02-17 PROCEDURE — 96365 THER/PROPH/DIAG IV INF INIT: CPT

## 2021-02-17 PROCEDURE — 258N000003 HC RX IP 258 OP 636: Performed by: INTERNAL MEDICINE

## 2021-02-17 RX ORDER — HEPARIN SODIUM,PORCINE 10 UNIT/ML
5 VIAL (ML) INTRAVENOUS
Status: CANCELLED | OUTPATIENT
Start: 2021-02-24

## 2021-02-17 RX ORDER — HEPARIN SODIUM (PORCINE) LOCK FLUSH IV SOLN 100 UNIT/ML 100 UNIT/ML
5 SOLUTION INTRAVENOUS
Status: CANCELLED | OUTPATIENT
Start: 2021-02-24

## 2021-02-17 RX ADMIN — FERRIC CARBOXYMALTOSE INJECTION 750 MG: 50 INJECTION, SOLUTION INTRAVENOUS at 14:09

## 2021-02-17 RX ADMIN — SODIUM CHLORIDE 250 ML: 9 INJECTION, SOLUTION INTRAVENOUS at 14:10

## 2021-02-17 NOTE — PROGRESS NOTES
Infusion Nursing Note:  Jeremiah Isaac presents today for Injectafer.    Patient seen by provider today: No   present during visit today: Not Applicable.    Note:   Patient did meet criteria for an asymptomatic covid-19 PCR test in infusion today. Patient declined the covid-19 test.    Intravenous Access:  Peripheral IV placed.    Treatment Conditions:  Not Applicable.      Post Infusion Assessment:  Patient tolerated infusion without incident.  Blood return noted pre and post infusion.  Site patent and intact, free from redness, edema or discomfort.  Access discontinued per protocol.       Discharge Plan:   Patient discharged in stable condition accompanied by: self.  Departure Mode: Ambulatory. Pt will return 2/24/2021.     Arnaldo Davalos RN

## 2021-02-17 NOTE — NURSING NOTE
Pts plan of care per EL Sidhu:      Return in ~3 weeks to see myself in clinic, after iron infusions. Repeat renal labs that visit.     Pt scheduled for follow up.  Senait Berg RN on 2/17/2021 at 10:59 AM

## 2021-02-23 ENCOUNTER — TELEPHONE (OUTPATIENT)
Dept: CARDIOLOGY | Facility: CLINIC | Age: 61
End: 2021-02-23

## 2021-02-24 ENCOUNTER — INFUSION THERAPY VISIT (OUTPATIENT)
Dept: INFUSION THERAPY | Facility: CLINIC | Age: 61
End: 2021-02-24
Attending: INTERNAL MEDICINE
Payer: COMMERCIAL

## 2021-02-24 VITALS
SYSTOLIC BLOOD PRESSURE: 161 MMHG | RESPIRATION RATE: 24 BRPM | HEART RATE: 97 BPM | OXYGEN SATURATION: 91 % | TEMPERATURE: 97.6 F | DIASTOLIC BLOOD PRESSURE: 85 MMHG

## 2021-02-24 DIAGNOSIS — D50.9 IRON DEFICIENCY ANEMIA, UNSPECIFIED IRON DEFICIENCY ANEMIA TYPE: Primary | ICD-10-CM

## 2021-02-24 PROCEDURE — 96374 THER/PROPH/DIAG INJ IV PUSH: CPT

## 2021-02-24 PROCEDURE — 258N000003 HC RX IP 258 OP 636: Performed by: INTERNAL MEDICINE

## 2021-02-24 PROCEDURE — 250N000011 HC RX IP 250 OP 636: Performed by: INTERNAL MEDICINE

## 2021-02-24 RX ORDER — HEPARIN SODIUM,PORCINE 10 UNIT/ML
5 VIAL (ML) INTRAVENOUS
Status: CANCELLED | OUTPATIENT
Start: 2021-02-24

## 2021-02-24 RX ORDER — HEPARIN SODIUM (PORCINE) LOCK FLUSH IV SOLN 100 UNIT/ML 100 UNIT/ML
5 SOLUTION INTRAVENOUS
Status: CANCELLED | OUTPATIENT
Start: 2021-02-24

## 2021-02-24 RX ADMIN — SODIUM CHLORIDE 250 ML: 9 INJECTION, SOLUTION INTRAVENOUS at 14:28

## 2021-02-24 RX ADMIN — FERRIC CARBOXYMALTOSE INJECTION 750 MG: 50 INJECTION, SOLUTION INTRAVENOUS at 14:31

## 2021-02-24 ASSESSMENT — PAIN SCALES - GENERAL: PAINLEVEL: NO PAIN (0)

## 2021-02-24 NOTE — PROGRESS NOTES
Infusion Nursing Note:  Jeremiah Isaac presents today for Injectafer.    Patient seen by provider today: No   present during visit today: Not Applicable.    Note: N/A.    Intravenous Access:  Peripheral IV placed.    Treatment Conditions:  Not Applicable.      Post Infusion Assessment:  Patient tolerated infusion without incident.  Patient observed for 30 minutes post injectafer per protocol.  Blood return noted pre and post infusion.  Site patent and intact, free from redness, edema or discomfort.  No evidence of extravasations.  Access discontinued per protocol.       Discharge Plan:   Discharge instructions reviewed with: Patient.  Patient and/or family verbalized understanding of discharge instructions and all questions answered.  Copy of AVS reviewed with patient and/or family.  Patient will return 3/25/21 for next appointment.  Patient discharged in stable condition accompanied by: self.  Departure Mode: Ambulatory.    Jesi Odnonell RN

## 2021-03-04 ENCOUNTER — OFFICE VISIT (OUTPATIENT)
Dept: CARDIOLOGY | Facility: CLINIC | Age: 61
End: 2021-03-04
Payer: COMMERCIAL

## 2021-03-04 VITALS
SYSTOLIC BLOOD PRESSURE: 158 MMHG | DIASTOLIC BLOOD PRESSURE: 78 MMHG | WEIGHT: 315 LBS | HEART RATE: 66 BPM | BODY MASS INDEX: 48.35 KG/M2 | OXYGEN SATURATION: 92 %

## 2021-03-04 DIAGNOSIS — I50.30 HEART FAILURE WITH PRESERVED EJECTION FRACTION, NYHA CLASS I (H): Primary | ICD-10-CM

## 2021-03-04 DIAGNOSIS — E66.01 MORBID OBESITY (H): ICD-10-CM

## 2021-03-04 PROCEDURE — 99213 OFFICE O/P EST LOW 20 MIN: CPT | Performed by: INTERNAL MEDICINE

## 2021-03-04 RX ORDER — METOPROLOL SUCCINATE 25 MG/1
TABLET, EXTENDED RELEASE ORAL
Qty: 90 TABLET | Refills: 1 | Status: SHIPPED | OUTPATIENT
Start: 2021-03-04 | End: 2021-04-27

## 2021-03-04 NOTE — PROGRESS NOTES
1. Pulmonary hypertension  2. Elevated body mass index  3. Abnormal ECG: bradycardia, incomplete RBBB, RVH  4. Diabetes  5. Neuropathy  6. Hypertension  7. Negative serologic screen collagen vascular disease  8. CKD with proteinuria Estimated GFR 30-50  9. Elevated kappa light chains, low albumin, no evidence of monoclonal spike  10. Narcolepsy  11.Sleep disordered breath              Central sleep apnea              BIP with ASV currently  12. Elevated coronary calcium score with negative stress test              History of LAD stent  13, Questionable history of narcolepsy ? Central sleep apnea  14. History of methamphetamine usage  15. Exertional syncope  16. Epistaxis (ASA/Plavix)      Plan.  1. Reduce metoprolol to 75 mgs ER every day (ordered) dosage reducwed secondary to chronotropic incompetence  2.  6 minute walk for determination of oxygen status.  3/. Call immediately if increasing chest pain   4. Follow up clinic visit one month    Patient walked around office for 125 feet with failure offie HR to increase and oxygen and saturations fell to 855        Right sided filling pressures are mildly elevated.    Moderate pre capillary pulmonary hypertension.    Left sided filling pressures are mildly elevated.    Normal cardiac output.    Hemodynamic data has been modified in Epic per physician review.     Single vessel CAD with moderate to severe mid LAD disease.  PCI with one drug eluting stent to the mid LAD and POBA of the D2.        Coronary Findings    Diagnostic  Dominance: Right  Left Main   The vessel is moderate in size.   Left Anterior Descending   The vessel is moderate in size. There was 40% diffuse vessel disease.   Previously placed Prox LAD to Mid LAD drug eluting stent is widely patent. Previous treatment took place <1 month ago. No thrombosis in the previous stent. No restenosis in the previous stent. 0.62Previously placed 2 layers of Prox LAD to Mid LAD drug eluting stents is widely patent.    Mid LAD lesion is 75% stenosed.   First Diagonal Branch   The vessel is small. There is mild diffuse disease throughout the vessel.   Second Diagonal Branch   The vessel is moderate in size. The vessel exhibits minimal luminal irregularities.   2nd Diag lesion is 90% stenosed.   Third Diagonal Branch   The vessel is small. There is mild diffuse disease throughout the vessel.   Left Circumflex   The vessel is moderate in size.   Prox Cx lesion is 30% stenosed.   First Obtuse Marginal Branch   The vessel is small. There is mild diffuse disease throughout the vessel.   Second Obtuse Marginal Branch   The vessel is moderate in size. The vessel exhibits minimal luminal irregularities.     Intervention    Mid LAD lesion   Stent   CATH GUIDING BLUE YELLOW PTFE XB3.5 [id number] guide catheter was successful. The pre-interventional distal flow is normal (MAYO 3). A STENT ORSIRO ARINA 2.75X26 [id number] drug eluting stent was successfully placed. Post-stent angioplasty was performed using a CATH BALLOON NC EMERGE 2.75X8MM [id number] supply. . The post-interventional distal flow is normal (MAYO 3). The intervention was successful. No complications occurred at this lesion. A 6 Luxembourgish XB 3.5 GC was placed in the LCA. A runthrough wire was used to wire the LAD and the D2 branch. A 1.5x20mm Emerge was used to pre dilate the D2 branch and a 2.10x22pw Synergy was initially attempted to be placed into the mid LAD but could not cross the lesion so a 2.28x95yb Orsiro stent was then delivered and deployed at 10 kim. A 2.75x8mm NC emerge was then used to post dilate the Orsiro stent at high pressure. The LAD wire was then used to rewire the D2 again and the D2 wire was redirected into the LAD. A 1.5x20mm Emerge was used to then dilate the ostium of the D2. 300 mcg of IC nitroglycerin was given and final angiography was performed showing MAYO III flow, no residual stenosis in the LAD with mild residual stenosis in the D2, no  perforation or dissection.   There is a 0% residual stenosis post intervention.   2nd Diag lesion   Angioplasty   The pre-interventional distal flow is normal (MAYO 3). The post-interventional distal flow is normal (MAYO 3).   There is a 50% residual stenosis post intervention.     Hemodynamics    Hemodynamics RA 12/13/10  RV 70/10  PA 70/31/40  PCWP --/--/15    PA Sat 66%  Ao 97%  Hgb 11.4  HR 61  Brittanie CO/CI 7.8 L/min; 3.1 L/min/m2  TD CO/CI 7.9 L/min; 3.2 L/min/m2    PVR 7.8  TPR 3.1 Right sided filling pressures are mildly elevated.Left sided filling pressures are mildly elevated. Moderately elevated pulmonary artery hypertension.Normal cardiac output level.Hemodynamic data has been modified in Epic per physician review.   Pressures Phase: Baseline     Time Systolic (mmHg) Diastolic (mmHg) Mean (mmHg) A Wave (mmHg) V Wave (mmHg) EDP (mmHg) Max dp/dt (mmHg/sec) HR (bpm) Content (mL/dL) SAT (%)   RA Pressures  4:00 PM   10    12    11      61        RV Pressures  3:59 PM 70        10     62        PA Pressures  4:03 PM 70    30    40        62        PCW Pressures  4:03 PM   15    12    10      62        AO Pressures  4:10     70    92        64        Blood Flow Results Phase: Baseline     Time Results  Indexed Values (L/min/m2)   QP  3:20 PM 7.81 L/min    3.1      QS  3:20 PM 7.81 L/min    3.1      Blood Oximetry Phase: Baseline     Time Hb  SAT(%)  PO2  Content (mL/dL) PA Sat (%)   PA  3:20 PM  66 %      66      Art  3:20 PM  97 %     15.04       Cardiac Output Phase: Baseline     Time TDCO (L/min) TDCI (L/min/m2) Brittanie C.O. (L/min) Brittanie C.I. (L/min/m2) Brittanie HR (bpm)   Cardiac Output Results  3:20 PM 7.93    3.15    7.81    3.1         4:04 PM 7.93          Resistance Results Phase: Baseline     Time PVR  SVR  TPR  TVR  PVR/SVR  TPR/TVR    Resistance Results (Metric)  3:20 .9 dsc-5    839.35 dsc-5    409.44 dsc-5    941.71 dsc-5    0.3    0.43      Resistance Results (Wood)  3:20 PM 3.2 LAMAS    10.49 LAMAS     5.12 LAMAS    11.77 LAMAS    0.3    0.43      Stoke Volume Results Phase: Baseline     Time RVSW (gm*m) LVSW (gm*m) RVSW-I (gm*m/m2) LVSW-I (gm*m/m2)   Stroke Work Results  3:20 PM                Wt Readings from Last 24 Encounters:   21 (!) 152.9 kg (337 lb)   21 140.8 kg (310 lb 6.4 oz)   21 145.2 kg (320 lb)   20 141.5 kg (312 lb)   20 138.3 kg (305 lb)   20 145.2 kg (320 lb)   20 147.6 kg (325 lb 4.8 oz)   20 142.8 kg (314 lb 12.8 oz)   20 141.5 kg (312 lb)   20 146.4 kg (322 lb 12.8 oz)   20 145.2 kg (320 lb 3.2 oz)   20 149.1 kg (328 lb 11.2 oz)   19 145.2 kg (320 lb)        Name: NADIA MCELROY  MRN: [id number]  : 1960  Study Date: 2021 11:06 AM  Age: 60 yrs  Gender: Male  Patient Location: [facility]  Reason For Study: Heart Failure  Ordering Physician: BELIA JASMINE  Referring Physician: ALLY BOWIE  Performed By: Any Daniels     BSA: 2.6 m2  Height: 70 in  Weight: 324 lb  HR: 74  BP: 150/83 mmHg  _____________________________________________________________________________  __        Procedure  Complete Portable Echo Adult. Optison (NDC #5962-0265) given intravenously.  _____________________________________________________________________________  __        Interpretation Summary     The visual ejection fraction is estimated at 60-65%. No regional wall motion  abnormalities noted.  The right ventricle is moderately dilated. Mild-moderately decreased right  ventricular systolic function.  Right ventricular systolic pressure could not be approximated due to  inadequate tricuspid regurgitation.  Dilation of the inferior vena cava is present with abnormal respiratory  variation in diameter.  There is no pericardial effusion.     RV function appears mildly worse compared to prior study (was mildly reduced  on that study as well  visually).  _____________________________________________________________________________  __        Left Ventricle  The left ventricle is normal in size. There is normal left ventricular wall  thickness. Left ventricular systolic function is normal. The visual ejection  fraction is estimated at 60-65%. Diastolic Doppler findings (E/E' ratio and/or  other parameters) suggest left ventricular filling pressures are increased. No  regional wall motion abnormalities noted.     Right Ventricle  The right ventricle is moderately dilated. The right ventricular systolic  function is mild to moderately reduced.     Atria  There is mild-moderate biatrial enlargement.     Mitral Valve  The mitral valve is normal in structure and function. There is mild mitral  annular calcification. There is trace to mild mitral regurgitation.        Tricuspid Valve  There is trace to mild tricuspid regurgitation. Right ventricular systolic  pressure could not be approximated due to inadequate tricuspid regurgitation.     Aortic Valve  The aortic valve is not well visualized. No aortic regurgitation is present.  No hemodynamically significant valvular aortic stenosis.     Pulmonic Valve  The pulmonic valve is not well visualized.     Vessels  The aortic root is normal size. The ascending aorta is Borderline dilated.  Dilation of the inferior vena cava is present with abnormal respiratory  variation in diameter.     Pericardium  There is no pericardial effusion.     _____________________________________________________________________________  __  MMode/2D Measurements & Calculations  IVSd: 1.2 cm  LVIDd: 5.2 cm  LVIDs: 3.0 cm  LVPWd: 1.2 cm  FS: 42.3 %  LV mass(C)d: 245.6 grams  LV mass(C)dI: 95.8 grams/m2     Ao root diam: 3.7 cm  LA dimension: 4.4 cm  asc Aorta Diam: 3.9 cm  LA/Ao: 1.2  LA Volume (BP): 101.0 ml  LA Volume Index (BP): 39.5 ml/m2  RWT: 0.46        Doppler Measurements & Calculations  MV E max audrey: 110.0 cm/sec  MV A max audrey:  80.6 cm/sec  MV E/A: 1.4  MV dec slope: 482.7 cm/sec2  MV dec time: 0.23 sec     PA acc time: 0.15 sec  E/E' av.5  Lateral E/e': 12.9  Medial E/e': 20.1         Results for TERRI MCELROY (MRN [id number]) as of 3/4/2021 15:42   Ref. Range 2021 05:42 2021 14:21 2021 15:53 2021 15:57 2021 05:36 2021 14:13 2021 12:41 2021 13:00   Sodium Latest Ref Range: 133 - 144 mmol/L  141   139 139 136    Potassium Latest Ref Range: 3.4 - 5.3 mmol/L 3.9 3.8   3.8 4.5 4.5    Chloride Latest Ref Range: 94 - 109 mmol/L  107   105 107 106    Carbon Dioxide Latest Ref Range: 20 - 32 mmol/L  27   28 24 26    Urea Nitrogen Latest Ref Range: 7 - 30 mg/dL  28   30 29 37 (H)    Creatinine Latest Ref Range: 0.66 - 1.25 mg/dL  2.01 (H)   2.06 (H) 1.60 (H) 1.96 (H)    GFR Estimate Latest Ref Range: >60 mL/min/1.73_m2  35 (L)   34 (L) 46 (L) 36 (L)    GFR Estimate If Black Latest Ref Range: >60 mL/min/1.73_m2  41 (L)   39 (L) 54 (L) 42 (L)    Calcium Latest Ref Range: 8.5 - 10.1 mg/dL  9.3   9.2 9.5 9.3    Anion Gap Latest Ref Range: 3 - 14 mmol/L  7   6 8 4    Phosphorus Latest Ref Range: 2.5 - 4.5 mg/dL       3.7    Albumin Latest Ref Range: 3.4 - 5.0 g/dL      3.4 3.0 (L)    Protein Total Latest Ref Range: 6.8 - 8.8 g/dL      7.6     Bilirubin Total Latest Ref Range: 0.2 - 1.3 mg/dL      0.3     Alkaline Phosphatase Latest Ref Range: 40 - 150 U/L      134     ALT Latest Ref Range: 0 - 70 U/L      20     AST Latest Ref Range: 0 - 45 U/L      15     Creatinine Urine Latest Units: mg/dL        49   Ferritin Latest Ref Range: 26 - 388 ng/mL      105     Iron Latest Ref Range: 35 - 180 ug/dL      37     Iron Binding Cap Latest Ref Range: 240 - 430 ug/dL      308     Iron Saturation Index Latest Ref Range: 15 - 46 %      12 (L)     N-Terminal Pro Bnp Latest Ref Range: 0 - 125 pg/mL      880 (H)     Protein Random Urine Latest Units: g/L        1.99   Protein Total Urine g/gr Creatinine Latest Ref Range: 0 -  0.2 g/g Cr        4.08 (H)   Soluble Transferrin Receptor Latest Ref Range: 2.2 - 5.0 mg/L      5.0     Glucose Latest Ref Range: 70 - 99 mg/dL  233 (H)   257 (H) 240 (H) 247 (H)    Ph Venous Latest Ref Range: 7.32 - 7.43 pH   7.42        PCO2 Venous Latest Ref Range: 40 - 50 mm Hg   40        PO2 Venous Latest Ref Range: 25 - 47 mm Hg   37        Bicarbonate Venous Latest Ref Range: 21 - 28 mmol/L   26        O2 Sat Venous Latest Units: %   72        WBC Latest Ref Range: 4.0 - 11.0 10e9/L      7.7 8.9    Hemoglobin Latest Ref Range: 13.3 - 17.7 g/dL      11.5 (L) 12.1 (L)    Hematocrit Latest Ref Range: 40.0 - 53.0 %      36.1 (L) 38.5 (L)    Platelet Count Latest Ref Range: 150 - 450 10e9/L      277 319    RBC Count Latest Ref Range: 4.4 - 5.9 10e12/L      4.33 (L) 4.61    MCV Latest Ref Range: 78 - 100 fl      83 84    MCH Latest Ref Range: 26.5 - 33.0 pg      26.6 26.2 (L)    MCHC Latest Ref Range: 31.5 - 36.5 g/dL      31.9 31.4 (L)    RDW Latest Ref Range: 10.0 - 15.0 %      14.9 15.2 (H)    Color Urine Unknown        Yellow            Prescription Medications as of 3/4/2021       Rx Number Disp Refills Start End Last Dispensed Date Next Fill Date Owning Pharmacy    Alcohol Swabs PADS  100 each 3 8/21/2020    [address]    Sig: Use to swab the area of the injection or abram as directed   Per insurance coverage    Class: Local Print    aspirin (ASA) 81 MG EC tablet  90 tablet 3 8/12/2020    [address]    Sig: Take 1 tablet (81 mg) by mouth daily Start tomorrow morning.    Class: Local Print    Route: Oral    atorvastatin (LIPITOR) 40 MG tablet    7/24/2019    [id number] - [facility] [address] [facility] [address]    Sig: Take 40 mg by mouth every morning     Class: Historical    Route: Oral    glipiZIDE (GLUCOTROL) 5 MG tablet  90 tablet 3 8/22/2020    [facility] -  [address]    Sig: Take 1 tablet (5 mg) by mouth every morning (before breakfast)    Class: E-Prescribe    Route: Oral    insulin lispro (HUMALOG VIAL) 100 UNIT/ML vial    2/15/2019    [id number] - [place] - [address] [facility] [address]    Sig: Inject 100 Units Subcutaneous daily    Class: Historical    Route: Subcutaneous    liraglutide (VICTOZA) 18 MG/3ML solution  3 mL 3 8/22/2020    [facility] [address]    Sig: Inject 0.6 mg Subcutaneous daily    Class: E-Prescribe    Route: Subcutaneous    macitentan (OPSUMIT) 10 MG tablet    4/14/2020        Sig: Take 10 mg by mouth every morning     Class: Historical    Notes to Pharmacy: PA in process, Rx to be sent upon approval    Route: Oral    metoprolol succinate ER (TOPROL-XL) 100 MG 24 hr tablet    2/15/2019    [id number] - [place], [place] - [address] [facility] [address]    Sig: Take 100 mg by mouth every morning     Class: Historical    Route: Oral    NIFEdipine ER (ADALAT CC) 30 MG 24 hr tablet  90 tablet 3 4/17/2020    [id number] - [place], [place] - [address] [facility] [address]    Sig: Take 1 tablet (30 mg) by mouth daily    Class: E-Prescribe    Route: Oral    potassium chloride ER (KLOR-CON M) 20 MEQ CR tablet  140 tablet 3 12/10/2020    [id number] - [place] - [address] [facility] [address]    Sig: Take 40 mEq on even days and 20 mEq on Odd days    Class: E-Prescribe    sildenafil (REVATIO) 20 MG tablet  90 tablet 0 2/9/2021    [id number] - [place] - [address] [facility] [address]    Sig: Take 1 tablet (20 mg) by mouth 3 times daily    Class: No Print Out    Route: Oral    ticagrelor (BRILINTA) 90 MG tablet  180 tablet 3 2/26/2021    [id number] - [place] - [address] [facility] [address]    Sig: Take 1 tablet (90 mg) by mouth 2 times daily    Class: E-Prescribe    Route: Oral    torsemide (DEMADEX) 20 MG tablet  120 tablet 0 1/5/2021    [facility]  [place] - [address] [email or web address]    Sig: Take torsemide 40 mg twice daily on even days and 20 mg Once on Odd days    Class: E-Prescribe    insulin pen needle (32G X 4 MM) 32G X 4 MM miscellaneous  100 each 3 8/21/2020    [facility] [address]    Sig: Use as directed by provider  Per insurance coverage    Class: Local Print    Sharps Container MISC  1 each 3 8/21/2020    [address]    Sig: Use as directed to dispose of needles, lancets and other sharps  Per Insurance coverage    Class: Local Print

## 2021-03-04 NOTE — PATIENT INSTRUCTIONS
Medication Changes:  Decrease your Metoprolol to 75 mg Daily    Patient Instructions:  1. Continue staying active and eat a heart healthy diet.    2. Please keep current list of medications with you at all times.    3. Remember to weigh yourself daily after voiding and before you consume any food or beverages and log the numbers.  If you have gained 2 pounds overnight or 5 pounds in a week contact us immediately for medication adjustments or further instructions.    4. **Please call us immediately if you have any syncope (fainting or passing out), chest pain, edema (swelling or weight gain), or decline in your functional status (general decline in how you are feeling).    Follow up Appointment Information:  Labs today and follow up in one month          For scheduling at [facility] please call [phone]  For scheduling at the [facility] please call [phone]  We are located on the second floor [address] at the [facility].  Our address is     [address] [place] [address]   [address]  [place] [address]    Thank you for allowing us to be a part of your care here at the [facility]    If you have questions or concerns please contact us at:    Senait Berg, RN, BSN PHN      Nurse Coordinator       Pulmonary Hypertension     [facility]   (Phone)[phone]  (Fax)[phone]    ** Please note that you will NOT receive a reminder call regarding your scheduled testing, reminder calls are for provider appointments only.  If you are scheduled for testing within the [facility] system you may receive a call regarding pre-registration for billing purposes only.**     Remember to weigh yourself daily after voiding and before you consume any food or beverages and log the numbers.  If you have gained/lost 2 pounds overnight or 5 pounds in a week contact us immediately for medication adjustments or further instructions.    Support Group:  Pulmonary Hypertension  Association  [email or web address].phassociation.org/  **Look at the Events Tab** They even have Support Groups that you can call into    [facility] Support Group  Second Saturday of the Month from 1-3 PM   [facility] [address]  Leader: Bonnie Pena  Phone: [phone] or [phone]  Email: [email or web address]

## 2021-03-04 NOTE — NURSING NOTE
Pts plan of care per Santiago Riojas MD:    Decrease metoprolol to 75 mg Daily  Follow up in 1 month  Labs today  6 muinute walk test in the next week or two    Orders placed and pt walked to the front for check out.  Senait Berg RN on 3/4/2021 at 5:10 PM

## 2021-03-04 NOTE — LETTER
3/4/2021    Radhika Ashton PA-C  [facility] [address] [facility] [address]    RE: Jeremiah Isaac       Dear Colleague,    I had the pleasure of seeing Jeremiah Isaac in the [facility].        1. Pulmonary hypertension  2. Elevated body mass index  3. Abnormal ECG: bradycardia, incomplete RBBB, RVH  4. Diabetes  5. Neuropathy  6. Hypertension  7. Negative serologic screen collagen vascular disease  8. CKD with proteinuria Estimated GFR 30-50  9. Elevated kappa light chains, low albumin, no evidence of monoclonal spike  10. Narcolepsy  11.Sleep disordered breath              Central sleep apnea              BIP with ASV currently  12. Elevated coronary calcium score with negative stress test              History of LAD stent  13, Questionable history of narcolepsy ? Central sleep apnea  14. History of methamphetamine usage  15. Exertional syncope  16. Epistaxis (ASA/Plavix)      Plan.  1. Reduce metoprolol to 75 mgs ER every day (ordered) dosage reducwed secondary to chronotropic incompetence  2.  6 minute walk for determination of oxygen status.  3/. Call immediately if increasing chest pain   4. Follow up clinic visit one month    Patient walked around office for 125 feet with failure offie HR to increase and oxygen and saturations fell to 855        Right sided filling pressures are mildly elevated.    Moderate pre capillary pulmonary hypertension.    Left sided filling pressures are mildly elevated.    Normal cardiac output.    Hemodynamic data has been modified in Epic per physician review.     Single vessel CAD with moderate to severe mid LAD disease.  PCI with one drug eluting stent to the mid LAD and POBA of the D2.        Coronary Findings    Diagnostic  Dominance: Right  Left Main   The vessel is moderate in size.   Left Anterior Descending   The vessel is moderate in size. There was 40% diffuse vessel disease.   Previously  placed Prox LAD to Mid LAD drug eluting stent is widely patent. Previous treatment took place <1 month ago. No thrombosis in the previous stent. No restenosis in the previous stent. 0.62Previously placed 2 layers of Prox LAD to Mid LAD drug eluting stents is widely patent.   Mid LAD lesion is 75% stenosed.   First Diagonal Branch   The vessel is small. There is mild diffuse disease throughout the vessel.   Second Diagonal Branch   The vessel is moderate in size. The vessel exhibits minimal luminal irregularities.   2nd Diag lesion is 90% stenosed.   Third Diagonal Branch   The vessel is small. There is mild diffuse disease throughout the vessel.   Left Circumflex   The vessel is moderate in size.   Prox Cx lesion is 30% stenosed.   First Obtuse Marginal Branch   The vessel is small. There is mild diffuse disease throughout the vessel.   Second Obtuse Marginal Branch   The vessel is moderate in size. The vessel exhibits minimal luminal irregularities.     Intervention    Mid LAD lesion   Stent   CATH GUIDING BLUE YELLOW PTFE XB3.5 [id number] guide catheter was successful. The pre-interventional distal flow is normal (MAYO 3). A STENT ORSIRO ARINA [id number] drug eluting stent was successfully placed. Post-stent angioplasty was performed using a CATH BALLOON NC EMERGE 2.75X8MM [id number] supply. . The post-interventional distal flow is normal (MAYO 3). The intervention was successful. No complications occurred at this lesion. A 6 [place] XB 3.5 GC was placed in the LCA. A runthrough wire was used to wire the LAD and the D2 branch. A 1.5x20mm Emerge was used to pre dilate the D2 branch and a [id number] Synergy was initially attempted to be placed into the mid LAD but could not cross the lesion so a [id number] Orsiro stent was then delivered and deployed at 10 kim. A 2.75x8mm NC emerge was then used to post dilate the Orsiro stent at high pressure. The LAD wire was then used to rewire the D2 again and the D2  wire was redirected into the LAD. A 1.5x20mm Emerge was used to then dilate the ostium of the D2. 300 mcg of IC nitroglycerin was given and final angiography was performed showing MAYO III flow, no residual stenosis in the LAD with mild residual stenosis in the D2, no perforation or dissection.   There is a 0% residual stenosis post intervention.   2nd Diag lesion   Angioplasty   The pre-interventional distal flow is normal (MAYO 3). The post-interventional distal flow is normal (MAYO 3).   There is a 50% residual stenosis post intervention.     Hemodynamics    Hemodynamics RA 12/13/10  RV 70/10  PA 70/31/40  PCWP --/--/15    PA Sat 66%  Ao 97%  Hgb 11.4  HR 61  Brittanie CO/CI 7.8 L/min; 3.1 L/min/m2  TD CO/CI 7.9 L/min; 3.2 L/min/m2    PVR 7.8  TPR 3.1 Right sided filling pressures are mildly elevated.Left sided filling pressures are mildly elevated. Moderately elevated pulmonary artery hypertension.Normal cardiac output level.Hemodynamic data has been modified in Epic per physician review.   Pressures Phase: Baseline     Time Systolic (mmHg) Diastolic (mmHg) Mean (mmHg) A Wave (mmHg) V Wave (mmHg) EDP (mmHg) Max dp/dt (mmHg/sec) HR (bpm) Content (mL/dL) SAT (%)   RA Pressures  4:00 PM   10    12    11      61        RV Pressures  3:59 PM 70        10     62        PA Pressures  4:03 PM 70    30    40        62        PCW Pressures  4:03 PM   15    12    10      62        AO Pressures  4:10     70    92        64        Blood Flow Results Phase: Baseline     Time Results  Indexed Values (L/min/m2)   QP  3:20 PM 7.81 L/min    3.1      QS  3:20 PM 7.81 L/min    3.1      Blood Oximetry Phase: Baseline     Time Hb  SAT(%)  PO2  Content (mL/dL) PA Sat (%)   PA  3:20 PM  66 %      66      Art  3:20 PM  97 %     15.04       Cardiac Output Phase: Baseline     Time TDCO (L/min) TDCI (L/min/m2) Brittanie C.O. (L/min) Brittanie C.I. (L/min/m2) Brittanie HR (bpm)   Cardiac Output Results  3:20 PM 7.93    3.15    7.81    3.1         4:04 PM  7.93          Resistance Results Phase: Baseline     Time PVR  SVR  TPR  TVR  PVR/SVR  TPR/TVR    Resistance Results (Metric)  3:20 .9 dsc-5    839.35 dsc-5    409.44 dsc-5    941.71 dsc-5    0.3    0.43      Resistance Results (Wood)  3:20 PM 3.2 LAMAS    10.49 LAMAS    5.12 LAMAS    11.77 LAMAS    0.3    0.43      Stoke Volume Results Phase: Baseline     Time RVSW (gm*m) LVSW (gm*m) RVSW-I (gm*m/m2) LVSW-I (gm*m/m2)   Stroke Work Results  3:20 PM                Wt Readings from Last 24 Encounters:   21 (!) 152.9 kg (337 lb)   21 140.8 kg (310 lb 6.4 oz)   21 145.2 kg (320 lb)   20 141.5 kg (312 lb)   20 138.3 kg (305 lb)   20 145.2 kg (320 lb)   20 147.6 kg (325 lb 4.8 oz)   20 142.8 kg (314 lb 12.8 oz)   20 141.5 kg (312 lb)   20 146.4 kg (322 lb 12.8 oz)   20 145.2 kg (320 lb 3.2 oz)   20 149.1 kg (328 lb 11.2 oz)   19 145.2 kg (320 lb)        Name: NADIA MCELROY  MRN: [id number]  : 1960  Study Date: 2021 11:06 AM  Age: 60 yrs  Gender: Male  Patient Location: [facility]  Reason For Study: Heart Failure  Ordering Physician: BELIA JASMINE  Referring Physician: ALLY BOWIE  Performed By: Any Daniels     BSA: 2.6 m2  Height: 70 in  Weight: 324 lb  HR: 74  BP: 150/83 mmHg  _____________________________________________________________________________  __        Procedure  Complete Portable Echo Adult. Optison (NDC #1420-8059) given intravenously.  _____________________________________________________________________________  __        Interpretation Summary     The visual ejection fraction is estimated at 60-65%. No regional wall motion  abnormalities noted.  The right ventricle is moderately dilated. Mild-moderately decreased right  ventricular systolic function.  Right ventricular systolic pressure could not be approximated due to  inadequate tricuspid regurgitation.  Dilation of the inferior vena cava is present  with abnormal respiratory  variation in diameter.  There is no pericardial effusion.     RV function appears mildly worse compared to prior study (was mildly reduced  on that study as well visually).  _____________________________________________________________________________  __        Left Ventricle  The left ventricle is normal in size. There is normal left ventricular wall  thickness. Left ventricular systolic function is normal. The visual ejection  fraction is estimated at 60-65%. Diastolic Doppler findings (E/E' ratio and/or  other parameters) suggest left ventricular filling pressures are increased. No  regional wall motion abnormalities noted.     Right Ventricle  The right ventricle is moderately dilated. The right ventricular systolic  function is mild to moderately reduced.     Atria  There is mild-moderate biatrial enlargement.     Mitral Valve  The mitral valve is normal in structure and function. There is mild mitral  annular calcification. There is trace to mild mitral regurgitation.        Tricuspid Valve  There is trace to mild tricuspid regurgitation. Right ventricular systolic  pressure could not be approximated due to inadequate tricuspid regurgitation.     Aortic Valve  The aortic valve is not well visualized. No aortic regurgitation is present.  No hemodynamically significant valvular aortic stenosis.     Pulmonic Valve  The pulmonic valve is not well visualized.     Vessels  The aortic root is normal size. The ascending aorta is Borderline dilated.  Dilation of the inferior vena cava is present with abnormal respiratory  variation in diameter.     Pericardium  There is no pericardial effusion.     _____________________________________________________________________________  __  MMode/2D Measurements & Calculations  IVSd: 1.2 cm  LVIDd: 5.2 cm  LVIDs: 3.0 cm  LVPWd: 1.2 cm  FS: 42.3 %  LV mass(C)d: 245.6 grams  LV mass(C)dI: 95.8 grams/m2     Ao root diam: 3.7 cm  LA dimension: 4.4 cm  asc  Aorta Diam: 3.9 cm  LA/Ao: 1.2  LA Volume (BP): 101.0 ml  LA Volume Index (BP): 39.5 ml/m2  RWT: 0.46        Doppler Measurements & Calculations  MV E max audrey: 110.0 cm/sec  MV A max audrey: 80.6 cm/sec  MV E/A: 1.4  MV dec slope: 482.7 cm/sec2  MV dec time: 0.23 sec     PA acc time: 0.15 sec  E/E' av.5  Lateral E/e': 12.9  Medial E/e': 20.1         Results for TERRI MCELROY (MRN [id number]) as of 3/4/2021 15:42   Ref. Range 2021 05:42 2021 14:21 2021 15:53 2021 15:57 2021 05:36 2021 14:13 2021 12:41 2021 13:00   Sodium Latest Ref Range: 133 - 144 mmol/L  141   139 139 136    Potassium Latest Ref Range: 3.4 - 5.3 mmol/L 3.9 3.8   3.8 4.5 4.5    Chloride Latest Ref Range: 94 - 109 mmol/L  107   105 107 106    Carbon Dioxide Latest Ref Range: 20 - 32 mmol/L  27   28 24 26    Urea Nitrogen Latest Ref Range: 7 - 30 mg/dL  28   30 29 37 (H)    Creatinine Latest Ref Range: 0.66 - 1.25 mg/dL  2.01 (H)   2.06 (H) 1.60 (H) 1.96 (H)    GFR Estimate Latest Ref Range: >60 mL/min/1.73_m2  35 (L)   34 (L) 46 (L) 36 (L)    GFR Estimate If Black Latest Ref Range: >60 mL/min/1.73_m2  41 (L)   39 (L) 54 (L) 42 (L)    Calcium Latest Ref Range: 8.5 - 10.1 mg/dL  9.3   9.2 9.5 9.3    Anion Gap Latest Ref Range: 3 - 14 mmol/L  7   6 8 4    Phosphorus Latest Ref Range: 2.5 - 4.5 mg/dL       3.7    Albumin Latest Ref Range: 3.4 - 5.0 g/dL      3.4 3.0 (L)    Protein Total Latest Ref Range: 6.8 - 8.8 g/dL      7.6     Bilirubin Total Latest Ref Range: 0.2 - 1.3 mg/dL      0.3     Alkaline Phosphatase Latest Ref Range: 40 - 150 U/L      134     ALT Latest Ref Range: 0 - 70 U/L      20     AST Latest Ref Range: 0 - 45 U/L      15     Creatinine Urine Latest Units: mg/dL        49   Ferritin Latest Ref Range: 26 - 388 ng/mL      105     Iron Latest Ref Range: 35 - 180 ug/dL      37     Iron Binding Cap Latest Ref Range: 240 - 430 ug/dL      308     Iron Saturation Index Latest Ref Range: 15 - 46 %      12  (L)     N-Terminal Pro Bnp Latest Ref Range: 0 - 125 pg/mL      880 (H)     Protein Random Urine Latest Units: g/L        1.99   Protein Total Urine g/gr Creatinine Latest Ref Range: 0 - 0.2 g/g Cr        4.08 (H)   Soluble Transferrin Receptor Latest Ref Range: 2.2 - 5.0 mg/L      5.0     Glucose Latest Ref Range: 70 - 99 mg/dL  233 (H)   257 (H) 240 (H) 247 (H)    Ph Venous Latest Ref Range: 7.32 - 7.43 pH   7.42        PCO2 Venous Latest Ref Range: 40 - 50 mm Hg   40        PO2 Venous Latest Ref Range: 25 - 47 mm Hg   37        Bicarbonate Venous Latest Ref Range: 21 - 28 mmol/L   26        O2 Sat Venous Latest Units: %   72        WBC Latest Ref Range: 4.0 - 11.0 10e9/L      7.7 8.9    Hemoglobin Latest Ref Range: 13.3 - 17.7 g/dL      11.5 (L) 12.1 (L)    Hematocrit Latest Ref Range: 40.0 - 53.0 %      36.1 (L) 38.5 (L)    Platelet Count Latest Ref Range: 150 - 450 10e9/L      277 319    RBC Count Latest Ref Range: 4.4 - 5.9 10e12/L      4.33 (L) 4.61    MCV Latest Ref Range: 78 - 100 fl      83 84    MCH Latest Ref Range: 26.5 - 33.0 pg      26.6 26.2 (L)    MCHC Latest Ref Range: 31.5 - 36.5 g/dL      31.9 31.4 (L)    RDW Latest Ref Range: 10.0 - 15.0 %      14.9 15.2 (H)    Color Urine Unknown        Yellow            Prescription Medications as of 3/4/2021       Rx Number Disp Refills Start End Last Dispensed Date Next Fill Date Owning Pharmacy    Alcohol Swabs PADS  100 each 3 8/21/2020    [address]    Sig: Use to swab the area of the injection or abram as directed   Per insurance coverage    Class: Local Print    aspirin (ASA) 81 MG EC tablet  90 tablet 3 8/12/2020    [address]    Sig: Take 1 tablet (81 mg) by mouth daily Start tomorrow morning.    Class: Local Print    Route: Oral    atorvastatin (LIPITOR) 40 MG tablet    7/24/2019    [id number] - [place], [place] - [address]    Sig: Take 40 mg by mouth every morning     Class: Historical    Route: Oral    glipiZIDE (GLUCOTROL) 5 MG tablet  90 tablet 3 8/22/2020    [address]    Sig: Take 1 tablet (5 mg) by mouth every morning (before breakfast)    Class: E-Prescribe    Route: Oral    insulin lispro (HUMALOG VIAL) 100 UNIT/ML vial    2/15/2019    [facility] #Brandie Rosario, [place] [id number] [facility] [address]    Sig: Inject 100 Units Subcutaneous daily    Class: Historical    Route: Subcutaneous    liraglutide (VICTOZA) 18 MG/3ML solution  3 mL 3 8/22/2020    [address]    Sig: Inject 0.6 mg Subcutaneous daily    Class: E-Prescribe    Route: Subcutaneous    macitentan (OPSUMIT) 10 MG tablet    4/14/2020        Sig: Take 10 mg by mouth every morning     Class: Historical    Notes to Pharmacy: PA in process, Rx to be sent upon approval    Route: Oral    metoprolol succinate ER (TOPROL-XL) 100 MG 24 hr tablet    2/15/2019    [facility] #Brandie Rosario [address]    Sig: Take 100 mg by mouth every morning     Class: Historical    Route: Oral    NIFEdipine ER (ADALAT CC) 30 MG 24 hr tablet  90 tablet 3 4/17/2020    [facility] #Brandie Rosario [address]    Sig: Take 1 tablet (30 mg) by mouth daily    Class: E-Prescribe    Route: Oral    potassium chloride ER (KLOR-CON M) 20 MEQ CR tablet  140 tablet 3 12/10/2020    [facility] #Brandie Rosario [address]    Sig: Take 40 mEq on even days and 20 mEq on Odd days    Class: E-Prescribe    sildenafil (REVATIO) 20 MG tablet  90 tablet 0 2/9/2021    [facility] #Brandie Rosario [address]    Sig: Take 1 tablet (20 mg) by mouth 3 times daily    Class: No Print Out    Route: Oral    ticagrelor (BRILINTA) 90 MG tablet  180 tablet 3 2/26/2021    [facility] #Brandie Rosario [address]  [place] [address]    Sig: Take 1 tablet (90 mg) by mouth 2 times daily    Class: E-Prescribe    Route: Oral    torsemide (DEMADEX) 20 MG tablet  120 tablet 0 1/5/2021    [facility] [address]    Sig: Take torsemide 40 mg twice daily on even days and 20 mg Once on Odd days    Class: E-Prescribe    insulin pen needle (32G X 4 MM) 32G X 4 MM miscellaneous  100 each 3 8/21/2020    [place], [place] - [address] [place] [address]    Sig: Use as directed by provider  Per insurance coverage    Class: Local Print    Sharps Container MISC  1 each 3 8/21/2020    [address]    Sig: Use as directed to dispose of needles, lancets and other sharps  Per Insurance coverage    Class: Local Print          Thank you for allowing me to participate in the care of your patient.      Sincerely,     Santiago Riojas MD     [facility]    cc:   Santiago Riojas MD  [address] [facility] [address]  [place] [address]

## 2021-03-05 DIAGNOSIS — I50.30 DIASTOLIC HEART FAILURE, NYHA CLASS 1 (H): Primary | ICD-10-CM

## 2021-03-11 ASSESSMENT — ENCOUNTER SYMPTOMS
LEG PAIN: 0
SHORTNESS OF BREATH: 1
PALPITATIONS: 0
FATIGUE: 1
EYE PAIN: 0
EXERCISE INTOLERANCE: 1
COUGH DISTURBING SLEEP: 0
HYPERTENSION: 0
WEIGHT LOSS: 0
INCREASED ENERGY: 0
EYE WATERING: 0
WEIGHT GAIN: 1
EYE REDNESS: 0
NIGHT SWEATS: 0
ORTHOPNEA: 0
WHEEZING: 0
SPUTUM PRODUCTION: 0
SLEEP DISTURBANCES DUE TO BREATHING: 0
HALLUCINATIONS: 0
EYE IRRITATION: 0
POSTURAL DYSPNEA: 0
HYPOTENSION: 0
HEMOPTYSIS: 0
DYSPNEA ON EXERTION: 1
CHILLS: 1
LIGHT-HEADEDNESS: 0
POLYDIPSIA: 0
POLYPHAGIA: 0
ALTERED TEMPERATURE REGULATION: 1
FEVER: 0
DECREASED APPETITE: 0
COUGH: 0
SNORES LOUDLY: 0
SYNCOPE: 0
DOUBLE VISION: 0

## 2021-03-17 DIAGNOSIS — I27.20 PULMONARY HYPERTENSION (H): Primary | ICD-10-CM

## 2021-03-22 ENCOUNTER — TELEPHONE (OUTPATIENT)
Dept: CARDIOLOGY | Facility: CLINIC | Age: 61
End: 2021-03-22

## 2021-03-24 NOTE — PROGRESS NOTES
Date of Service: 03/25/2021      [facility]      Primary PH cardiologist: Dr. Riojas      HPI:  Mr. Isaac is a pleasant 61 year old male with a PMHx including DM2, hypertension, neuropathy, obesity, CKD, central sleep apnea with possible narcolepsy on Bipap, prior methamphetamine use, and coronary artery disease.  He was initially evaluated by Dr. Riojas in Jan 2020 due to exertional syncope. He underwent a RHC at that time showing normal right and left sided filling pressures, but severe PAH, with normal cardiac output. Notably, at that time he had a significant reduction in PA pressures after receiving NTG and verapamil and was placed on nifedipine. Ultimately, last spring he was placed on dual oral therapy with sildenafil and macitentan with no further syncope, though has continued to struggle with shortness of breath and volume overload.     He then underwent a repeat RHC in June 2020. This showed continued severe pulmonary hypertension, along with both elevated right and left filling pressures  (RAP 12, PAP 96/35, mean 57, PCWP 22, Brittanie CI 2.59, and PVR of 5.44 del valle). Echo showed continued preserved EF 60-65%, and normal RV function with no new valvular abnormalities. When I saw him shortly after, weight was 320 lbs at home, and I changed his Lasix to torsemide at 40mg daily.      He returned again in August, with ongoing SAINI unchanged. He went back for a repeat L+RHC In Aug 2020. This showed mildly elevated right sided filling pressures, and again severe PH with moderately elevated left sided filling pressures (RA 15, PAP 98/40, mean 60. PCWP 30 (however normal LVEDP), TD CO/CI 7.37/2.88. PVR 4.44 del valle). The Nipride study showed a slight drop in cardiac output with significant drop in mean PA pressure and PCWP pressure. Notably, he had severe in stent restenosis of a prior proximal LAD stent and received PCI with ARINA to the proximal LAD.      When he had a  virtual follow up with Dr. Riojas the following week, he had apparently been doing better, but abruptly started not feeling well again that day with dizziness and presyncope. He was sent back to the ED. Repeat R+LHC 8/20/2020 showed moderate to severe mid LAD disease and he got another ARINA to the mid LAD with POBA of the D2. Previous LAD stents were patent. At that time PAP was 70/31, mean 40, with PCWP 15. He remained on the torsemide 40mg daily unchanged along with Toprol XL 100mg daily. His macitentan and sildenafil were also continued. His lisinopril was stopped due to renal concerns. Since then, we have continued to periodically adjust his diuretics, which has also been complicated by some renal concerns.       More recently, Hasmukh was hospitalized at [facility] in early January after presenting back with worsening shortness of breath and leg edema once again. He was diuresed, and then underwent a repeat RHC 1/4/2021 again showing moderate to severe PAH with normal right and left sided filling pressures. His cardiac output, however, was preserved (RAP 6, PAP 72/25, mean 41, PCWP 12, CO/CI Brittanie 9.5/3.76, PVR 3.04wu). Notably, weight at time of cath was 315 lbs. His sildenafil was increased just slightly to 25mg TID, and he was discharged on torsemide 40mg alternating every other day with 20mg.      Hasmukh was seen last by Dr. Riojas in early March. At that visit his metoprolol was decreased to 75mg daily due to concerns of chronotropic incompetence. A six minute walk was also recommended to further sort out his oxygen needs.     Today, we are doing an in clinic follow up as planned. Unfortunately, there is some confusions surrounding his medications. He first tells me that he thinks Dr. Riojas told him to change his sildenafil instead of the metoprolol but upon further questioning he thinks maybe he made the appropriate change. Notably, he tells me that he ran out of both his Brilinta and sildenafil for  "about a week around the time of his last visit. He notes that without them he felt much better, and within a day of resuming the Brilinta, he felt much more short of breath and was \"huffing and puffing\" a lot. He tells me he thinks he has resumed the sildenafil without issue. Because of this, he admits he hasn't taken the Brilinta the last few days, as he has a six minute walk scheduled after our visit today and didn't think he would be able to complete it otherwise. He tells me his weight is stable, as is his chronic lower extremity edema, as long as he wraps his legs or wears the compression socks. He denies significant dizziness or presyncope and has not had any new chest pain.     Labs done this morning were reviewed: hemoglobin 13.4, hematocrit 42.0, plt 262. Na 139, K 4.0, BUN 41, SCr 1.95. ALT 19, AST 10, NT-proBNP 822.          CURRENT PULMONARY HYPERTENSION REGIMEN:     PAH Rx: macitentan 10mg daily, sildenafil 20mg TID     Diuretics: torsemide 40mg, with 20mg every third day     Anticoagulation: None        Assessment/Plan:     1. Pulmonary arterial hypertension.              --PAH felt out of proportion to his sleep disordered breathing.Currently he is on sildenafil 20mg TID and Opsumit 10mg daily unchanged. To avoid confusion, my RN will call him later today to do a med rec and confirm his home medication doses.    --He reports worsening shortness of breath which he thinks is related to the Brilinta and hasn't taken the last few days--will switch to Plavix as below. On exam, I also noted minimal breath sounds in his RLL posteriorly. Will check a CXR today as well. For now, I've kept his diuretics as is. I encouraged him to continue leg wraps. We again briefly reviewed the need to maintain a low salt diet. [Addendum; CXR does show a slightly worse right pleural effusion from previous. Will continue to monitor his breathing with changes as below and potentially increase diuretics if necessary.]             "  --He remains compliant with his nightly Bipap as directed.       2. Coronary artery disease.              --Hx prox LAD stenting in Aug 2019. More recently, had severe in stent restenosis and in Aug 2020 received PCI with ARINA to proximal LAD. About a week later he returned with presyncope and dizziness, and repeat angiogram showed moderate to severe mid LAD disease; he received another ARINA to the mid LAD with POBA of the D2. Previous LAD stents were patent.               --He briefly ran out of Brilinta and noticed he felt much better off it. He admits he has not taken it the last few days and his breathing is better off of it.  I stressed the importance of antiplatelet therapy; will switch him to Plavix 75mg daily and asked him to fill and begin this today. Continue ASA 81mg daily as well.               --Continue atorvastatin 40mg daily. Last LDL Jan 2020 within goal at 53.       3. Hypertension.              --BP continues to fluctuate. He was taken off lisinopril previously due to renal concerns, however he does have DM. For now, will continue nifedipine, and diuretics as above. His Toprol XL dose was recently reduced due to possible chronotropic incompetence. Will await six minute walk planned after our visit today.        Follow up plan: Chest xray and six minute walk post visit today. Return in 1 month for video visit with myself.       Orders this Visit:  Orders Placed This Encounter   Procedures     X-ray Chest 2 vws*     Follow-Up with Pulmonary Hypertension Clinic     Orders Placed This Encounter   Medications     clopidogrel (PLAVIX) 75 MG tablet     Sig: Take 1 tablet (75 mg) by mouth daily     Dispense:  90 tablet     Refill:  3     Medications Discontinued During This Encounter   Medication Reason     metoprolol succinate ER (TOPROL-XL) 100 MG 24 hr tablet Discontinued by another Health Care Provider     ticagrelor (BRILINTA) 90 MG tablet Stop at Discharge         CURRENT MEDICATIONS:  Current  Outpatient Medications   Medication Sig Dispense Refill     Alcohol Swabs PADS Use to swab the area of the injection or abram as directed   Per insurance coverage 100 each 3     aspirin (ASA) 81 MG EC tablet Take 1 tablet (81 mg) by mouth daily Start tomorrow morning. 90 tablet 3     atorvastatin (LIPITOR) 40 MG tablet Take 40 mg by mouth every morning        clopidogrel (PLAVIX) 75 MG tablet Take 1 tablet (75 mg) by mouth daily 90 tablet 3     glipiZIDE (GLUCOTROL) 5 MG tablet Take 1 tablet (5 mg) by mouth every morning (before breakfast) 90 tablet 3     insulin lispro (HUMALOG VIAL) 100 UNIT/ML vial Inject 100 Units Subcutaneous daily       insulin pen needle (32G X 4 MM) 32G X 4 MM miscellaneous Use as directed by provider  Per insurance coverage 100 each 3     liraglutide (VICTOZA) 18 MG/3ML solution Inject 0.6 mg Subcutaneous daily (Patient taking differently: Inject 1.2 mg Subcutaneous daily for 5 days. then 1.2 mg daily after) 3 mL 3     macitentan (OPSUMIT) 10 MG tablet Take 1 tablet (10 mg) by mouth every morning 30 tablet 11     metoprolol succinate ER (TOPROL-XL) 25 MG 24 hr tablet Take 3 tablets of 25mgs each dayh 90 tablet 1     NIFEdipine ER (ADALAT CC) 30 MG 24 hr tablet Take 1 tablet (30 mg) by mouth daily 90 tablet 3     potassium chloride ER (KLOR-CON M) 20 MEQ CR tablet Take 40 mEq on even days and 20 mEq on Odd days (Patient taking differently: Take 40 mEq 2 days in a row then  20 mEq for one day) 140 tablet 3     Sharps Container MISC Use as directed to dispose of needles, lancets and other sharps  Per Insurance coverage 1 each 3     sildenafil (REVATIO) 20 MG tablet Take 1 tablet (20 mg) by mouth 3 times daily 90 tablet 0     torsemide (DEMADEX) 20 MG tablet Take torsemide 40 mg twice daily on even days and 20 mg Once on Odd days (Patient taking differently: Take torsemide 80 mg for 2 days and 40 mg one day.) 120 tablet 0       ALLERGIES     Allergies   Allergen Reactions     Brilinta  "[Ticagrelor] Shortness Of Breath       PAST MEDICAL HISTORY:  Past Medical History:   Diagnosis Date     Acute on chronic right-sided heart failure (H) 1/16/2020    Added automatically from request for surgery [id number]     Central sleep apnea      CKD (chronic kidney disease)      DM2 (diabetes mellitus, type 2) (H)      Dyspnea on exertion 1/14/2020    Added automatically from request for surgery [id number]     Heart failure (H) 1/16/2020     Hypertension      Narcolepsy      Neuropathy      Pulmonary hypertension (H) 1/14/2020    Added automatically from request for surgery [id number]         Review of Systems:  Cardiovascular: negative for chest pain, palpitations, orthopnea, pos chronic LE edema  Constitutional: negative for chills, sweats, fevers   Resp: Negative for dyspnea at rest, pos dyspnea on exertion, neg known chronic lung disease  HEENT: Negative for new visual changes, frequent headaches  Gastrointestinal: negative for abdominal pain, diarrhea, blood in stool, nausea, vomiting  Hematologic/lymphatic: negative for current systemic anticoagulation, hx of blood clots  Neurological: negative for focal weakness, LOC, seizures, syncope/presyncope       Physical Exam:  Vitals: BP (!) 152/88 (BP Location: Right arm, Patient Position: Chair, Cuff Size: Adult Large)   Pulse 70   Ht 1.778 m (5' 10\")   Wt 148.8 kg (328 lb)   SpO2 90%   BMI 47.06 kg/m     Wt Readings from Last 4 Encounters:   03/25/21 148.8 kg (328 lb)   03/04/21 (!) 152.9 kg (337 lb)   01/05/21 140.8 kg (310 lb 6.4 oz)   01/02/21 145.2 kg (320 lb)       GEN:  In general, this is an overweight  male in no acute distress on room air.  Patient ambulatory, unaccompanied.   HEENT:  Pupils grossly equal, sclerae nonicteric.   NECK: Supple, trachea midline. Thick neck, difficult to assess JVD, beard.   C/V:  Regular rate and rhythm, no murmur, rub or gallop. Mildly accentuated P2. No S3 or RV heave.   RESP: Respirations are unlabored. No use of " accessory muscles. Diminished aeration in right base posteriorly. No wheezing or rhonchi.   GI: Abdomen soft, nontender, nondistended.   EXTREM: Tight bilateral 1+ edema with compression socks in place. No cyanosis or clubbing.  NEURO: Alert and oriented, cooperative. Gait not formally assessed. No obvious focal deficits.   SKIN: Warm and dry.       Recent Lab Results:  LIPID RESULTS:  Lab Results   Component Value Date    CHOL 145 01/16/2020    HDL 45 01/16/2020    LDL 53 01/16/2020    TRIG 234 (H) 01/16/2020       LIVER ENZYME RESULTS:  Lab Results   Component Value Date    AST 10 03/25/2021    ALT 19 03/25/2021       CBC RESULTS:  Lab Results   Component Value Date    WBC 6.8 03/25/2021    RBC 4.97 03/25/2021    HGB 13.4 03/25/2021    HCT 42.0 03/25/2021    MCV 85 03/25/2021    MCH 27.0 03/25/2021    MCHC 31.9 03/25/2021    RDW 15.6 (H) 03/25/2021     03/25/2021       BMP RESULTS:  Lab Results   Component Value Date     03/25/2021    POTASSIUM 4.0 03/25/2021    CHLORIDE 108 03/25/2021    CO2 27 03/25/2021    ANIONGAP 4 03/25/2021     (H) 03/25/2021    BUN 31 (H) 03/25/2021    CR 1.95 (H) 03/25/2021    GFRESTIMATED 36 (L) 03/25/2021    GFRESTBLACK 42 (L) 03/25/2021    ANNITA 9.2 03/25/2021        Most recent testing:      Echocardiogram  1/3/2021     Interpretation Summary     The visual ejection fraction is estimated at 60-65%. No regional wall motion  abnormalities noted.  The right ventricle is moderately dilated. Mild-moderately decreased right  ventricular systolic function.  Right ventricular systolic pressure could not be approximated due to  inadequate tricuspid regurgitation.  Dilation of the inferior vena cava is present with abnormal respiratory  variation in diameter.  There is no pericardial effusion.     RV function appears mildly worse compared to prior study (was mildly reduced  on that study as well visually).      RHC  1/4/2021  Hemodynamics Mean RA 6 mmHg  PA 72/25 (41  mmHg)  Wedge 12 mmHg Right sided filling pressures are normal. Left sided filling pressures are normal. Moderately elevated pulmonary artery hypertension. Normal cardiac output level.         NYHA Functional Class:  Functional class 3    1--No limitation of activity  2--Slight limitation, ordinary activities may cause symptoms  3--Marked limitation, less than ordinary activities cause symptoms  4--Severe limitation, minimal activity causes symptoms, or symptoms at rest    A total of  40 minutes was spent today performing chart and history review, gathering HPI, physical exam, pre and post visit documentation, and care coordination.      Laurie Peters PA-C  [facility]  Pager [phone]

## 2021-03-25 ENCOUNTER — ANCILLARY PROCEDURE (OUTPATIENT)
Dept: GENERAL RADIOLOGY | Facility: CLINIC | Age: 61
End: 2021-03-25
Attending: PHYSICIAN ASSISTANT
Payer: COMMERCIAL

## 2021-03-25 ENCOUNTER — TELEPHONE (OUTPATIENT)
Dept: CARDIOLOGY | Facility: CLINIC | Age: 61
End: 2021-03-25

## 2021-03-25 ENCOUNTER — OFFICE VISIT (OUTPATIENT)
Dept: CARDIOLOGY | Facility: CLINIC | Age: 61
End: 2021-03-25
Attending: PHYSICIAN ASSISTANT
Payer: COMMERCIAL

## 2021-03-25 VITALS
HEART RATE: 70 BPM | HEIGHT: 70 IN | BODY MASS INDEX: 45.1 KG/M2 | OXYGEN SATURATION: 90 % | DIASTOLIC BLOOD PRESSURE: 88 MMHG | WEIGHT: 315 LBS | SYSTOLIC BLOOD PRESSURE: 152 MMHG

## 2021-03-25 DIAGNOSIS — I50.30 HEART FAILURE WITH PRESERVED EJECTION FRACTION, NYHA CLASS I (H): ICD-10-CM

## 2021-03-25 DIAGNOSIS — I27.20 PULMONARY HYPERTENSION (H): ICD-10-CM

## 2021-03-25 DIAGNOSIS — R06.89 DECREASED BREATH SOUNDS AT RIGHT LUNG BASE: ICD-10-CM

## 2021-03-25 DIAGNOSIS — E66.01 MORBID OBESITY (H): ICD-10-CM

## 2021-03-25 DIAGNOSIS — I25.10 CORONARY ARTERY DISEASE INVOLVING NATIVE HEART WITHOUT ANGINA PECTORIS, UNSPECIFIED VESSEL OR LESION TYPE: Primary | ICD-10-CM

## 2021-03-25 DIAGNOSIS — R06.09 DYSPNEA ON EXERTION: ICD-10-CM

## 2021-03-25 DIAGNOSIS — I50.30 DIASTOLIC HEART FAILURE, NYHA CLASS 1 (H): ICD-10-CM

## 2021-03-25 LAB
6 MIN WALK (FT): 970 FT
6 MIN WALK (M): 296 M
ALBUMIN SERPL-MCNC: 3.1 G/DL (ref 3.4–5)
ALP SERPL-CCNC: 142 U/L (ref 40–150)
ALT SERPL W P-5'-P-CCNC: 19 U/L (ref 0–70)
ANION GAP SERPL CALCULATED.3IONS-SCNC: 4 MMOL/L (ref 3–14)
AST SERPL W P-5'-P-CCNC: 10 U/L (ref 0–45)
BILIRUB SERPL-MCNC: 0.4 MG/DL (ref 0.2–1.3)
BUN SERPL-MCNC: 31 MG/DL (ref 7–30)
CALCIUM SERPL-MCNC: 9.2 MG/DL (ref 8.5–10.1)
CHLORIDE SERPL-SCNC: 108 MMOL/L (ref 94–109)
CO2 SERPL-SCNC: 27 MMOL/L (ref 20–32)
CREAT SERPL-MCNC: 1.95 MG/DL (ref 0.66–1.25)
ERYTHROCYTE [DISTWIDTH] IN BLOOD BY AUTOMATED COUNT: 15.6 % (ref 10–15)
GFR SERPL CREATININE-BSD FRML MDRD: 36 ML/MIN/{1.73_M2}
GLUCOSE SERPL-MCNC: 216 MG/DL (ref 70–99)
HCT VFR BLD AUTO: 42 % (ref 40–53)
HGB BLD-MCNC: 13.4 G/DL (ref 13.3–17.7)
IRON SATN MFR SERPL: 20 % (ref 15–46)
IRON SERPL-MCNC: 56 UG/DL (ref 35–180)
MCH RBC QN AUTO: 27 PG (ref 26.5–33)
MCHC RBC AUTO-ENTMCNC: 31.9 G/DL (ref 31.5–36.5)
MCV RBC AUTO: 85 FL (ref 78–100)
NT-PROBNP SERPL-MCNC: 822 PG/ML (ref 0–125)
PLATELET # BLD AUTO: 262 10E9/L (ref 150–450)
POTASSIUM SERPL-SCNC: 4 MMOL/L (ref 3.4–5.3)
PROT SERPL-MCNC: 7.2 G/DL (ref 6.8–8.8)
RBC # BLD AUTO: 4.97 10E12/L (ref 4.4–5.9)
SODIUM SERPL-SCNC: 139 MMOL/L (ref 133–144)
TIBC SERPL-MCNC: 278 UG/DL (ref 240–430)
WBC # BLD AUTO: 6.8 10E9/L (ref 4–11)

## 2021-03-25 PROCEDURE — 36415 COLL VENOUS BLD VENIPUNCTURE: CPT | Performed by: PATHOLOGY

## 2021-03-25 PROCEDURE — 80053 COMPREHEN METABOLIC PANEL: CPT | Performed by: PATHOLOGY

## 2021-03-25 PROCEDURE — G0463 HOSPITAL OUTPT CLINIC VISIT: HCPCS

## 2021-03-25 PROCEDURE — 83540 ASSAY OF IRON: CPT | Performed by: PATHOLOGY

## 2021-03-25 PROCEDURE — 83880 ASSAY OF NATRIURETIC PEPTIDE: CPT | Performed by: PATHOLOGY

## 2021-03-25 PROCEDURE — 99215 OFFICE O/P EST HI 40 MIN: CPT | Performed by: PHYSICIAN ASSISTANT

## 2021-03-25 PROCEDURE — 94618 PULMONARY STRESS TESTING: CPT

## 2021-03-25 PROCEDURE — 85027 COMPLETE CBC AUTOMATED: CPT | Performed by: PATHOLOGY

## 2021-03-25 PROCEDURE — 83550 IRON BINDING TEST: CPT | Performed by: PATHOLOGY

## 2021-03-25 PROCEDURE — 71046 X-RAY EXAM CHEST 2 VIEWS: CPT | Performed by: RADIOLOGY

## 2021-03-25 RX ORDER — POTASSIUM CHLORIDE 1500 MG/1
TABLET, EXTENDED RELEASE ORAL
Qty: 135 TABLET | Refills: 3 | Status: SHIPPED | OUTPATIENT
Start: 2021-03-25

## 2021-03-25 RX ORDER — TORSEMIDE 20 MG/1
TABLET ORAL
Qty: 135 TABLET | Refills: 3 | Status: SHIPPED | OUTPATIENT
Start: 2021-03-25

## 2021-03-25 RX ORDER — CLOPIDOGREL BISULFATE 75 MG/1
75 TABLET ORAL DAILY
Qty: 90 TABLET | Refills: 3 | Status: SHIPPED | OUTPATIENT
Start: 2021-03-25 | End: 2022-03-07

## 2021-03-25 ASSESSMENT — PAIN SCALES - GENERAL: PAINLEVEL: NO PAIN (0)

## 2021-03-25 ASSESSMENT — MIFFLIN-ST. JEOR: SCORE: 2304.05

## 2021-03-25 NOTE — PATIENT INSTRUCTIONS
Additional Instructions:  Thank you for visiting the Pulmonary Hypertension Clinic today.      Today we discussed:     1. You may STOP the BRILINTA, but you need to  the new medication PLAVIX today. Take 75mg once daily. See if your breathing improves with this change.   2. Chest Xray today before you leave clinic.  3. Our nurse will call you later today to confirm all of your medications.       Follow up Appointment Information:  Return in 1 month to see Laurie via video visit.     1. Continue staying active and eat a heart healthy, low sodium diet.     2. Please keep current list of medications with you at all times.     3. Remember to weigh yourself daily after voiding and write it down on a log. If you have gained/lost 2 pounds overnight or 5 pounds in a week contact us for medication adjustments or further instructions.    4. Please call us immediately if you have syncope (fainting or passing out), chest pain, worsening edema (swelling or weight gain), or general worsening in how you are feeling.       -----------------------------------------------------------------------------------------------------------------    If you have questions or concerns please contact us at:    Senait Berg RN, BSN    Sharron Katz (Schedule,Prior Auth)  Nurse Coordinator     Clinic   Pulmonary Hypertension   Pulmonary Hypertension  [facility]             [facility]  (P)[phone]    (P) [phone]        (F)[phone]                ** Please note that you will NOT receive a reminder call regarding your scheduled testing, reminder calls are for provider appointments only.  If you are scheduled for testing within the [employer] system you may receive a call regarding pre-registration for billing purposes only.**     --------------------------------------------------------------------------------------------------------------    Interested in  joining a support group?    Pulmonary Hypertension Association  Https://www.phassociation.org/  **Look at the Events Tab** They even have Support Groups that you can call into    [facility] Support Group  Second Saturday of the Month from 1-3 PM   Location: [address] [place] [id number]  Leader: Bonnie Pena  Phone: [phone] or [phone]  Email: [email or web address]

## 2021-03-25 NOTE — LETTER
3/25/2021      RE: Jeremiah Isaac  [address] [address]  [place] [facility] [address]       Dear Colleague,    Thank you for the opportunity to participate in the care of your patient, Jeremiah Isaac, at the [facility] [place] [facility]. Please see a copy of my visit note below.        Date of Service: 03/25/2021      [facility] Pulmonary Hypertension [facility]      Primary PH cardiologist: Dr. Riojas      HPI:  Mr. Isaac is a pleasant 61 year old male with a PMHx including DM2, hypertension, neuropathy, obesity, CKD, central sleep apnea with possible narcolepsy on Bipap, prior methamphetamine use, and coronary artery disease.  He was initially evaluated by Dr. Riojas in Jan 2020 due to exertional syncope. He underwent a RHC at that time showing normal right and left sided filling pressures, but severe PAH, with normal cardiac output. Notably, at that time he had a significant reduction in PA pressures after receiving NTG and verapamil and was placed on nifedipine. Ultimately, last spring he was placed on dual oral therapy with sildenafil and macitentan with no further syncope, though has continued to struggle with shortness of breath and volume overload.     He then underwent a repeat RHC in June 2020. This showed continued severe pulmonary hypertension, along with both elevated right and left filling pressures  (RAP 12, PAP 96/35, mean 57, PCWP 22, Brittanie CI 2.59, and PVR of 5.44 del valle). Echo showed continued preserved EF 60-65%, and normal RV function with no new valvular abnormalities. When I saw him shortly after, weight was 320 lbs at home, and I changed his Lasix to torsemide at 40mg daily.      He returned again in August, with ongoing SAINI unchanged. He went back for a repeat L+RHC In Aug 2020. This showed mildly elevated right sided filling pressures, and again severe PH with moderately elevated left sided filling pressures (RA 15, PAP  98/40, mean 60. PCWP 30 (however normal LVEDP), TD CO/CI 7.37/2.88. PVR 4.44 del valle). The Nipride study showed a slight drop in cardiac output with significant drop in mean PA pressure and PCWP pressure. Notably, he had severe in stent restenosis of a prior proximal LAD stent and received PCI with ARINA to the proximal LAD.      When he had a virtual follow up with Dr. Riojas the following week, he had apparently been doing better, but abruptly started not feeling well again that day with dizziness and presyncope. He was sent back to the ED. Repeat R+LHC 8/20/2020 showed moderate to severe mid LAD disease and he got another ARINA to the mid LAD with POBA of the D2. Previous LAD stents were patent. At that time PAP was 70/31, mean 40, with PCWP 15. He remained on the torsemide 40mg daily unchanged along with Toprol XL 100mg daily. His macitentan and sildenafil were also continued. His lisinopril was stopped due to renal concerns. Since then, we have continued to periodically adjust his diuretics, which has also been complicated by some renal concerns.       More recently, Hasmukh was hospitalized at [facility] in early January after presenting back with worsening shortness of breath and leg edema once again. He was diuresed, and then underwent a repeat RHC 1/4/2021 again showing moderate to severe PAH with normal right and left sided filling pressures. His cardiac output, however, was preserved (RAP 6, PAP 72/25, mean 41, PCWP 12, CO/CI Brittanie 9.5/3.76, PVR 3.04wu). Notably, weight at time of cath was 315 lbs. His sildenafil was increased just slightly to 25mg TID, and he was discharged on torsemide 40mg alternating every other day with 20mg.      Hasmukh was seen last by Dr. Riojas in early March. At that visit his metoprolol was decreased to 75mg daily due to concerns of chronotropic incompetence. A six minute walk was also recommended to further sort out his oxygen needs.     Today, we are doing an in clinic follow up  "as planned. Unfortunately, there is some confusions surrounding his medications. He first tells me that he thinks Dr. Riojas told him to change his sildenafil instead of the metoprolol but upon further questioning he thinks maybe he made the appropriate change. Notably, he tells me that he ran out of both his Brilinta and sildenafil for about a week around the time of his last visit. He notes that without them he felt much better, and within a day of resuming the Brilinta, he felt much more short of breath and was \"huffing and puffing\" a lot. He tells me he thinks he has resumed the sildenafil without issue. Because of this, he admits he hasn't taken the Brilinta the last few days, as he has a six minute walk scheduled after our visit today and didn't think he would be able to complete it otherwise. He tells me his weight is stable, as is his chronic lower extremity edema, as long as he wraps his legs or wears the compression socks. He denies significant dizziness or presyncope and has not had any new chest pain.     Labs done this morning were reviewed: hemoglobin 13.4, hematocrit 42.0, plt 262. Na 139, K 4.0, BUN 41, SCr 1.95. ALT 19, AST 10, NT-proBNP 822.          CURRENT PULMONARY HYPERTENSION REGIMEN:     PAH Rx: macitentan 10mg daily, sildenafil 20mg TID     Diuretics: torsemide 40mg, with 20mg every third day     Anticoagulation: None        Assessment/Plan:     1. Pulmonary arterial hypertension.              --PAH felt out of proportion to his sleep disordered breathing.Currently he is on sildenafil 20mg TID and Opsumit 10mg daily unchanged. To avoid confusion, my RN will call him later today to do a med rec and confirm his home medication doses.    --He reports worsening shortness of breath which he thinks is related to the Brilinta and hasn't taken the last few days--will switch to Plavix as below. On exam, I also noted minimal breath sounds in his RLL posteriorly. Will check a CXR today as well. For " now, I've kept his diuretics as is. I encouraged him to continue leg wraps. We again briefly reviewed the need to maintain a low salt diet. [Addendum; CXR does show a slightly worse right pleural effusion from previous. Will continue to monitor his breathing with changes as below and potentially increase diuretics if necessary.]              --He remains compliant with his nightly Bipap as directed.       2. Coronary artery disease.              --Hx prox LAD stenting in Aug 2019. More recently, had severe in stent restenosis and in Aug 2020 received PCI with ARINA to proximal LAD. About a week later he returned with presyncope and dizziness, and repeat angiogram showed moderate to severe mid LAD disease; he received another ARINA to the mid LAD with POBA of the D2. Previous LAD stents were patent.               --He briefly ran out of Brilinta and noticed he felt much better off it. He admits he has not taken it the last few days and his breathing is better off of it.  I stressed the importance of antiplatelet therapy; will switch him to Plavix 75mg daily and asked him to fill and begin this today. Continue ASA 81mg daily as well.               --Continue atorvastatin 40mg daily. Last LDL Jan 2020 within goal at 53.       3. Hypertension.              --BP continues to fluctuate. He was taken off lisinopril previously due to renal concerns, however he does have DM. For now, will continue nifedipine, and diuretics as above. His Toprol XL dose was recently reduced due to possible chronotropic incompetence. Will await six minute walk planned after our visit today.        Follow up plan: Chest xray and six minute walk post visit today. Return in 1 month for video visit with myself.       Orders this Visit:  Orders Placed This Encounter   Procedures     X-ray Chest 2 vws*     Follow-Up with Pulmonary Hypertension Clinic     Orders Placed This Encounter   Medications     clopidogrel (PLAVIX) 75 MG tablet     Sig: Take 1  tablet (75 mg) by mouth daily     Dispense:  90 tablet     Refill:  3     Medications Discontinued During This Encounter   Medication Reason     metoprolol succinate ER (TOPROL-XL) 100 MG 24 hr tablet Discontinued by another Health Care Provider     ticagrelor (BRILINTA) 90 MG tablet Stop at Discharge         CURRENT MEDICATIONS:  Current Outpatient Medications   Medication Sig Dispense Refill     Alcohol Swabs PADS Use to swab the area of the injection or abram as directed   Per insurance coverage 100 each 3     aspirin (ASA) 81 MG EC tablet Take 1 tablet (81 mg) by mouth daily Start tomorrow morning. 90 tablet 3     atorvastatin (LIPITOR) 40 MG tablet Take 40 mg by mouth every morning        clopidogrel (PLAVIX) 75 MG tablet Take 1 tablet (75 mg) by mouth daily 90 tablet 3     glipiZIDE (GLUCOTROL) 5 MG tablet Take 1 tablet (5 mg) by mouth every morning (before breakfast) 90 tablet 3     insulin lispro (HUMALOG VIAL) 100 UNIT/ML vial Inject 100 Units Subcutaneous daily       insulin pen needle (32G X 4 MM) 32G X 4 MM miscellaneous Use as directed by provider  Per insurance coverage 100 each 3     liraglutide (VICTOZA) 18 MG/3ML solution Inject 0.6 mg Subcutaneous daily (Patient taking differently: Inject 1.2 mg Subcutaneous daily for 5 days. then 1.2 mg daily after) 3 mL 3     macitentan (OPSUMIT) 10 MG tablet Take 1 tablet (10 mg) by mouth every morning 30 tablet 11     metoprolol succinate ER (TOPROL-XL) 25 MG 24 hr tablet Take 3 tablets of 25mgs each dayh 90 tablet 1     NIFEdipine ER (ADALAT CC) 30 MG 24 hr tablet Take 1 tablet (30 mg) by mouth daily 90 tablet 3     potassium chloride ER (KLOR-CON M) 20 MEQ CR tablet Take 40 mEq on even days and 20 mEq on Odd days (Patient taking differently: Take 40 mEq 2 days in a row then  20 mEq for one day) 140 tablet 3     Sharps Container MISC Use as directed to dispose of needles, lancets and other sharps  Per Insurance coverage 1 each 3     sildenafil (REVATIO) 20 MG  "tablet Take 1 tablet (20 mg) by mouth 3 times daily 90 tablet 0     torsemide (DEMADEX) 20 MG tablet Take torsemide 40 mg twice daily on even days and 20 mg Once on Odd days (Patient taking differently: Take torsemide 80 mg for 2 days and 40 mg one day.) 120 tablet 0       ALLERGIES     Allergies   Allergen Reactions     Brilinta [Ticagrelor] Shortness Of Breath       PAST MEDICAL HISTORY:  Past Medical History:   Diagnosis Date     Acute on chronic right-sided heart failure (H) 1/16/2020    Added automatically from request for surgery [id number]     Central sleep apnea      CKD (chronic kidney disease)      DM2 (diabetes mellitus, type 2) (H)      Dyspnea on exertion 1/14/2020    Added automatically from request for surgery [id number]     Heart failure (H) 1/16/2020     Hypertension      Narcolepsy      Neuropathy      Pulmonary hypertension (H) 1/14/2020    Added automatically from request for surgery [id number]         Review of Systems:  Cardiovascular: negative for chest pain, palpitations, orthopnea, pos chronic LE edema  Constitutional: negative for chills, sweats, fevers   Resp: Negative for dyspnea at rest, pos dyspnea on exertion, neg known chronic lung disease  HEENT: Negative for new visual changes, frequent headaches  Gastrointestinal: negative for abdominal pain, diarrhea, blood in stool, nausea, vomiting  Hematologic/lymphatic: negative for current systemic anticoagulation, hx of blood clots  Neurological: negative for focal weakness, LOC, seizures, syncope/presyncope       Physical Exam:  Vitals: BP (!) 152/88 (BP Location: Right arm, Patient Position: Chair, Cuff Size: Adult Large)   Pulse 70   Ht 1.778 m (5' 10\")   Wt 148.8 kg (328 lb)   SpO2 90%   BMI 47.06 kg/m     Wt Readings from Last 4 Encounters:   03/25/21 148.8 kg (328 lb)   03/04/21 (!) 152.9 kg (337 lb)   01/05/21 140.8 kg (310 lb 6.4 oz)   01/02/21 145.2 kg (320 lb)       GEN:  In general, this is an overweight  male in no acute " distress on room air.  Patient ambulatory, unaccompanied.   HEENT:  Pupils grossly equal, sclerae nonicteric.   NECK: Supple, trachea midline. Thick neck, difficult to assess JVD, beard.   C/V:  Regular rate and rhythm, no murmur, rub or gallop. Mildly accentuated P2. No S3 or RV heave.   RESP: Respirations are unlabored. No use of accessory muscles. Diminished aeration in right base posteriorly. No wheezing or rhonchi.   GI: Abdomen soft, nontender, nondistended.   EXTREM: Tight bilateral 1+ edema with compression socks in place. No cyanosis or clubbing.  NEURO: Alert and oriented, cooperative. Gait not formally assessed. No obvious focal deficits.   SKIN: Warm and dry.       Recent Lab Results:  LIPID RESULTS:  Lab Results   Component Value Date    CHOL 145 01/16/2020    HDL 45 01/16/2020    LDL 53 01/16/2020    TRIG 234 (H) 01/16/2020       LIVER ENZYME RESULTS:  Lab Results   Component Value Date    AST 10 03/25/2021    ALT 19 03/25/2021       CBC RESULTS:  Lab Results   Component Value Date    WBC 6.8 03/25/2021    RBC 4.97 03/25/2021    HGB 13.4 03/25/2021    HCT 42.0 03/25/2021    MCV 85 03/25/2021    MCH 27.0 03/25/2021    MCHC 31.9 03/25/2021    RDW 15.6 (H) 03/25/2021     03/25/2021       BMP RESULTS:  Lab Results   Component Value Date     03/25/2021    POTASSIUM 4.0 03/25/2021    CHLORIDE 108 03/25/2021    CO2 27 03/25/2021    ANIONGAP 4 03/25/2021     (H) 03/25/2021    BUN 31 (H) 03/25/2021    CR 1.95 (H) 03/25/2021    GFRESTIMATED 36 (L) 03/25/2021    GFRESTBLACK 42 (L) 03/25/2021    ANNITA 9.2 03/25/2021        Most recent testing:      Echocardiogram  1/3/2021     Interpretation Summary     The visual ejection fraction is estimated at 60-65%. No regional wall motion  abnormalities noted.  The right ventricle is moderately dilated. Mild-moderately decreased right  ventricular systolic function.  Right ventricular systolic pressure could not be approximated due to  inadequate tricuspid  regurgitation.  Dilation of the inferior vena cava is present with abnormal respiratory  variation in diameter.  There is no pericardial effusion.     RV function appears mildly worse compared to prior study (was mildly reduced  on that study as well visually).      RHC  1/4/2021  Hemodynamics Mean RA 6 mmHg  PA 72/25 (41 mmHg)  Wedge 12 mmHg Right sided filling pressures are normal. Left sided filling pressures are normal. Moderately elevated pulmonary artery hypertension. Normal cardiac output level.         NYHA Functional Class:  Functional class 3    1--No limitation of activity  2--Slight limitation, ordinary activities may cause symptoms  3--Marked limitation, less than ordinary activities cause symptoms  4--Severe limitation, minimal activity causes symptoms, or symptoms at rest    A total of  40 minutes was spent today performing chart and history review, gathering HPI, physical exam, pre and post visit documentation, and care coordination.      Laurie Peters PA-C  [facility]  Pager [phone]

## 2021-03-25 NOTE — TELEPHONE ENCOUNTER
Called patient and reviewed all medication doses.  patient was taking them all as prescribed, except the Potassium & Torsemide were written in a confusing way, so I updated them both on the med list with more clear instructions. I updated Laurie Peters PA-C that all mds were correct. Lissette Carias RN on 3/25/2021 at 5:12 PM

## 2021-03-25 NOTE — NURSING NOTE
Met with patient and reviewed plan for CXR and follow up in 1  Month.  I was able to get the Xray scheduled for after his 6mwt today.  Reminded patient I would call him later to review all his meds. Patient verbalized understanding, agreed with plan and denied any further questions. Lissette Carias RN on 3/25/2021 at 11:15 AM    ----------------------------  Thank you for visiting the Pulmonary Hypertension Clinic today.      Today we discussed:     1. You may STOP the BRILINTA, but you need to  the new medication PLAVIX today. Take 75mg once daily. See if your breathing improves with this change.   2. Chest Xray today before you leave clinic.  3. Our nurse will call you later today to confirm all of your medications.       Follow up Appointment Information:  Return in 1 month to see Laurie via video visit.

## 2021-03-26 LAB — FIO2-PRE: 21 %

## 2021-04-08 NOTE — TELEPHONE ENCOUNTER
Prior Authorization Approval    Authorization Effective Date: 3/23/2021  Authorization Expiration Date: 3/23/2022  Medication: Opsumit 10mg - Approved  Approved Dose/Quantity: 30 tablets/30 days  Reference #: [id number]   Insurance Company: [employer] - Phone [phone] Fax [phone]  Which Pharmacy is filling the prescription (Not needed for infusion/clinic administered): [address]  Pharmacy Notified:  Yes  ----------------------------  Insurance: [employer]  BIN: [id number]  PCN: [id number]  ID#: [id number]  GRP#: [id number]

## 2021-04-08 NOTE — TELEPHONE ENCOUNTER
PA Initiation    Medication: Opsumit 10mg  Insurance Company: [email or web address] - Phone [phone] Fax [phone]  Pharmacy Filling the Rx: [facility] [address] [place] [address]  Start Date: 3/22/2021    *[facility]

## 2021-04-08 NOTE — TELEPHONE ENCOUNTER
Prior Authorization Not Needed / Previously Approved    Authorization Effective Date: 12/28/2020  Authorization Expiration Date: 1/28/2022  Medication: Sildenafil 20mg - PA Not Needed  Approved Dose/Quantity: 90 tablets/30 days  Reference #: [id number]   Insurance Company: [employer] - Phone [phone] Fax [phone]    Which Pharmacy is filling the prescription (Not needed for infusion/clinic administered): [id number] [address] [facility] [address]  ----------------------------  Insurance: [employer]  BIN: [id number]  PCN: [id number]  ID#: [id number]  GRP#: [id number]

## 2021-04-08 NOTE — TELEPHONE ENCOUNTER
PA Initiation    Medication: Sildenafil 20mg  Insurance Company: [employer] - Phone [phone] Fax [phone]  Pharmacy Filling the Rx: [id number] [address] [facility]  Start Date: 3/22/2021    *Prior authorization form completed and faxed to [employer] for review along with clinic note and right heart catheterization report.

## 2021-04-10 ENCOUNTER — HEALTH MAINTENANCE LETTER (OUTPATIENT)
Age: 61
End: 2021-04-10

## 2021-04-27 ENCOUNTER — VIRTUAL VISIT (OUTPATIENT)
Dept: CARDIOLOGY | Facility: CLINIC | Age: 61
End: 2021-04-27
Attending: PHYSICIAN ASSISTANT
Payer: COMMERCIAL

## 2021-04-27 ENCOUNTER — TELEPHONE (OUTPATIENT)
Dept: CARDIOLOGY | Facility: CLINIC | Age: 61
End: 2021-04-27

## 2021-04-27 DIAGNOSIS — E66.01 MORBID OBESITY (H): ICD-10-CM

## 2021-04-27 DIAGNOSIS — E78.5 DYSLIPIDEMIA: Primary | ICD-10-CM

## 2021-04-27 DIAGNOSIS — R06.09 DYSPNEA ON EXERTION: ICD-10-CM

## 2021-04-27 DIAGNOSIS — I27.20 PULMONARY HYPERTENSION (H): ICD-10-CM

## 2021-04-27 PROCEDURE — 99214 OFFICE O/P EST MOD 30 MIN: CPT | Mod: GT | Performed by: PHYSICIAN ASSISTANT

## 2021-04-27 RX ORDER — METOPROLOL SUCCINATE 25 MG/1
50 TABLET, EXTENDED RELEASE ORAL DAILY
Qty: 90 TABLET | Refills: 3 | Status: SHIPPED | OUTPATIENT
Start: 2021-04-27 | End: 2021-04-28

## 2021-04-27 NOTE — PROGRESS NOTES
CARDIOLOGY PH CLINIC VIDEO VISIT    Date of video visit: 04/27/21      Jeremiah Isaac is a 61 year old male who is being evaluated via a billable video visit.        I have reviewed and updated the patient's Past Medical History, Social History, Family History and Medication List.    MEDICATIONS:  Current Outpatient Medications   Medication Sig Dispense Refill     Alcohol Swabs PADS Use to swab the area of the injection or abram as directed   Per insurance coverage 100 each 3     aspirin (ASA) 81 MG EC tablet Take 1 tablet (81 mg) by mouth daily Start tomorrow morning. 90 tablet 3     atorvastatin (LIPITOR) 40 MG tablet Take 40 mg by mouth every morning        clopidogrel (PLAVIX) 75 MG tablet Take 1 tablet (75 mg) by mouth daily 90 tablet 3     glipiZIDE (GLUCOTROL) 5 MG tablet Take 1 tablet (5 mg) by mouth every morning (before breakfast) 90 tablet 3     insulin lispro (HUMALOG VIAL) 100 UNIT/ML vial Inject 100 Units Subcutaneous daily       insulin pen needle (32G X 4 MM) 32G X 4 MM miscellaneous Use as directed by provider  Per insurance coverage 100 each 3     liraglutide (VICTOZA) 18 MG/3ML solution Inject 0.6 mg Subcutaneous daily (Patient taking differently: Inject 1.2 mg Subcutaneous daily for 5 days. then 1.2 mg daily after) 3 mL 3     macitentan (OPSUMIT) 10 MG tablet Take 1 tablet (10 mg) by mouth every morning 30 tablet 11     metoprolol succinate ER (TOPROL-XL) 25 MG 24 hr tablet Take 3 tablets of 25mgs each dayh 90 tablet 1     NIFEdipine ER (ADALAT CC) 30 MG 24 hr tablet Take 1 tablet (30 mg) by mouth daily 90 tablet 3     potassium chloride ER (KLOR-CON M) 20 MEQ CR tablet Take 2 tabs (40 mEq) 2 days in a row then 1 tab (20 mEq) for one day and continue with this pattern. 135 tablet 3     Sharps Container MISC Use as directed to dispose of needles, lancets and other sharps  Per Insurance coverage 1 each 3     sildenafil (REVATIO) 20 MG tablet Take 1 tablet (20 mg) by mouth 3 times daily 90  tablet 0     torsemide (DEMADEX) 20 MG tablet Take 2 tabs (40mg) 2 days in a row then 1 tab (20mg) for one day and continue with this pattern 135 tablet 3       ALLERGIES  Brilinta [ticagrelor]      Self reported vitals:  Weight: 324#  /78  HR 75 (running in mid 70s)      Brief physical exam:  General: In no acute distress, upright and calm.  Eyes: No apparent redness or discharge.   Chest: No labored breathing, no cough during exam or audible wheezing.   Neuro: No obvious focal defects or tremors.   Psych: Alert and oriented. Does not appear anxious.     The rest of a comprehensive physical examination is deferred due to public health emergency video visit restrictions.       Primary PH cardiologist: Dr. Riojas      HPI:  Mr. Isaac is a pleasant 61 year old male with a PMHx including DM2, hypertension, neuropathy, obesity, CKD, central sleep apnea with possible narcolepsy on Bipap, prior methamphetamine use, and coronary artery disease. He also has pulmonary hypertension and is maintained on dual oral therapy with macitentan and sildenafil.     In January of this year, Hasmukh was hospitalized at [facility] after presenting back with worsening shortness of breath and leg edema. He was diuresed, and then underwent a repeat RHC 1/4/2021 again showing moderate to severe PAH with normal right and left sided filling pressures. His cardiac output, was preserved (RAP 6, PAP 72/25, mean 41, PCWP 12, CO/CI Brittanie 9.5/3.76, PVR 3.04wu). Notably, weight at time of cath was 315 lbs. He was discharged on torsemide 40mg alternating every other day with 20mg. When he followed up with Dr. Riojas in early March, his metoprolol was decreased to 75mg daily due to concerns of chronotropic incompetence. A six minute walk was also recommended to further sort out his oxygen needs, which ultimately showed that his sats remained 90% or greater without supplemental oxygen.    I saw Hasmukh last in late March. He had run out of his  "Brilinta and sildenafil for about a week and noted that he was having less shortness of breath while off these medications. He resumed the sildenafil without issue but when he restarted Brilinta he felt worse. Thus, last visit I transitioned him to Plavix. I also sent him for a CXR which showed a slightly worse right pleural effusion, but we opted to continue to monitor.    Today, we are doing a virtual visit for follow up. He tells me that his breathing is improved off the Brilinta,but says \"it still isn't really good.\" His weight is down a few pounds since we met last and he notes that he is able to go a few days here and there without having to wrap his legs which is an improvement. He denies any new chest pain, palpitations, dizziness, or presyncope. Today he is asking whether we can stop his Toprol as he read about the side effect of fatigue--and he notes that some days he has very little energy to do anything. He tells me he remains compliant with his nightly bipap.     He did not have any new labs drawn prior to our visit today.         CURRENT PULMONARY HYPERTENSION REGIMEN:     PAH Rx: macitentan 10mg daily, sildenafil 20mg TID     Diuretics: torsemide 40mg, with 20mg every third day     Anticoagulation: None        Assessment/Plan:     1. Pulmonary arterial hypertension.              --PAH felt out of proportion to his sleep disordered breathing. Currently he is on sildenafil 20mg TID and Opsumit 10mg daily. No changes made today.   --Weight is stable and he reports some recent improvement in edema. Continue torsemide as is (40mg with 20mg every 3rd day). We reviewed the need to remain active and maintain a low sodium diet. He continues to wrap his legs as well.               --He remains compliant with his nightly Bipap as directed.       2. Coronary artery disease.              --Hx prox LAD stenting in Aug 2019. He developed severe in stent restenosis and in Aug 2020 and received PCI with ARINA to " proximal LAD. About a week later he returned with presyncope and dizziness, and repeat angiogram showed moderate to severe mid LAD disease; he received another ARINA to the mid LAD with POBA of the D2. Previous LAD stents were patent.               --Due to complaints of dyspnea on the Brilinta, last visit I switched him to Plavix. Overall he notes improvement with this. Will continue along with ASA 81mg daily as well.               --Continue atorvastatin 40mg daily. Last LDL Jan 2020 within goal at 53. Will repeat FLP at next follow up visit.                  3. Hypertension.              --BP continues to fluctuate. He was taken off lisinopril previously due to renal concerns, however he does have DM so may reconsdier in the future. For now, continue nifedipine, and diuretics as above. His Toprol XL dose was recently reduced due to possible chronotropic incompetence, recent six min walk shows HR goes to high 80s with exertion, and per patient, are in mid 70s on rest checks at home. Hasmukh is concerned that the Toprol is causing him fatigue; will reach out to Dr. Riojas about possibly reducing this further. [Addendum: D/W Dr Riojas who is ok with reducing Toprol XL further to 50mg daily. Will then do virtual visit in 2 weeks with myself after he makes the change.]    Follow up plan: Return in 3 months to see Dr. Riojas with labs prior.       Testing/labs:    Most recent labs:   Results for HASMUKH MCELROY (MRN [id number]) as of 4/27/2021 10:37   Ref. Range 3/25/2021 10:13   Sodium Latest Ref Range: 133 - 144 mmol/L 139   Potassium Latest Ref Range: 3.4 - 5.3 mmol/L 4.0   Chloride Latest Ref Range: 94 - 109 mmol/L 108   Carbon Dioxide Latest Ref Range: 20 - 32 mmol/L 27   Urea Nitrogen Latest Ref Range: 7 - 30 mg/dL 31 (H)   Creatinine Latest Ref Range: 0.66 - 1.25 mg/dL 1.95 (H)   GFR Estimate Latest Ref Range: >60 mL/min/1.73_m2 36 (L)   GFR Estimate If Black Latest Ref Range: >60 mL/min/1.73_m2 42 (L)   Calcium  Latest Ref Range: 8.5 - 10.1 mg/dL 9.2   Anion Gap Latest Ref Range: 3 - 14 mmol/L 4   Albumin Latest Ref Range: 3.4 - 5.0 g/dL 3.1 (L)   Protein Total Latest Ref Range: 6.8 - 8.8 g/dL 7.2   Bilirubin Total Latest Ref Range: 0.2 - 1.3 mg/dL 0.4   Alkaline Phosphatase Latest Ref Range: 40 - 150 U/L 142   ALT Latest Ref Range: 0 - 70 U/L 19   AST Latest Ref Range: 0 - 45 U/L 10   Iron Latest Ref Range: 35 - 180 ug/dL 56   Iron Binding Cap Latest Ref Range: 240 - 430 ug/dL 278   Iron Saturation Index Latest Ref Range: 15 - 46 % 20   N-Terminal Pro Bnp Latest Ref Range: 0 - 125 pg/mL 822 (H)     Results for TERRI MCELROY (MRN [id number]) as of 4/27/2021 10:37   Ref. Range 3/25/2021 10:13   WBC Latest Ref Range: 4.0 - 11.0 10e9/L 6.8   Hemoglobin Latest Ref Range: 13.3 - 17.7 g/dL 13.4   Hematocrit Latest Ref Range: 40.0 - 53.0 % 42.0   Platelet Count Latest Ref Range: 150 - 450 10e9/L 262       Other recent pertinent testing:  [facility] 1/4/2021  Pressures Phase: Baseline     Time Systolic (mmHg) Diastolic (mmHg) Mean (mmHg) A Wave (mmHg) V Wave (mmHg) EDP (mmHg) Max dp/dt (mmHg/sec) HR (bpm) Content (mL/dL) SAT (%)   RA Pressures  3:52 PM   6    10    7      90        RV Pressures  3:34 PM       682           3:53 PM 73        11     81        PA Pressures  3:54 PM 72    25    41        90        PCW Pressures  3:53 PM   12    15    14      90        AO Pressures  3:56     58    84        91        Blood Flow Results Phase: Baseline     Time Results  Indexed Values (L/min/m2)   QP  3:34 PM 9.53 L/min    3.76      QS  3:34 PM 9.53 L/min    3.76      Blood Oximetry Phase: Baseline     Time Hb  SAT(%)  PO2  Content (mL/dL) PA Sat (%)   PA  3:34 PM  72 %      72      Art  3:34 PM  95 %     13.7       Cardiac Output Phase: Baseline     Time TDCO (L/min) TDCI (L/min/m2) Brittanie C.O. (L/min) Brittanie C.I. (L/min/m2) Brittanie HR (bpm)   Cardiac Output Results  3:34 PM   9.53    3.76       Resistance Results Phase: Baseline     Time PVR   SVR  TPR  TVR  PVR/SVR  TPR/TVR    Resistance Results (Metric)  3:34 .41 dsc-5    654.68 dsc-5    344.13 dsc-5    705.04 dsc-5    0.37    0.49      Resistance Results (Wood)  3:34 PM 3.04 LAMAS    8.19 LAMAS    4.3 LAMAS    8.82 LAMAS    0.37           Echo 1/3/2021     Interpretation Summary     The visual ejection fraction is estimated at 60-65%. No regional wall motion  abnormalities noted.  The right ventricle is moderately dilated. Mild-moderately decreased right  ventricular systolic function.  Right ventricular systolic pressure could not be approximated due to  inadequate tricuspid regurgitation.  Dilation of the inferior vena cava is present with abnormal respiratory  variation in diameter.  There is no pericardial effusion.       NYHA Functional Class:  Functional class 3    1--No limitation of activity  2--Slight limitation, ordinary activities may cause symptoms  3--Marked limitation, less than ordinary activities cause symptoms  4--Severe limitation, minimal activity causes symptoms, or symptoms at rest      Video-Visit Details    Type of service:  Video Visit    Video Start Time: 1148  Video End Time: 1158    An additional 20 minutes was spent today performing chart and history review, pre and post visit documentation, and care coordination.      Originating Location (pt. Location): Home    Distant Location (provider location):  [facility] HEART [facility]-- [facility]    Platform used for Video Visit: Jose Angel Peters PA-C  [facility] [place] [facility]  Pager [phone]

## 2021-04-27 NOTE — LETTER
4/27/2021      RE: Jeremiah Isaac  [id number]  [place] [address]       Dear Colleague,    Thank you for the opportunity to participate in the care of your patient, Jeremiah Isaac, at the [facility]. Please see a copy of my visit note below.          CARDIOLOGY  CLINIC VIDEO VISIT    Date of video visit: 04/27/21      Jeremiah Isaac is a 61 year old male who is being evaluated via a billable video visit.        I have reviewed and updated the patient's Past Medical History, Social History, Family History and Medication List.    MEDICATIONS:  Current Outpatient Medications   Medication Sig Dispense Refill     Alcohol Swabs PADS Use to swab the area of the injection or abram as directed   Per insurance coverage 100 each 3     aspirin (ASA) 81 MG EC tablet Take 1 tablet (81 mg) by mouth daily Start tomorrow morning. 90 tablet 3     atorvastatin (LIPITOR) 40 MG tablet Take 40 mg by mouth every morning        clopidogrel (PLAVIX) 75 MG tablet Take 1 tablet (75 mg) by mouth daily 90 tablet 3     glipiZIDE (GLUCOTROL) 5 MG tablet Take 1 tablet (5 mg) by mouth every morning (before breakfast) 90 tablet 3     insulin lispro (HUMALOG VIAL) 100 UNIT/ML vial Inject 100 Units Subcutaneous daily       insulin pen needle (32G X 4 MM) 32G X 4 MM miscellaneous Use as directed by provider  Per insurance coverage 100 each 3     liraglutide (VICTOZA) 18 MG/3ML solution Inject 0.6 mg Subcutaneous daily (Patient taking differently: Inject 1.2 mg Subcutaneous daily for 5 days. then 1.2 mg daily after) 3 mL 3     macitentan (OPSUMIT) 10 MG tablet Take 1 tablet (10 mg) by mouth every morning 30 tablet 11     metoprolol succinate ER (TOPROL-XL) 25 MG 24 hr tablet Take 3 tablets of 25mgs each dayh 90 tablet 1     NIFEdipine ER (ADALAT CC) 30 MG 24 hr tablet Take 1 tablet (30 mg) by mouth daily 90 tablet 3     potassium chloride ER (KLOR-CON M)  20 MEQ CR tablet Take 2 tabs (40 mEq) 2 days in a row then 1 tab (20 mEq) for one day and continue with this pattern. 135 tablet 3     Sharps Container MISC Use as directed to dispose of needles, lancets and other sharps  Per Insurance coverage 1 each 3     sildenafil (REVATIO) 20 MG tablet Take 1 tablet (20 mg) by mouth 3 times daily 90 tablet 0     torsemide (DEMADEX) 20 MG tablet Take 2 tabs (40mg) 2 days in a row then 1 tab (20mg) for one day and continue with this pattern 135 tablet 3       ALLERGIES  Brilinta [ticagrelor]      Self reported vitals:  Weight: 324#  /78  HR 75 (running in mid 70s)      Brief physical exam:  General: In no acute distress, upright and calm.  Eyes: No apparent redness or discharge.   Chest: No labored breathing, no cough during exam or audible wheezing.   Neuro: No obvious focal defects or tremors.   Psych: Alert and oriented. Does not appear anxious.     The rest of a comprehensive physical examination is deferred due to public health emergency video visit restrictions.       Primary PH cardiologist: Dr. Riojas      HPI:  Mr. Isaac is a pleasant 61 year old male with a PMHx including DM2, hypertension, neuropathy, obesity, CKD, central sleep apnea with possible narcolepsy on Bipap, prior methamphetamine use, and coronary artery disease. He also has pulmonary hypertension and is maintained on dual oral therapy with macitentan and sildenafil.     In January of this year, Hasmukh was hospitalized at [facility] after presenting back with worsening shortness of breath and leg edema. He was diuresed, and then underwent a repeat RHC 1/4/2021 again showing moderate to severe PAH with normal right and left sided filling pressures. His cardiac output, was preserved (RAP 6, PAP 72/25, mean 41, PCWP 12, CO/CI Brittanie 9.5/3.76, PVR 3.04wu). Notably, weight at time of cath was 315 lbs. He was discharged on torsemide 40mg alternating every other day with 20mg. When he followed up with  "Dr. Riojas in early March, his metoprolol was decreased to 75mg daily due to concerns of chronotropic incompetence. A six minute walk was also recommended to further sort out his oxygen needs, which ultimately showed that his sats remained 90% or greater without supplemental oxygen.    I saw Hasmukh vieira in late March. He had run out of his Brilinta and sildenafil for about a week and noted that he was having less shortness of breath while off these medications. He resumed the sildenafil without issue but when he restarted Brilinta he felt worse. Thus, last visit I transitioned him to Plavix. I also sent him for a CXR which showed a slightly worse right pleural effusion, but we opted to continue to monitor.    Today, we are doing a virtual visit for follow up. He tells me that his breathing is improved off the Brilinta,but says \"it still isn't really good.\" His weight is down a few pounds since we met last and he notes that he is able to go a few days here and there without having to wrap his legs which is an improvement. He denies any new chest pain, palpitations, dizziness, or presyncope. Today he is asking whether we can stop his Toprol as he read about the side effect of fatigue--and he notes that some days he has very little energy to do anything. He tells me he remains compliant with his nightly bipap.     He did not have any new labs drawn prior to our visit today.         CURRENT PULMONARY HYPERTENSION REGIMEN:     PAH Rx: macitentan 10mg daily, sildenafil 20mg TID     Diuretics: torsemide 40mg, with 20mg every third day     Anticoagulation: None        Assessment/Plan:     1. Pulmonary arterial hypertension.              --PAH felt out of proportion to his sleep disordered breathing. Currently he is on sildenafil 20mg TID and Opsumit 10mg daily. No changes made today.   --Weight is stable and he reports some recent improvement in edema. Continue torsemide as is (40mg with 20mg every 3rd day). We reviewed the " need to remain active and maintain a low sodium diet. He continues to wrap his legs as well.               --He remains compliant with his nightly Bipap as directed.       2. Coronary artery disease.              --Hx prox LAD stenting in Aug 2019. He developed severe in stent restenosis and in Aug 2020 and received PCI with ARINA to proximal LAD. About a week later he returned with presyncope and dizziness, and repeat angiogram showed moderate to severe mid LAD disease; he received another ARINA to the mid LAD with POBA of the D2. Previous LAD stents were patent.               --Due to complaints of dyspnea on the Brilinta, last visit I switched him to Plavix. Overall he notes improvement with this. Will continue along with ASA 81mg daily as well.               --Continue atorvastatin 40mg daily. Last LDL Jan 2020 within goal at 53. Will repeat FLP at next follow up visit.                  3. Hypertension.              --BP continues to fluctuate. He was taken off lisinopril previously due to renal concerns, however he does have DM so may reconsdier in the future. For now, continue nifedipine, and diuretics as above. His Toprol XL dose was recently reduced due to possible chronotropic incompetence, recent six min walk shows HR goes to high 80s with exertion, and per patient, are in mid 70s on rest checks at home. Hasmukh is concerned that the Toprol is causing him fatigue; will reach out to Dr. Riojas about possibly reducing this further. [Addendum: D/W Dr Riojas who is ok with reducing Toprol XL further to 50mg daily. Will then do virtual visit in 2 weeks with myself after he makes the change.]    Follow up plan: Return in 3 months to see Dr. Riojas with labs prior.       Testing/labs:    Most recent labs:   Results for HASMUKH MCELROY (MRN [id number]) as of 4/27/2021 10:37   Ref. Range 3/25/2021 10:13   Sodium Latest Ref Range: 133 - 144 mmol/L 139   Potassium Latest Ref Range: 3.4 - 5.3 mmol/L 4.0   Chloride Latest  Ref Range: 94 - 109 mmol/L 108   Carbon Dioxide Latest Ref Range: 20 - 32 mmol/L 27   Urea Nitrogen Latest Ref Range: 7 - 30 mg/dL 31 (H)   Creatinine Latest Ref Range: 0.66 - 1.25 mg/dL 1.95 (H)   GFR Estimate Latest Ref Range: >60 mL/min/1.73_m2 36 (L)   GFR Estimate If Black Latest Ref Range: >60 mL/min/1.73_m2 42 (L)   Calcium Latest Ref Range: 8.5 - 10.1 mg/dL 9.2   Anion Gap Latest Ref Range: 3 - 14 mmol/L 4   Albumin Latest Ref Range: 3.4 - 5.0 g/dL 3.1 (L)   Protein Total Latest Ref Range: 6.8 - 8.8 g/dL 7.2   Bilirubin Total Latest Ref Range: 0.2 - 1.3 mg/dL 0.4   Alkaline Phosphatase Latest Ref Range: 40 - 150 U/L 142   ALT Latest Ref Range: 0 - 70 U/L 19   AST Latest Ref Range: 0 - 45 U/L 10   Iron Latest Ref Range: 35 - 180 ug/dL 56   Iron Binding Cap Latest Ref Range: 240 - 430 ug/dL 278   Iron Saturation Index Latest Ref Range: 15 - 46 % 20   N-Terminal Pro Bnp Latest Ref Range: 0 - 125 pg/mL 822 (H)     Results for NAVITERRI CARR (MRN [id number]) as of 4/27/2021 10:37   Ref. Range 3/25/2021 10:13   WBC Latest Ref Range: 4.0 - 11.0 10e9/L 6.8   Hemoglobin Latest Ref Range: 13.3 - 17.7 g/dL 13.4   Hematocrit Latest Ref Range: 40.0 - 53.0 % 42.0   Platelet Count Latest Ref Range: 150 - 450 10e9/L 262       Other recent pertinent testing:  [facility] 1/4/2021  Pressures Phase: Baseline     Time Systolic (mmHg) Diastolic (mmHg) Mean (mmHg) A Wave (mmHg) V Wave (mmHg) EDP (mmHg) Max dp/dt (mmHg/sec) HR (bpm) Content (mL/dL) SAT (%)   RA Pressures  3:52 PM   6    10    7      90        RV Pressures  3:34 PM       682           3:53 PM 73        11     81        PA Pressures  3:54 PM 72    25    41        90        PCW Pressures  3:53 PM   12    15    14      90        AO Pressures  3:56     58    84        91        Blood Flow Results Phase: Baseline     Time Results  Indexed Values (L/min/m2)   QP  3:34 PM 9.53 L/min    3.76      QS  3:34 PM 9.53 L/min    3.76      Blood Oximetry Phase: Baseline     Time Hb   SAT(%)  PO2  Content (mL/dL) PA Sat (%)   PA  3:34 PM  72 %      72      Art  3:34 PM  95 %     13.7       Cardiac Output Phase: Baseline     Time TDCO (L/min) TDCI (L/min/m2) Brittanie C.O. (L/min) Brittanie C.I. (L/min/m2) Brittanie HR (bpm)   Cardiac Output Results  3:34 PM   9.53    3.76       Resistance Results Phase: Baseline     Time PVR  SVR  TPR  TVR  PVR/SVR  TPR/TVR    Resistance Results (Metric)  3:34 .41 dsc-5    654.68 dsc-5    344.13 dsc-5    705.04 dsc-5    0.37    0.49      Resistance Results (Wood)  3:34 PM 3.04 LAMAS    8.19 LAMAS    4.3 LAMAS    8.82 LAMAS    0.37           Echo 1/3/2021     Interpretation Summary     The visual ejection fraction is estimated at 60-65%. No regional wall motion  abnormalities noted.  The right ventricle is moderately dilated. Mild-moderately decreased right  ventricular systolic function.  Right ventricular systolic pressure could not be approximated due to  inadequate tricuspid regurgitation.  Dilation of the inferior vena cava is present with abnormal respiratory  variation in diameter.  There is no pericardial effusion.       NYHA Functional Class:  Functional class 3    1--No limitation of activity  2--Slight limitation, ordinary activities may cause symptoms  3--Marked limitation, less than ordinary activities cause symptoms  4--Severe limitation, minimal activity causes symptoms, or symptoms at rest      Video-Visit Details    Type of service:  Video Visit    Video Start Time: 1148  Video End Time: 1158    An additional 20 minutes was spent today performing chart and history review, pre and post visit documentation, and care coordination.      Originating Location (pt. Location): Home    Distant Location (provider location):  [facility]-- [facility]    Platform used for Video Visit: Jose Angel Peters PA-C  [facility]  Pager [phone]      Hasmukh is a 61 year old who is being evaluated via a billable video visit.      How would you like to  "obtain your AVS? MyChart  If the video visit is dropped, the invitation should be resent by: Send to text [phone]  Will anyone else be joining your video visit? No      Vitals - Patient Reported  Weight (Patient Reported): 147 kg (324 lb)  Height (Patient Reported): 177.8 cm (5' 10\")  BMI (Based on Pt Reported Ht/Wt): 46.49  Pain Score: No Pain (0)  Pain Loc: Chest            Please do not hesitate to contact me if you have any questions/concerns.     Sincerely,     EL Sidhu    "

## 2021-04-27 NOTE — NURSING NOTE
"Per Laurie: Overall stable, no changes today.     Please put in orders for 3 month follow up in clinic with MP, labs prior.     Orders placed and patient marked \"ready for checkout.\" Melody Staley RN on 4/27/2021 at 12:17 PM    "

## 2021-04-27 NOTE — PATIENT INSTRUCTIONS
Medication Changes:  - No medication changes at this time. Please continue current medication regiment.    Patient Instructions:  1. Continue staying active and eat a heart healthy diet.    2. Please keep current list of medications with you at all times.    3. Remember to weigh yourself daily after voiding and before you consume any food or beverages and log the numbers.  If you have gained 2 pounds overnight or 5 pounds in a week contact us immediately for medication adjustments or further instructions.    4. **Please call us immediately if you have any syncope (fainting or passing out), chest pain, edema (swelling or weight gain), or decline in your functional status (general decline in how you are feeling).    Follow up Appointment Information:  - 3 month follow up with labs prior      We are located [facility] the third [facility] on the [facility].  Our address is     [address] [place] [address] [place] [address] [place] [address]    Thank you for allowing us to be a part of your care here at the [facility]    If you have questions or concerns please contact us at:    Irvin Staley RN, BSN   Sharron Katz (Schedule,Prior Auth)  Nurse Coordinator     Clinic   Pulmonary Hypertension   Pulmonary Hypertension  [facility]  [facility]  (Phone)[phone]    (Phone) [phone]        (Fax) [phone]    ** Please note that you will NOT receive a reminder call regarding your scheduled testing, reminder calls are for provider appointments only.  If you are scheduled for testing within the [facility] system you may receive a call regarding pre-registration for billing purposes only.**     Remember to weigh yourself daily after voiding and before you consume any food or beverages and log the numbers.  If you have gained/lost 2 pounds overnight or 5 pounds in a week contact  us immediately for medication adjustments or further instructions.   **Please call us immediately if you have any syncope, chest pain, edema, or decline in your functional status.    Support Group:  Pulmonary Hypertension Association  Https://www.phassociation.org/  **Look at the Events Tab** They even have Support Groups that you can call into    [facility] Support Group  Second Saturday of the Month from 1-3 PM   Location: [address] [place] [address]  Leader: Bonnie Yee and Afshan Pena  Phone: [phone] or [phone]  Email: [email or web address]

## 2021-04-27 NOTE — TELEPHONE ENCOUNTER
Unable to reach patient; left detailed message about decrease Toprol XL dose down to 2 tablets (50 mg) a day. Advised I have scheduled him for another VV follow up with Laurie on May 11 at 9:45 AM to assess medication changes. Melody Staley RN on 4/27/2021 at 1:54 PM    ----- Message from EL Sidhu sent at 4/27/2021  1:38 PM CDT -----  Regarding: toprol xl  After his visit I spoke with MP; Please call pt and let him know that he was ok with reducing toprol further to 50mg daily.  Then do a VV follow up with me again in 2 weeks after the change.     Sachin kincaid

## 2021-04-28 DIAGNOSIS — E66.01 MORBID OBESITY (H): ICD-10-CM

## 2021-04-28 RX ORDER — METOPROLOL SUCCINATE 25 MG/1
50 TABLET, EXTENDED RELEASE ORAL DAILY
Qty: 90 TABLET | Refills: 3 | Status: SHIPPED | OUTPATIENT
Start: 2021-04-28

## 2021-04-28 NOTE — TELEPHONE ENCOUNTER
Updated ERx sent with clarification of Toprol-XL 2 tablets (50 mg) daily. Melody Staley RN on 4/28/2021 at 8:30 AM

## 2021-05-04 ENCOUNTER — TELEPHONE (OUTPATIENT)
Dept: CARDIOLOGY | Facility: CLINIC | Age: 61
End: 2021-05-04

## 2021-05-04 NOTE — TELEPHONE ENCOUNTER
"Spoke with patient regarding metoprolol change; states that he is \"not feeling worse but not feeling much better either.\" Advised that his fatigue has slightly improved but still \"huffs and puffs\" when he goes outside and attempts yard work. Patient verbalized that he will call me if things worsen and aknowledged VV F/U with Laurie next week. Melody Staley RN on 5/4/2021 at 8:16 AM    "

## 2021-05-10 NOTE — PROGRESS NOTES
CARDIOLOGY PH CLINIC VIDEO VISIT    Date of video visit: 05/11/21      Jeremiah Isaac is a 61 year old male who is being evaluated via a billable video visit.        I have reviewed and updated the patient's Past Medical History, Social History, Family History and Medication List.    MEDICATIONS:  Current Outpatient Medications   Medication Sig Dispense Refill     Alcohol Swabs PADS Use to swab the area of the injection or abram as directed   Per insurance coverage 100 each 3     aspirin (ASA) 81 MG EC tablet Take 1 tablet (81 mg) by mouth daily Start tomorrow morning. 90 tablet 3     atorvastatin (LIPITOR) 40 MG tablet Take 40 mg by mouth every morning        clopidogrel (PLAVIX) 75 MG tablet Take 1 tablet (75 mg) by mouth daily 90 tablet 3     glipiZIDE (GLUCOTROL) 5 MG tablet Take 1 tablet (5 mg) by mouth every morning (before breakfast) 90 tablet 3     insulin lispro (HUMALOG VIAL) 100 UNIT/ML vial Inject 100 Units Subcutaneous daily       insulin pen needle (32G X 4 MM) 32G X 4 MM miscellaneous Use as directed by provider  Per insurance coverage 100 each 3     liraglutide (VICTOZA) 18 MG/3ML solution Inject 0.6 mg Subcutaneous daily (Patient taking differently: Inject 1.2 mg Subcutaneous daily for 5 days. then 1.2 mg daily after) 3 mL 3     macitentan (OPSUMIT) 10 MG tablet Take 1 tablet (10 mg) by mouth every morning 30 tablet 11     metoprolol succinate ER (TOPROL-XL) 25 MG 24 hr tablet Take 2 tablets (50 mg) by mouth daily 90 tablet 3     NIFEdipine ER (ADALAT CC) 30 MG 24 hr tablet Take 1 tablet (30 mg) by mouth daily 90 tablet 3     potassium chloride ER (KLOR-CON M) 20 MEQ CR tablet Take 2 tabs (40 mEq) 2 days in a row then 1 tab (20 mEq) for one day and continue with this pattern. 135 tablet 3     Sharps Container MISC Use as directed to dispose of needles, lancets and other sharps  Per Insurance coverage 1 each 3     sildenafil (REVATIO) 20 MG tablet Take 1 tablet (20 mg) by mouth 3 times daily  90 tablet 0     torsemide (DEMADEX) 20 MG tablet Take 2 tabs (40mg) 2 days in a row then 1 tab (20mg) for one day and continue with this pattern 135 tablet 3       ALLERGIES  Brilinta [ticagrelor]      Self reported vitals:  Weight: 325 lb  /77 this AM (but says 130s/70s recently otherwise)  HR 78 bpm    Brief physical exam:  General: In no acute distress, upright and calm.  Eyes: No apparent redness or discharge.   Chest: No labored breathing, no cough during exam or audible wheezing.   Neuro: No obvious focal defects or tremors.   Psych: Alert and oriented. Does not appear anxious.     The rest of a comprehensive physical examination is deferred due to public health emergency video visit restrictions.       Primary PH cardiologist: Dr. Riojas      HPI:  Mr. Isaac is a pleasant 61 year old male with a PMHx including DM2, hypertension, neuropathy, obesity, CKD, central sleep apnea with possible narcolepsy on Bipap, prior methamphetamine use, and coronary artery disease. He also has pulmonary hypertension and is maintained on dual oral therapy with macitentan and sildenafil.      In January of this year, Hasmukh was hospitalized at [facility] after presenting back with worsening shortness of breath and leg edema. He was diuresed, and then underwent a repeat RHC 1/4/2021 again showing moderate to severe PAH with normal right and left sided filling pressures. His cardiac output, was preserved (RAP 6, PAP 72/25, mean 41, PCWP 12, CO/CI Brittanie 9.5/3.76, PVR 3.04wu). Notably, weight at time of cath was 315 lbs. He was discharged on torsemide 40mg alternating every other day with 20mg. When he followed up with Dr. Riojas in early March, his metoprolol was decreased to 75mg daily due to concerns of chronotropic incompetence. A six minute walk was also recommended to further sort out his oxygen needs, which ultimately showed that his sats remained 90% or greater without supplemental oxygen. His highest heart  "rate was 91.      I saw Hasmukh in late March. He had run out of his Brilinta and sildenafil for about a week and noted that he was having less shortness of breath while off these medications. He resumed the sildenafil without issue but when he restarted Brilinta he felt worse. Thus, I transitioned him to Plavix. I also sent him for a CXR which showed a slightly worse right pleural effusion, but we opted to continue to monitor. When I followed up with him virtually a few weeks ago, he said the breathing was somewhat better off the Brilinta, but still \"not very good.\" He was worried the Toprol XL was causing him fatigue. I discussed with Dr. Riojas, who was agreeable to reducing the dose to 50mg (from 75mg) daily.     Today, we are doing another virtual follow up. He tells me that he has noticed some improvement in his energy but not a dramatic change. He still gets winded easily while doing things like mowing the lawn or walking in stores (he will have to stop and rest or takes a motorized cart while shopping). He denies any new chest pain, palpitations, dizziness, or presyncope. He has \"good days and bad days\" but main complaint remains lack of energy and fatigue. He tells me he remains compliant with his nightly bipap.      He did not have any new labs drawn prior to our visit today.         CURRENT PULMONARY HYPERTENSION REGIMEN:     PAH Rx: macitentan 10mg daily, sildenafil 20mg TID     Diuretics: torsemide 40mg, with 20mg every third day     Anticoagulation: None        Assessment/Plan:     1. Pulmonary arterial hypertension.              --PAH felt out of proportion to his sleep disordered breathing. Currently he is on sildenafil 20mg TID and Opsumit 10mg daily. No changes made today.              --Weight is stable, and he reports no worsening in edema. Continue torsemide as is (40mg with 20mg every 3rd day). We reviewed the need to remain active and maintain a low sodium diet. He continues to wrap his legs " periodically as well.               --He remains compliant with his nightly Bipap as directed.       2. Coronary artery disease.              --Hx prox LAD stenting in Aug 2019. He developed severe in stent restenosis and in Aug 2020 and received PCI with ARINA to proximal LAD. About a week later he returned with presyncope and dizziness, and repeat angiogram showed moderate to severe mid LAD disease; he received another ARINA to the mid LAD with POBA of the D2. Previous LAD stents were patent.               --Due to complaints of dyspnea on the Brilinta, I switched him to Plavix. Overall he noteed some improvement with this. Will continue along with ASA 81mg daily as well.               --Continue atorvastatin 40mg daily. Last LDL Jan 2020 within goal at 53. Will repeat FLP at next follow up visit.                  3. Hypertension.              --He reports recent BPs mostly 130s systolic. He was taken off lisinopril previously due to renal concerns, however he does have DM so may reconsider in the future. For now, continue nifedipine, and diuretics as above. We have been slowly reducing his Toprol XL, and he does note some mild improvement in his energy. There is concern for chronotropic incompetence as well, as last six min walk shows HR goes only to 90-91 with exertion. Will try to come down further today in his Toprol to 25mg daily. I asked him to check his BP at home daily and call if SBP routinely in the 140s or greater. He will also start checking heart rate/saturation more frequently with rest and exertion, and report back next visit.    Follow up plan: Return in 2-3 months to see Dr. Riojas with fasting labs and repeat six minute walk.       Testing/labs:    Most recent labs:   Results for TERRI MCELROY (MRN [id number]) as of 5/11/2021 09:16   Ref. Range 3/25/2021 10:13   Sodium Latest Ref Range: 133 - 144 mmol/L 139   Potassium Latest Ref Range: 3.4 - 5.3 mmol/L 4.0   Chloride Latest Ref Range: 94 - 109  mmol/L 108   Carbon Dioxide Latest Ref Range: 20 - 32 mmol/L 27   Urea Nitrogen Latest Ref Range: 7 - 30 mg/dL 31 (H)   Creatinine Latest Ref Range: 0.66 - 1.25 mg/dL 1.95 (H)   GFR Estimate Latest Ref Range: >60 mL/min/1.73_m2 36 (L)   GFR Estimate If Black Latest Ref Range: >60 mL/min/1.73_m2 42 (L)   Calcium Latest Ref Range: 8.5 - 10.1 mg/dL 9.2   Anion Gap Latest Ref Range: 3 - 14 mmol/L 4   Albumin Latest Ref Range: 3.4 - 5.0 g/dL 3.1 (L)   Protein Total Latest Ref Range: 6.8 - 8.8 g/dL 7.2   Bilirubin Total Latest Ref Range: 0.2 - 1.3 mg/dL 0.4   Alkaline Phosphatase Latest Ref Range: 40 - 150 U/L 142   ALT Latest Ref Range: 0 - 70 U/L 19   AST Latest Ref Range: 0 - 45 U/L 10   Iron Latest Ref Range: 35 - 180 ug/dL 56   Iron Binding Cap Latest Ref Range: 240 - 430 ug/dL 278   Iron Saturation Index Latest Ref Range: 15 - 46 % 20   N-Terminal Pro Bnp Latest Ref Range: 0 - 125 pg/mL 822 (H)   Glucose Latest Ref Range: 70 - 99 mg/dL 216 (H)   WBC Latest Ref Range: 4.0 - 11.0 10e9/L 6.8   Hemoglobin Latest Ref Range: 13.3 - 17.7 g/dL 13.4   Hematocrit Latest Ref Range: 40.0 - 53.0 % 42.0   Platelet Count Latest Ref Range: 150 - 450 10e9/L 262         Other recent pertinent testing:  Echo 1/3/2021  Interpretation Summary     The visual ejection fraction is estimated at 60-65%. No regional wall motion  abnormalities noted.  The right ventricle is moderately dilated. Mild-moderately decreased right  ventricular systolic function.  Right ventricular systolic pressure could not be approximated due to  inadequate tricuspid regurgitation.  Dilation of the inferior vena cava is present with abnormal respiratory  variation in diameter.  There is no pericardial effusion.     RV function appears mildly worse compared to prior study (was mildly reduced  on that study as well visually).      NYHA Functional Class:  Functional class 3    1--No limitation of activity  2--Slight limitation, ordinary activities may cause  symptoms  3--Marked limitation, less than ordinary activities cause symptoms  4--Severe limitation, minimal activity causes symptoms, or symptoms at rest      Video-Visit Details    Type of service:  Video Visit    Video Start Time: 0925  Video End Time: 0936    An additional 20 minutes was spent today performing chart and history review, pre and post visit documentation, and care coordination.      Originating Location (pt. Location): Home    Distant Location (provider location):  [facility]    Platform used for Video Visit: Doximity    75674 20-29 min  44162  30-39 min  24758 40-54 min      Laurie Peters PA-C  [facility]  Pager [phone]

## 2021-05-11 ENCOUNTER — VIRTUAL VISIT (OUTPATIENT)
Dept: CARDIOLOGY | Facility: CLINIC | Age: 61
End: 2021-05-11
Attending: PHYSICIAN ASSISTANT
Payer: COMMERCIAL

## 2021-05-11 ENCOUNTER — TELEPHONE (OUTPATIENT)
Dept: CARDIOLOGY | Facility: CLINIC | Age: 61
End: 2021-05-11

## 2021-05-11 DIAGNOSIS — R06.02 SOB (SHORTNESS OF BREATH): ICD-10-CM

## 2021-05-11 DIAGNOSIS — I27.20 PULMONARY HYPERTENSION (H): ICD-10-CM

## 2021-05-11 DIAGNOSIS — I27.20 PULMONARY HYPERTENSION (H): Primary | ICD-10-CM

## 2021-05-11 DIAGNOSIS — R06.09 DYSPNEA ON EXERTION: ICD-10-CM

## 2021-05-11 PROCEDURE — 99214 OFFICE O/P EST MOD 30 MIN: CPT | Mod: GT | Performed by: PHYSICIAN ASSISTANT

## 2021-05-11 NOTE — PROGRESS NOTES
"Hasmukh is a 61 year old who is being evaluated via a billable video visit.      How would you like to obtain your AVS? MyChart  If the video visit is dropped, the invitation should be resent by: Text to cell phone: [phone]  Will anyone else be joining your video visit? No    Vitals - Patient Reported  Weight (Patient Reported): 147.4 kg (325 lb)  Height (Patient Reported): 177.8 cm (5' 10\")  BMI (Based on Pt Reported Ht/Wt): 46.63  Pain Score: No Pain (0)  Pain Loc: Chest        "

## 2021-05-11 NOTE — TELEPHONE ENCOUNTER
Orders placed and staff message sent to Sharron to help schedule. Melody Staley RN on 5/11/2021 at 10:00 AM      ----- Message from Melody Staley RN sent at 5/11/2021  9:49 AM CDT -----  Regarding: FW: VV update    ----- Message -----  From: Laurie Peters PA  Sent: 5/11/2021   9:38 AM CDT  To: Cardiology Ph Nurse-  Subject: VV update                                        I reduced his Toprol XL a bit further today to 25mg daily to see if this helps his fatigue, with maybe some chronotropic incompetence.  I asked him to check his BP and heart rates at home daily and call if  or more routinely.     There are already orders in for him to see MP in July, which I'd like to keep but please add a six min walk to that request.    Thx

## 2021-05-11 NOTE — PATIENT INSTRUCTIONS
Thank you for visiting the Pulmonary Hypertension Clinic today.      Today we discussed:     1. REDUCE your Toprol XL to 25mg (one tablet) once daily to see if this continues to help your energy.  2. Check your BP at home daily, and keep a log. Call if your top number is in the 140s routinely. You can follow your heart rates as well: 60-70s at rest is good, but when you exert yourself, going into the low 100s should be reasonable.       Follow up Appointment Information:  Return to see Dr. Riojas in July, with FASTING labs (we will recheck your cholesterol) and another walk test.       Additional Instructions:    1. Continue staying active and eat a heart healthy, low sodium diet.     2. Please keep current list of medications with you at all times.     3. Remember to weigh yourself daily after voiding and write it down on a log. If you have gained/lost 2 pounds overnight or 5 pounds in a week contact us for medication adjustments or further instructions.    4. Please call us immediately if you have syncope (fainting or passing out), chest pain, worsening edema (swelling or weight gain), or general worsening in how you are feeling.       -----------------------------------------------------------------------------------------------------------------    If you have questions or concerns please contact us at:    Senait Berg, RN, BSN    Sharron Katz (Schedule,Prior Auth)  Nurse Coordinator     Clinic   Pulmonary Hypertension   Pulmonary Hypertension  [facility]             [facility]  (P)[phone]    (P) [phone]        (F)[phone]                ** Please note that you will NOT receive a reminder call regarding your scheduled testing, reminder calls are for provider appointments only.  If you are scheduled for testing within the [facility] system you may receive a call regarding pre-registration for billing purposes only.**      --------------------------------------------------------------------------------------------------------------    Interested in joining a support group?    Pulmonary Hypertension Association  Https://www.phassociation.org/  **Look at the Events Tab** They even have Support Groups that you can call into    [facility] Support Group  Second Saturday of the Month from 1-3 PM   Location: [address] [place] [address]  Leader: Bonnie Pnea  Phone: [phone] or [phone]  Email: [email or web address]

## 2021-05-11 NOTE — LETTER
5/11/2021      RE: Jeremiah Isaac  [id number] [facility] [address]  [facility] [address]       Dear Colleague,    Thank you for the opportunity to participate in the care of your patient, Jeremiah Isaac, at the [facility] [place] at [facility]. Please see a copy of my visit note below.          CARDIOLOGY  CLINIC VIDEO VISIT    Date of video visit: 05/11/21      Jeremiah Isaac is a 61 year old male who is being evaluated via a billable video visit.        I have reviewed and updated the patient's Past Medical History, Social History, Family History and Medication List.    MEDICATIONS:  Current Outpatient Medications   Medication Sig Dispense Refill     Alcohol Swabs PADS Use to swab the area of the injection or abram as directed   Per insurance coverage 100 each 3     aspirin (ASA) 81 MG EC tablet Take 1 tablet (81 mg) by mouth daily Start tomorrow morning. 90 tablet 3     atorvastatin (LIPITOR) 40 MG tablet Take 40 mg by mouth every morning        clopidogrel (PLAVIX) 75 MG tablet Take 1 tablet (75 mg) by mouth daily 90 tablet 3     glipiZIDE (GLUCOTROL) 5 MG tablet Take 1 tablet (5 mg) by mouth every morning (before breakfast) 90 tablet 3     insulin lispro (HUMALOG VIAL) 100 UNIT/ML vial Inject 100 Units Subcutaneous daily       insulin pen needle (32G X 4 MM) 32G X 4 MM miscellaneous Use as directed by provider  Per insurance coverage 100 each 3     liraglutide (VICTOZA) 18 MG/3ML solution Inject 0.6 mg Subcutaneous daily (Patient taking differently: Inject 1.2 mg Subcutaneous daily for 5 days. then 1.2 mg daily after) 3 mL 3     macitentan (OPSUMIT) 10 MG tablet Take 1 tablet (10 mg) by mouth every morning 30 tablet 11     metoprolol succinate ER (TOPROL-XL) 25 MG 24 hr tablet Take 2 tablets (50 mg) by mouth daily 90 tablet 3     NIFEdipine ER (ADALAT CC) 30 MG 24 hr tablet Take 1 tablet (30 mg) by mouth daily 90 tablet 3     potassium chloride ER  (KLOR-CON M) 20 MEQ CR tablet Take 2 tabs (40 mEq) 2 days in a row then 1 tab (20 mEq) for one day and continue with this pattern. 135 tablet 3     Sharps Container MISC Use as directed to dispose of needles, lancets and other sharps  Per Insurance coverage 1 each 3     sildenafil (REVATIO) 20 MG tablet Take 1 tablet (20 mg) by mouth 3 times daily 90 tablet 0     torsemide (DEMADEX) 20 MG tablet Take 2 tabs (40mg) 2 days in a row then 1 tab (20mg) for one day and continue with this pattern 135 tablet 3       ALLERGIES  Brilinta [ticagrelor]      Self reported vitals:  Weight: 325 lb  /77 this AM (but says 130s/70s recently otherwise)  HR 78 bpm    Brief physical exam:  General: In no acute distress, upright and calm.  Eyes: No apparent redness or discharge.   Chest: No labored breathing, no cough during exam or audible wheezing.   Neuro: No obvious focal defects or tremors.   Psych: Alert and oriented. Does not appear anxious.     The rest of a comprehensive physical examination is deferred due to public health emergency video visit restrictions.       Primary PH cardiologist: Dr. Riojas      HPI:  Mr. Isaac is a pleasant 61 year old male with a PMHx including DM2, hypertension, neuropathy, obesity, CKD, central sleep apnea with possible narcolepsy on Bipap, prior methamphetamine use, and coronary artery disease. He also has pulmonary hypertension and is maintained on dual oral therapy with macitentan and sildenafil.      In January of this year, Hasmukh was hospitalized at [facility] after presenting back with worsening shortness of breath and leg edema. He was diuresed, and then underwent a repeat RHC 1/4/2021 again showing moderate to severe PAH with normal right and left sided filling pressures. His cardiac output, was preserved (RAP 6, PAP 72/25, mean 41, PCWP 12, CO/CI Brittanie 9.5/3.76, PVR 3.04wu). Notably, weight at time of cath was 315 lbs. He was discharged on torsemide 40mg alternating every  "other day with 20mg. When he followed up with Dr. Riojas in early March, his metoprolol was decreased to 75mg daily due to concerns of chronotropic incompetence. A six minute walk was also recommended to further sort out his oxygen needs, which ultimately showed that his sats remained 90% or greater without supplemental oxygen. His highest heart rate was 91.      I saw Hasmukh in late March. He had run out of his Brilinta and sildenafil for about a week and noted that he was having less shortness of breath while off these medications. He resumed the sildenafil without issue but when he restarted Brilinta he felt worse. Thus, I transitioned him to Plavix. I also sent him for a CXR which showed a slightly worse right pleural effusion, but we opted to continue to monitor. When I followed up with him virtually a few weeks ago, he said the breathing was somewhat better off the Brilinta, but still \"not very good.\" He was worried the Toprol XL was causing him fatigue. I discussed with Dr. Riojas, who was agreeable to reducing the dose to 50mg (from 75mg) daily.     Today, we are doing another virtual follow up. He tells me that he has noticed some improvement in his energy but not a dramatic change. He still gets winded easily while doing things like mowing the lawn or walking in stores (he will have to stop and rest or takes a motorized cart while shopping). He denies any new chest pain, palpitations, dizziness, or presyncope. He has \"good days and bad days\" but main complaint remains lack of energy and fatigue. He tells me he remains compliant with his nightly bipap.      He did not have any new labs drawn prior to our visit today.         CURRENT PULMONARY HYPERTENSION REGIMEN:     PAH Rx: macitentan 10mg daily, sildenafil 20mg TID     Diuretics: torsemide 40mg, with 20mg every third day     Anticoagulation: None        Assessment/Plan:     1. Pulmonary arterial hypertension.              --PAH felt out of proportion to " his sleep disordered breathing. Currently he is on sildenafil 20mg TID and Opsumit 10mg daily. No changes made today.              --Weight is stable, and he reports no worsening in edema. Continue torsemide as is (40mg with 20mg every 3rd day). We reviewed the need to remain active and maintain a low sodium diet. He continues to wrap his legs periodically as well.               --He remains compliant with his nightly Bipap as directed.       2. Coronary artery disease.              --Hx prox LAD stenting in Aug 2019. He developed severe in stent restenosis and in Aug 2020 and received PCI with ARINA to proximal LAD. About a week later he returned with presyncope and dizziness, and repeat angiogram showed moderate to severe mid LAD disease; he received another ARINA to the mid LAD with POBA of the D2. Previous LAD stents were patent.               --Due to complaints of dyspnea on the Brilinta, I switched him to Plavix. Overall he noteed some improvement with this. Will continue along with ASA 81mg daily as well.               --Continue atorvastatin 40mg daily. Last LDL Jan 2020 within goal at 53. Will repeat FLP at next follow up visit.                  3. Hypertension.              --He reports recent BPs mostly 130s systolic. He was taken off lisinopril previously due to renal concerns, however he does have DM so may reconsider in the future. For now, continue nifedipine, and diuretics as above. We have been slowly reducing his Toprol XL, and he does note some mild improvement in his energy. There is concern for chronotropic incompetence as well, as last six min walk shows HR goes only to 90-91 with exertion. Will try to come down further today in his Toprol to 25mg daily. I asked him to check his BP at home daily and call if SBP routinely in the 140s or greater. He will also start checking heart rate/saturation more frequently with rest and exertion, and report back next visit.    Follow up plan: Return in 2-3  months to see Dr. Riojas with fasting labs and repeat six minute walk.       Testing/labs:    Most recent labs:   Results for TERRI MCELROY (MRN [id number]) as of 5/11/2021 09:16   Ref. Range 3/25/2021 10:13   Sodium Latest Ref Range: 133 - 144 mmol/L 139   Potassium Latest Ref Range: 3.4 - 5.3 mmol/L 4.0   Chloride Latest Ref Range: 94 - 109 mmol/L 108   Carbon Dioxide Latest Ref Range: 20 - 32 mmol/L 27   Urea Nitrogen Latest Ref Range: 7 - 30 mg/dL 31 (H)   Creatinine Latest Ref Range: 0.66 - 1.25 mg/dL 1.95 (H)   GFR Estimate Latest Ref Range: >60 mL/min/1.73_m2 36 (L)   GFR Estimate If Black Latest Ref Range: >60 mL/min/1.73_m2 42 (L)   Calcium Latest Ref Range: 8.5 - 10.1 mg/dL 9.2   Anion Gap Latest Ref Range: 3 - 14 mmol/L 4   Albumin Latest Ref Range: 3.4 - 5.0 g/dL 3.1 (L)   Protein Total Latest Ref Range: 6.8 - 8.8 g/dL 7.2   Bilirubin Total Latest Ref Range: 0.2 - 1.3 mg/dL 0.4   Alkaline Phosphatase Latest Ref Range: 40 - 150 U/L 142   ALT Latest Ref Range: 0 - 70 U/L 19   AST Latest Ref Range: 0 - 45 U/L 10   Iron Latest Ref Range: 35 - 180 ug/dL 56   Iron Binding Cap Latest Ref Range: 240 - 430 ug/dL 278   Iron Saturation Index Latest Ref Range: 15 - 46 % 20   N-Terminal Pro Bnp Latest Ref Range: 0 - 125 pg/mL 822 (H)   Glucose Latest Ref Range: 70 - 99 mg/dL 216 (H)   WBC Latest Ref Range: 4.0 - 11.0 10e9/L 6.8   Hemoglobin Latest Ref Range: 13.3 - 17.7 g/dL 13.4   Hematocrit Latest Ref Range: 40.0 - 53.0 % 42.0   Platelet Count Latest Ref Range: 150 - 450 10e9/L 262         Other recent pertinent testing:  Echo 1/3/2021  Interpretation Summary     The visual ejection fraction is estimated at 60-65%. No regional wall motion  abnormalities noted.  The right ventricle is moderately dilated. Mild-moderately decreased right  ventricular systolic function.  Right ventricular systolic pressure could not be approximated due to  inadequate tricuspid regurgitation.  Dilation of the inferior vena cava is  present with abnormal respiratory  variation in diameter.  There is no pericardial effusion.     RV function appears mildly worse compared to prior study (was mildly reduced  on that study as well visually).      NYHA Functional Class:  Functional class 3    1--No limitation of activity  2--Slight limitation, ordinary activities may cause symptoms  3--Marked limitation, less than ordinary activities cause symptoms  4--Severe limitation, minimal activity causes symptoms, or symptoms at rest      Video-Visit Details    Type of service:  Video Visit    Video Start Time: 0925  Video End Time: 0936    An additional 20 minutes was spent today performing chart and history review, pre and post visit documentation, and care coordination.      Originating Location (pt. Location): Home    Distant Location (provider location):  [facility]    Platform used for Video Visit: Doximity    44539 20-29 min  [id number]  30-39 min  48770 40-54 min      Laurie Peters PA-C  [facility]  Pager [phone]

## 2021-05-15 DIAGNOSIS — I27.20 PULMONARY HYPERTENSION (H): ICD-10-CM

## 2021-05-17 RX ORDER — NIFEDIPINE 30 MG
30 TABLET, EXTENDED RELEASE ORAL DAILY
Qty: 90 TABLET | Refills: 0 | Status: SHIPPED | OUTPATIENT
Start: 2021-05-17 | End: 2021-10-25

## 2021-05-17 NOTE — TELEPHONE ENCOUNTER
NIFEdipine ER Oral Tablet Extended Release 24 Hour 30 MG      Last Written Prescription Date:  4/17/20  Last Fill Quantity: 90,   # refills: 3  Last Office Visit : Laurie Peters PA  Cardiovascular Disease  5/11/2021  [facility]  Recommended 2-3 month follow up  Future Office visit:  None scheduled    Routing refill request to provider for review/approval because:  Blood pressure out of range   BP Readings from Last 3 Encounters:   03/25/21 (!) 152/88   03/04/21 (!) 158/78   02/24/21 (!) 161/85     Elevated creatinine  Lab Test 03/25/21  1013   CR 1.95*     Results discussed directly with patient by provider while patient was present in clinic.  Any further details documented in the note.   Melody Staley RN    90 day refill provided per protocol, routed to cardiology    Sildenafil Citrate Oral Tablet 20 MG      Last Written Prescription Date:  2/9/21  Last Fill Quantity: 90,   # refills: 0  Last Office Visit : Last Office Visit : Laurie Peters PA  Cardiovascular Disease  5/11/2021  [facility]  Recommended 2-3 month follow up  Future Office visit:  None scheduled      Routing refill request to provider for review/approval because:  Drug not on cardiology refill protocol

## 2021-05-18 RX ORDER — SILDENAFIL CITRATE 20 MG/1
20 TABLET ORAL 3 TIMES DAILY
Qty: 270 TABLET | Refills: 3 | Status: SHIPPED | OUTPATIENT
Start: 2021-05-18

## 2021-05-18 NOTE — TELEPHONE ENCOUNTER
Medication Refill double check:    Last virtual visit was on 5/11/21 with Laurie Peters PA-C.    Follow up was recommended for 2-3 months.    Any additional encounters with changes to requested med? no    Authorizing provider is: Dr. morgan Mujica was approved.     Additional orders/notes:       Lissette Carias RN on 5/18/2021 at 11:41 AM

## 2021-05-25 DIAGNOSIS — E66.01 MORBID OBESITY (H): ICD-10-CM

## 2021-05-26 RX ORDER — METOPROLOL SUCCINATE 25 MG/1
50 TABLET, EXTENDED RELEASE ORAL DAILY
Qty: 90 TABLET | Refills: 3 | OUTPATIENT
Start: 2021-05-26

## 2021-05-26 NOTE — TELEPHONE ENCOUNTER
metoprolol succinate ER (TOPROL-XL) 25 MG 24 hr tabletTake 2 tablets (50 mg) by mouth daily   Last Written Prescription Date:  4/28/2021  Last Fill Quantity: 90,   # refills: 3     Denied, was sent to  [id number] - [place], [place] - [address] [address].  Pharmacy contacted by phone  [phone] and have found the 4/28/2021 prescription.

## 2021-06-01 NOTE — TELEPHONE ENCOUNTER
Review of RAC, no appointments remaining this week. OV with you/ [facility] at 0850 at [facility] on Monday 10/7/19 is soonest available. Any other further recommendations? ANEL,Rn

## 2021-06-01 NOTE — TELEPHONE ENCOUNTER
Called and spoke with the patient. Review of recommendation for medication change. Discussion and encouragement to keep OV with WTZ for Monday 10/7 to discuss and follow-up. Pt agreeable to medication change. Sne to pharmacy of patients' choice. Explicit instruction to take last dose of Brilinta tonight. Start Plavix/Clopidogrel tomorrow 10/2/19 with 300 mg dose, then on Thursday 10/3 take 75 mg daily, in the morning thereafter. Monitor shortness of breath and will re-discuss at OV on Monday to see if any improvement. Pt verbalized understanding. Will obtain the medication later tonight to start tomorrow.

## 2021-06-01 NOTE — TELEPHONE ENCOUNTER
----- Message from Devan Mata sent at 10/1/2019  2:02 PM CDT -----  Regarding: Pt symptoms  General phone call:    Caller: Pt    Primary cardiologist: Dr Coleman    Detailed reason for call: Pt was seen by Dr Coleman - but has FU scheduled with Dr Jimenez  Pt is calling in with complaints of shortness of breath - Pt would like a call back to discuss - thank you     New or active symptoms? New - last 5 days or so    Best phone number: [phone]  Best time to contact: any  Ok to leave a detailed message? yes  Device? no    Additional Info:

## 2021-06-01 NOTE — TELEPHONE ENCOUNTER
"Called and spoke with pt. Pt reports that he had procedure with stent placed 4 weeks ago. He has been home and doing well, felt great and breathing improved. However, last Friday started noticing return of shortness of breath with exertion. Walking, shortness of breath, light dizziness, and sweating. Stops activity and symptoms resolve. Denies chest pain or pressure, jaw or arm involvement, palpitations, or N/V. Pt reports that his glucose readings have been WNL, no elevated or higher glucose readings at home. Pt is concerned and wonders if his stent \"is clogged or something else is clogged\". Pt reports that he went on a hunting trip last Saturday, he began walking and had to stop due to symptoms; shortness of breath, dizziness and sweating. Pt reports that this is hindering his life. Discussion with patient that new medication Brilinta is also a possible source of the shortness of breath, but unsure if cause.   Pt states that he will reduce his activity level, and continue to monitor. If symptoms come on without activity or continue even after period of rest will present to ER for evaluation. Pt has upcoming OV with [facility] on Monday 10/7/19. Informed patient that Dr. Coleman is out of office and will forward to alternate provider for any recommendations prior to OV on Monday. Dr. Jimenez please review, any recommendations? Thank you BRYCE TAM  "

## 2021-06-02 NOTE — PATIENT INSTRUCTIONS - HE
Jeremiah Isaac,    It was a pleasure to see you today at the [facility].     My recommendations after this visit include:    Cardiac catheterization    ERIC Jimenez MD, FACC, SANDY

## 2021-06-02 NOTE — PROGRESS NOTES
Jeremiah Isaac  [address]  [facility] [address]  [phone] (home)     Primary cardiologist:  Dr. Jimenez  PCP:  Radhika Ashton PA-C  Procedure:  Coronary angiogram with possible intervention   H&P completed by:  Dr. Barbara Childs MD:  Dr. Valdez or Dr. Coleman  Admit date and time:  10/10/19 0730  Case start time:  TBD  Ordering MD:  Dr. Jimenez   Diagnosis:  Dyspnea on exertion; CAD;   Anticoagulation: None  CPAP: Yes  Bypass Grafts: No  Renal Issues: No Borderline  Allergies: No Known Allergies  Diabetic?: YES  Device?: No      Angiogram Teaching    Reason for Visit:  Patient seen for pre-procedure education in preparation for: Coronary angiogram with possible intervention     Procedure Prep:  Cardiologist note dated: 10/7/19  EKG results obtained, dated: on admission   Pertinent test results obtained - Viewable in Epic, dated: Coronary angiogram with ARINA to mid/prox LAD 8/26/19  Hemogram results obtained: on admission   Basic Metabolic Panel results obtained: on admission   Lipid Profile results obtained: on admission     Patient Education  Pt will hear risks upon signing his consent    Pre-procedure instructions  Patient instructed to be NPO after midnight.  Patient instructed to arrange for transportation home following procedure.  Patient instructed to have a responsible adult with them for 24 hours post-procedure.  Post-procedure follow up process.  Conscious sedation discussed.  The patient was sent the pre-procedure letter (If requested) on 10/7/19    Pre-procedure medication instructions  Patient instructed on antiplatelet medication.  Continue medications as scheduled, with a small amount of water on the day of the procedure unless indicated.  Patient instructed to take 325 mg of Aspirin am of procedure: Yes  Other medication: instructed to hold all oral diabetic agents, adderall and his short acting insulin the a.m. of the procedure. Pt has a VGO system that he replaces daily as a daily  pump with his humalog. He will not place the AM of procedure and check his BG prior to arrival to [place]. He will consult with his PMD regarding this procedure and his VGO pump humalog insulin if he has any questions with NPO status prior to his procedure. Pt does not take long acting insulin and reports he is very rarely low. He is usually high. He was informed that if need be, they can cover his BG in cath lab/[place]. He knows he will have to hold his metformin after the procedure.      *PATIENTS RECORDS AVAILABLE IN [facility] UNLESS OTHERWISE INDICATED*    *Order set was entered on this date: 10/7/19      Patient Active Problem List   Diagnosis     Dyspnea on exertion     Acute chest pain     Type 2 diabetes mellitus without complication (H)     Dyslipidemia     HTN (hypertension)     Syncope, unspecified syncope type     Coronary artery disease involving native coronary artery of native heart with angina pectoris (H)     ACS (acute coronary syndrome) (H)     GIRISH (obstructive sleep apnea)       Current Outpatient Medications   Medication Sig Dispense Refill     amLODIPine (NORVASC) 10 MG tablet Take 10 mg by mouth daily.       aspirin 81 MG EC tablet Take 81 mg by mouth daily.       atorvastatin (LIPITOR) 40 MG tablet Take 40 mg by mouth at bedtime.       cloNIDine HCl (CATAPRES) 0.3 MG tablet Take 0.3 mg by mouth 2 (two) times a day.       clopidogrel (PLAVIX) 75 mg tablet Start with 300 mg loading dose once on 10/2/19, then take 75 mg daily thereafter. 30 tablet 11     dextroamphetamine-amphetamine (ADDERALL) 20 mg Tab Take 20-40 mg by mouth 3 (three) times a day. Takes for narcalepsy-       empagliflozin 25 mg Tab Take 25 mg by mouth daily.       glipiZIDE (GLUCOTROL XL) 10 MG 24 hr tablet Take 10 mg by mouth daily.       insulin lispro (HUMALOG) 100 unit/mL injection Inject under the skin. For VGo system. Daily dose 100 units       lisinopril (PRINIVIL,ZESTRIL) 40 MG tablet Take 40 mg by mouth daily.       metFORMIN  (GLUCOPHAGE-XR) 750 MG 24 hr tablet Take 1 tablet (750 mg total) by mouth daily with breakfast. Takes 3 tablets daily with breakfast  0     metoprolol succinate (TOPROL-XL) 100 MG 24 hr tablet Take 100 mg by mouth daily.       No current facility-administered medications for this visit.        No Known Allergies    Plan  Pt's sister, Vika will be his .   Patient ready for procedure    BRYCE Lovett

## 2021-06-03 VITALS — BODY MASS INDEX: 45.1 KG/M2 | WEIGHT: 315 LBS | HEIGHT: 70 IN

## 2021-06-03 VITALS
RESPIRATION RATE: 14 BRPM | HEIGHT: 70 IN | WEIGHT: 315 LBS | BODY MASS INDEX: 45.1 KG/M2 | DIASTOLIC BLOOD PRESSURE: 80 MMHG | HEART RATE: 78 BPM | SYSTOLIC BLOOD PRESSURE: 118 MMHG

## 2021-06-03 VITALS — BODY MASS INDEX: 44.97 KG/M2 | HEIGHT: 70 IN | WEIGHT: 314.1 LBS

## 2021-06-03 NOTE — PATIENT INSTRUCTIONS - HE
Jeremiah Isaac,    It was a pleasure to see you today at the [facility].     My recommendations after this visit include:    bnp  vq scan  Set up visit with dr abreu for pulmonary hypertension consult after vq    W. Jonel Jimenez MD, FACC, [facility]

## 2021-06-03 NOTE — PROGRESS NOTES
"Cardiology Progress Note    Assessment:  Coronary artery disease status post LAD stenting in August 2019, stable coronary anatomy  Pulmonary hypertension, severe, etiology likely multifactorial including diastolic heart failure and sleep apnea/body habitus  Hypertension, questionable control  Heart failure with preserved ejection fraction with elevated wedge pressure  Diabetes mellitus  Morbid obesity  Sleep apnea on BiPAP  Chronic kidney disease    Plan:  BMP and BNP today to assess volume status -adjust diuretics accordingly  VQ scan to rule out thromboembolic etiology of pulmonary hypertension  Referral to pulmonary hypertension clinic    Subjective:   This is 59 y.o. male who comes in today follow-up visit.  We saw him in October of this year because of recurrent chest discomfort and shortness of breath.  He underwent coronary angiogram.  He was found to have patent stents and no new coronary lesions.  He had severe elevation of pulmonary pressures and pulmonary capillary wedge pressure.  He was started on diuretics.  He continues to feel short of breath.  He urinates a lot.  He has not had recurrent syncope.  He underwent additional sleep studies.  He is now on BiPAP machine at home.    Review of Systems:   General: WNL  Eyes: WNL  Ears/Nose/Throat: WNL  Lungs: Shortness of Breath  Heart: Shortness of Breath with activity  Stomach: WNL  Bladder: WNL  Muscle/Joints: WNL  Skin: WNL  Nervous System: WNL  Mental Health: WNL     Blood: WNL    Objective:   BP (!) 146/92 (Patient Site: Right Arm, Patient Position: Sitting, Cuff Size: Adult Large)   Pulse 64   Resp 16   Ht 5' 10\" (1.778 m)   Wt (!) 317 lb (143.8 kg)   BMI 45.48 kg/m    Physical Exam:  GENERAL: no distress  NECK: No JVD  LUNGS: Clear to auscultation.  CARDIAC: regular rhythm, S1 & S2 normal.  No heaves, thrills, gallops or murmurs.  ABDOMEN: flat, negative hepatosplenomegaly, soft and non-tender.  EXTREMITIES: No evidence of cyanosis, clubbing or " edema.    Current Outpatient Medications   Medication Sig Dispense Refill     amLODIPine (NORVASC) 10 MG tablet Take 10 mg by mouth daily.       aspirin 81 MG EC tablet Take 81 mg by mouth daily.       atorvastatin (LIPITOR) 40 MG tablet Take 40 mg by mouth at bedtime.       cloNIDine HCl (CATAPRES) 0.3 MG tablet Take 0.3 mg by mouth 2 (two) times a day.       clopidogrel (PLAVIX) 75 mg tablet Start with 300 mg loading dose once on 10/2/19, then take 75 mg daily thereafter. 30 tablet 11     dextroamphetamine-amphetamine (ADDERALL) 20 mg Tab Take 20-40 mg by mouth 3 (three) times a day. Takes for narcalepsy-       empagliflozin 25 mg Tab Take 25 mg by mouth daily.       furosemide (LASIX) 40 MG tablet Take 1 tablet (40 mg total) by mouth daily. 30 tablet 3     glipiZIDE (GLUCOTROL XL) 10 MG 24 hr tablet Take 10 mg by mouth daily.       insulin lispro (HUMALOG) 100 unit/mL injection Inject under the skin. For VGo system. Daily dose 100 units       lisinopril (PRINIVIL,ZESTRIL) 40 MG tablet Take 40 mg by mouth daily.       metFORMIN (GLUCOPHAGE-XR) 750 MG 24 hr tablet Take 1 tablet (750 mg total) by mouth daily with breakfast. Takes 3 tablets daily with breakfast (Patient taking differently: Take 750 mg by mouth daily with breakfast. )  0     metoprolol succinate (TOPROL-XL) 100 MG 24 hr tablet Take 100 mg by mouth daily.       No current facility-administered medications for this visit.        Cardiographics:    Echo: August 2019    Left ventricle ejection fraction is increased. The calculated left ventricular ejection fraction is 76%. Mild left ventricular outflow tract gradient.    Normal left ventricular size.    Normal right ventricular size and systolic function.    Abnormal left ventricular diastolic function. E/e' > 15, suggesting elevated LV filling pressures.    No hemodynamically significant valvular heart abnormalities.    Coronary angiogram: October 2019  Left Main   There is mild diffuse disease  throughout the vessel.   Dist LM to Ost LAD lesion is 30% stenosed.   Left Anterior Descending   There is mild diffuse disease throughout the vessel.   Previously placed Prox LAD drug eluting stent is widely patent. The lesion was previously treated using angioplasty.   Previously placed Prox LAD to Mid LAD drug eluting stent is widely patent. The lesion was previously treated using angioplasty. pre diagnositic: 0.7.   Mid LAD to Dist LAD lesion is 50% stenosed.   Left Circumflex   There is mild diffuse disease throughout the vessel.   Ost Cx to Prox Cx lesion is 40% stenosed.   Right Coronary Artery   There is mild diffuse disease throughout the vessel.   Dist RCA lesion is 40% stenosed.   Right Posterior Descending Artery   Ost RPDA lesion is 40% stenosed.     /42 (65)    RA mean 23    PCW 42 /72 (49)    /78     /78 (91)    /15, 28      Lab Results:       Lab Results   Component Value Date    CHOL 163 10/10/2019    CHOL 150 08/24/2019     Lab Results   Component Value Date    HDL 47 10/10/2019    HDL 43 08/24/2019     Lab Results   Component Value Date    LDLCALC 83 10/10/2019    LDLCALC 73 08/24/2019     Lab Results   Component Value Date    TRIG 164 (H) 10/10/2019    TRIG 171 (H) 08/24/2019     No results found for: BNP    Sarath (Jonel)  MD Barbara

## 2021-06-04 VITALS
WEIGHT: 315 LBS | SYSTOLIC BLOOD PRESSURE: 146 MMHG | HEIGHT: 70 IN | DIASTOLIC BLOOD PRESSURE: 92 MMHG | RESPIRATION RATE: 16 BRPM | HEART RATE: 64 BPM | BODY MASS INDEX: 45.1 KG/M2

## 2021-06-04 VITALS
HEART RATE: 74 BPM | WEIGHT: 315 LBS | RESPIRATION RATE: 14 BRPM | BODY MASS INDEX: 45.1 KG/M2 | SYSTOLIC BLOOD PRESSURE: 148 MMHG | OXYGEN SATURATION: 96 % | HEIGHT: 70 IN | DIASTOLIC BLOOD PRESSURE: 80 MMHG

## 2021-06-04 VITALS — HEIGHT: 70 IN | WEIGHT: 315 LBS | BODY MASS INDEX: 45.1 KG/M2

## 2021-06-04 NOTE — PROGRESS NOTES
"RESPIRATORY CARE NOTE     Patient Name: Jeremiah Isaac  Today's Date: 12/18/2019     Complete PFT done. Pt performed tests with good effort, 2.5 mg Albuterol neb given. Test results meet ATS criteria except Pre FVC which patient had back extrapolated volume.  User Defined used for sample collection in [facility].  The results of this test appear to be valid, although the ATS standard of \"start of test\" was not met in one test.  Results scanned into epic. Pt left in no distress.       Trina Crawford, LRT  "

## 2021-06-04 NOTE — TELEPHONE ENCOUNTER
----- Message from Shan Malhotra MD sent at 12/30/2019 12:54 PM CST -----  Regarding: RE: his wedge was said to be very high  Jonel,    Thanks for your advice. I agree with you so I called Hasmukh and discussed this with him.     Ramona,    Please set the repeat heart cath up ASAP. Hasmukh agrees to undergo right and left heart cath for pulmonary hypertension with possible vasodilator study if his LVEDP is normal. No coronary angiography, please. Indicate that I should be updated by text by the interventional cardiologist when this test is done.     Rodolfo    ----- Message -----  From: Sarath Jimenez MD (Ted)  Sent: 12/29/2019   3:21 PM CST  To: Shan Malhotra MD  Subject: RE: his wedge was said to be very high           I agree with repeating cath.    Jonel  ----- Message -----  From: Shan Malhotra MD  Sent: 12/27/2019   2:49 PM CST  To: Felix Coleman MD, #  Subject: RE: his wedge was said to be very high           Thanks, Melanie Remy,     I would recommend a repeat heart cath (both left and right) so as to obtain direct measurement of the LVEDP and then vasodilator testing if the LVEDP is normal.    The other option is to get a second opinion from Dr. Leroy, who comes to our [facility] to see both PH and HF patients.    Rodolfo    ----- Message -----  From: Felix Coleman MD  Sent: 12/23/2019  10:11 AM CST  To: Shan Malhotra MD  Subject: RE: his wedge was said to be very high           I reviewed the report.  I think that first wedge was a mistake as it was picking up up PA pressures.  The second wedge pressure was a more accurate 1 and report include this.  The pressures were 21/31 (21)    I hope that helps.  ----- Message -----  From: Shan Malhotra MD  Sent: 12/13/2019   3:54 PM CST  To: Felix Coleman MD, #  Subject: his wedge was said to be very high               Pat,    These are the pressure data from your recent heart cath; my apologies for the format but the upshot is that  these state his PCW mean was 21-49. This would mean that he is not a candidate for PH medications. Are these numbers correct?    Thanks,    Rodolfo ParkerTimeSystolic (mmHg)Dystolic (mmHg)Mean (mmHg)EDP (mmHg)  AO10:15 AM  103    78    91    PCW10:04 AM  42    72    49    10:12 AM  21    31    21    PA10:05 AM  114    42    65    10:10 AM  121    45    70    RV10:12 AM  124    15    28    RA10:13 AM  27 mmHg    25 mmHg    23

## 2021-06-04 NOTE — TELEPHONE ENCOUNTER
Jeremiah Isaac  [id number]  [facility] [address]  [phone] (home)     Primary cardiologist:  Dr. Jimenez  PCP:  Dr. Ashton  Procedure:  Right and Left heart catheterization, shunt run, vasodilator study.  H&P completed by:  Dr. Malhotra 12-13-19  Case MD:  Dr. Landa or Tom  Admit date and time:  Wed. 1-8-20 @ 0930  Case start time:  To follow  Ordering MD:  Dr. Malhotra  Diagnosis:  Pulm. HTN  Anticoagulation: None  Antiarrhythmics: N/A  CPAP: Yes  Bypass Grafts: No  Renal Issues: Yes  Allergies: NKA  Diabetic?: Yes  Device?: No  Type:  NA    Angiogram Teaching    Reason for Visit:  Telephone call to discuss pre-procedure education in preparation for: R&L HC, shunt run, vasodilator study.    Precedure Prep:  Cardiologist note dated: 12-13-19  EKG results obtained, dated: on admission  Pertinent test results obtained, dated: 10-10-19 Rt HC, cor angio, 8-26-19 PCI, 8-23-19 echo  Hemogram results obtained: on admission  Basic Metabolic Panel results obtained: on admission  Lipid Profile results obtained: 10-10-19    Patient Education  Explained indications/risks for diagnostic evaluation, including one or more of the following:  left heart catheterization, right heart catheterization, coronary angiogram, left ventriculogram, percutaneous arteritomy closure, less than 0.1% of acute myocardial infarction and CVA or death or any of the following to the degree that it could threaten life:  allergic reaction, arrhythmia, renal failure, hemorrhage, thrombosis and infection    Patient states understanding of procedure and risks and agrees to proceed.    Pre-procedure instructions  Patient instructed to be NPO after midnight.  Patient instructed to arrange for transportation home following procedure.  Patient instructed to have a responsible adult with them for 24 hours post-procedure.  Post-procedure follow up process.  Conscious sedation discussed.    Pre-procedure medication instructions.  Continue  medications as scheduled, with a small amount of water on the day of the procedure unless indicated.  Other medication: instructed to Hold Lasix, Glipizide, Metformin, VGo insulin pump system , Take ASA, Atorvastatin, Clonidine, Plavix, Adderral (ok'd per Dr. Malhotra), Lisinopril, Metoprolol Succ a.m. of the procedure.    NO CAFFEINE 24HRS PRIOR     Patient Active Problem List   Diagnosis     Type 2 diabetes mellitus without complication (H)     Dyslipidemia     Essential hypertension     Syncope, unspecified syncope type     Coronary artery disease due to lipid rich plaque     GIRISH (obstructive sleep apnea)     Pulmonary hypertension (H)     Class 3 severe obesity with serious comorbidity and body mass index (BMI) of 45.0 to 49.9 in adult (H)     Narcolepsy     Polyneuropathy     Stage 3 chronic kidney disease due to type 2 diabetes mellitus (H)       Current Outpatient Medications   Medication Sig Dispense Refill     amLODIPine (NORVASC) 10 MG tablet Take 10 mg by mouth daily.       aspirin 81 MG EC tablet Take 81 mg by mouth daily.       atorvastatin (LIPITOR) 40 MG tablet Take 40 mg by mouth at bedtime.       cloNIDine HCl (CATAPRES) 0.3 MG tablet Take 0.3 mg by mouth 2 (two) times a day.       clopidogrel (PLAVIX) 75 mg tablet Start with 300 mg loading dose once on 10/2/19, then take 75 mg daily thereafter. 30 tablet 11     dextroamphetamine-amphetamine (ADDERALL) 20 mg Tab Take 20-40 mg by mouth 3 (three) times a day. Takes for narcalepsy-       empagliflozin 25 mg Tab Take 25 mg by mouth daily.       furosemide (LASIX) 40 MG tablet Take 1 tablet (40 mg total) by mouth daily. 30 tablet 3     glipiZIDE (GLUCOTROL XL) 10 MG 24 hr tablet Take 10 mg by mouth daily.       insulin lispro (HUMALOG) 100 unit/mL injection Inject under the skin. For VGo system. Daily dose 100 units       lisinopril (PRINIVIL,ZESTRIL) 40 MG tablet Take 40 mg by mouth daily.       metFORMIN (GLUCOPHAGE-XR) 750 MG 24 hr tablet Take 1 tablet  (750 mg total) by mouth daily with breakfast. Takes 3 tablets daily with breakfast (Patient taking differently: Take 750 mg by mouth daily with breakfast. )  0     metoprolol succinate (TOPROL-XL) 100 MG 24 hr tablet Take 100 mg by mouth daily.       No current facility-administered medications for this visit.        No Known Allergies    Discussion/Summary  Phone call to patient for procedure teach - patient has good recollection of previous heart caths and stent - patient reported he has not been taking Amlodipine or Jardiance as listed on recent visit medication list - patient verbalized understanding of procedure and instructions including bringing Bipap and holding placement of VGo insulin pump prior to admission - sister will be .    Plan  Labs/EKG on admission  Orders placed per cosign required protocol 1-2-20.     Ramona Marcus RN

## 2021-06-04 NOTE — TELEPHONE ENCOUNTER
----- Message from Lissette Silver sent at 1/2/2020  1:51 PM CST -----  RHC, LHC, Shunt run & Vasodilator study with Dr Landa/Tom - 1/8, 930am admit  H&P - 12/13      Thanks

## 2021-06-04 NOTE — PATIENT INSTRUCTIONS - HE
Hasmukh,    It was a pleasure to see you today at [facility].    It is my professional opinion that the severe elevation of the blood pressure of the arteries of the lungs is the reason for your profound shortness of breath and fainting spells. We need to do some final tests to learn why this is happening and to see if special medications could help you to breath better. If you would require continuous iv medications, I will arrange for you to get that treatment with my partners at the [facility].    My nurse or I will call you with the newly ordered test results.     Your family doctor should investigate your anemia.    You will see Dr. Malhotra in four weeks.    Please call us if you have any questions or concerns about your heart.      Rodolfo Malhotra M.D.

## 2021-06-05 ENCOUNTER — HEALTH MAINTENANCE LETTER (OUTPATIENT)
Age: 61
End: 2021-06-05

## 2021-06-09 NOTE — TELEPHONE ENCOUNTER
RN Triage:  Seasonal Hotline    60 yr old Hasmukh calling about COVID testing prior to procedure tomorrow.    Pt states he was scheduled for 3:00 pm yesterday at the [facility].  When patient arrived no testing was happening.    Pt states he then discovered an email indicating his appointment had been changed to 10:00 am in the morning.    Pt is calling about when he can be tested prior to his procedure tomorrow.    PLAN:  Transferred caller to [facility] scheduling line.  Pt warm transferred to Duke Wallace RN   [facility] RN Triage    Reason for Disposition    Information only question and nurse able to answer    Additional Information    Negative: Nursing judgment    Negative: Nursing judgment    Negative: Nursing judgment    Negative: Nursing judgment    Protocols used: NO PROTOCOL AVAILABLE - INFORMATION ONLY-A-[place]

## 2021-06-22 ENCOUNTER — TELEPHONE (OUTPATIENT)
Dept: SLEEP MEDICINE | Facility: CLINIC | Age: 61
End: 2021-06-22

## 2021-06-22 NOTE — TELEPHONE ENCOUNTER
Spoke with patient and patient states he is following with his sleep doctor at [facility]. Patient will contact us if needed

## 2021-06-28 NOTE — PROGRESS NOTES
Progress Notes by Shan Malhotra MD at 12/13/2019  3:30 PM     Author: Shan Malhotra MD Service: -- Author Type: Physician    Filed: 12/13/2019  6:35 PM Encounter Date: 12/13/2019 Status: Addendum    : Shan Malhotra MD (Physician)    Related Notes: Original Note by Shan Malhotra MD (Physician) filed at 12/13/2019  4:25 PM            [facility] Pulmonary Hypertension Clinic Consultation Note    Thank you, Dr. Jonel Jimenez, for advising Jeremiah Isaac to meet with me in consultation today to evaluate pulmonary hypertension.     Assessment:    1. Pulmonary hypertension (H)  Thyroid Stimulating Hormone (TSH)    Antinuclear Antibody (RACHEL) Cascade    Hepatic function panel    PFT 6 Minute Walk Test    PFT Complete    Blood Gases, Arterial     Discussion:    Hasmukh has several possible causes for pulmonary hypertension including sleep disordered breathing and severely elevated pulmonary capillary wedge pressures, as documented in the right heart catheterization report of Dr. Felix Coleman, which I have copied below.  I could not find a measured left ventricular end-diastolic pressure in that report.  Typically, sleep disordered breathing (WHO group 3) would not cause the level of elevation of mean pulmonary artery pressure seen here, but it could contribute to pulmonary hypertension caused by WHO group 2 disease (left heart failure).  The pulmonary artery saturations were somewhat depressed and certainly speak against left to right intracardiac shunting.  I have sent a secure Epic message to Dr. Jimenez and Dr. Felix Coleman to clarify the hemodynamic findings at catheterization.  If there is evidence of a normal left ventricular end-diastolic pressure, then we also need to consider pulmonary venoocclusive disease in light of the markedly elevated pulmonary capillary wedge pressures, which does not respond well to pulmonary vasodilator therapy.  I counseled Hasmukh that we may need to  consider assistance from my colleagues at the [facility], John Leroy and Chip Riojas, especially if we have determined that he has WHO group 1 disease that would merit continuous prostacyclin infusion.  I would certainly consider that as first-line therapy given the level of elevation of his pulmonary pressure and the fact that he has had exertional syncope. However, his heart cath might need to be repeated by my colleagues at the [facility] as it does not appear that vasodilator testing was performed to determine if Hasmukh would be a candidate for high dose calcium channel blockers.    Plan:    1. We will call Hasmukh with the testing described above, which will complete his evaluation for pulmonary hypertension.  As noted above, I will also review his heart catheterization data with Dr. Felix Coleman and Dr. Jonel Jimenez.  2. Hasmukh will return to see me in the pulmonary hypertension clinic in 4 weeks, if we have not already decided to start either oral therapy or referral to my colleagues in the pulmonary hypertension clinic at the [facility] for consideration of continuous prostacyclin infusion therapy.    An After Visit Summary was printed and given to the patient.    Current History:    Hasmukh tells me that he last had good breathing capacity about a year ago when he was still able to mow his lawn and do outdoor chores.  Over the last year he has had progressively worsening shortness of breath and in the last 2 months has had 3 episodes of exertional syncope.  He came to the hospital in August because of 1 of these episodes and was evaluated and underwent coronary angiography without heart catheterization.  A 75% stenosis in the left anterior descending coronary artery was stented and Hasmukh thinks his breathing was better for about 3 weeks.  He then had another episode of exertional near syncope about a month ago.  He is also been back to the sleep laboratory as  directed by his sleep physician, Dr. Kiran Phelps, and was advised to switch from CPAP to BiPAP for his obstructive sleep apnea.    He does not experience angina, palpitations, orthopnea, or cough.     His past history is negative for splenectomy, the use of prescription weight loss medications, cirrhosis, deep venous thrombosis, pulmonary embolism, scleroderma or Raynaud's phenomena.    Past Medical History:  Past Medical History:   Diagnosis Date   ? Class 3 severe obesity with serious comorbidity and body mass index (BMI) of 45.0 to 49.9 in adult (H)    ? Coronary artery disease due to lipid rich plaque 08/26/2019    he never had angina   ? Diabetes mellitus, type 2 (H) 2009   ? Dyslipidemia, goal LDL below 70 08/26/2019   ? Essential hypertension 08/23/2014   ? Narcolepsy 2004   ? Obesity    ? GIRISH on BiPAP 2004    Dr. Jessica Phelps is his sleep doctor and she switched him from CPAP to BiPAP in November, 2019     Past Medical History Pertinent Negatives:   Diagnosis Date Noted   ? Cirrhosis (H) 12/13/2019   ? COPD (chronic obstructive pulmonary disease) (H) 12/13/2019   ? Deep vein thrombosis (H) 12/13/2019   ? Pulmonary embolism (H) 12/13/2019   ? Scleroderma (H) 12/13/2019       Past Surgical History:  Past Surgical History:   Procedure Laterality Date   ? CARPAL TUNNEL RELEASE Right    ? CV CORONARY ANGIOGRAM N/A 8/26/2019    Procedure: Coronary Angiogram;  Surgeon: Felix Coleman MD;  Location: [facility] Cath Lab;  Service: Cardiology   ? CV CORONARY ANGIOGRAM N/A 10/10/2019    Procedure: Coronary Angiogram;  Surgeon: Felix Coleman MD;  Location: [facility] Cath Lab;  Service: Cardiology   ? CV RIGHT HEART CATH N/A 10/10/2019    Procedure: Right Heart Catheterization;  Surgeon: Felix Coleman MD;  Location: [facility] Cath Lab;  Service: Cardiology   ? NASAL SEPTUM SURGERY     ? TENDON REPAIR      fingers     Past Surgical History Pertinent Negatives:   Procedure Date Noted   ? SPLENECTOMY 12/13/2019        Family History:  Family History   Problem Relation Age of Onset   ? No Medical Problems Son    ? No Medical Problems Son    ? No Medical Problems Mother    ? Vasculitis Father 74   ? No Medical Problems Sister    ? Sleep apnea Brother 62   ? No Medical Problems Sister    ? No Medical Problems Sister    ? No Medical Problems Sister    ? Pulmonary Hypertension Neg Hx    ? Congenital heart disease Neg Hx        Social History:   reports that he has never smoked. He has never used smokeless tobacco. He reports current alcohol use. He reports that he does not use drugs.    Medications:  Outpatient Encounter Medications as of 12/13/2019   Medication Sig Dispense Refill   ? amLODIPine (NORVASC) 10 MG tablet Take 10 mg by mouth daily.     ? aspirin 81 MG EC tablet Take 81 mg by mouth daily.     ? atorvastatin (LIPITOR) 40 MG tablet Take 40 mg by mouth at bedtime.     ? cloNIDine HCl (CATAPRES) 0.3 MG tablet Take 0.3 mg by mouth 2 (two) times a day.     ? clopidogrel (PLAVIX) 75 mg tablet Start with 300 mg loading dose once on 10/2/19, then take 75 mg daily thereafter. 30 tablet 11   ? dextroamphetamine-amphetamine (ADDERALL) 20 mg Tab Take 20-40 mg by mouth 3 (three) times a day. Takes for narcalepsy-     ? empagliflozin 25 mg Tab Take 25 mg by mouth daily.     ? furosemide (LASIX) 40 MG tablet Take 1 tablet (40 mg total) by mouth daily. 30 tablet 3   ? glipiZIDE (GLUCOTROL XL) 10 MG 24 hr tablet Take 10 mg by mouth daily.     ? insulin lispro (HUMALOG) 100 unit/mL injection Inject under the skin. For VGo system. Daily dose 100 units     ? lisinopril (PRINIVIL,ZESTRIL) 40 MG tablet Take 40 mg by mouth daily.     ? metFORMIN (GLUCOPHAGE-XR) 750 MG 24 hr tablet Take 1 tablet (750 mg total) by mouth daily with breakfast. Takes 3 tablets daily with breakfast (Patient taking differently: Take 750 mg by mouth daily with breakfast. )  0   ? metoprolol succinate (TOPROL-XL) 100 MG 24 hr tablet Take 100 mg by mouth daily.    "    No facility-administered encounter medications on file as of 12/13/2019.        Allergies:  Patient has no known allergies.    Review of Systems:     General: WNL  Eyes: WNL  Ears/Nose/Throat: WNL  Lungs: Shortness of Breath  Heart: Shortness of Breath with activity, Irregular Heartbeat, Fainting  Stomach: WNL  Bladder: Frequent Urination at Night  Muscle/Joints: WNL  Skin: WNL  Nervous System: Daytime Sleepiness  Mental Health: WNL     Blood: WNL    Objective:    Wt Readings from Last 5 Encounters:   12/13/19 (!) 321 lb (145.6 kg)   11/26/19 (!) 317 lb (143.8 kg)   10/11/19 (!) 320 lb 0.2 oz (145.2 kg)   10/07/19 (!) 326 lb (147.9 kg)   08/27/19 (!) 314 lb 1.6 oz (142.5 kg)      5' 10\" (1.778 m)  Body mass index is 46.06 kg/m .  /80 (Patient Site: Left Arm, Patient Position: Sitting, Cuff Size: Adult Regular)   Pulse 74   Resp 14   Ht 5' 10\" (1.778 m)   Wt (!) 321 lb (145.6 kg)   SpO2 96%   BMI 46.06 kg/m       Physical Exam:      General Appearance: Alert and not in distress   HEENT: No scleral icterus; the mucous membranes are pink and moist   Neck: No cervical bruits, adenopathy, or thyromegaly; jugular venous pressure is difficult to evaluate due to obesity   Chest: The chest is symmetric   Lungs: Respirations are unlabored; the lungs are clear to auscultation   Cardiovasular: Auscultation reveals distant first and second heart sounds and no murmurs, rubs, or gallops; the carotid, and radial pulses are intact   Abdomen: Massively obese with no detectable organomegaly, masses, bruits, or tenderness; bowel sounds are present   Extremities: No cyanosis, clubbing; both legs are compression wrapped   Skin: No xanthelasma   Neurologic: Mood and affect are appropriate     Cardiac testing:    EKG: Sinus rhythm with right axis deviation per my personal review.    Echocardiogram:   Results for orders placed during the hospital encounter of 08/23/19   Echo Complete [ECH10] 08/23/2019    Narrative   No " previous study for comparison.    Left ventricle ejection fraction is increased. The calculated left   ventricular ejection fraction is 76%. Mild left ventricular outflow tract   gradient.    Normal left ventricular size.    Normal right ventricular size and systolic function.    Abnormal left ventricular diastolic function. E/e' > 15, suggesting   elevated LV filling pressures.    No hemodynamically significant valvular heart abnormalities.          Cardiac Catheterization:   Results for orders placed during the hospital encounter of 10/10/19   Cardiac Catheterization [CATH01] 10/10/2019    Narrative Coronary anatomy unchanged from recent study.  LAD remains widely patent.  Mild diffuse coronary atherosclerosis.  Severe pulmonary hypertension.       Pressure Samples     Site Time Systolic (mmHg) Dystolic (mmHg) Mean (mmHg) EDP (mmHg)   AO 10:15     78    91       PCW 10:04 AM 42    72    49        10:12 AM 21    31    21       PA 10:05     42    65        10:10     45    70       RV 10:12     15     28      RA 10:13 AM 27 mmHg    25 mmHg    23             Pulmonary:    Sleep study was done on November 5, 2019 at the [facility] sleep laboratory; the report was personally reviewed.    Imaging:    Xr Chest 2 Views    Result Date: 12/4/2019  EXAM: XR CHEST 2 VIEWS LOCATION: [facility] DATE/TIME: 12/4/2019 12:38 PM INDICATION: Dyspnea on exertion. Pulmonary hypertension suspected. COMPARISON: Date 619.     Minimal basilar opacities, likely atelectasis or scarring. No focal consolidation. No pleural effusion or pneumothorax. Stable upper normal heart size. Central pulmonary arteries also mildly prominent; may correlate with pulmonary hypertension. No pulmonary edema.     Nm Lung Vq Scan    Result Date: 12/4/2019  EXAM: NM LUNG VQ SCAN LOCATION: [facility] DATE/TIME: 12/4/2019 12:29 PM INDICATION: Pulmonary hypertension. Shortness of breath. Dyspnea on exertion. COMPARISON: Chest  radiograph from today, 12/04/2019. TECHNIQUE: 46.7 technetium-99m DTPA, aerosol inhalation for ventilation scan. 8.2 mCi technetium-99m MAA IV for perfusion scan. FINDINGS: Matched ventilation-perfusion defect in the lingula without radiographic correlate. Normal homogeneous radionuclide activity elsewhere throughout both lungs on both ventilation and perfusion scans. No mismatched segmental perfusion defect. Findings result in low probability of pulmonary embolism.     Low probability of pulmonary embolism. This makes chronic thromboembolic disease as an etiology of pulmonary hypertension unlikely.      Lab Review:    Lab Results   Component Value Date     11/26/2019    K 4.8 11/26/2019     11/26/2019    CO2 24 11/26/2019    BUN 48 (H) 11/26/2019    CREATININE 2.49 (H) 11/26/2019    CALCIUM 9.7 11/26/2019     Lab Results   Component Value Date    WBC 7.5 10/10/2019    HGB 12.8 (L) 10/10/2019    HCT 37.6 (L) 10/10/2019    MCV 86 10/10/2019     10/10/2019      HIV Antigen / Antibody   Date Value Ref Range Status   10/11/2019 Negative Negative Final      BNP (pg/mL)   Date Value   11/26/2019 433 (H)           Much or all of the text in this note was generated through the use of the Dragon Dictate voice-to-text software. Errors in spelling or words which seem out of context are unintentional. Sound alike errors, in particular, may have escaped editing.

## 2021-06-28 NOTE — PROGRESS NOTES
Progress Notes by Sarath Jimenez MD (Ted) at 10/7/2019  8:50 AM     Author: Sarath Jimenez MD (Ted) Service: -- Author Type: Physician    Filed: 10/7/2019 10:15 AM Encounter Date: 10/7/2019 Status: Signed    : Sarath Jimenez MD (Ted) (Physician)           Click to link to [facility] Heart [facility] HEART [facility] NOTE    Thank you, Dr. Worrell ref. provider found, for asking the [facility] to evaluate Mr. Jeremiah Isaac.      Assessment/Recommendations   Assessment:    Coronary artery disease status post LAD stenting in August 2019  Chest discomfort and shortness of breath, recurrent, unclear etiology, rule out stent restenosis  Hypertension, good control  Diabetes mellitus  Morbid obesity  Sleep apnea    Plan:  It is possible that recurrent symptoms are related to stent restenosis.  I would favor repeating coronary angiogram and getting right heart catheterization.  I believe that accuracy of stress testing will be significant compromised by his body habitus.  He has had multiple relatively vague complaints which I am not sure are necessarily related to his heart.  I discussed risk and benefits of procedure the patient.  He agrees to proceed       History of Present Illness    Mr. Jeremiah Isaac is a 59 y.o. male who comes in for follow-up visit.  I saw him once in the hospital when he was admitted with chest discomfort and shortness of breath.  He had abnormal CT chest with multiple coronary calcifications.  Subsequent coronary angiogram revealed 75% LAD stenosis.  FFR was consistent with significant obstruction.  He received the stents.  He states that he felt better after the stenting for about 3 weeks.  Later on he started having shortness of breath and chest tightness again.  He denies any prolonged chest pains at rest.  He has not had heart palpitations or syncope.    ECG: Personally reviewed. Normal sinus rhythm right axis deviation    Coronary  angiogram:  Left Main   There is mild diffuse disease throughout the vessel.   Left Anterior Descending   Prox LAD lesion is 75% stenosed.   Prox LAD to Mid LAD lesion is 75% stenosed. Pressure wire/FFR was performed on the lesion. pre diagnositic: 0.7. No medication given   Mid LAD to Dist LAD lesion is 50% stenosed.   Left Circumflex   There is mild diffuse disease throughout the vessel.   Ost Cx to Prox Cx lesion is 40% stenosed.   Right Coronary Artery   There is mild diffuse disease throughout the vessel.   Dist RCA lesion is 40% stenosed.   Right Posterior Descending Artery   Ost RPDA lesion is 35% stenosed.   Intervention     Prox LAD lesion   PCI   The guidewire crossed lesion. The pre-interventional distal flow is normal (MAYO 3). Pre-stent angioplasty was performed. A drug eluting stent was successfully placed. The strut is apposed. Post-stent angioplasty was performed. The post-interventional distal flow is normal (MAYO 3). No complications occurred at this lesion. Pressure wire/FFR was performed during intervention on the lesion:.   There is a 0% residual stenosis post intervention.   Prox LAD to Mid LAD lesion   PCI   The guidewire crossed lesion. The pre-interventional distal flow is normal (MAYO 3). Pre-stent angioplasty was performed. A drug eluting stent was successfully placed. The strut is apposed. Post-stent angioplasty was performed. The post-interventional distal flow is normal (MAYO 3). The intervention was successful. No complications occurred at this lesion. Pressure wire/FFR was performed during intervention on the lesion:. pre intervention: 0.7. post intervention: 0.89.   Supplies used: CATH EMERGE NC MR US 2.50MM X 12MM; STENT SYNERGY DRUG ELUTING 2.26C39GI [id number]; STENT SYNERGY DRUG ELUTING 3.84S64YQ [id number]   There is a 0% residual stenosis post intervention          Echocardiogram:     Left ventricle ejection fraction is increased. The calculated left ventricular  ejection fraction is 76%. Mild left ventricular outflow tract gradient.    Normal left ventricular size.    Normal right ventricular size and systolic function.    Abnormal left ventricular diastolic function. E/e' > 15, suggesting elevated LV filling pressures.    No hemodynamically significant valvular heart abnormalities.     Physical Examination Review of Systems   Vitals:    10/07/19 0902   BP: 118/80   Pulse: 78   Resp: 14     Body mass index is 46.78 kg/m .  Wt Readings from Last 3 Encounters:   10/07/19 (!) 326 lb (147.9 kg)   08/27/19 (!) 314 lb 1.6 oz (142.5 kg)     General Appearance:   Alert, cooperative, no distress, appears stated age   Head/ENT: Normocephalic, without obvious abnormality. Membranes moist      EYES:  no scleral icterus, normal conjunctivae   Neck: Supple, symmetrical, trachea midline, no adenopathy, thyroid: not enlarged, symmetric, no carotid bruit or JVD   Chest/Lungs:   Lungs are clear to auscultation, respirations unlabored. No tenderness or deformity    Cardiovascular:   Regular rhythm, S1, S2 normal, no murmur, rub or gallop.   Abdomen:  Soft, non-tender, bowel sounds active all four quadrants,  no masses, no organomegaly   Extremities: no cyanosis or clubbing. No edema   Skin: Skin color, texture, turgor normal, no rashes or lesions.    Psychiatric: Normal affect, calm   Neurologic: Alert and oriented x 3, moving all four extremities.     General: WNL  Eyes: WNL  Ears/Nose/Throat: WNL  Lungs: Cough  Heart: Shortness of Breath with activity, Irregular Heartbeat  Stomach: WNL  Bladder: WNL  Muscle/Joints: WNL  Skin: WNL  Nervous System: Daytime Sleepiness  Mental Health: WNL     Blood: WNL     Medical History  Surgical History Family History Social History   Past Medical History:   Diagnosis Date   ? Diabetes mellitus (H)    ? Hyperlipidemia    ? Hypertension    ? Narcolepsy    ? Obesity     Past Surgical History:   Procedure Laterality Date   ? CARPAL TUNNEL RELEASE     ? CV  CORONARY ANGIOGRAM N/A 8/26/2019    Procedure: Coronary Angiogram;  Surgeon: Felix Coleman MD;  Location: [facility] Cath Lab;  Service: Cardiology   ? NASAL SEPTUM SURGERY     ? TENDON REPAIR      fingers    no family history of premature coronary artery disease Social History     Socioeconomic History   ? Marital status: Single     Spouse name: Not on file   ? Number of children: Not on file   ? Years of education: Not on file   ? Highest education level: Not on file   Occupational History   ? Not on file   Social Needs   ? Financial resource strain: Not on file   ? Food insecurity:     Worry: Not on file     Inability: Not on file   ? Transportation needs:     Medical: Not on file     Non-medical: Not on file   Tobacco Use   ? Smoking status: Never Smoker   ? Smokeless tobacco: Never Used   Substance and Sexual Activity   ? Alcohol use: Never     Frequency: Never     Comment: once a year   ? Drug use: Never   ? Sexual activity: Not on file   Lifestyle   ? Physical activity:     Days per week: Not on file     Minutes per session: Not on file   ? Stress: Not on file   Relationships   ? Social connections:     Talks on phone: Not on file     Gets together: Not on file     Attends Mandaeism service: Not on file     Active member of club or organization: Not on file     Attends meetings of clubs or organizations: Not on file     Relationship status: Not on file   ? Intimate partner violence:     Fear of current or ex partner: Not on file     Emotionally abused: Not on file     Physically abused: Not on file     Forced sexual activity: Not on file   Other Topics Concern   ? Not on file   Social History Narrative   ? Not on file          Medications  Allergies   Current Outpatient Medications   Medication Sig Dispense Refill   ? amLODIPine (NORVASC) 10 MG tablet Take 10 mg by mouth daily.     ? aspirin 81 MG EC tablet Take 81 mg by mouth daily.     ? atorvastatin (LIPITOR) 40 MG tablet Take 40 mg by mouth at  bedtime.     ? cloNIDine HCl (CATAPRES) 0.3 MG tablet Take 0.3 mg by mouth 2 (two) times a day.     ? clopidogrel (PLAVIX) 75 mg tablet Start with 300 mg loading dose once on 10/2/19, then take 75 mg daily thereafter. 30 tablet 11   ? dextroamphetamine-amphetamine (ADDERALL) 20 mg Tab Take 20-40 mg by mouth 3 (three) times a day. Takes for narcalepsy-     ? empagliflozin 25 mg Tab Take 25 mg by mouth daily.     ? glipiZIDE (GLUCOTROL XL) 10 MG 24 hr tablet Take 10 mg by mouth daily.     ? insulin lispro (HUMALOG) 100 unit/mL injection Inject under the skin. For VGo system. Daily dose 100 units     ? lisinopril (PRINIVIL,ZESTRIL) 40 MG tablet Take 40 mg by mouth daily.     ? metFORMIN (GLUCOPHAGE-XR) 750 MG 24 hr tablet Take 1 tablet (750 mg total) by mouth daily with breakfast. Takes 3 tablets daily with breakfast  0   ? metoprolol succinate (TOPROL-XL) 100 MG 24 hr tablet Take 100 mg by mouth daily.       No current facility-administered medications for this visit.       No Known Allergies      Lab Results    Chemistry/lipid CBC Cardiac Enzymes/BNP/TSH/INR   Lab Results   Component Value Date    CHOL 150 08/24/2019    HDL 43 08/24/2019    LDLCALC 73 08/24/2019    TRIG 171 (H) 08/24/2019    CREATININE 1.68 (H) 08/27/2019    BUN 25 (H) 08/27/2019    K 4.0 08/27/2019     08/27/2019     08/27/2019    CO2 27 08/27/2019    Lab Results   Component Value Date    WBC 9.3 08/26/2019    HGB 14.8 08/26/2019    HCT 43.3 08/26/2019    MCV 85 08/26/2019     08/27/2019    Lab Results   Component Value Date    CKMB 2 08/27/2019    TROPONINI 0.01 08/23/2019

## 2021-07-14 PROBLEM — I24.9 ACS (ACUTE CORONARY SYNDROME) (H): Status: RESOLVED | Noted: 2019-08-25 | Resolved: 2019-12-13

## 2021-07-26 NOTE — PROGRESS NOTES
[facility]  Cardiology Progress Note  Date of Service: 01/05/2021  Primary Cardiologist: Dr. Samuels    Assessment & Plan    Jeremiah Isaac is a 60 year old male with past medical history significant for CAD, pulmonary htn, sleep disordered breathing with bipap, CKD, obesity, hx of meth, admitted on 1/3/2021 with worsening SAINI.    Assessment:  1.  Acute on chronic heart failure with preserved EF comorbid with precapillary pulmonary HTN   - started on lasix 80 mg IV, has gotten 5 doses IV prior to RHC   - admit wt 324, current wt 310 - likely euvolemic based on rhc- previous dry wt has been established at ~312 and seems accurate given RHC   - RHC today Mean RA 6 mmHg , PA 72/25 (41 mmHg), wedge 12 mmHg, CO 9.5 l/min- euvolemic with ongoing PH values similar to cath 8/20 will increase vasodilators gently   -Discussed that it would be difficult to determine if his shortness of breath is due to volume versus simply his pulmonary hypertension, close tracking of weights and low-sodium diet will be important.    2.  CAD with hx of eedn to mid LAD, hx of instent restenosis of prox LAD with EDEN placed 8/20.    - on asa and brilanta, states sob is different than prior to stenting    3.  HTN, labile but improving    4.  HLD, on atorvastatin 40 pta, will restart here       Plan:   1. Please discharge on torsemide 40 mg alternating with 20 mg every other day.   I'm hopeful that this will be effective not that's he's euvolemic, good edema is reduced and absorption will be better.    2. Increase Toprol to 100 mg daily.  3. Will defer to outpt PH clinic for further adjustments of PH meds or addition of prostacyclin or prostacyclin analog  4. F/u being arranged for PH clinic next week with bmp prior     Shana Arreaga PA-C  [facility]  Pager: [phone]     Interval History   Patient notes that he feels much better than prior to admission.  He walks the floor last night and noticed that his shortness of  breath was markedly improved.  His edema seems better and his abdomen is softer.  He denies orthopnea.  He consistently wears his BiPAP.  He is motivated to eat low-salt.  He is agreeable to follow-up next week in pH clinic and understands that his medications may be adjusted further.    Physical Exam   Temp: 98.4  F (36.9  C) Temp src: Oral BP: (!) 150/82 Pulse: 86   Resp: 18 SpO2: 91 % O2 Device: None (Room air)    Vitals:    01/03/21 0300 01/04/21 0530 01/05/21 0424   Weight: 147.2 kg (324 lb 8 oz) 142.8 kg (314 lb 14.4 oz) 140.8 kg (310 lb 6.4 oz)   Well-developed well-nourished gentleman in no acute distress.  He is normocephalic and atraumatic.  Heart is regular mildly tachycardic I do not appreciate murmur rub or gallop.  Lungs are clear without wheezes rales or rhonchi.  Patient has 2+ edema to his mid to upper shin.  He has lymphedema changes noted as well.      Medications       aspirin  81 mg Oral Daily     atorvastatin  40 mg Oral QPM     insulin aspart  1-7 Units Subcutaneous TID AC     insulin aspart  1-5 Units Subcutaneous At Bedtime     macitentan  10 mg Oral Daily     metoprolol succinate ER  50 mg Oral Daily     NIFEdipine ER OSMOTIC  30 mg Oral Daily     sildenafil  5 mg Oral TID    And     sildenafil  20 mg Oral TID     sodium chloride (PF)  3 mL Intracatheter Q8H     ticagrelor  90 mg Oral BID     torsemide  20 mg Oral Daily       Data   Most Recent 3 CBC's:  Recent Labs   Lab Test 01/03/21  0413 01/02/21 2043 12/08/20  1418   WBC 7.8 7.8 7.4   HGB 10.6* 10.6* 10.6*   MCV 83 84 85    295 326     Most Recent 3 BMP's:  Recent Labs   Lab Test 01/05/21  0536 01/04/21  1421 01/04/21  0542 01/03/21  0413    141  --  142   POTASSIUM 3.8 3.8 3.9 3.6   CHLORIDE 105 107  --  110*   CO2 28 27  --  28   BUN 30 28  --  27   CR 2.06* 2.01*  --  1.79*   ANIONGAP 6 7  --  4   ANNITA 9.2 9.3  --  9.0   * 233*  --  133*     Most Recent 3 Troponin's:  Recent Labs   Lab Test 01/03/21  0656  01/03/21  0413 01/02/21 1953   TROPI <0.015 <0.015 <0.015     Most Recent 3 BNP's:  Recent Labs   Lab Test 01/02/21 1953 12/30/20  1451 12/08/20  1418 09/11/20  1539 08/19/20  2350 08/19/20  2350 08/11/20  1230   NTBNPI 2,514*  --   --   --   --  338 250   NTBNP  --  2,083* 1,832* 415*   < >  --   --     < > = values in this interval not displayed.     Last 24 hours labs reviewed     Tele- sinus     oriented to person, place, time and situation

## 2021-08-10 NOTE — PROVIDER NOTIFICATION
Chio Hayes Potassium = 5.7 and heart rate 44-47 bpm. Cards 2 notified. Patient asymptomatic. No intervention at this time. Will continue to monitor.   birth will be increased.

## 2021-09-25 ENCOUNTER — HEALTH MAINTENANCE LETTER (OUTPATIENT)
Age: 61
End: 2021-09-25

## 2021-10-21 DIAGNOSIS — I27.20 PULMONARY HYPERTENSION (H): ICD-10-CM

## 2021-10-27 RX ORDER — NIFEDIPINE 30 MG
30 TABLET, EXTENDED RELEASE ORAL DAILY
Qty: 90 TABLET | Refills: 1 | Status: SHIPPED | OUTPATIENT
Start: 2021-10-27

## 2021-12-10 ENCOUNTER — TELEPHONE (OUTPATIENT)
Dept: CARDIOLOGY | Facility: CLINIC | Age: 61
End: 2021-12-10

## 2021-12-10 NOTE — TELEPHONE ENCOUNTER
*Contacted pt to schedule follow-up with Dr. Riojas along with 6 minute walk test and labs. Pt stated that Dr. Riojas is not within network with his insurance and pt has been following Dr. Mora at [facility] for a few months now. Pt declined to schedule and will continue to see Dr. Mora. Agreed to plan.    No further follow-up completed and encounter closed.    Sharron Katz  Clinic   Pulmonary Hypertension  [facility]  (P) [phone]

## 2022-01-09 ENCOUNTER — HEALTH MAINTENANCE LETTER (OUTPATIENT)
Age: 62
End: 2022-01-09

## 2022-03-04 DIAGNOSIS — I25.10 CORONARY ARTERY DISEASE INVOLVING NATIVE HEART WITHOUT ANGINA PECTORIS, UNSPECIFIED VESSEL OR LESION TYPE: ICD-10-CM

## 2022-03-07 RX ORDER — CLOPIDOGREL BISULFATE 75 MG/1
75 TABLET ORAL DAILY
Qty: 90 TABLET | Refills: 0 | Status: SHIPPED | OUTPATIENT
Start: 2022-03-07

## 2022-03-07 NOTE — TELEPHONE ENCOUNTER
Last Clinic Visit: 5/11/2021  [facility]

## 2022-04-02 NOTE — PROGRESS NOTES
"Hasmukh is a 61 year old who is being evaluated via a billable video visit.      How would you like to obtain your AVS? MyChart  If the video visit is dropped, the invitation should be resent by: Send to text [phone]  Will anyone else be joining your video visit? No      Vitals - Patient Reported  Weight (Patient Reported): 147 kg (324 lb)  Height (Patient Reported): 177.8 cm (5' 10\")  BMI (Based on Pt Reported Ht/Wt): 46.49  Pain Score: No Pain (0)  Pain Loc: Chest        " Stable

## 2022-04-26 DIAGNOSIS — R06.09 DYSPNEA ON EXERTION: ICD-10-CM

## 2022-04-26 DIAGNOSIS — I27.20 PULMONARY HYPERTENSION (H): ICD-10-CM

## 2022-04-28 RX ORDER — SILDENAFIL CITRATE 20 MG/1
TABLET ORAL
Qty: 270 TABLET | Refills: 0 | OUTPATIENT
Start: 2022-04-28

## 2022-04-28 RX ORDER — TORSEMIDE 20 MG/1
TABLET ORAL
Qty: 135 TABLET | Refills: 0 | OUTPATIENT
Start: 2022-04-28

## 2022-04-28 NOTE — TELEPHONE ENCOUNTER
See 12-10-21 cards note: discontinuation of care  pt has been following Dr. Mora at [facility] for a few months now

## 2022-05-01 ENCOUNTER — HEALTH MAINTENANCE LETTER (OUTPATIENT)
Age: 62
End: 2022-05-01

## 2022-05-02 DIAGNOSIS — I27.20 PULMONARY HYPERTENSION (H): ICD-10-CM

## 2022-05-04 NOTE — TELEPHONE ENCOUNTER
potassium chloride ER (KLOR-CON M) 20 MEQ CR tablet     Take 2 tabs (40 mEq) 2 days in a row then 1 tab (20 mEq) for one day and continue with this pattern.   Last Written Prescription Date:  3/25/21  Last Fill Quantity: 135,   # refills: 3  Last Office Visit : 5/11/21  Future Office visit:  none    Routing refill request to provider for review/approval because:  [facility] seen by [facility] on multiple occasions since last visit on 5/11/21.  Not sure if patient has transferred Cardiology Care. Med pended. Please advise.    RECENT OUTSIDE LABS AVAILABLE IN THE LAB TAB OR CARE EVERYWHERE.  4/26/22  POTASSIUM 3.5 - 5.0 mmol/L 4.5

## 2022-05-05 RX ORDER — POTASSIUM CHLORIDE 1500 MG/1
TABLET, EXTENDED RELEASE ORAL
Qty: 135 TABLET | Refills: 0 | OUTPATIENT
Start: 2022-05-05

## 2022-05-05 NOTE — TELEPHONE ENCOUNTER
See 12-10-21 cards note: discontinuation of care  pt has been following Dr. Mora at [facility] for a few months now

## 2022-05-08 DIAGNOSIS — I27.20 PULMONARY HYPERTENSION (H): ICD-10-CM

## 2022-05-10 RX ORDER — POTASSIUM CHLORIDE 1500 MG/1
TABLET, EXTENDED RELEASE ORAL
Qty: 135 TABLET | Refills: 0 | OUTPATIENT
Start: 2022-05-10

## 2022-05-10 NOTE — TELEPHONE ENCOUNTER
Klor-Con M20 Oral Tablet Extended Release 20 MEQ      Last Written Prescription Date:  3-  Last Fill Quantity: 135,   # refills: 3  Last Office Visit : 5-  Future Office visit:  none    See 12-10-21 cards note: discontinuation of care  pt has been following Dr. Mora at [facility] for a few months now    Refill denied.  Pharmacy notified.      Kathleen M Doege RN

## 2022-08-21 ENCOUNTER — HEALTH MAINTENANCE LETTER (OUTPATIENT)
Age: 62
End: 2022-08-21

## 2022-12-25 ENCOUNTER — HEALTH MAINTENANCE LETTER (OUTPATIENT)
Age: 62
End: 2022-12-25

## 2023-01-22 NOTE — TELEPHONE ENCOUNTER
Medication Refill double check:    Last virtual visit was on 5/11/21 with Laurie Peters PA-C.    Follow up was recommended for 2-3 months. (Overdue)    Any additional encounters with changes to requested med? no    Authorizing provider is: Dr. Riojas     Refill was approved.     Additional orders/notes: patient was scheduled for testing and f/u in July, but cancelled and has not been re-schedule.  Sent msg to Sharron to help patient re-schedule testing & f/u appt.      Lissette Carias RN on 10/27/2021 at 11:35 AM    
NIFEdipine ER Oral Tablet Extended Release 24 Hour 30 MG      Last Written Prescription Date:  5-17-21  Last Fill Quantity: 90,   # refills: 0  Last Office Visit : 5-11-21 ( RTC 2-3 M)  Future Office visit:  none    Creatinine   Date Value Ref Range Status   03/25/2021 1.95 (H) 0.66 - 1.25 mg/dL Final   Noted/reviewed in last clinic note    BP Readings from Last 3 Encounters:   03/25/21 (!) 152/88   03/04/21 (!) 158/78   02/24/21 (!) 161/85   noted in 3-25-21 clinic note      Routing refill request to provider for review/approval because:  Past due appt- last rx 90 day  Gap in RF    Scheduling has been notified to contact the pt for appointment.      
187.96

## 2023-01-28 DIAGNOSIS — I25.10 CORONARY ARTERY DISEASE INVOLVING NATIVE HEART WITHOUT ANGINA PECTORIS, UNSPECIFIED VESSEL OR LESION TYPE: ICD-10-CM

## 2023-02-01 RX ORDER — CLOPIDOGREL BISULFATE 75 MG/1
75 TABLET ORAL DAILY
Qty: 90 TABLET | Refills: 0 | OUTPATIENT
Start: 2023-02-01

## 2023-02-01 NOTE — TELEPHONE ENCOUNTER
"clopidogrel (PLAVIX) 75 MG tablet   Last Written Prescription Date:  3/7/22  Last Fill Quantity: 90,   # refills: 0      See 12-10-21 cards note:\" discontinuation of care  pt has been following Dr. Mora at [facility] for a few months now\"  Called pharmacy [facility] [phone]  and informed of change.    "

## 2023-04-16 ENCOUNTER — HEALTH MAINTENANCE LETTER (OUTPATIENT)
Age: 63
End: 2023-04-16

## 2023-05-31 NOTE — NURSING NOTE
Chief Complaint   Patient presents with     Follow Up     Return for PH F/U (1 month follow up after Decreasing Metoprolol)      Vitals were taken and medications where reconciled.   Jose Heaton, EMT  10:37 AM     Ambulatory

## 2023-11-26 ENCOUNTER — HEALTH MAINTENANCE LETTER (OUTPATIENT)
Age: 63
End: 2023-11-26

## 2024-01-11 NOTE — Clinical Note
Dr. Hopson.     Please advise.   Pt asking about a neurology appt?    Thank you   Family has been updated.   Final Wound Length (In Cm), Simple/Intermediate/Complex Closures: 9

## 2024-02-04 ENCOUNTER — HEALTH MAINTENANCE LETTER (OUTPATIENT)
Age: 64
End: 2024-02-04

## 2024-06-23 ENCOUNTER — HEALTH MAINTENANCE LETTER (OUTPATIENT)
Age: 64
End: 2024-06-23

## (undated) DEVICE — KIT HAND CONTROL ACIST 014644 AR-P54

## (undated) DEVICE — KIT RIGHT HEART CATH 60130719

## (undated) DEVICE — MANIFOLD KIT ANGIO AUTOMATED 014613

## (undated) DEVICE — CATH BALLOON EMERGE 1.5X20MM H7493918920150

## (undated) DEVICE — INTRO SHEATH 7FRX10CM PINNACLE RSS702

## (undated) DEVICE — MEDITRACE MULTIFUNTION ADULT RADIOTRANSPARENT ELECTRODE FOR ZOLL

## (undated) DEVICE — KIT HAND CONTROL ANGIOTOUCH ACIST 65CM AT-P65

## (undated) DEVICE — WIRE GUIDE 0.025"X150CM EMERALD J TIP 502524

## (undated) DEVICE — CATH BALLOON NC EMERGE 3.75X8MM H7493926708370

## (undated) DEVICE — INTRO GLIDESHEATH SLENDER 6FR 10X45CM 60-1060

## (undated) DEVICE — Device

## (undated) DEVICE — CLOSURE ANGIOSEAL 6FR 610130

## (undated) DEVICE — DRAPE STERI MINOR PROCEDURE 3M 1092

## (undated) DEVICE — FASTENER CATH BALLOON CLAMPX2 STATLOCK 0684-00-493

## (undated) DEVICE — PACK HEART LEFT CUSTOM

## (undated) DEVICE — TOTE ANGIO CORP PC15AT SAN32CC83O

## (undated) DEVICE — INTRO SHEATH 7FRX25CM PINNACLE RSS706

## (undated) DEVICE — INTRODUCER CATH VASC 5FRX10CM  MPIS-501-NT-U-SST

## (undated) DEVICE — CATH DIAG 4FR ANG PIG 538453S

## (undated) DEVICE — CATH BALLOON EMERGE OTW 1.5X20MM H7493919020150

## (undated) DEVICE — VALVE HEMOSTASIS .096" COPILOT MECH 1003331

## (undated) DEVICE — SLEEVE TR BAND RADIAL COMPRESSION DEVICE 29CM XX-RF06L

## (undated) DEVICE — INTRODUCER SHEATH GREEN 6FRX24CM ARROW FLEX CL-07624

## (undated) DEVICE — CATH BALLOON NC EMERGE 3.50X8MM H7493926708350

## (undated) DEVICE — KIT ACCESSORY INTRO INFLATION SYS 20/30 PRIORITY 1000186-115

## (undated) DEVICE — CATH ANGIO INFINITI JL4 4FRX100CM 538420

## (undated) DEVICE — GUIDEWIRE VASC 0.014INX180CM RUNTHROUGH 25-1011

## (undated) DEVICE — CATH GUIDING BLUE YELLOW PTFE XB3.5 6FRX100CM 67005400

## (undated) DEVICE — INTRO SHEATH 6FRX25CM PINNACLE RSS606

## (undated) DEVICE — STATLOCK PSI DEVICE

## (undated) DEVICE — SHTH INTRO 0.021IN ID 6FR DIA

## (undated) DEVICE — CATH BALLOON NC EMERGE 3.50X20MM H7493926720350

## (undated) DEVICE — CATH ANGIO INFINITI 3DRC 4FRX100CM 538476

## (undated) DEVICE — CATH BALLOON EMERGE 2.5X20MM H7493918920250

## (undated) DEVICE — 0.035IN X 260CM, EMERALD DIAGNOSTIC GUIDEWIRE, FIXED-CORE PTFE COATED, STANDARD, 3MM EXCHANGE J-TIP (EA/1)

## (undated) DEVICE — CATH BALLOON NC EMERGE 2.75X8MM H7493926708270

## (undated) DEVICE — 7FR X 24CM SUPER ARROW-FLEX SHEATH SET W/INTEGRAL HEMOSTASIS VALVE/SIDE PORT

## (undated) DEVICE — TUBING PRESSURE 30"

## (undated) DEVICE — WIRE GUIDE 0.035"X150CM EMERALD J TIP 502521

## (undated) DEVICE — INTRO SHEATH AVANTI 4FRX23CM 504604T

## (undated) DEVICE — CATH US OD 5FR OD SEC 2.9FR EAGLE EYE PLATINUM 0.014 85900P

## (undated) DEVICE — GW 175CM STR OPTOWIRE DEUX W/O GA FCTR CONN

## (undated) RX ORDER — HEPARIN SODIUM 1000 [USP'U]/ML
INJECTION, SOLUTION INTRAVENOUS; SUBCUTANEOUS
Status: DISPENSED
Start: 2020-01-17

## (undated) RX ORDER — LIDOCAINE HYDROCHLORIDE 10 MG/ML
INJECTION, SOLUTION EPIDURAL; INFILTRATION; INTRACAUDAL; PERINEURAL
Status: DISPENSED
Start: 2020-06-30

## (undated) RX ORDER — HEPARIN SODIUM 1000 [USP'U]/ML
INJECTION, SOLUTION INTRAVENOUS; SUBCUTANEOUS
Status: DISPENSED
Start: 2020-08-11

## (undated) RX ORDER — HEPARIN SODIUM 1000 [USP'U]/ML
INJECTION, SOLUTION INTRAVENOUS; SUBCUTANEOUS
Status: DISPENSED
Start: 2020-06-30

## (undated) RX ORDER — HEPARIN SODIUM 200 [USP'U]/100ML
INJECTION, SOLUTION INTRAVENOUS
Status: DISPENSED
Start: 2021-01-04

## (undated) RX ORDER — FENTANYL CITRATE 50 UG/ML
INJECTION, SOLUTION INTRAMUSCULAR; INTRAVENOUS
Status: DISPENSED
Start: 2020-06-30

## (undated) RX ORDER — LIDOCAINE HYDROCHLORIDE 10 MG/ML
INJECTION, SOLUTION EPIDURAL; INFILTRATION; INTRACAUDAL; PERINEURAL
Status: DISPENSED
Start: 2021-01-04

## (undated) RX ORDER — VERAPAMIL HYDROCHLORIDE 2.5 MG/ML
INJECTION, SOLUTION INTRAVENOUS
Status: DISPENSED
Start: 2020-06-30

## (undated) RX ORDER — HEPARIN SODIUM 200 [USP'U]/100ML
INJECTION, SOLUTION INTRAVENOUS
Status: DISPENSED
Start: 2020-06-30

## (undated) RX ORDER — HEPARIN SODIUM 1000 [USP'U]/ML
INJECTION, SOLUTION INTRAVENOUS; SUBCUTANEOUS
Status: DISPENSED
Start: 2020-08-20

## (undated) RX ORDER — SODIUM CHLORIDE 9 MG/ML
INJECTION, SOLUTION INTRAVENOUS
Status: DISPENSED
Start: 2020-08-11

## (undated) RX ORDER — HEPARIN SODIUM 1000 [USP'U]/ML
INJECTION, SOLUTION INTRAVENOUS; SUBCUTANEOUS
Status: DISPENSED
Start: 2021-01-04

## (undated) RX ORDER — LIDOCAINE HYDROCHLORIDE 10 MG/ML
INJECTION, SOLUTION EPIDURAL; INFILTRATION; INTRACAUDAL; PERINEURAL
Status: DISPENSED
Start: 2020-08-20

## (undated) RX ORDER — VERAPAMIL HYDROCHLORIDE 2.5 MG/ML
INJECTION, SOLUTION INTRAVENOUS
Status: DISPENSED
Start: 2020-01-17

## (undated) RX ORDER — SODIUM NITROPRUSSIDE 25 MG/ML
INJECTION INTRAVENOUS
Status: DISPENSED
Start: 2020-08-11

## (undated) RX ORDER — ONDANSETRON 2 MG/ML
INJECTION INTRAMUSCULAR; INTRAVENOUS
Status: DISPENSED
Start: 2020-08-20

## (undated) RX ORDER — NITROGLYCERIN 5 MG/ML
VIAL (ML) INTRAVENOUS
Status: DISPENSED
Start: 2020-06-30

## (undated) RX ORDER — NITROGLYCERIN 5 MG/ML
VIAL (ML) INTRAVENOUS
Status: DISPENSED
Start: 2020-08-11